# Patient Record
Sex: FEMALE | Race: WHITE | NOT HISPANIC OR LATINO | Employment: FULL TIME | URBAN - METROPOLITAN AREA
[De-identification: names, ages, dates, MRNs, and addresses within clinical notes are randomized per-mention and may not be internally consistent; named-entity substitution may affect disease eponyms.]

---

## 2017-03-16 ENCOUNTER — OFFICE VISIT - RIVER FALLS (OUTPATIENT)
Dept: FAMILY MEDICINE | Facility: CLINIC | Age: 42
End: 2017-03-16

## 2017-03-16 ASSESSMENT — MIFFLIN-ST. JEOR: SCORE: 1669.2

## 2017-05-23 ENCOUNTER — OFFICE VISIT - RIVER FALLS (OUTPATIENT)
Dept: FAMILY MEDICINE | Facility: CLINIC | Age: 42
End: 2017-05-23

## 2017-05-23 ASSESSMENT — MIFFLIN-ST. JEOR: SCORE: 1689.16

## 2017-11-20 ENCOUNTER — OFFICE VISIT - RIVER FALLS (OUTPATIENT)
Dept: FAMILY MEDICINE | Facility: CLINIC | Age: 42
End: 2017-11-20

## 2017-11-20 ASSESSMENT — MIFFLIN-ST. JEOR: SCORE: 1658.32

## 2017-12-05 ENCOUNTER — COMMUNICATION - RIVER FALLS (OUTPATIENT)
Dept: FAMILY MEDICINE | Facility: CLINIC | Age: 42
End: 2017-12-05

## 2017-12-05 ENCOUNTER — OFFICE VISIT - RIVER FALLS (OUTPATIENT)
Dept: FAMILY MEDICINE | Facility: CLINIC | Age: 42
End: 2017-12-05

## 2017-12-05 ASSESSMENT — MIFFLIN-ST. JEOR: SCORE: 1648.34

## 2017-12-11 ENCOUNTER — OFFICE VISIT - RIVER FALLS (OUTPATIENT)
Dept: FAMILY MEDICINE | Facility: CLINIC | Age: 42
End: 2017-12-11

## 2017-12-11 ASSESSMENT — MIFFLIN-ST. JEOR: SCORE: 1687.35

## 2017-12-18 ENCOUNTER — OFFICE VISIT - RIVER FALLS (OUTPATIENT)
Dept: FAMILY MEDICINE | Facility: CLINIC | Age: 42
End: 2017-12-18

## 2018-01-08 ENCOUNTER — OFFICE VISIT - RIVER FALLS (OUTPATIENT)
Dept: FAMILY MEDICINE | Facility: CLINIC | Age: 43
End: 2018-01-08

## 2018-01-08 ASSESSMENT — MIFFLIN-ST. JEOR: SCORE: 1661.95

## 2018-02-20 ENCOUNTER — OFFICE VISIT - RIVER FALLS (OUTPATIENT)
Dept: FAMILY MEDICINE | Facility: CLINIC | Age: 43
End: 2018-02-20

## 2018-03-06 ENCOUNTER — OFFICE VISIT - RIVER FALLS (OUTPATIENT)
Dept: FAMILY MEDICINE | Facility: CLINIC | Age: 43
End: 2018-03-06

## 2018-03-06 ASSESSMENT — MIFFLIN-ST. JEOR: SCORE: 1632.01

## 2018-05-24 ENCOUNTER — OFFICE VISIT - RIVER FALLS (OUTPATIENT)
Dept: FAMILY MEDICINE | Facility: CLINIC | Age: 43
End: 2018-05-24

## 2018-06-14 ENCOUNTER — OFFICE VISIT - RIVER FALLS (OUTPATIENT)
Dept: FAMILY MEDICINE | Facility: CLINIC | Age: 43
End: 2018-06-14

## 2018-06-14 ASSESSMENT — MIFFLIN-ST. JEOR: SCORE: 1574.85

## 2018-06-25 ENCOUNTER — OFFICE VISIT - RIVER FALLS (OUTPATIENT)
Dept: FAMILY MEDICINE | Facility: CLINIC | Age: 43
End: 2018-06-25

## 2018-06-25 ASSESSMENT — MIFFLIN-ST. JEOR: SCORE: 1598.44

## 2018-07-31 ENCOUNTER — OFFICE VISIT - RIVER FALLS (OUTPATIENT)
Dept: FAMILY MEDICINE | Facility: CLINIC | Age: 43
End: 2018-07-31

## 2018-09-01 ENCOUNTER — TRANSFERRED RECORDS (OUTPATIENT)
Dept: HEALTH INFORMATION MANAGEMENT | Facility: CLINIC | Age: 43
End: 2018-09-01

## 2018-09-25 ENCOUNTER — OFFICE VISIT - RIVER FALLS (OUTPATIENT)
Dept: FAMILY MEDICINE | Facility: CLINIC | Age: 43
End: 2018-09-25

## 2018-09-25 ASSESSMENT — MIFFLIN-ST. JEOR: SCORE: 1573.95

## 2018-10-10 ENCOUNTER — OFFICE VISIT - RIVER FALLS (OUTPATIENT)
Dept: FAMILY MEDICINE | Facility: CLINIC | Age: 43
End: 2018-10-10

## 2018-10-10 ASSESSMENT — MIFFLIN-ST. JEOR: SCORE: 1568.5

## 2019-03-28 ENCOUNTER — OFFICE VISIT - RIVER FALLS (OUTPATIENT)
Dept: FAMILY MEDICINE | Facility: CLINIC | Age: 44
End: 2019-03-28

## 2019-04-04 ENCOUNTER — OFFICE VISIT - RIVER FALLS (OUTPATIENT)
Dept: FAMILY MEDICINE | Facility: CLINIC | Age: 44
End: 2019-04-04

## 2019-04-04 ASSESSMENT — MIFFLIN-ST. JEOR: SCORE: 1601.16

## 2019-04-05 LAB
BUN SERPL-MCNC: 16 MG/DL (ref 7–25)
BUN/CREAT RATIO - HISTORICAL: NORMAL (ref 6–22)
CALCIUM SERPL-MCNC: 9.6 MG/DL (ref 8.6–10.2)
CHLORIDE BLD-SCNC: 104 MMOL/L (ref 98–110)
CO2 SERPL-SCNC: 29 MMOL/L (ref 20–32)
CREAT SERPL-MCNC: 0.92 MG/DL (ref 0.5–1.1)
EGFRCR SERPLBLD CKD-EPI 2021: 77 ML/MIN/1.73M2
GLUCOSE BLD-MCNC: 94 MG/DL (ref 65–99)
POTASSIUM BLD-SCNC: 4.7 MMOL/L (ref 3.5–5.3)
SODIUM SERPL-SCNC: 140 MMOL/L (ref 135–146)

## 2019-06-13 ENCOUNTER — COMMUNICATION - RIVER FALLS (OUTPATIENT)
Dept: FAMILY MEDICINE | Facility: CLINIC | Age: 44
End: 2019-06-13

## 2019-08-12 ENCOUNTER — OFFICE VISIT - RIVER FALLS (OUTPATIENT)
Dept: FAMILY MEDICINE | Facility: CLINIC | Age: 44
End: 2019-08-12

## 2019-08-29 ENCOUNTER — COMMUNICATION - RIVER FALLS (OUTPATIENT)
Dept: FAMILY MEDICINE | Facility: CLINIC | Age: 44
End: 2019-08-29

## 2019-09-24 ENCOUNTER — COMMUNICATION - RIVER FALLS (OUTPATIENT)
Dept: FAMILY MEDICINE | Facility: CLINIC | Age: 44
End: 2019-09-24

## 2019-11-05 ENCOUNTER — HOSPITAL ENCOUNTER (EMERGENCY)
Facility: CLINIC | Age: 44
Discharge: HOME | End: 2019-11-05
Attending: NURSE PRACTITIONER | Admitting: NURSE PRACTITIONER
Payer: COMMERCIAL

## 2019-11-05 ENCOUNTER — OFFICE VISIT - RIVER FALLS (OUTPATIENT)
Dept: FAMILY MEDICINE | Facility: CLINIC | Age: 44
End: 2019-11-05

## 2019-11-05 VITALS
TEMPERATURE: 97.6 F | HEIGHT: 62 IN | OXYGEN SATURATION: 99 % | RESPIRATION RATE: 18 BRPM | SYSTOLIC BLOOD PRESSURE: 175 MMHG | HEART RATE: 94 BPM | DIASTOLIC BLOOD PRESSURE: 89 MMHG | WEIGHT: 150 LBS | BODY MASS INDEX: 27.6 KG/M2

## 2019-11-05 DIAGNOSIS — Z76.0 MEDICATION REFILL: ICD-10-CM

## 2019-11-05 PROCEDURE — G0463 HOSPITAL OUTPT CLINIC VISIT: HCPCS | Performed by: NURSE PRACTITIONER

## 2019-11-05 PROCEDURE — 99214 OFFICE O/P EST MOD 30 MIN: CPT | Mod: Z6 | Performed by: NURSE PRACTITIONER

## 2019-11-05 RX ORDER — PREGABALIN 150 MG/1
150 CAPSULE ORAL 2 TIMES DAILY
Qty: 10 CAPSULE | Refills: 0 | Status: SHIPPED | OUTPATIENT
Start: 2019-11-05 | End: 2019-11-06

## 2019-11-05 RX ORDER — PREGABALIN 300 MG/1
300 CAPSULE ORAL 2 TIMES DAILY
COMMUNITY
End: 2019-11-05

## 2019-11-05 ASSESSMENT — ENCOUNTER SYMPTOMS
CHILLS: 0
COUGH: 0
HEADACHES: 1
FATIGUE: 0
VOMITING: 0

## 2019-11-05 ASSESSMENT — MIFFLIN-ST. JEOR: SCORE: 1298.65

## 2019-11-05 NOTE — ED PROVIDER NOTES
"  History     Chief Complaint   Patient presents with     Medication Refill     pt moved to [place] from [place] and has no PCP.  seeking refill of gabapentin and migraine meds     HPI  Apoorva Roberts is a 43 year old female who presents to urgent care requesting refill of medications. Patient states she recently moved to [place] from [place] and would like refill of Lyrica, Gabapentin, and Fioricet that she takes for chronic pain and migraine headaches.   Per [place] Prescription Monitoring Database ([place]):   Last refill of Pregabalin 150 mg capsule was #60 (30 day supply) on 9/24/2019.  No evidence of getting Fioricet        I have no access to her prior medical records or history other than what is noted in MNPMD above.         Allergies:  No Known Allergies    Problem List:    There are no active problems to display for this patient.       Past Medical History:    History reviewed. No pertinent past medical history.    Past Surgical History:    History reviewed. No pertinent surgical history.    Family History:    History reviewed. No pertinent family history.    Social History:  Marital Status:    Social History     Tobacco Use     Smoking status: Current Every Day Smoker     Packs/day: 1.00     Smokeless tobacco: Never Used   Substance Use Topics     Alcohol use: None     Drug use: None        Medications:    pregabalin (LYRICA) 150 MG capsule          Review of Systems   Constitutional: Negative for chills and fatigue.   HENT: Negative for congestion.    Respiratory: Negative for cough.    Cardiovascular: Negative for chest pain.   Gastrointestinal: Negative for vomiting.   Neurological: Positive for headaches.       Physical Exam   BP: (!) 175/89  Pulse: 94  Temp: 97.6  F (36.4  C)  Resp: 18  Height: 157.5 cm (5' 2\")  Weight: 68 kg (150 lb)  SpO2: 99 %      Physical Exam  Constitutional:       General: She is not in acute distress.     Appearance: Normal appearance. She is not ill-appearing.   Cardiovascular:      " Rate and Rhythm: Normal rate.   Pulmonary:      Effort: Pulmonary effort is normal.   Neurological:      Mental Status: She is alert and oriented to person, place, and time.         ED Course        Procedures                 No results found for this or any previous visit (from the past 24 hour(s)).    Medications - No data to display    Assessments & Plan (with Medical Decision Making)   The majority of this visit was counseling patient. Discussed the appropriate avenue for refill medications and establishing care. I am not comfortable refilling her Fioricet, and we do not provide opioid refills for chronic pain here in Urgent Care. Patient states she is on both Gabapentin and Pregabalin which seems unusual. I was able to verify Pregabalin was refilled on 9/24 for 150 mg tablets (60 tabs) for a 30 day supply.  Plan as follows:  Refill provided for a 10 day supply of Pregabalin 150 mg twice a day.  Establish care with a primary care provider for further medication refills, and an appointment was scheduled for you tomorrow at 2:20apm with Ginger Tamez NP here at the [facility].    I have reviewed the nursing notes.    I have reviewed the findings, diagnosis, plan and need for follow up with the patient.      New Prescriptions    PREGABALIN (LYRICA) 150 MG CAPSULE    Take 1 capsule (150 mg) by mouth 2 times daily       Final diagnoses:   Medication refill       11/5/2019   [facility] EMERGENCY DEPARTMENT     Vale Carter APRN CNP  11/05/19 1320

## 2019-11-06 ENCOUNTER — OFFICE VISIT (OUTPATIENT)
Dept: FAMILY MEDICINE | Facility: CLINIC | Age: 44
End: 2019-11-06
Payer: COMMERCIAL

## 2019-11-06 VITALS
SYSTOLIC BLOOD PRESSURE: 166 MMHG | TEMPERATURE: 98.9 F | DIASTOLIC BLOOD PRESSURE: 104 MMHG | HEIGHT: 62 IN | HEART RATE: 89 BPM | OXYGEN SATURATION: 100 % | WEIGHT: 182 LBS | BODY MASS INDEX: 33.49 KG/M2

## 2019-11-06 DIAGNOSIS — I10 BENIGN ESSENTIAL HYPERTENSION: ICD-10-CM

## 2019-11-06 DIAGNOSIS — G62.89 OTHER POLYNEUROPATHY: Primary | ICD-10-CM

## 2019-11-06 DIAGNOSIS — Z23 NEED FOR PROPHYLACTIC VACCINATION AND INOCULATION AGAINST INFLUENZA: ICD-10-CM

## 2019-11-06 DIAGNOSIS — Z23 ENCOUNTER FOR VACCINATION: ICD-10-CM

## 2019-11-06 DIAGNOSIS — G43.009 MIGRAINE WITHOUT AURA AND WITHOUT STATUS MIGRAINOSUS, NOT INTRACTABLE: ICD-10-CM

## 2019-11-06 PROBLEM — G43.109 CLASSIC MIGRAINE: Status: ACTIVE | Noted: 2019-11-06

## 2019-11-06 PROBLEM — L73.2 HIDRADENITIS SUPPURATIVA: Status: ACTIVE | Noted: 2017-09-11

## 2019-11-06 PROBLEM — K21.9 GASTROESOPHAGEAL REFLUX DISEASE: Status: ACTIVE | Noted: 2017-12-05

## 2019-11-06 PROBLEM — J18.9 COMMUNITY ACQUIRED PNEUMONIA: Status: ACTIVE | Noted: 2017-12-05

## 2019-11-06 PROCEDURE — 90471 IMMUNIZATION ADMIN: CPT | Performed by: NURSE PRACTITIONER

## 2019-11-06 PROCEDURE — 99214 OFFICE O/P EST MOD 30 MIN: CPT | Mod: 25 | Performed by: NURSE PRACTITIONER

## 2019-11-06 PROCEDURE — 90732 PPSV23 VACC 2 YRS+ SUBQ/IM: CPT | Performed by: NURSE PRACTITIONER

## 2019-11-06 PROCEDURE — 90686 IIV4 VACC NO PRSV 0.5 ML IM: CPT | Performed by: NURSE PRACTITIONER

## 2019-11-06 PROCEDURE — 90472 IMMUNIZATION ADMIN EACH ADD: CPT | Performed by: NURSE PRACTITIONER

## 2019-11-06 RX ORDER — GABAPENTIN 300 MG/1
CAPSULE ORAL
Refills: 0 | COMMUNITY
Start: 2019-09-24 | End: 2019-11-06

## 2019-11-06 RX ORDER — BUTALBITAL, ACETAMINOPHEN AND CAFFEINE 50; 325; 40 MG/1; MG/1; MG/1
TABLET ORAL
Refills: 0 | Status: CANCELLED | OUTPATIENT
Start: 2019-11-06

## 2019-11-06 RX ORDER — ZOLPIDEM TARTRATE 10 MG/1
TABLET ORAL
Refills: 1 | COMMUNITY
Start: 2019-10-23 | End: 2019-12-23

## 2019-11-06 RX ORDER — HYDROCHLOROTHIAZIDE 25 MG/1
25 TABLET ORAL DAILY
Qty: 90 TABLET | Refills: 3 | Status: SHIPPED | OUTPATIENT
Start: 2019-11-06 | End: 2021-02-01

## 2019-11-06 RX ORDER — RIZATRIPTAN BENZOATE 5 MG/1
5-10 TABLET ORAL
Qty: 18 TABLET | Refills: 11 | Status: SHIPPED | OUTPATIENT
Start: 2019-11-06 | End: 2020-11-16

## 2019-11-06 RX ORDER — PREGABALIN 150 MG/1
150 CAPSULE ORAL 2 TIMES DAILY
Qty: 10 CAPSULE | Refills: 0 | Status: CANCELLED | OUTPATIENT
Start: 2019-11-06

## 2019-11-06 RX ORDER — LIDOCAINE/PRILOCAINE 2.5 %-2.5%
1 CREAM (GRAM) TOPICAL DAILY PRN
COMMUNITY
Start: 2017-09-11 | End: 2022-03-21

## 2019-11-06 RX ORDER — HYDROCHLOROTHIAZIDE 25 MG/1
25 TABLET ORAL
COMMUNITY
End: 2019-11-06

## 2019-11-06 RX ORDER — HYDROCODONE BITARTRATE AND ACETAMINOPHEN 5; 325 MG/1; MG/1
TABLET ORAL
Refills: 0 | COMMUNITY
Start: 2019-08-22 | End: 2019-11-06

## 2019-11-06 RX ORDER — GABAPENTIN 300 MG/1
CAPSULE ORAL
Qty: 360 CAPSULE | Refills: 11 | Status: SHIPPED | OUTPATIENT
Start: 2019-11-06 | End: 2020-09-17

## 2019-11-06 RX ORDER — BUTALBITAL, ACETAMINOPHEN AND CAFFEINE 50; 325; 40 MG/1; MG/1; MG/1
TABLET ORAL
Refills: 0 | COMMUNITY
Start: 2019-09-20 | End: 2019-11-06

## 2019-11-06 ASSESSMENT — MIFFLIN-ST. JEOR: SCORE: 1443.8

## 2019-11-06 NOTE — PROGRESS NOTES
Subjective     Apoorva Roberts is a 43 year old female who presents to clinic today for the following health issues:    HPI   Chief Complaint   Patient presents with     ER F/U     Establish Care     Refill Request     Imm/Inj     Flu Shot         New Patient/Transfer of Care  Recently moved into this area from [place]      ED/ Followup:    Facility:  [facility]  Date of visit: 11/5/2019- yesterday  Reason for visit: refill request of gabapentin and migraine medications  Current Status: requesting refills       Migraine     Since your last clinic visit, how have your headaches changed?  Worsened    How often are you getting headaches or migraines? daily     Are you able to do normal daily activities when you have a migraine? Yes- sometimes    Are you taking rescue/relief medications? (Select all that apply) ibuprofen (Advil, Motrin) and Tylenol    How helpful is your rescue/relief medication?  I get only a small amount of relief;  Has been out of the Fioricet  That  Medication is very effective -  has taken Fioricet for headaches since she was a teenager.  She previously tried Imitrex which did not work.    Are you taking any medications to prevent migraines? (Select all that apply)  No    In the past 4 weeks, how often have you gone to urgent care or the emergency room because of your headaches?  1      Hypertension: Patient has long history of high blood pressure.  She has been on hydrochlorothiazide 25 mg daily.  She has been out of this medication for a while.        Chronic Pain Follow-Up       Type / Location of Pain: nerve damage in both legs - happened during a hernia surgery  Analgesia/pain control:       Recent changes:  Worse because she has been out of her medication      Overall control: Tolerable with discomfort  Patient currently is taking gabapentin 1200 mg 3 times daily and Lyrica 150 mg twice daily.  She states her previous doctor prescribed both of these medications starting 1 year  "ago.  Activity level/function:      Daily activities:  Able to do all daily activities    Work:  Pain does not limit any  Work as long as she can move around  Adverse effects:  No  Adherance    Taking medication as directed?  Yes- doesn't have any currently    Participating in other treatments: yes  Risk Factors:    Sleep:  Fair    Mood/anxiety:  controlled    Recent family or social stressors:  recent move and job change/loss; doesn't have place to live currently    Other aggravating factors: none  No flowsheet data found.  No flowsheet data found.  Encounter-Level CSA:    There are no encounter-level csa.     Patient-Level CSA:    There are no patient-level csa.       Reviewed and updated as needed this visit by Provider  Tobacco  Allergies  Meds  Problems  Med Hx  Surg Hx  Fam Hx         Review of Systems         Objective    BP (!) 166/104 (BP Location: Right arm, Cuff Size: Adult Large)   Pulse 89   Temp 98.9  F (37.2  C) (Tympanic)   Ht 1.575 m (5' 2\")   Wt 82.6 kg (182 lb)   SpO2 100%   Breastfeeding? No   BMI 33.29 kg/m    Body mass index is 33.29 kg/m .  Physical Exam   GENERAL: healthy, alert and no distress  EYES: Eyes grossly normal to inspection, PERRL and conjunctivae and sclerae normal  HENT: ear canals and TM's normal, nose and mouth without ulcers or lesions  NECK: no adenopathy, no asymmetry, masses, or scars and thyroid normal to palpation  RESP: lungs clear to auscultation - no rales, rhonchi or wheezes  CV: regular rate and rhythm, normal S1 S2, no S3 or S4, no murmur, click or rub, no peripheral edema and peripheral pulses strong  ABDOMEN: soft, nontender, no hepatosplenomegaly, no masses and bowel sounds normal  MS: no gross musculoskeletal defects noted, no edema            Assessment & Plan       ICD-10-CM    1. Other polyneuropathy G62.89 Peripheral neuropathy because after complication during hernia surgery.  Patient reports taking both gabapentin and Lyrica.  Discussed with " "patient that I questioned the safety of this.  Recommended treatment with just 1 of these medications.  She would like to stay on the gabapentin as she feels this provides better pain control.  Gabapentin was refilled at her current dose of 1200 mg 3 times daily.  Discontinue the Lyrica.    gabapentin (NEURONTIN) 300 MG capsule     2. Migraine without aura and without status migrainosus, not intractable G43.009 Patient has a long history of chronic daily headaches.  She has been using Fioricet since she was a teenager.  Previously tried Imitrex which was ineffective.  Discussed with patient that Fioricet is no longer recommended as therapy for chronic daily headaches.  Recommend trial of Maxalt.  If she continues to have chronic daily headaches recommend headache prevention medication such as topiramate.  Patient will follow-up in 1 month to reevaluate this.    rizatriptan (MAXALT) 5 MG tablet     3. Benign essential hypertension I10 Blood pressures are poorly controlled.  Restart hydrochlorothiazide 25 mg daily.  Recheck in one month    hydrochlorothiazide (HYDRODIURIL) 25 MG tablet     4. Need for prophylactic vaccination and inoculation against influenza Z23 INFLUENZA VACCINE IM > 6 MONTHS VALENT IIV4 [70234]     Vaccine Administration, Initial [61952]   5. Encounter for vaccination Z23 Pneumococcal vaccine 23 valent PPSV23  (Pneumovax) [05400]     Vaccine Administration, Each Additional [69260]        Tobacco Cessation:   reports that she has been smoking. She has been smoking about 1.00 pack per day. She has never used smokeless tobacco.  Tobacco Cessation Action Plan: Information offered: Patient not interested at this time      BMI:   Estimated body mass index is 33.29 kg/m  as calculated from the following:    Height as of this encounter: 1.575 m (5' 2\").    Weight as of this encounter: 82.6 kg (182 lb).   Weight management plan: Discussed healthy diet and exercise guidelines            Return in about 4 " weeks (around 12/4/2019).    The risks, benefits and treatment options of prescribed medications or other treatments have been discussed with the patient. The patient verbalized their understanding and should call or follow up if no improvement or if they develop further problems.    ISAAC Omer [facility]

## 2019-11-06 NOTE — NURSING NOTE
Prior to immunization administration, verified patients identity using patient s name and date of birth. Please see Immunization Activity for additional information.     Screening Questionnaire for Adult Immunization    Are you sick today?   No   Do you have allergies to medications, food, a vaccine component or latex?   Yes   Have you ever had a serious reaction after receiving a vaccination?   No   Do you have a long-term health problem with heart disease, lung disease, asthma, kidney disease, metabolic disease (e.g. diabetes), anemia, or other blood disorder?   No   Do you have cancer, leukemia, HIV/AIDS, or any other immune system problem?   No   In the past 3 months, have you taken medications that affect  your immune system, such as prednisone, other steroids, or anticancer drugs; drugs for the treatment of rheumatoid arthritis, Crohn s disease, or psoriasis; or have you had radiation treatments?   No   Have you had a seizure, or a brain or other nervous system problem?   No   During the past year, have you received a transfusion of blood or blood     products, or been given immune (gamma) globulin or antiviral drug?   No   For women: Are you pregnant or is there a chance you could become        pregnant during the next month?   No   Have you received any vaccinations in the past 4 weeks?   No     Immunization questionnaire was positive for at least one answer.  Notified provider.        Per orders of Dr. Tamez, injection of flu and pneumovax PPVS23 given by JACK FELDER. Patient instructed to remain in clinic for 15 minutes afterwards, and to report any adverse reaction to me immediately.       Screening performed by JACK FELDER on 11/6/2019 at 2:55 PM.

## 2019-11-06 NOTE — PATIENT INSTRUCTIONS
Your clinic record indicates that you are due for:   Pap and physical exam  If fasting labs are indicated, call before coming in to let the  know when you are coming in.  Need to be fasting for 10 hrs so just coming in before eating breakfast is the easiest.         Thank you for choosing [facility].  You may be receiving an email and/or telephone survey request from [facility] Customer Experience regarding your visit today.  Please take a few minutes to respond to the survey to let us know how we are doing.      If you have questions or concerns, please contact us via activ[employer] Intelligence or you can contact your care team at [phone].    Our Clinic hours are:  Monday 6:40 am  to 7:00 pm  Tuesday -Friday 6:40 am to 5:00 pm    The [place] outpatient lab hours are:  Monday - Friday 6:10 am to 4:45 pm  Saturdays 7:00 am to 11:00 am  Appointments are required, call [phone]    If you have clinical questions after hours or would like to schedule an appointment,  call the clinic at [phone].

## 2019-12-20 RX ORDER — ZOLPIDEM TARTRATE 10 MG/1
TABLET ORAL
Refills: 1 | Status: CANCELLED | OUTPATIENT
Start: 2019-12-20

## 2019-12-20 NOTE — TELEPHONE ENCOUNTER
Requested Prescriptions   Pending Prescriptions Disp Refills     zolpidem (AMBIEN) 10 MG tablet  Historical  Last office visit: 11/6/2019 with prescribing provider:  Ginger Tamez   Future Office Visit:      1       There is no refill protocol information for this order

## 2019-12-20 NOTE — TELEPHONE ENCOUNTER
Routing refill request to provider for review/approval because:  Drug not on the [facility] refill protocol   Medication is reported/historical  Rossana ASHER RN

## 2019-12-23 ENCOUNTER — OFFICE VISIT (OUTPATIENT)
Dept: FAMILY MEDICINE | Facility: CLINIC | Age: 44
End: 2019-12-23
Payer: COMMERCIAL

## 2019-12-23 VITALS
HEIGHT: 62 IN | HEART RATE: 89 BPM | OXYGEN SATURATION: 99 % | BODY MASS INDEX: 32.02 KG/M2 | WEIGHT: 174 LBS | RESPIRATION RATE: 12 BRPM | DIASTOLIC BLOOD PRESSURE: 102 MMHG | SYSTOLIC BLOOD PRESSURE: 182 MMHG | TEMPERATURE: 99.2 F

## 2019-12-23 DIAGNOSIS — F51.01 PRIMARY INSOMNIA: ICD-10-CM

## 2019-12-23 DIAGNOSIS — I10 BENIGN ESSENTIAL HYPERTENSION: ICD-10-CM

## 2019-12-23 DIAGNOSIS — J01.00 ACUTE NON-RECURRENT MAXILLARY SINUSITIS: Primary | ICD-10-CM

## 2019-12-23 PROCEDURE — 99214 OFFICE O/P EST MOD 30 MIN: CPT | Performed by: NURSE PRACTITIONER

## 2019-12-23 RX ORDER — LISINOPRIL 20 MG/1
20 TABLET ORAL DAILY
Qty: 30 TABLET | Refills: 1 | Status: SHIPPED | OUTPATIENT
Start: 2019-12-23 | End: 2020-02-12

## 2019-12-23 RX ORDER — ZOLPIDEM TARTRATE 10 MG/1
10 TABLET ORAL
Qty: 30 TABLET | Refills: 3 | Status: SHIPPED | OUTPATIENT
Start: 2019-12-23 | End: 2020-04-01

## 2019-12-23 ASSESSMENT — MIFFLIN-ST. JEOR: SCORE: 1402.51

## 2019-12-23 NOTE — PATIENT INSTRUCTIONS
Your clinic record indicates that you are due for:   Pap and physical exam      For blood pressure:  Continue hydrochlorothiazide 25 mg daily  Add lisinopril 20 mg daily.  Follow up in one month.      Thank you for choosing [facility].  You may be receiving an email and/or telephone survey request from [facility] Customer Experience regarding your visit today.  Please take a few minutes to respond to the survey to let us know how we are doing.      If you have questions or concerns, please contact us via In[employer] or you can contact your care team at [phone].    Our Clinic hours are:  Monday 6:40 am  to 7:00 pm  Tuesday -Friday 6:40 am to 5:00 pm    The [place] outpatient lab hours are:  Monday - Friday 6:10 am to 4:45 pm  Saturdays 7:00 am to 11:00 am  Appointments are required, call [phone]    If you have clinical questions after hours or would like to schedule an appointment,  call the clinic at [phone].

## 2019-12-23 NOTE — TELEPHONE ENCOUNTER
Left message on answering machine for patient to call back.  CSS, when she calls back, please tell her know that Ginger was asked about a prescription for zolpidem/ ambien and will need to see her before she could consider this prescription. Please help her schedule if she would like to come in to talk about it, thanks,   Roberta Jorge RNC

## 2019-12-23 NOTE — PROGRESS NOTES
"Subjective     Apoorva Roberts is a 44 year old female who presents to clinic today for the following health issues:    HPI   ENT Symptoms             Symptoms: cc Present Absent Comment   Fever/Chills   x    Fatigue       Muscle Aches   x    Eye Irritation       Sneezing  x     Nasal Pranav/Drg x x     Sinus Pressure/Pain x x     Loss of smell       Dental pain   x    Sore Throat   x    Swollen Glands       Ear Pain/Fullness   x    Cough   x Not really   Wheeze   x    Chest Pain   x    Shortness of breath       Rash       Other         Symptom duration:  has been sick with a cold for a couple weeks  Yesterday had severe sinus pain   Symptom severity:  moderate   Treatments tried:  nyquil and mucinex   Contacts:  none known       Hypertension Follow-up      Do you check your blood pressure regularly outside of the clinic? No     Are you following a low salt diet? No    Are your blood pressures ever more than 140 on the top number (systolic) OR more   than 90 on the bottom number (diastolic), for example 140/90? Yes   She is taking her hydrochlorothiazide every day.      Insomnia:  Takes Ambien 10 mg at bedtime if needed.  Tries not to use it every day.  No side effects.  No residual daytime grogginess              Reviewed and updated as needed this visit by Provider         Review of Systems   ROS COMP: Constitutional, HEENT, cardiovascular, pulmonary, gi and gu systems are negative, except as otherwise noted.      Objective    BP (!) 162/102 (BP Location: Right arm)   Pulse 89   Temp 99.2  F (37.3  C) (Tympanic)   Resp 12   Ht 1.575 m (5' 2\")   Wt 78.9 kg (174 lb)   SpO2 99%   BMI 31.83 kg/m    Body mass index is 31.83 kg/m .  Physical Exam   GENERAL: healthy, alert and no distress  HENT: ear canals and TM's normal, nose and mouth without ulcers or lesions  NECK: no adenopathy, no asymmetry, masses, or scars and thyroid normal to palpation  RESP: lungs clear to auscultation - no rales, rhonchi or wheezes  CV: " regular rate and rhythm, normal S1 S2, no S3 or S4, no murmur, click or rub, no peripheral edema and peripheral pulses strong            Assessment & Plan       ICD-10-CM    1. Acute non-recurrent maxillary sinusitis J01.00 amoxicillin-clavulanate (AUGMENTIN) 875-125 MG tablet BID for 10 days.     2. Primary insomnia F51.01 zolpidem (AMBIEN) 10 MG tablet - refilled.     3. Benign essential hypertension I10 Poorly controlled.  Add lisinopril (PRINIVIL/ZESTRIL) 20 MG tablet          Patient Instructions   Your clinic record indicates that you are due for:   Pap and physical exam      For blood pressure:  Continue hydrochlorothiazide 25 mg daily  Add lisinopril 20 mg daily.  Follow up in one month.      Thank you for choosing [facility].  You may be receiving an email and/or telephone survey request from [facility] Customer Experience regarding your visit today.  Please take a few minutes to respond to the survey to let us know how we are doing.      If you have questions or concerns, please contact us via Alek[employer] or you can contact your care team at [phone].    Our Clinic hours are:  Monday 6:40 am  to 7:00 pm  Tuesday -Friday 6:40 am to 5:00 pm    The [place] outpatient lab hours are:  Monday - Friday 6:10 am to 4:45 pm  Saturdays 7:00 am to 11:00 am  Appointments are required, call [phone]    If you have clinical questions after hours or would like to schedule an appointment,  call the clinic at [phone].        Return in about 4 weeks (around 1/20/2020) for BP Recheck.    The risks, benefits and treatment options of prescribed medications or other treatments have been discussed with the patient. The patient verbalized their understanding and should call or follow up if no improvement or if they develop further problems.    ISAAC Omer CNP  [facility]

## 2020-01-30 ENCOUNTER — TELEPHONE (OUTPATIENT)
Dept: FAMILY MEDICINE | Facility: CLINIC | Age: 45
End: 2020-01-30

## 2020-01-30 NOTE — TELEPHONE ENCOUNTER
Patient Quality Outreach Summary      Summary:    Patient is due/failing the following:   Hypertension follow-up visit    Type of outreach:    Call attempted, number listed is changed or no longer in service, unable to leave message.  Letter mailed with recommendations.    Questions for provider review:    None                                                                                                                    YVONNE Washington MA

## 2020-01-30 NOTE — LETTER
January 30, 2020      Apoorva Roberts  [address]  [address]  [place] [address]        Dear Apoorva,     In order to ensure we are providing the best quality care, we have reviewed your chart and see that you are due for:  A blood pressure recheck with your provider.  A new medication was added at your last office visit and your provider would like you to recheck with her.  You can call the clinic at [phone] to schedule an appointment.    Please call the clinic with any questions or concerns.    Thank you for trusting us with your health care.  Sincerely,    Your [facility] Team/lw

## 2020-01-30 NOTE — TELEPHONE ENCOUNTER
Lisinopril added last month.  Please call and remind patient to follow up with me for a recheck.  Ginger Tamez, CNP

## 2020-02-10 DIAGNOSIS — I10 BENIGN ESSENTIAL HYPERTENSION: ICD-10-CM

## 2020-02-10 NOTE — TELEPHONE ENCOUNTER
"Requested Prescriptions   Pending Prescriptions Disp Refills     lisinopril (PRINIVIL/ZESTRIL) 20 MG tablet [Pharmacy Med Name: LISINOPRIL 20 MG TABLET] 30 tablet 1     Sig: Take 1 tablet (20 mg) by mouth daily       ACE Inhibitors (Including Combos) Protocol Failed - 2/10/2020  8:00 AM        Failed - Blood pressure under 140/90 in past 12 months     BP Readings from Last 3 Encounters:   12/23/19 (!) 182/102   11/06/19 (!) 166/104   11/05/19 (!) 175/89                 Failed - Normal serum creatinine on file in past 12 months     No lab results found.          Failed - Normal serum potassium on file in past 12 months     No lab results found.          Passed - Recent (12 mo) or future (30 days) visit within the authorizing provider's specialty     Patient has had an office visit with the authorizing provider or a provider within the authorizing providers department within the previous 12 mos or has a future within next 30 days. See \"Patient Info\" tab in inbasket, or \"Choose Columns\" in Meds & Orders section of the refill encounter.              Passed - Medication is active on med list        Passed - Patient is age 18 or older        Passed - No active pregnancy on record        Passed - No positive pregnancy test within past 12 months        Last Written Prescription Date:  12/23/2019  Last Fill Quantity: 30,  # refills: 1   Last office visit: 12/23/2019 with prescribing provider:  Joi   Future Office Visit:      "

## 2020-02-10 NOTE — LETTER
[facility]  [address] [place] [address]  Phone: [phone]       February 12, 2020         Apoorva Roberts  [address]  [address]  [place] [address]            Dear Apoorva:    We are concerned about your health care.  We recently provided you with medication refills.  Many medications require routine follow-up with your doctor.    Your prescription(s) have been refilled for 30 days so you may have time for the above noted follow-up. Please call to schedule soon so we can assure you have an appointment before your next refills are needed.    Thank you,      ALEXANDER Omer / shun

## 2020-02-12 RX ORDER — LISINOPRIL 20 MG/1
20 TABLET ORAL DAILY
Qty: 30 TABLET | Refills: 0 | Status: SHIPPED | OUTPATIENT
Start: 2020-02-12 | End: 2020-09-17

## 2020-02-12 NOTE — TELEPHONE ENCOUNTER
Due for clinic visit and labs. 30 day joaquin fill given. Reminder letter sent.        BRIDGETTE MoreauN, RN

## 2020-04-28 ENCOUNTER — APPOINTMENT (OUTPATIENT)
Dept: FAMILY MEDICINE | Facility: CLINIC | Age: 45
End: 2020-04-28
Payer: COMMERCIAL

## 2020-04-29 ENCOUNTER — TELEPHONE (OUTPATIENT)
Dept: FAMILY MEDICINE | Facility: CLINIC | Age: 45
End: 2020-04-29

## 2020-04-29 NOTE — TELEPHONE ENCOUNTER
BP was very high last time I saw her and medications were adjusted.  She is due for follow up.  Please call patient and schedule her a face-to-face visit with me next week when I'm back in [place].  Ginger Tamez, CNP

## 2020-04-29 NOTE — LETTER
May 7, 2020      Apoorva Roberts  [address] [address]        Dear Apoorva,     In order to ensure we are providing the best quality care, we have reviewed your chart and see that you are due for:  A follow up appointment with your provider regarding your blood pressure.  Please call the clinic at [phone] to schedule an appointment.    Please call the clinic with any questions or concerns.    Thank you for trusting us with your health care.  Sincerely,    Your [facility] Team/lw

## 2020-04-29 NOTE — TELEPHONE ENCOUNTER
Panel Management Review      Patient has the following on her problem list:     Hypertension   Last three blood pressure readings:  BP Readings from Last 3 Encounters:   12/23/19 (!) 182/102   11/06/19 (!) 166/104   11/05/19 (!) 175/89     Blood pressure: FAILED    HTN Guidelines:  Less than 140/90      Composite cancer screening  Chart review shows that this patient is due/due soon for the following Pap Smear  Summary:    Patient is due/failing the following:   BP CHECK, PAP( will defer pappe until Fall 2020)  and PHQ9  BP was very high last time I saw her and medications were adjusted.  She is due for follow up.  Please call patient and schedule her a face-to-face visit with me next week when I'm back in [place].  Ginger Tamez CNP    Type of outreach:    Phone, left message for patient to call back.       Vimal GARCIA CMA

## 2020-05-04 NOTE — TELEPHONE ENCOUNTER
Attempt #2, Left message for patient to call back. Vimal SANTIAGO     Patient is due/failing the following:   BP CHECK, PAP( will defer pappe until Fall 2020)  and PHQ9  BP was very high last time I saw her and medications were adjusted.  She is due for follow up.  Please call patient and schedule her a face-to-face visit with me next week when I'm back in [place].  Ginger Tamez, CNP

## 2020-06-29 ENCOUNTER — TELEPHONE (OUTPATIENT)
Dept: FAMILY MEDICINE | Facility: CLINIC | Age: 45
End: 2020-06-29

## 2020-06-29 NOTE — TELEPHONE ENCOUNTER
"[facility] note regarding zolpidem (AMBIEN) 10 MG tablet:    \"To Ginger Tamez,  Please review Apoorva's Zolpidem refills dates. She claims she is out today. Looking at fill history she should hav an extra 18 or 19 tablets; Please advise. She claims she doesn't take an extra and doesn't know what has happened. She claims she should be able to get them because you know and she takes them. Please Advise.  RICHARD Harden\"     Fill dates from pharmacy:  9/24/201 30 tabs  11/18/2019 30 tabs  12/23/2019 30 tabs   1/20/2020 30 tabs   2/15/2020 30 tabs  3/12/2020 30 tabs   4/8/2020 30 tabs  5/5/2020 30 tabs  6/1/2020 30 tabs      "

## 2020-06-29 NOTE — TELEPHONE ENCOUNTER
Called [facility] [place] [facility] [address] [address] [phone] and spoke with [facility]. Pt transferred all her Rx's to [facility] on Saturday, 6/27/2020.    I left a message for the patient to return my call.    Rossana ASHER RN, BSN

## 2020-06-29 NOTE — TELEPHONE ENCOUNTER
She can refill today.    However please call and instruct her to be careful with her prescription - make sure no one else is using her pills.  I am approving the refill today, however I will not approve early refills in the future.  Ginger Tamez, CNP

## 2020-06-30 NOTE — TELEPHONE ENCOUNTER
Called pt again and this time she answered. Message below from JENNIFER GRAY relayed. She did switch her pharmacy, as she says that [facility] is closer to her home.  Due to her rides, she will  a couple days early and she still has some at home.    Rossana ASHER RN, BSN

## 2020-08-20 DIAGNOSIS — F51.01 PRIMARY INSOMNIA: ICD-10-CM

## 2020-08-20 NOTE — TELEPHONE ENCOUNTER
Requested Prescriptions   Pending Prescriptions Disp Refills     zolpidem (AMBIEN) 10 MG tablet [Pharmacy Med Name: zolpidem 10 mg tablet] 87 tablet 0     Sig: Take 1 Tablet BY MOUTH at bedtime AS NEEDED       There is no refill protocol information for this order

## 2020-08-20 NOTE — LETTER
[facility]  [address] [address]  Phone: [phone]       August 21, 2020         Apoorva Roberts  [address] [address] [address]            Dear Apoorva:    We are concerned about your health care.  We recently provided you with medication refills.  Many medications require routine follow-up with your doctor.    Your prescription(s) have been refilled for 3 months so you may have time for the above noted follow-up. Please call to schedule soon so we can assure you have an appointment before your next refills are needed.    Thank you,      ALEXANDER Omer / Odalys HARTMAN RN

## 2020-08-21 RX ORDER — ZOLPIDEM TARTRATE 10 MG/1
TABLET ORAL
Qty: 30 TABLET | Refills: 2 | Status: SHIPPED | OUTPATIENT
Start: 2020-08-21 | End: 2020-11-05

## 2020-08-22 DIAGNOSIS — G62.89 OTHER POLYNEUROPATHY: ICD-10-CM

## 2020-08-23 ENCOUNTER — APPOINTMENT (OUTPATIENT)
Dept: CT IMAGING | Facility: CLINIC | Age: 45
End: 2020-08-23
Attending: EMERGENCY MEDICINE
Payer: COMMERCIAL

## 2020-08-23 ENCOUNTER — HOSPITAL ENCOUNTER (EMERGENCY)
Facility: CLINIC | Age: 45
Discharge: HOME | End: 2020-08-23
Attending: EMERGENCY MEDICINE | Admitting: EMERGENCY MEDICINE
Payer: COMMERCIAL

## 2020-08-23 VITALS
TEMPERATURE: 97.7 F | OXYGEN SATURATION: 98 % | RESPIRATION RATE: 16 BRPM | HEART RATE: 119 BPM | BODY MASS INDEX: 29.26 KG/M2 | WEIGHT: 160 LBS | SYSTOLIC BLOOD PRESSURE: 158 MMHG | DIASTOLIC BLOOD PRESSURE: 116 MMHG

## 2020-08-23 DIAGNOSIS — S22.41XA CLOSED FRACTURE OF MULTIPLE RIBS OF RIGHT SIDE, INITIAL ENCOUNTER: ICD-10-CM
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
## 2020-08-23 LAB
ALBUMIN SERPL-MCNC: 3.9 G/DL (ref 3.4–5)
ALBUMIN UR-MCNC: 30 MG/DL
ALP SERPL-CCNC: 74 U/L (ref 40–150)
ALT SERPL W P-5'-P-CCNC: 34 U/L (ref 0–50)
ANION GAP SERPL CALCULATED.3IONS-SCNC: 5 MMOL/L (ref 3–14)
APPEARANCE UR: ABNORMAL
AST SERPL W P-5'-P-CCNC: 24 U/L (ref 0–45)
BACTERIA #/AREA URNS HPF: ABNORMAL /HPF
BASOPHILS # BLD AUTO: 0.1 10E9/L (ref 0–0.2)
BASOPHILS NFR BLD AUTO: 0.4 %
BILIRUB SERPL-MCNC: 0.6 MG/DL (ref 0.2–1.3)
BILIRUB UR QL STRIP: NEGATIVE
BUN SERPL-MCNC: 10 MG/DL (ref 7–30)
CALCIUM SERPL-MCNC: 8.8 MG/DL (ref 8.5–10.1)
CHLORIDE SERPL-SCNC: 108 MMOL/L (ref 94–109)
CO2 SERPL-SCNC: 24 MMOL/L (ref 20–32)
COLOR UR AUTO: YELLOW
CREAT SERPL-MCNC: 0.89 MG/DL (ref 0.52–1.04)
DIFFERENTIAL METHOD BLD: ABNORMAL
EOSINOPHIL # BLD AUTO: 0.1 10E9/L (ref 0–0.7)
EOSINOPHIL NFR BLD AUTO: 0.4 %
ERYTHROCYTE [DISTWIDTH] IN BLOOD BY AUTOMATED COUNT: 12.4 % (ref 10–15)
GFR SERPL CREATININE-BSD FRML MDRD: 80 ML/MIN/{1.73_M2}
GLUCOSE SERPL-MCNC: 109 MG/DL (ref 70–99)
GLUCOSE UR STRIP-MCNC: NEGATIVE MG/DL
HCT VFR BLD AUTO: 45.4 % (ref 35–47)
HGB BLD-MCNC: 15.4 G/DL (ref 11.7–15.7)
HGB UR QL STRIP: NEGATIVE
IMM GRANULOCYTES # BLD: 0.1 10E9/L (ref 0–0.4)
IMM GRANULOCYTES NFR BLD: 0.4 %
KETONES UR STRIP-MCNC: NEGATIVE MG/DL
LEUKOCYTE ESTERASE UR QL STRIP: NEGATIVE
LIPASE SERPL-CCNC: 64 U/L (ref 73–393)
LYMPHOCYTES # BLD AUTO: 1.8 10E9/L (ref 0.8–5.3)
LYMPHOCYTES NFR BLD AUTO: 16.5 %
MCH RBC QN AUTO: 34.2 PG (ref 26.5–33)
MCHC RBC AUTO-ENTMCNC: 33.9 G/DL (ref 31.5–36.5)
MCV RBC AUTO: 101 FL (ref 78–100)
MONOCYTES # BLD AUTO: 0.6 10E9/L (ref 0–1.3)
MONOCYTES NFR BLD AUTO: 5.3 %
MUCOUS THREADS #/AREA URNS LPF: PRESENT /LPF
NEUTROPHILS # BLD AUTO: 8.6 10E9/L (ref 1.6–8.3)
NEUTROPHILS NFR BLD AUTO: 77 %
NITRATE UR QL: POSITIVE
NRBC # BLD AUTO: 0 10*3/UL
NRBC BLD AUTO-RTO: 0 /100
PH UR STRIP: 5 PH (ref 5–7)
PLATELET # BLD AUTO: 376 10E9/L (ref 150–450)
POTASSIUM SERPL-SCNC: 3.4 MMOL/L (ref 3.4–5.3)
PROT SERPL-MCNC: 7.6 G/DL (ref 6.8–8.8)
RBC # BLD AUTO: 4.5 10E12/L (ref 3.8–5.2)
RBC #/AREA URNS AUTO: 1 /HPF (ref 0–2)
SODIUM SERPL-SCNC: 137 MMOL/L (ref 133–144)
SOURCE: ABNORMAL
SP GR UR STRIP: 1.02 (ref 1–1.03)
SQUAMOUS #/AREA URNS AUTO: 1 /HPF (ref 0–1)
UROBILINOGEN UR STRIP-MCNC: 0 MG/DL (ref 0–2)
WBC # BLD AUTO: 11.2 10E9/L (ref 4–11)
WBC #/AREA URNS AUTO: 1 /HPF (ref 0–5)

## 2020-08-23 PROCEDURE — 83690 ASSAY OF LIPASE: CPT | Performed by: EMERGENCY MEDICINE

## 2020-08-23 PROCEDURE — 76604 US EXAM CHEST: CPT | Mod: 26 | Performed by: EMERGENCY MEDICINE

## 2020-08-23 PROCEDURE — 25000125 ZZHC RX 250: Performed by: EMERGENCY MEDICINE

## 2020-08-23 PROCEDURE — 93308 TTE F-UP OR LMTD: CPT | Performed by: EMERGENCY MEDICINE

## 2020-08-23 PROCEDURE — 87088 URINE BACTERIA CULTURE: CPT | Performed by: EMERGENCY MEDICINE

## 2020-08-23 PROCEDURE — 25000128 H RX IP 250 OP 636: Performed by: EMERGENCY MEDICINE

## 2020-08-23 PROCEDURE — 76705 ECHO EXAM OF ABDOMEN: CPT | Performed by: EMERGENCY MEDICINE

## 2020-08-23 PROCEDURE — 87086 URINE CULTURE/COLONY COUNT: CPT | Performed by: EMERGENCY MEDICINE

## 2020-08-23 PROCEDURE — 87186 SC STD MICRODIL/AGAR DIL: CPT | Performed by: EMERGENCY MEDICINE

## 2020-08-23 PROCEDURE — 96376 TX/PRO/DX INJ SAME DRUG ADON: CPT | Performed by: EMERGENCY MEDICINE

## 2020-08-23 PROCEDURE — 80053 COMPREHEN METABOLIC PANEL: CPT | Performed by: EMERGENCY MEDICINE

## 2020-08-23 PROCEDURE — 96374 THER/PROPH/DIAG INJ IV PUSH: CPT | Mod: 59 | Performed by: EMERGENCY MEDICINE

## 2020-08-23 PROCEDURE — 99285 EMERGENCY DEPT VISIT HI MDM: CPT | Mod: 25 | Performed by: EMERGENCY MEDICINE

## 2020-08-23 PROCEDURE — 76705 ECHO EXAM OF ABDOMEN: CPT | Mod: 26 | Performed by: EMERGENCY MEDICINE

## 2020-08-23 PROCEDURE — 74177 CT ABD & PELVIS W/CONTRAST: CPT

## 2020-08-23 PROCEDURE — 81001 URINALYSIS AUTO W/SCOPE: CPT | Performed by: EMERGENCY MEDICINE

## 2020-08-23 PROCEDURE — 85025 COMPLETE CBC W/AUTO DIFF WBC: CPT | Performed by: EMERGENCY MEDICINE

## 2020-08-23 PROCEDURE — 76604 US EXAM CHEST: CPT | Performed by: EMERGENCY MEDICINE

## 2020-08-23 PROCEDURE — 2894A VOIDCORRECT: CPT | Mod: 26 | Performed by: EMERGENCY MEDICINE

## 2020-08-23 RX ORDER — DILTIAZEM HYDROCHLORIDE 5 MG/ML
15 INJECTION INTRAVENOUS ONCE
Status: DISCONTINUED | OUTPATIENT
Start: 2020-08-23 | End: 2020-08-23

## 2020-08-23 RX ORDER — HYDROMORPHONE HYDROCHLORIDE 1 MG/ML
0.5 INJECTION, SOLUTION INTRAMUSCULAR; INTRAVENOUS; SUBCUTANEOUS ONCE
Status: COMPLETED | OUTPATIENT
Start: 2020-08-23 | End: 2020-08-23

## 2020-08-23 RX ORDER — OXYCODONE AND ACETAMINOPHEN 5; 325 MG/1; MG/1
1-2 TABLET ORAL EVERY 4 HOURS PRN
Qty: 12 TABLET | Refills: 0 | Status: SHIPPED | OUTPATIENT
Start: 2020-08-23 | End: 2020-08-24

## 2020-08-23 RX ORDER — IOPAMIDOL 755 MG/ML
79 INJECTION, SOLUTION INTRAVASCULAR ONCE
Status: COMPLETED | OUTPATIENT
Start: 2020-08-23 | End: 2020-08-23

## 2020-08-23 RX ADMIN — IOPAMIDOL 79 ML: 755 INJECTION, SOLUTION INTRAVENOUS at 10:20

## 2020-08-23 RX ADMIN — SODIUM CHLORIDE 60 ML: 9 INJECTION, SOLUTION INTRAVENOUS at 10:20

## 2020-08-23 RX ADMIN — HYDROMORPHONE HYDROCHLORIDE 0.5 MG: 1 INJECTION, SOLUTION INTRAMUSCULAR; INTRAVENOUS; SUBCUTANEOUS at 10:07

## 2020-08-23 RX ADMIN — HYDROMORPHONE HYDROCHLORIDE 0.5 MG: 1 INJECTION, SOLUTION INTRAMUSCULAR; INTRAVENOUS; SUBCUTANEOUS at 10:59

## 2020-08-23 NOTE — DISCHARGE INSTRUCTIONS
Ibuprofen, Percocet, ice    Incentive spirometry    Stop smoking    Follow-up primary care for further evaluation, return here for worsening pain, shortness of air, passing out or any other concern
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
## 2020-08-24 ENCOUNTER — OFFICE VISIT (OUTPATIENT)
Dept: FAMILY MEDICINE | Facility: CLINIC | Age: 45
End: 2020-08-24
Payer: COMMERCIAL

## 2020-08-24 VITALS
HEIGHT: 62 IN | SYSTOLIC BLOOD PRESSURE: 168 MMHG | DIASTOLIC BLOOD PRESSURE: 106 MMHG | OXYGEN SATURATION: 98 % | RESPIRATION RATE: 16 BRPM | WEIGHT: 180.8 LBS | TEMPERATURE: 98 F | HEART RATE: 96 BPM | BODY MASS INDEX: 33.27 KG/M2

## 2020-08-24 DIAGNOSIS — S22.41XD CLOSED FRACTURE OF MULTIPLE RIBS OF RIGHT SIDE WITH ROUTINE HEALING, SUBSEQUENT ENCOUNTER: Primary | ICD-10-CM

## 2020-08-24 DIAGNOSIS — B96.89 URINARY TRACT INFECTION DUE TO KLEBSIELLA SPECIES: ICD-10-CM

## 2020-08-24 DIAGNOSIS — Z86.19 HX OF CANDIDIASIS: ICD-10-CM

## 2020-08-24 DIAGNOSIS — N39.0 URINARY TRACT INFECTION DUE TO KLEBSIELLA SPECIES: ICD-10-CM
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
## 2020-08-24 LAB
BACTERIA SPEC CULT: ABNORMAL
Lab: ABNORMAL
SPECIMEN SOURCE: ABNORMAL

## 2020-08-24 PROCEDURE — 99214 OFFICE O/P EST MOD 30 MIN: CPT | Performed by: FAMILY MEDICINE

## 2020-08-24 RX ORDER — OXYCODONE AND ACETAMINOPHEN 5; 325 MG/1; MG/1
1-2 TABLET ORAL EVERY 6 HOURS PRN
Qty: 30 TABLET | Refills: 0 | Status: SHIPPED | OUTPATIENT
Start: 2020-08-24 | End: 2020-09-01

## 2020-08-24 RX ORDER — GABAPENTIN 300 MG/1
CAPSULE ORAL
Qty: 360 CAPSULE | Refills: 2 | OUTPATIENT
Start: 2020-08-24

## 2020-08-24 RX ORDER — FLUCONAZOLE 150 MG/1
150 TABLET ORAL ONCE
Qty: 1 TABLET | Refills: 0 | Status: SHIPPED | OUTPATIENT
Start: 2020-08-24 | End: 2020-08-24

## 2020-08-24 RX ORDER — SULFAMETHOXAZOLE/TRIMETHOPRIM 800-160 MG
1 TABLET ORAL 2 TIMES DAILY
Qty: 6 TABLET | Refills: 0 | Status: SHIPPED | OUTPATIENT
Start: 2020-08-24 | End: 2020-08-27

## 2020-08-24 RX ORDER — DOCUSATE SODIUM 100 MG/1
100 CAPSULE, LIQUID FILLED ORAL 2 TIMES DAILY
Qty: 30 CAPSULE | Refills: 0 | Status: SHIPPED | OUTPATIENT
Start: 2020-08-24 | End: 2020-09-08

## 2020-08-24 ASSESSMENT — MIFFLIN-ST. JEOR: SCORE: 1433.35

## 2020-08-24 NOTE — TELEPHONE ENCOUNTER
Medication refilled for the year 11/2019  She should have refills until her follow up appointment is due.  Ginger Tamez, CNP

## 2020-08-24 NOTE — PATIENT INSTRUCTIONS
Take percocet 1-2 tablets 6 hrs apart for next 2 days as needed for severe pain, then 1 tablet 6-8 hours apart as needed for severre pain.  Reduce use as pain resolves.    Colace for stool softening.    Bactrim Ds for the urinary tract infection.  Take diflucan only if you develop signs of vaginal or oral yeast infection.    Narcan given as a mediation in case you have opiate overdose from percocet. Do not take ambien if you take percocet at night for pain.  Do not drink alcohol if you take either of those mediaiton.  Patient Education     Rib Fracture    You broke one or more ribs. This is called a rib fracture. Rib fractures don't need a cast like other bones. They will heal by themselves in about 4 to 6 weeks. The first 3 to 4 weeks will be the most painful. During this time deep breathing, coughing, or changing position from sitting to lying down, may cause the broken ends to move slightly.  Home care    Rest. You should not be doing any heavy lifting or strenuous exertion until the pain goes away.    It hurts to breathe when you have a broken rib. This puts you at risk of getting pneumonia from poor airflow through your lungs. To prevent this:  ? Take several very deep breaths once an hour while you're awake. Breathe out through pursed lips as if you are blowing up a balloon. If possible, actually blow up a balloon or a rubber glove. This exercise builds up pressure inside the lung and prevents collapse of the small air sacs of the lung. This exercise may cause some pain at the site of injury. This is normal.  ? You may have gotten a breathing exercise device called an incentive spirometer. Use it at least 4 times a day, or as directed.    Apply an ice pack over the injured area for 15 to 20 minutes every 1 to 2 hours. You should do this for the first 24 to 48 hours. To make an ice pack, put ice cubes in a plastic bag that seals at the top. Wrap the bag in a clean, thin towel or cloth. Never put ice or an ice  pack directly on the skin. Keep using ice packs as needed for the relief of pain and swelling.    You may use over-the-counter pain medicine to control pain, unless another pain medicine was prescribed. If you have chronic liver or kidney disease or ever had a stomach ulcer or GI (gastrointestinal) bleeding, talk with your healthcare provider before using these medicines.    If your pain is not controlled, contact your healthcare provider. Sometimes a stronger pain medicine may be needed. A nerve block can be done in case of severe pain. It will numb the nerve between the ribs.  Follow-up care  Follow up with your healthcare provider, or as advised. In rare cases, a broken rib will cause complications in the first few days that may not be clearly seen during your initial exam. This can include collapsed lung, bleeding around the lung or into the belly (abdomen), or pneumonia. So watch for the signs below.  If X-rays were taken, you will be told of any new findings that may affect your care.  Call 911  Call 911 if you have:    Dizziness, weakness or fainting    Shortness of breath with or without chest discomfort    New or worsening abdominal pain    Discomfort in other areas of your upper body such as your shoulders, jaw, neck, or arms  When to seek medical advice  Call your healthcare provider right away if any of these occur:    Increasing chest pain with breathing    Fever of 100.4 F (38 C) or above, or as directed by your healthcare provider    Congested cough, nausea, or vomiting  Date Last Reviewed: 4/1/2018 2000-2019 The "[employer]. [address] [place] [address]. All rights reserved. This information is not intended as a substitute for professional medical care. Always follow your healthcare professional's instructions.           Patient Education     Urinary Tract Infections in Women    Urinary tract infections (UTIs) are most often caused by bacteria. These bacteria enter the  urinary tract. The bacteria may come from outside the body. Or they may travel from the skin outside the rectum or vagina into the urethra. Female anatomy makes it easy for bacteria from the bowel to enter a woman s urinary tract, which is the most common source of UTI. This means women develop UTIs more often than men. Pain in or around the urinary tract is a common UTI symptom. But the only way to know for sure if you have a UTI for the healthcare provider to test your urine. The two tests that may be done are the urinalysis and urine culture.  Types of UTIs    Cystitis. A bladder infection (cystitis) is the most common UTI in women. You may have urgent or frequent urination. You may also have pain, burning when you urinate, and bloody urine.    Urethritis. This is an inflamed urethra, which is the tube that carries urine from the bladder to outside the body. You may have lower stomach or back pain. You may also have urgent or frequent urination.    Pyelonephritis. This is a kidney infection. If not treated, it can be serious and damage your kidneys. In severe cases, you may need to stay in the hospital. You may have a fever and lower back pain.  Medicines to treat a UTI  Most UTIs are treated with antibiotics. These kill the bacteria. The length of time you need to take them depends on the type of infection. It may be as short as 3 days. If you have repeated UTIs, you may need a low-dose antibiotic for several months. Take antibiotics exactly as directed. Don t stop taking them until all of the medicine is gone. If you stop taking the antibiotic too soon, the infection may not go away. You may also develop a resistance to the antibiotic. This can make it much harder to treat.  Lifestyle changes to treat and prevent UTIs  The lifestyle changes below will help get rid of your UTI. They may also help prevent future UTIs.    Drink plenty of fluids. This includes water, juice, or other caffeine-free drinks. Fluids  help flush bacteria out of your body.    Empty your bladder. Always empty your bladder when you feel the urge to urinate. And always urinate before going to sleep. Urine that stays in your bladder can lead to infection. Try to urinate before and after sex as well.    Practice good personal hygiene. Wipe yourself from front to back after using the toilet. This helps keep bacteria from getting into the urethra.    Use condoms during sex. These help prevent UTIs caused by sexually transmitted bacteria. Also don't use spermicides during sex. These can increase the risk for UTIs. Choose other forms of birth control instead. For women who tend to get UTIs after sex, a low-dose of a preventive antibiotic may be used. Be sure to discuss this option with your healthcare provider.    Follow up with your healthcare provider as directed. He or she may test to make sure the infection has cleared. If needed, more treatment may be started.  Date Last Reviewed: 1/1/2017 2000-2019 [employer]. [address] [place] [address]. All rights reserved. This information is not intended as a substitute for professional medical care. Always follow your healthcare professional's instructions.           Patient Education     Understanding the Risks and Side Effects of Opioid Medicines  When opioids are taken as prescribed, they are usually safe and can help manage pain effectively. But they do come with risks and side effects that are important to understand. Of the risks that can occur with opioid treatment, opioid overdose is the most serious. Overdose means taking a too high dose. For this reason, it is critical that you and your loved ones understand the signs and symptoms of an opioid overdose and what to do if it occurs.   Risks of opioid medicines  If you take opioids regularly for a long time, there is a risk of forming a tolerance or dependence to the medicines. There is also the risk of forming an  addiction. But this is much less common when opioids are taken as directed under the care of a healthcare provider. Understanding the differences between tolerance, dependence, and addiction is important. This helps you know what to expect when taking opioids and know what to do if you think you may be addicted.     Tolerance means that your body needs higher doses than before to get the same pain relief effects. Most people who take opioids for longer than a few weeks will form a tolerance. This is normal. Your healthcare provider will work with you to manage tolerance and ensure that your pain is still controlled.    Dependence means your body will have withdrawal symptoms if you reduce or stop taking the medicine. These symptoms can include sleeplessness, rapid heartbeat, rapid breathing, and diarrhea. Forming a dependence is common for people taking opioids regularly for a long time. When it is time to stop taking the medicine, your healthcare provider will work closely with you to taper the medicine to lessen withdrawal symptoms. You should never stop taking or reduce the amount of medicine you are used to taking without talking to your healthcare provider. Note: Dependence is not the same thing as addiction.    Addiction occurs when a person has the urge to seek out the medicine and can't stop using it despite the harm and negative effects it might cause. Some people, such as those who have a history of drug misuse, are at higher risk for addiction. Your healthcare provider will follow up with you regularly and also monitor you for signs of addiction. If you think you are forming an addiction to your medicine, call your healthcare provider right away.  What is opioid-use disorder?  Opioid-use disorder is a risk of taking opioid medicines. It may be diagnosed if a person shows a pattern of taking opioids despite negative consequences such as:    The opioid interferes with life, family or work obligations (this  includes avoiding situations because of opioid use)    The opioid causes physical or psychological problems    Continued and increased amount of time spent attempting to obtain, use and recover from opioid use    Unsuccessful attempts to cut down or stop opioid use    Using a higher amount of opioid than prescribed or using it in unsafe situations (such as driving)    Unmanaged signs and symptoms of tolerance or withdrawal  If you or your family suspect opioid-use disorder, contact your healthcare provider right away. They can help you assess the problem and provide treatment if needed.   Risk for overdose  Opioids affect the part of the brain that controls breathing. An overdose of opioids can slow breathing down too much and even stop a person s breathing. This can be fatal. Call 911 right away if an overdose is suspected in any person.   Three key signs and symptoms of opioid overdose are:    Narrowing of dark circles in the middle of eyes (pinpoint pupils)    Slowed or stopped breathing    Unconsciousness (this is when a person passes out and does not respond)  Other signs and symptoms to look for include:    Limp body    Pale face    Clammy skin    Purple or blue color of the lips and fingernails    Vomiting   Your healthcare provider may prescribe a medicine called naloxone in case of opioid overdose. When given within a certain period of time after an overdose, naloxone can help reverse the life-threatening effects of the opioid. Emergency care will still be needed.  Side effects of opioid medicines  Some side effects are common when taking opioids. These include constipation, nausea, sleepiness, impaired motor skills, and problems emptying the bladder (urinary retention). Opioid medicines can also cause problems with memory, thinking, and judgment, especially in older adults.   If you have any of these side effects, talk with your healthcare provider or pharmacist. They can provide advice for managing them.  This might include:    Reducing the dose of your opioid medicine (never do this without talking with your healthcare provider)    Trying a different type or brand of opioid medicine    Adding a drug to treat the side effect   In some cases, your healthcare provider may take measures to help prevent side effects that are likely to occur. For instance, to help prevent constipation, your healthcare provider may prescribe a laxative or stool softener at the same time you start opioid treatment.   More serious or longer-lasting side effects can occur when you don t take opioids exactly as directed. Misusing opioids can lead to liver and brain damage. To avoid these side effects:    Never take more opioids than prescribed by your healthcare provider.    Never combine opioids with non-prescribed medicines.    Never use street drugs or drink alcohol while taking opioids.    Don't take opioids in combination with benzodiazepines. Serious risks are associated with combining opioids with benzodiazepines. These risks include extreme sleepiness, slowed breathing, and death. Let your healthcare provider know if you are taking benzodiazepines.  When to call your healthcare provider  You will be carefully monitored during treatment with opioid medicines. But you should call your healthcare provider right away if you have any of these symptoms:    New pain, pain that gets worse, or pain that doesn t get better even after you take your medicine    Side effects, such as constipation or nausea, that keep you from daily activities    Extreme sleepiness    Breathing problems   Date Last Reviewed: 8/1/2017 2000-2019 [employer]. [address] [place] [address], [place] [address]. All rights reserved. This information is not intended as a substitute for professional medical care. Always follow your healthcare professional's instructions.           Patient Education     Naloxone Hydrochloride Solution for injection  What is this  medicine?  NALOXONE (nal OX one) is a narcotic blocker. It is used to treat narcotic drug overdose.  This medicine may be used for other purposes; ask your health care provider or pharmacist if you have questions.  What should I tell my health care provider before I take this medicine?  They need to know if you have any of these conditions:    drug abuse or addiction    heart disease    an unusual or allergic reaction to naloxone, other medicines, foods, dyes, or preservatives    pregnant or trying to get pregnant    breast-feeding  How should I use this medicine?  This medicine is for injection into the outer thigh. It can be injected through clothing if needed. Get emergency medical help right away after giving the first dose of this medicine, even if the person wakes up. You should be familiar with how to recognize the signs and symptoms of an opioid overdose. Administer according to the printed instructions on the device label or the electronic voice instructions. You should practice using the Trainer injector before this medicine is needed.  Talk to your pediatrician regarding the use of this medicine in children. While this drug may be prescribed for children as young as  for selected conditions, precautions do apply. For infants less than 1 year of age, pinch the thigh muscle while administering.  Overdosage: If you think you have taken too much of this medicine contact a poison control center or emergency room at once.  NOTE: This medicine is only for you. Do not share this medicine with others.  What if I miss a dose?  This does not apply.  What may interact with this medicine?  This medicine is only used during an emergency. No interactions are expected during emergency use.  This list may not describe all possible interactions. Give your health care provider a list of all the medicines, herbs, non-prescription drugs, or dietary supplements you use. Also tell them if you smoke, drink alcohol, or use  illegal drugs. Some items may interact with your medicine.  What should I watch for while using this medicine?  Keep this medicine ready for use in the case of an opioid overdose. Make sure that you have the phone number of your doctor or health care professional and local hospital ready. You may need to have additional doses of this medicine. Each injector contains a single dose. Some emergencies may require additional doses.  After use, bring the treated person to the nearest hospital or call 911. Make sure the treating health care professional knows that the person has received an injection of this medicine. You will receive additional instructions on what to do during and after use of this medicine before an emergency occurs.  What side effects may I notice from receiving this medicine?  Side effects that you should report to your doctor or health care professional as soon as possible:    allergic reactions like skin rash, itching or hives, swelling of the face, lips, or tongue    breathing problems    fast, irregular heartbeat    high blood pressure    pain that was controlled by narcotic pain medicine    seizures    stomach cramps  Side effects that usually do not require medical attention (report to your doctor or health care professional if they continue or are bothersome):    aches and pains    diarrhea    fever or chills    irritable, nervous, restless    nausea, vomiting    runny nose    sweating    trembling    weak  This list may not describe all possible side effects. Call your doctor for medical advice about side effects. You may report side effects to FDA at 2-555-FDA-0430.  Where should I keep my medicine?  Keep out of the reach of children.  Store at room temperature between 15 and 25 degrees C (59 and 77 degrees F). Keep this medicine in its outer case until ready to use. Occasionally check the solution through the viewing window of the injector. The solution should be clear. If it is discolored,  cloudy, or contains solid particles, replace it with a new injector. Remember to check the expiration date of this medicine regularly. Throw away any unused medicine after the expiration date.  NOTE: This sheet is a summary. It may not cover all possible information. If you have questions about this medicine, talk to your doctor, pharmacist, or health care provider.  NOTE:This sheet is a summary. It may not cover all possible information. If you have questions about this medicine, talk to your doctor, pharmacist, or health care provider. Copyright  2015 [employer]

## 2020-08-24 NOTE — PROGRESS NOTES
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 about this medicine, talk to your doctor, pharmacist, or health care provider. Copyright  2015 [employer]               Return in about 2 weeks (around 9/7/2020).    Parminder Reeves MD  [facility]

## 2020-08-24 NOTE — TELEPHONE ENCOUNTER
Requested Prescriptions   Pending Prescriptions Disp Refills     gabapentin (NEURONTIN) 300 MG capsule [Pharmacy Med Name: gabapentin 300 mg capsule] 360 capsule 2     Sig: Take 4 Capsules BY MOUTH THREE TIMES DAILY       There is no refill protocol information for this order

## 2020-09-01 ENCOUNTER — TELEPHONE (OUTPATIENT)
Dept: FAMILY MEDICINE | Facility: CLINIC | Age: 45
End: 2020-09-01
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
## 2020-09-01 DIAGNOSIS — S22.41XD CLOSED FRACTURE OF MULTIPLE RIBS OF RIGHT SIDE WITH ROUTINE HEALING, SUBSEQUENT ENCOUNTER: ICD-10-CM

## 2020-09-01 RX ORDER — OXYCODONE AND ACETAMINOPHEN 5; 325 MG/1; MG/1
1-2 TABLET ORAL EVERY 6 HOURS PRN
Qty: 12 TABLET | Refills: 0 | Status: SHIPPED | OUTPATIENT
Start: 2020-09-01 | End: 2020-09-03
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
## 2020-09-03 ENCOUNTER — OFFICE VISIT (OUTPATIENT)
Dept: FAMILY MEDICINE | Facility: CLINIC | Age: 45
End: 2020-09-03
Payer: COMMERCIAL

## 2020-09-03 VITALS
OXYGEN SATURATION: 99 % | HEART RATE: 95 BPM | HEIGHT: 63 IN | RESPIRATION RATE: 20 BRPM | SYSTOLIC BLOOD PRESSURE: 180 MMHG | TEMPERATURE: 98.3 F | WEIGHT: 185 LBS | DIASTOLIC BLOOD PRESSURE: 100 MMHG | BODY MASS INDEX: 32.78 KG/M2

## 2020-09-03 DIAGNOSIS — S22.41XD CLOSED FRACTURE OF MULTIPLE RIBS OF RIGHT SIDE WITH ROUTINE HEALING, SUBSEQUENT ENCOUNTER: Primary | ICD-10-CM
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
## 2020-09-03 DIAGNOSIS — Z23 NEED FOR PROPHYLACTIC VACCINATION AND INOCULATION AGAINST INFLUENZA: ICD-10-CM

## 2020-09-03 DIAGNOSIS — I10 UNCONTROLLED HYPERTENSION: ICD-10-CM

## 2020-09-03 PROBLEM — J96.01 ACUTE RESPIRATORY FAILURE WITH HYPOXIA (H): Status: RESOLVED | Noted: 2017-12-05 | Resolved: 2020-09-03

## 2020-09-03 PROBLEM — E66.01 MORBID OBESITY (H): Status: RESOLVED | Noted: 2017-12-05 | Resolved: 2020-09-03

## 2020-09-03 PROCEDURE — 90686 IIV4 VACC NO PRSV 0.5 ML IM: CPT | Performed by: FAMILY MEDICINE

## 2020-09-03 PROCEDURE — 90471 IMMUNIZATION ADMIN: CPT | Performed by: FAMILY MEDICINE

## 2020-09-03 PROCEDURE — 99214 OFFICE O/P EST MOD 30 MIN: CPT | Mod: 25 | Performed by: FAMILY MEDICINE

## 2020-09-03 RX ORDER — OXYCODONE AND ACETAMINOPHEN 5; 325 MG/1; MG/1
1 TABLET ORAL EVERY 6 HOURS PRN
Qty: 40 TABLET | Refills: 0 | Status: SHIPPED | OUTPATIENT
Start: 2020-09-03 | End: 2020-09-17

## 2020-09-03 RX ORDER — ACETAMINOPHEN 325 MG/1
650 TABLET ORAL EVERY 6 HOURS PRN
COMMUNITY

## 2020-09-03 RX ORDER — IBUPROFEN 200 MG
400 TABLET ORAL EVERY 4 HOURS PRN
Status: ON HOLD | COMMUNITY
End: 2021-10-08

## 2020-09-03 ASSESSMENT — ANXIETY QUESTIONNAIRES
2. NOT BEING ABLE TO STOP OR CONTROL WORRYING: SEVERAL DAYS
6. BECOMING EASILY ANNOYED OR IRRITABLE: SEVERAL DAYS
7. FEELING AFRAID AS IF SOMETHING AWFUL MIGHT HAPPEN: SEVERAL DAYS
1. FEELING NERVOUS, ANXIOUS, OR ON EDGE: SEVERAL DAYS
GAD7 TOTAL SCORE: 7
3. WORRYING TOO MUCH ABOUT DIFFERENT THINGS: MORE THAN HALF THE DAYS
IF YOU CHECKED OFF ANY PROBLEMS ON THIS QUESTIONNAIRE, HOW DIFFICULT HAVE THESE PROBLEMS MADE IT FOR YOU TO DO YOUR WORK, TAKE CARE OF THINGS AT HOME, OR GET ALONG WITH OTHER PEOPLE: SOMEWHAT DIFFICULT
5. BEING SO RESTLESS THAT IT IS HARD TO SIT STILL: NOT AT ALL

## 2020-09-03 ASSESSMENT — MIFFLIN-ST. JEOR: SCORE: 1460.34

## 2020-09-03 ASSESSMENT — PATIENT HEALTH QUESTIONNAIRE - PHQ9
5. POOR APPETITE OR OVEREATING: SEVERAL DAYS
SUM OF ALL RESPONSES TO PHQ QUESTIONS 1-9: 11
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
## 2020-09-03 NOTE — PATIENT INSTRUCTIONS
You may take percocet 5/325mg 1 tablet orally at least 6 hours apart as needed only for moderate to severe pain.  May take Naproxen 220 mg orally with food every 12 hrs as needed for pain.  Expect gradual improvement of rib pain in next few weeks.  If needing pain medication other than over the counter tylenol or naproxen after 2 weeks from now, follow up with a provider in clinic.    Restart lisinopril 20 mg daily - you mentioned you already have it at home.  Continue hydrochlorothiazide.  Low salt diet.  Schedule nurse visit in 1 week to recheck your blood pressure.  If you have persistent new chest pain, persistent or worsening breathing difficulty, palpitation, lightheadedness or other concerning symptoms, you should be brought to the ER.      Patient Education     Uncontrolled High Blood Pressure (Established)    Your blood pressure was unusually high today. This can occur if you ve missed doses of your blood pressure medicine. Or it can happen if you are taking other medicines. These include some asthma inhalers, decongestants, diet pills, and street drugs like cocaine and amphetamine.  Other causes include:    Weight gain    More salt in your diet    Smoking    Caffeine  Your blood pressure can also rise if you are emotionally upset or in intense pain. It may go back to normal after a period of rest.  Blood pressure measurements are given as 2 numbers. Systolic blood pressure is the upper number. This is the pressure when the heart contracts. Diastolic blood pressure is the lower number. This is the pressure when the heart relaxes between beats. You will see your blood pressure readings written together. For example, a person with a systolic pressure of 118 and a diastolic pressure of 78 will have 118/78 written in the medical record. To be high blood pressure, the numbers must be higher when tested over a period of time.  Blood pressure is categorized as normal, elevated, or stage 1 or stage 2 high blood  pressure:    Normal blood pressure is systolic of less than 120 and diastolic of less than 80 (120/80)    Elevated blood pressure is systolic of 120 to 129 and diastolic less than 80    Stage 1 high blood pressure is systolic is 130 to 139 or diastolic between 80 to 89    Stage 2 high blood pressure is when systolic is 140 or higher or the diastolic is 90 or higher  Uncontrolled high blood pressure can cause serious health problems. It raises your risk for heart attack, stroke, and heart failure. In general, if you have high blood pressure, keeping your blood pressure below 130/80 mmHg may help prevent these problems. Your healthcare provider may prescribe medicine to help control blood pressure if lifestyle changes are not enough.  Home care  It s important to take steps to lower your blood pressure. If you are taking blood pressure medicine, the guidelines below may help you need less or no medicines in the future.    Start a weight-loss program if you are overweight.    Cut back on the amount of salt in your diet:  ? Avoid high-salt foods like olives, pickles, smoked meats, and salted potato chips.  ? Don t add salt to your food at the table.  ? Use only small amounts of salt when cooking.    Start an exercise program. Talk with your healthcare provider about what exercise program is best for you. It doesn t have to be difficult. Even brisk walking for 20 minutes 3 times a week is a good form of exercise.    Avoid medicines that stimulates the heart. This includes many over-the-counter cold and sinus decongestant pills and sprays, as well as diet pills. Check the warnings about high blood pressure on the label. Before purchasing any over-the-counter medicines or supplements, always ask the pharmacist about the product's potential interaction with your high blood pressure and your medicines.    Stimulants such as amphetamine or cocaine could be lethal for someone with high blood pressure. Never take  these.    Limit how much caffeine you drink. Or switch to noncaffeinated beverages.    Stop smoking. If you are a long-time smoker, this can be hard. Enroll in a stop-smoking program to make it more likely that you will succeed. Talk with your provider about ways to quit.    Learn how to handle stress better. This is an important part of any program to lower blood pressure. Learn ways to relax. These include meditation, yoga, and biofeedback.    If medicines were prescribed, take them exactly as directed. Missing doses may cause your blood pressure to get out of control.    If you miss a dose or doses of your medicines, check with your healthcare provider or pharmacist about what to do.    Consider buying an automatic blood pressure machine. Your provider may recommend a certain type. You can get one of these at most pharmacies. Measure your blood pressure twice a day, in the morning, and in the late afternoon. Keep a written record of your home blood pressure readings and take the record to your medical appointments.  Here are some additional guidelines on home blood pressure monitoring from the American Heart Association.    Don't smoke or drink coffee for 30 minutes    Go to the bathroom before the test.    Relax for 5 minutes before taking the measurement.    Sit correctly. Be sure your back is supported. Don't sit on a couch or soft chair. Uncross your feet and place them flat on the floor. Place your arm on a solid, flat surface like a table with the upper arm at heart level. Make certain the middle of the cuff is directly above the bend of the elbow. Check the monitor's instruction manual for an illustration.    Take multiple readings. When you measure, take 2 or 3 readings one minute apart and record all of the results.    Take your blood pressure at the same time every day, or as your healthcare provider recommends.    Record the date, time, and blood pressure reading.    Take the record with you to your  next appointment. If your blood pressure monitor has a built-in memory, simply take the monitor with you to your next appointment.    Call your provider if you have several high readings. Don't be frightened by a single high reading, but if you get several high readings, check in with your healthcare provider.    Note: When blood pressure reaches a systolic (top number) of 180 or higher or a diastolic (bottom number) of 110 or higher, emergency medical treatment is required. Call your healthcare provider immediately.  Follow-up care  Regular visits to your own healthcare provider for blood pressure and medicine checks are an important part of your care. Make a follow-up appointment as directed. Bring the record of your home blood pressure readings to the appointment.  When to seek medical advice  Call your healthcare provider right away if any of these occur:    Blood pressure reaches a systolic (top number) of 180 or higher or diastolic (bottom number) of 110 or higher, emergency medical treatment is required.    Chest, arm, shoulder, neck, or upper back pain    Shortness of breath    Severe headache    Throbbing or rushing sound in the ears    Nosebleed    Extreme drowsiness, confusion, or fainting    Dizziness or dizziness with spinning sensation (vertigo)    Weakness in an arm or leg or on one side of the face    Trouble speaking or seeing   Date Last Reviewed: 1/1/2017 2000-2019 [employer]. [address] [place] [address], [place] [address]. All rights reserved. This information is not intended as a substitute for professional medical care. Always follow your healthcare professional's instructions.

## 2020-09-04 ASSESSMENT — ANXIETY QUESTIONNAIRES: GAD7 TOTAL SCORE: 7

## 2020-09-17 ENCOUNTER — OFFICE VISIT (OUTPATIENT)
Dept: FAMILY MEDICINE | Facility: CLINIC | Age: 45
End: 2020-09-17
Payer: COMMERCIAL

## 2020-09-17 VITALS
BODY MASS INDEX: 32.96 KG/M2 | HEART RATE: 96 BPM | SYSTOLIC BLOOD PRESSURE: 196 MMHG | TEMPERATURE: 98.2 F | OXYGEN SATURATION: 99 % | WEIGHT: 186 LBS | DIASTOLIC BLOOD PRESSURE: 114 MMHG | HEIGHT: 63 IN

## 2020-09-17 DIAGNOSIS — G43.009 MIGRAINE WITHOUT AURA AND WITHOUT STATUS MIGRAINOSUS, NOT INTRACTABLE: ICD-10-CM

## 2020-09-17 DIAGNOSIS — G62.89 OTHER POLYNEUROPATHY: ICD-10-CM

## 2020-09-17 DIAGNOSIS — F17.200 TOBACCO USE DISORDER: ICD-10-CM

## 2020-09-17 DIAGNOSIS — I10 UNCONTROLLED HYPERTENSION: Primary | ICD-10-CM

## 2020-09-17 DIAGNOSIS — F12.90 MARIJUANA USE, CONTINUOUS: ICD-10-CM

## 2020-09-17 PROCEDURE — 99214 OFFICE O/P EST MOD 30 MIN: CPT | Performed by: FAMILY MEDICINE

## 2020-09-17 RX ORDER — GABAPENTIN 300 MG/1
900 CAPSULE ORAL 3 TIMES DAILY
Qty: 270 CAPSULE | Refills: 0 | Status: SHIPPED | OUTPATIENT
Start: 2020-09-17 | End: 2020-10-14

## 2020-09-17 RX ORDER — LISINOPRIL 20 MG/1
30 TABLET ORAL DAILY
Start: 2020-09-17 | End: 2020-10-14

## 2020-09-17 ASSESSMENT — MIFFLIN-ST. JEOR: SCORE: 1464.88

## 2020-09-17 NOTE — PATIENT INSTRUCTIONS
Plan:    1) reduce gabapentin to 3 tablets 3 x a day.    2) increase lisinopril to 30 mg daily.    3) schedule neurology consult.    4) follow up next week for blood pressure recheck.    You are strongly advised to stop smoking cigarettes and marijuana soon!  Continuing to smoke will increase your risk for high blood pressure, heart disease, stroke, cancer, and  lung damage.  It also increases risk of complications from Covid-19 if you acquire it.    Low salt diet.    If you have persistent chest pain, persistent or worsening breathing difficulty, palpitation, lightheadedness or other concerning symptoms, you should be brought to the ER.    Patient Education     Uncontrolled High Blood Pressure (Established)    Your blood pressure was unusually high today. This can occur if you ve missed doses of your blood pressure medicine. Or it can happen if you are taking other medicines. These include some asthma inhalers, decongestants, diet pills, and street drugs like cocaine and amphetamine.  Other causes include:    Weight gain    More salt in your diet    Smoking    Caffeine  Your blood pressure can also rise if you are emotionally upset or in intense pain. It may go back to normal after a period of rest.  Blood pressure measurements are given as 2 numbers. Systolic blood pressure is the upper number. This is the pressure when the heart contracts. Diastolic blood pressure is the lower number. This is the pressure when the heart relaxes between beats. You will see your blood pressure readings written together. For example, a person with a systolic pressure of 118 and a diastolic pressure of 78 will have 118/78 written in the medical record. To be high blood pressure, the numbers must be higher when tested over a period of time.  Blood pressure is categorized as normal, elevated, or stage 1 or stage 2 high blood pressure:    Normal blood pressure is systolic of less than 120 and diastolic of less than 80  (120/80)    Elevated blood pressure is systolic of 120 to 129 and diastolic less than 80    Stage 1 high blood pressure is systolic is 130 to 139 or diastolic between 80 to 89    Stage 2 high blood pressure is when systolic is 140 or higher or the diastolic is 90 or higher  Uncontrolled high blood pressure can cause serious health problems. It raises your risk for heart attack, stroke, and heart failure. In general, if you have high blood pressure, keeping your blood pressure below 130/80 mmHg may help prevent these problems. Your healthcare provider may prescribe medicine to help control blood pressure if lifestyle changes are not enough.  Home care  It s important to take steps to lower your blood pressure. If you are taking blood pressure medicine, the guidelines below may help you need less or no medicines in the future.    Start a weight-loss program if you are overweight.    Cut back on the amount of salt in your diet:  ? Avoid high-salt foods like olives, pickles, smoked meats, and salted potato chips.  ? Don t add salt to your food at the table.  ? Use only small amounts of salt when cooking.    Start an exercise program. Talk with your healthcare provider about what exercise program is best for you. It doesn t have to be difficult. Even brisk walking for 20 minutes 3 times a week is a good form of exercise.    Avoid medicines that stimulates the heart. This includes many over-the-counter cold and sinus decongestant pills and sprays, as well as diet pills. Check the warnings about high blood pressure on the label. Before purchasing any over-the-counter medicines or supplements, always ask the pharmacist about the product's potential interaction with your high blood pressure and your medicines.    Stimulants such as amphetamine or cocaine could be lethal for someone with high blood pressure. Never take these.    Limit how much caffeine you drink. Or switch to noncaffeinated beverages.    Stop smoking. If you  are a long-time smoker, this can be hard. Enroll in a stop-smoking program to make it more likely that you will succeed. Talk with your provider about ways to quit.    Learn how to handle stress better. This is an important part of any program to lower blood pressure. Learn ways to relax. These include meditation, yoga, and biofeedback.    If medicines were prescribed, take them exactly as directed. Missing doses may cause your blood pressure to get out of control.    If you miss a dose or doses of your medicines, check with your healthcare provider or pharmacist about what to do.    Consider buying an automatic blood pressure machine. Your provider may recommend a certain type. You can get one of these at most pharmacies. Measure your blood pressure twice a day, in the morning, and in the late afternoon. Keep a written record of your home blood pressure readings and take the record to your medical appointments.  Here are some additional guidelines on home blood pressure monitoring from the American Heart Association.    Don't smoke or drink coffee for 30 minutes    Go to the bathroom before the test.    Relax for 5 minutes before taking the measurement.    Sit correctly. Be sure your back is supported. Don't sit on a couch or soft chair. Uncross your feet and place them flat on the floor. Place your arm on a solid, flat surface like a table with the upper arm at heart level. Make certain the middle of the cuff is directly above the bend of the elbow. Check the monitor's instruction manual for an illustration.    Take multiple readings. When you measure, take 2 or 3 readings one minute apart and record all of the results.    Take your blood pressure at the same time every day, or as your healthcare provider recommends.    Record the date, time, and blood pressure reading.    Take the record with you to your next appointment. If your blood pressure monitor has a built-in memory, simply take the monitor with you to  your next appointment.    Call your provider if you have several high readings. Don't be frightened by a single high reading, but if you get several high readings, check in with your healthcare provider.    Note: When blood pressure reaches a systolic (top number) of 180 or higher or a diastolic (bottom number) of 110 or higher, emergency medical treatment is required. Call your healthcare provider immediately.  Follow-up care  Regular visits to your own healthcare provider for blood pressure and medicine checks are an important part of your care. Make a follow-up appointment as directed. Bring the record of your home blood pressure readings to the appointment.  When to seek medical advice  Call your healthcare provider right away if any of these occur:    Blood pressure reaches a systolic (top number) of 180 or higher or diastolic (bottom number) of 110 or higher, emergency medical treatment is required.    Chest, arm, shoulder, neck, or upper back pain    Shortness of breath    Severe headache    Throbbing or rushing sound in the ears    Nosebleed    Extreme drowsiness, confusion, or fainting    Dizziness or dizziness with spinning sensation (vertigo)    Weakness in an arm or leg or on one side of the face    Trouble speaking or seeing   Date Last Reviewed: 1/1/2017 2000-2019 [employer]. [address] [place] [address], [place] [address]. All rights reserved. This information is not intended as a substitute for professional medical care. Always follow your healthcare professional's instructions.

## 2020-09-17 NOTE — PROGRESS NOTES
Subjective     Apoorva Roberts is a 44 year old female who presents to clinic today for the following health issues:  Chief Complaint   Patient presents with     Recheck Medication     Pt here to discuss going off her gabapentin.     Headache     Pt would also like to discuss headaches.       HPI       Medication Followup of gabapentin    Taking Medication as prescribed: yes    Side Effects:  None    Medication Helping Symptoms:  Pt would like to discuss going off medication.  Makes her feel sick when she misses a dose.     Headache  Onset/Duration: ongoing since age 17, worse the past 2-3 months   Description  Location: start inbase of neck , travel up head into the front   Character: sharp pain  Frequency:  3-4 times per week  Duration:  1-2 days  Wake with headaches: YES  Able to do daily activities when headache present: YES- depends how bad it is  Intensity:  3-9/10  Progression of Symptoms:  worsening  Accompanying signs and symptoms:  Stiff neck: no  Neck or upper back pain: no  Sinus or URI symptoms no   Fever: no  Nausea or vomiting: YES- sometimes  Dizziness: no  Numbness/tingling: no  Weakness: no  Visual changes: none  History  Head trauma: no  Family history of migraines: YES- mom and siblings  Daily pain medication use: YES- tylenol and ibuprofen  Previous tests for headaches: YES- ct and mri in the past  Neurologist evaluation: YES- in the past  Precipitating or Alleviating factors (light/sound/sleep/caffeine): light and sound make it worse  Therapies tried and outcome: Ibuprofen (Advil, Motrin), Naproxyn (Aleve), Tylenol, Excedrin, Imitrex, Maxalt and narcotics ( codeine, hydrocodone, morphine, meperidine, fioricet, Tylenol #3 and oxycodone )    Outcome - the only one she found helpful was fioricet  Frequent/daily pain medication use: tylenol and ibuprofen sometimes will help if she catches symptoms right away  Used to be on amitriptyline, topiramate, norgesic - less frequent migraines.  Patient said  "\"when I went on fioricet, I was doing fine with my migraines\".    Verified above history with patient.    Hypertension Follow-up      Do you check your blood pressure regularly outside of the clinic? No     Are you following a low salt diet? No    Are your blood pressures ever more than 140 on the top number (systolic) OR more   than 90 on the bottom number (diastolic), for example 140/90? No    Patient Active Problem List   Diagnosis     Classic migraine     Community acquired pneumonia     Gastroesophageal reflux disease     Generalized headaches     Hidradenitis suppurativa     Uncontrolled hypertension     Moderate episode of recurrent major depressive disorder (H)     Peripheral neuropathy     Marijuana use, continuous     Tobacco use disorder     Migraine without aura and without status migrainosus, not intractable     History reviewed. No pertinent surgical history.    Social History     Tobacco Use     Smoking status: Current Every Day Smoker     Packs/day: 1.00     Years: 30.00     Pack years: 30.00     Types: Cigarettes     Smokeless tobacco: Never Used   Substance Use Topics     Alcohol use: Yes     Comment: 2-3 drinks per week     Family History   Problem Relation Age of Onset     Diabetes Mother      Hypertension Mother      Hyperlipidemia Mother      Hypertension Father      Hyperlipidemia Father      Kidney Cancer Father      Breast Cancer No family hx of          Smokes marijuana.    Current Outpatient Medications   Medication Sig Dispense Refill     acetaminophen (TYLENOL) 325 MG tablet Take 650 mg by mouth every 6 hours as needed for mild pain       gabapentin (NEURONTIN) 300 MG capsule Take 3 capsules (900 mg) by mouth 3 times daily 270 capsule 0     hydrochlorothiazide (HYDRODIURIL) 25 MG tablet Take 1 tablet (25 mg) by mouth daily 90 tablet 3     ibuprofen (ADVIL/MOTRIN) 200 MG tablet Take 400 mg by mouth every 4 hours as needed for mild pain       lisinopril (ZESTRIL) 20 MG tablet Take 1.5 " "tablets (30 mg) by mouth daily       zolpidem (AMBIEN) 10 MG tablet Take 1 Tablet BY MOUTH at bedtime AS NEEDED 30 tablet 2     lidocaine-prilocaine (EMLA) 2.5-2.5 % external cream Apply 1 g topically daily as needed       naloxone (NARCAN) 4 MG/0.1ML nasal spray Spray 1 spray (4 mg) into one nostril alternating nostrils as needed for opioid reversal every 2-3 minutes until assistance arrives 0.2 mL 0     rizatriptan (MAXALT) 5 MG tablet Take 1-2 tablets (5-10 mg) by mouth at onset of headache for migraine May repeat in 2 hours. Max 6 tablets/24 hours. (Patient not taking: Reported on 9/17/2020) 18 tablet 11     Allergies   Allergen Reactions     Indomethacin      Other reaction(s): GI Upset     Fluoxetine      Other reaction(s): Thrush     Paroxetine      Other reaction(s): Thrush         Review of Systems   Constitutional, HEENT, cardiovascular, pulmonary, GI, , musculoskeletal, neuro, skin, endocrine and psych systems are negative, except as otherwise noted.      Objective    BP (!) 196/114   Pulse 96   Temp 98.2  F (36.8  C) (Tympanic)   Ht 1.588 m (5' 2.5\")   Wt 84.4 kg (186 lb)   SpO2 99%   BMI 33.48 kg/m    Body mass index is 33.48 kg/m .  Physical Exam   GENERAL:  alert and no distress, ambulatory w/o assist  EYES: pink conj, no icterus, EOMI, pupils 4-5 mm briskly reactive to light, no nystagmus  NECK: no tenderness, no adenopathy,  Thyroid not enlarged  RESP: lungs clear to auscultation - no rales, no rhonchi, no wheezes  CV: regular rates and rhythm, no murmur  MS: no edema  SKIN: no suspicious lesions, no rashes  NEURO: Patient is A and O X 3; Cranial nerves 2-12 intact;  Strength 5/5 all extremities; DTR: ++ x 4; Romberg negative, able to tandem walk; Sensory no deficit; no tremors No problems with motor coordination      No results found for this or any previous visit (from the past 24 hour(s)).        Assessment & Plan     Apoorva was seen today for recheck medication and headache.    Diagnoses " "and all orders for this visit:    Uncontrolled hypertension  -     lisinopril (ZESTRIL) 20 MG tablet; Take 1.5 tablets (30 mg) by mouth daily  Patient was advised BP uncontrolled today. Discussed risks of high  BP.   Discussed persistently very high BPs can cause frequent headaches.  Reviewed meds with patient.  Increased lisinopril dose to optimize management.  Reinforced sodium restriction.  Exercise recommendations reviewed with patient.  Reasons to go to ER discussed in detail with patient.    Other polyneuropathy  -     gabapentin (NEURONTIN) 300 MG capsule; Take 3 capsules (900 mg) by mouth 3 times daily  Patient reports she was not getting enough relief anyway with the med, plus she did not want \"to be tied down to taking this\" multiple times a day - she misses doses and it gives her side effects.  Discussed slow wean first to prevent withdrawals.  She concurred.    Migraine without aura and without status migrainosus, not intractable  -     NEUROLOGY ADULT REFERRAL  Discussed current guidelines in treating migraines. fioricet alone is not ideal due to long term adverse effects of its components.  Her frequent HAs can also be explained by her very high BPs, so optimization of htn control is improtant and will be pursued today.  Neurology consult for further evaluation and management of her migraines.  She concurred with the above.    Tobacco use disorder  Discussed risks of continuous smoking tobacco.  Patient is not interested in quitting completely at this time.  Will continue to  in the future.    Marijuana use, continuous  Discussed with patient possible adverse health effects of even episodic cannabis use.  Patient was advised to completely stop use.             Patient Instructions   Plan:    1) reduce gabapentin to 3 tablets 3 x a day.    2) increase lisinopril to 30 mg daily.    3) schedule neurology consult.    4) follow up next week for blood pressure recheck.    You are strongly advised to " stop smoking cigarettes and marijuana soon!  Continuing to smoke will increase your risk for high blood pressure, heart disease, stroke, cancer, and  lung damage.  It also increases risk of complications from Covid-19 if you acquire it.    Low salt diet.    If you have persistent chest pain, persistent or worsening breathing difficulty, palpitation, lightheadedness or other concerning symptoms, you should be brought to the ER.    Patient Education     Uncontrolled High Blood Pressure (Established)    Your blood pressure was unusually high today. This can occur if you ve missed doses of your blood pressure medicine. Or it can happen if you are taking other medicines. These include some asthma inhalers, decongestants, diet pills, and street drugs like cocaine and amphetamine.  Other causes include:    Weight gain    More salt in your diet    Smoking    Caffeine  Your blood pressure can also rise if you are emotionally upset or in intense pain. It may go back to normal after a period of rest.  Blood pressure measurements are given as 2 numbers. Systolic blood pressure is the upper number. This is the pressure when the heart contracts. Diastolic blood pressure is the lower number. This is the pressure when the heart relaxes between beats. You will see your blood pressure readings written together. For example, a person with a systolic pressure of 118 and a diastolic pressure of 78 will have 118/78 written in the medical record. To be high blood pressure, the numbers must be higher when tested over a period of time.  Blood pressure is categorized as normal, elevated, or stage 1 or stage 2 high blood pressure:    Normal blood pressure is systolic of less than 120 and diastolic of less than 80 (120/80)    Elevated blood pressure is systolic of 120 to 129 and diastolic less than 80    Stage 1 high blood pressure is systolic is 130 to 139 or diastolic between 80 to 89    Stage 2 high blood pressure is when systolic is 140 or  higher or the diastolic is 90 or higher  Uncontrolled high blood pressure can cause serious health problems. It raises your risk for heart attack, stroke, and heart failure. In general, if you have high blood pressure, keeping your blood pressure below 130/80 mmHg may help prevent these problems. Your healthcare provider may prescribe medicine to help control blood pressure if lifestyle changes are not enough.  Home care  It s important to take steps to lower your blood pressure. If you are taking blood pressure medicine, the guidelines below may help you need less or no medicines in the future.    Start a weight-loss program if you are overweight.    Cut back on the amount of salt in your diet:  ? Avoid high-salt foods like olives, pickles, smoked meats, and salted potato chips.  ? Don t add salt to your food at the table.  ? Use only small amounts of salt when cooking.    Start an exercise program. Talk with your healthcare provider about what exercise program is best for you. It doesn t have to be difficult. Even brisk walking for 20 minutes 3 times a week is a good form of exercise.    Avoid medicines that stimulates the heart. This includes many over-the-counter cold and sinus decongestant pills and sprays, as well as diet pills. Check the warnings about high blood pressure on the label. Before purchasing any over-the-counter medicines or supplements, always ask the pharmacist about the product's potential interaction with your high blood pressure and your medicines.    Stimulants such as amphetamine or cocaine could be lethal for someone with high blood pressure. Never take these.    Limit how much caffeine you drink. Or switch to noncaffeinated beverages.    Stop smoking. If you are a long-time smoker, this can be hard. Enroll in a stop-smoking program to make it more likely that you will succeed. Talk with your provider about ways to quit.    Learn how to handle stress better. This is an important part of  any program to lower blood pressure. Learn ways to relax. These include meditation, yoga, and biofeedback.    If medicines were prescribed, take them exactly as directed. Missing doses may cause your blood pressure to get out of control.    If you miss a dose or doses of your medicines, check with your healthcare provider or pharmacist about what to do.    Consider buying an automatic blood pressure machine. Your provider may recommend a certain type. You can get one of these at most pharmacies. Measure your blood pressure twice a day, in the morning, and in the late afternoon. Keep a written record of your home blood pressure readings and take the record to your medical appointments.  Here are some additional guidelines on home blood pressure monitoring from the American Heart Association.    Don't smoke or drink coffee for 30 minutes    Go to the bathroom before the test.    Relax for 5 minutes before taking the measurement.    Sit correctly. Be sure your back is supported. Don't sit on a couch or soft chair. Uncross your feet and place them flat on the floor. Place your arm on a solid, flat surface like a table with the upper arm at heart level. Make certain the middle of the cuff is directly above the bend of the elbow. Check the monitor's instruction manual for an illustration.    Take multiple readings. When you measure, take 2 or 3 readings one minute apart and record all of the results.    Take your blood pressure at the same time every day, or as your healthcare provider recommends.    Record the date, time, and blood pressure reading.    Take the record with you to your next appointment. If your blood pressure monitor has a built-in memory, simply take the monitor with you to your next appointment.    Call your provider if you have several high readings. Don't be frightened by a single high reading, but if you get several high readings, check in with your healthcare provider.    Note: When blood pressure  reaches a systolic (top number) of 180 or higher or a diastolic (bottom number) of 110 or higher, emergency medical treatment is required. Call your healthcare provider immediately.  Follow-up care  Regular visits to your own healthcare provider for blood pressure and medicine checks are an important part of your care. Make a follow-up appointment as directed. Bring the record of your home blood pressure readings to the appointment.  When to seek medical advice  Call your healthcare provider right away if any of these occur:    Blood pressure reaches a systolic (top number) of 180 or higher or diastolic (bottom number) of 110 or higher, emergency medical treatment is required.    Chest, arm, shoulder, neck, or upper back pain    Shortness of breath    Severe headache    Throbbing or rushing sound in the ears    Nosebleed    Extreme drowsiness, confusion, or fainting    Dizziness or dizziness with spinning sensation (vertigo)    Weakness in an arm or leg or on one side of the face    Trouble speaking or seeing   Date Last Reviewed: 1/1/2017 2000-2019 [employer]. [address] [place] [address]. All rights reserved. This information is not intended as a substitute for professional medical care. Always follow your healthcare professional's instructions.               Return in about 1 week (around 9/24/2020) for BP Recheck with Dr. Reeves.    Parminder Reeves MD  [facility]

## 2020-10-12 DIAGNOSIS — G62.89 OTHER POLYNEUROPATHY: ICD-10-CM

## 2020-10-12 DIAGNOSIS — I10 UNCONTROLLED HYPERTENSION: ICD-10-CM

## 2020-10-12 NOTE — TELEPHONE ENCOUNTER
Requested Prescriptions   Pending Prescriptions Disp Refills     gabapentin (NEURONTIN) 300 MG capsule [Pharmacy Med Name: gabapentin 300 mg capsule] 270 capsule 0     Sig: Take 3 capsules (900 mg) by mouth 3 times daily       There is no refill protocol information for this order

## 2020-10-14 RX ORDER — GABAPENTIN 300 MG/1
600 CAPSULE ORAL 3 TIMES DAILY
Qty: 180 CAPSULE | Refills: 0 | Status: SHIPPED | OUTPATIENT
Start: 2020-10-14 | End: 2020-11-05 | Stop reason: DRUGHIGH

## 2020-10-14 RX ORDER — LISINOPRIL 30 MG/1
30 TABLET ORAL DAILY
Qty: 90 TABLET | Refills: 3 | Status: SHIPPED | OUTPATIENT
Start: 2020-10-14 | End: 2020-11-16

## 2020-10-14 NOTE — TELEPHONE ENCOUNTER
Decrease gabapentin to 600 mg 3 x a day.  Continue lisinopril 30 mg daily - changed Rx to 30 mg tablets one tablet a day.

## 2020-10-14 NOTE — TELEPHONE ENCOUNTER
Patient says she yes, she is ready for the next step down.  Currently taking 900 mg TID.    Also her lisinopril was increased to 30 mg daily - from 20 mg daily.  Would you like her to stay on this dose?  If so, needs RX - currently listed as historical.    Routing to provider.  Keyla GARCIA RN

## 2020-10-14 NOTE — TELEPHONE ENCOUNTER
Please call patient.  Plan last visit was wean down gabapentin.   Need to know if she is okay with the next step down dose.  Virtual visit in a month is recommended afer this rx.

## 2020-11-04 DIAGNOSIS — F51.01 PRIMARY INSOMNIA: ICD-10-CM

## 2020-11-04 NOTE — TELEPHONE ENCOUNTER
Requested Prescriptions   Pending Prescriptions Disp Refills     zolpidem (AMBIEN) 10 MG tablet [Pharmacy Med Name: zolpidem 10 mg tablet] 30 tablet 2     Sig: Take 1 Tablet BY MOUTH at bedtime AS NEEDED       There is no refill protocol information for this order

## 2020-11-05 ENCOUNTER — VIRTUAL VISIT (OUTPATIENT)
Dept: FAMILY MEDICINE | Facility: CLINIC | Age: 45
End: 2020-11-05
Payer: COMMERCIAL

## 2020-11-05 DIAGNOSIS — F51.01 PRIMARY INSOMNIA: ICD-10-CM

## 2020-11-05 DIAGNOSIS — G62.89 OTHER POLYNEUROPATHY: Primary | ICD-10-CM

## 2020-11-05 PROCEDURE — 99213 OFFICE O/P EST LOW 20 MIN: CPT | Mod: 95 | Performed by: NURSE PRACTITIONER

## 2020-11-05 RX ORDER — ZOLPIDEM TARTRATE 10 MG/1
TABLET ORAL
Qty: 30 TABLET | Refills: 2 | OUTPATIENT
Start: 2020-11-05

## 2020-11-05 RX ORDER — GABAPENTIN 300 MG/1
900 CAPSULE ORAL 3 TIMES DAILY
Qty: 270 CAPSULE | Refills: 0 | Status: SHIPPED | OUTPATIENT
Start: 2020-11-05 | End: 2020-12-01

## 2020-11-05 RX ORDER — ZOLPIDEM TARTRATE 10 MG/1
TABLET ORAL
Qty: 30 TABLET | Refills: 2 | Status: SHIPPED | OUTPATIENT
Start: 2020-11-05 | End: 2021-02-01

## 2020-11-05 ASSESSMENT — ENCOUNTER SYMPTOMS
NAUSEA: 1
SLEEP DISTURBANCE: 1
MYALGIAS: 1

## 2020-11-05 NOTE — PROGRESS NOTES
"Apoorva Roberts is a 44 year old female who is being evaluated via a billable telephone visit.      The patient has been notified of following:     \"This telephone visit will be conducted via a call between you and your physician/provider. We have found that certain health care needs can be provided without the need for a physical exam.  This service lets us provide the care you need with a short phone conversation.  If a prescription is necessary we can send it directly to your pharmacy.  If lab work is needed we can place an order for that and you can then stop by our lab to have the test done at a later time.    Telephone visits are billed at different rates depending on your insurance coverage. During this emergency period, for some insurers they may be billed the same as an in-person visit.  Please reach out to your insurance provider with any questions.    If during the course of the call the physician/provider feels a telephone visit is not appropriate, you will not be charged for this service.\"    Patient has given verbal consent for Telephone visit?  Yes    What phone number would you like to be contacted at? [phone]    How would you like to obtain your AVS? Mail a copy    Subjective     Apoorva Roberts is a 44 year old female who presents via phone visit today for the following health issues:    HPI     On 10/12/20 - Refill approved by Dr. Reeves med changes - (last appointment was told to wean down meds below)  Gabapentin (Neurotin) 900 mg x 3 times a day - Decrease gabapentin 600 mg 3 x a day    Lisinopril from 20 mg to 30 mg daily - Continue with 30 mg for 30 days     Yesterday request refill on Zolpidem - Not refilled yet.    Medication Followup of zolpidem (AMBIEN) 10 MG tablet    Taking Medication as prescribed: yes    Side Effects:  None    Medication Helping Symptoms:  yes     Medication Followup of gabapentin (NEURONTIN) 300 MG capsule    Taking Medication as prescribed: yes    Side Effects:  " None    Medication Helping Symptoms:  NO-feels like having too much pain and need go back to the old prescription order.      Additional provider notes:       Gabapentin: on 9/17 patient was seen and had requested weaning off gabapentin due to feeling sick, insomnia, cold chills when she missed a dose. She started wean 10/12/20, decreasing from 900mg to 600mg TID. She now states that she is in too much pain and still having the above symptoms and would like to return to original dose.     Insomnia: requesting refill of Ambien. Since weaning off gabapentin has been having more insomnia. Has been using medication 4-5 times a week.     Review of Systems   Gastrointestinal: Positive for nausea.   Musculoskeletal: Positive for myalgias.   Psychiatric/Behavioral: Positive for sleep disturbance.             Objective          Vitals:  No vitals were obtained today due to virtual visit.    healthy, alert, no distress and cooperative  PSYCH: Alert and oriented times 3; coherent speech, normal   rate and volume, able to articulate logical thoughts, able   to abstract reason, no tangential thoughts, no hallucinations   or delusions  Her affect is normal and pleasant  RESP: No cough, no audible wheezing, able to talk in full sentences  Remainder of exam unable to be completed due to telephone visits            Assessment/Plan:    Assessment & Plan     Other polyneuropathy  Patient requesting increase back to original dose due to pain and s/e. Increased to 900mg TID, refill for 30 days provided. PCP to refill further.     - gabapentin (NEURONTIN) 300 MG capsule; Take 3 capsules (900 mg) by mouth 3 times daily    Primary insomnia  Worsened recently by gabapentin weaning. Requesting refill. Otherwise, no s/e of medication. 30 day refill provided.  verified.     - zolpidem (AMBIEN) 10 MG tablet; Take 1 Tablet BY MOUTH at bedtime AS NEEDED     Tobacco Cessation:   reports that she has been smoking cigarettes. She has a 30.00  "pack-year smoking history. She has never used smokeless tobacco.        BMI:   Estimated body mass index is 33.48 kg/m  as calculated from the following:    Height as of 9/17/20: 1.588 m (5' 2.5\").    Weight as of 9/17/20: 84.4 kg (186 lb).            Patient Instructions   Follow-up with PCP for further refills.       Return if symptoms worsen or fail to improve.    ISAAC Landeros [facility]    Phone call duration:  5 minutes                "

## 2020-11-13 ENCOUNTER — TELEPHONE (OUTPATIENT)
Dept: FAMILY MEDICINE | Facility: CLINIC | Age: 45
End: 2020-11-13

## 2020-11-13 NOTE — TELEPHONE ENCOUNTER
S-(situation): Patient requesting Urine drug screen,     B-(background): Last Office visit: 9/17/20 with Lala    A-(assessment): Patient has failed two Urine drug screens, county is reporting Active ingredient for Suboxone in her system, had urine screens today and 11/2/20.   Asking for an independent urine drug screen.   Patient is trying to get her daughter back and this is prolonging the process.     R-(recommendations): Provider please review and advise. Thank you.

## 2020-11-13 NOTE — TELEPHONE ENCOUNTER
She needs to make appt for this (virtual okay) or if her PCP is willing to place the order since they know her.     Thanks,    Carmen Curran DNP, NP-C 11/13/2020 4:35 PM

## 2020-11-13 NOTE — TELEPHONE ENCOUNTER
Reason for Call:  Other order    Detailed comments: pt failed two drug test through the county. Pt is asking for an independent drug test pt states there should not be suboxin in pt system to test positive for this. Pt is trying to get daughter back and failing these is pushing her behind. Please advise    Phone Number Patient can be reached at: Home number on file [phone]    Best Time: any    Can we leave a detailed message on this number? YES    Call taken on 11/13/2020 at 3:21 PM by Sarita Lopez

## 2020-11-16 ENCOUNTER — VIRTUAL VISIT (OUTPATIENT)
Dept: FAMILY MEDICINE | Facility: CLINIC | Age: 45
End: 2020-11-16
Payer: COMMERCIAL

## 2020-11-16 DIAGNOSIS — I10 ESSENTIAL HYPERTENSION: ICD-10-CM

## 2020-11-16 DIAGNOSIS — F12.91 HISTORY OF MARIJUANA USE: Primary | ICD-10-CM

## 2020-11-16 PROCEDURE — 99214 OFFICE O/P EST MOD 30 MIN: CPT | Mod: 95 | Performed by: NURSE PRACTITIONER

## 2020-11-16 NOTE — PROGRESS NOTES
"Apoorva Roberts is a 44 year old female who is being evaluated via a billable telephone visit.      The patient has been notified of following:     \"This telephone visit will be conducted via a call between you and your physician/provider. We have found that certain health care needs can be provided without the need for a physical exam.  This service lets us provide the care you need with a short phone conversation.  If a prescription is necessary we can send it directly to your pharmacy.  If lab work is needed we can place an order for that and you can then stop by our lab to have the test done at a later time.    Telephone visits are billed at different rates depending on your insurance coverage. During this emergency period, for some insurers they may be billed the same as an in-person visit.  Please reach out to your insurance provider with any questions.    If during the course of the call the physician/provider feels a telephone visit is not appropriate, you will not be charged for this service.\"    Patient has given verbal consent for Telephone visit?  Yes    What phone number would you like to be contacted at? [phone]    How would you like to obtain your AVS? MyChart    Subjective     Apoorva Roberts is a 44 year old female who presents via phone visit today for the following health issues:    HPI     Chief Complaint   Patient presents with     Lab Only     Pt needs to get a drug screen completed. She gets UA's through the county, but the last 2 tests have been positive for saboxone, but pt states that she is not taking that drug. She wants a second party test completed.     Hypertension     Pt reports that yesterday she felt dizzy and her BP was 88/70. She wants to discuss the causes and dose of her medications.     Hypertension Follow-up      Do you check your blood pressure regularly outside of the clinic? No     Are you following a low salt diet? Yes    Are your blood pressures ever more than 140 on the " top number (systolic) OR more   than 90 on the bottom number (diastolic), for example 140/90? Yes      How many servings of fruits and vegetables do you eat daily?  0-1    On average, how many sweetened beverages do you drink each day (Examples: soda, juice, sweet tea, etc.  Do NOT count diet or artificially sweetened beverages)?   6    How many days per week do you exercise enough to make your heart beat faster? 7    How many minutes a day do you exercise enough to make your heart beat faster? 10 - 19  How many days per week do you miss taking your medication? 2    What makes it hard for you to take your medications?  remembering to take          Review of Systems   Constitutional, HEENT, cardiovascular, pulmonary, GI, , musculoskeletal, neuro, skin, endocrine and psych systems are negative, except as otherwise noted.       Objective          Vitals:  No vitals were obtained today due to virtual visit.    healthy, alert and no distress  PSYCH: Alert and oriented times 3; coherent speech, normal   rate and volume, able to articulate logical thoughts, able   to abstract reason, no tangential thoughts, no hallucinations   or delusions  Her affect is normal  RESP: No cough, no audible wheezing, able to talk in full sentences  Remainder of exam unable to be completed due to telephone visits            Assessment/Plan:    Assessment & Plan     History of marijuana use  Patient states that she has had positive suboxone in her drug UA's - but denies using suboxone- therefore she is asking for an independent drug test  - Drug  Screen Comprehensive , Urine with Reported Meds ([facility]) (Pain Care Package)    Essential hypertension  Patient reports low blood pressure's at home- recommend stopping lisinopril- schedule RN visit in 1 week to recheck blood pressure               No follow-ups on file.    ISAAC Martini CNP  [facility]    Phone call duration:  8 minutes

## 2020-12-01 ENCOUNTER — VIRTUAL VISIT (OUTPATIENT)
Dept: FAMILY MEDICINE | Facility: CLINIC | Age: 45
End: 2020-12-01
Payer: COMMERCIAL

## 2020-12-01 DIAGNOSIS — G62.89 OTHER POLYNEUROPATHY: ICD-10-CM

## 2020-12-01 PROCEDURE — 99213 OFFICE O/P EST LOW 20 MIN: CPT | Mod: 95 | Performed by: NURSE PRACTITIONER

## 2020-12-01 RX ORDER — GABAPENTIN 300 MG/1
900 CAPSULE ORAL 3 TIMES DAILY
Qty: 270 CAPSULE | Refills: 0 | Status: SHIPPED | OUTPATIENT
Start: 2020-12-01 | End: 2020-12-28

## 2020-12-01 NOTE — PROGRESS NOTES
"Apoorva Roberts is a 45 year old female who is being evaluated via a billable telephone visit.      The patient has been notified of following:     \"This telephone visit will be conducted via a call between you and your physician/provider. We have found that certain health care needs can be provided without the need for a physical exam.  This service lets us provide the care you need with a short phone conversation.  If a prescription is necessary we can send it directly to your pharmacy.  If lab work is needed we can place an order for that and you can then stop by our lab to have the test done at a later time.    Telephone visits are billed at different rates depending on your insurance coverage. During this emergency period, for some insurers they may be billed the same as an in-person visit.  Please reach out to your insurance provider with any questions.    If during the course of the call the physician/provider feels a telephone visit is not appropriate, you will not be charged for this service.\"    Patient has given verbal consent for Telephone visit?  Yes    What phone number would you like to be contacted at? [phone]    How would you like to obtain your AVS? Mail a copy    Subjective     Apoorva Roberts is a 45 year old female who presents via phone visit today for the following health issues:    HPI     Chronic Pain Follow-Up    Where in your body do you have pain? Legs, nerve damage  How has your pain affected your ability to work? Pain does not limit ability to work  Which of these pain treatments have you tried since your last clinic visit? Other: none  How well are you sleeping? Fair  How has your mood been since your last visit? About the same  Have you had a significant life event? Relationship Concerns and Other: trying to get custody of her daughter  Other aggravating factors:   Taking medication as directed? Yes    PHQ-9 SCORE 9/3/2020   PHQ-9 Total Score 11     [place]OR[place]-7 SCORE 9/3/2020   Total Score " "7     No flowsheet data found.  Encounter-Level CSA:    There are no encounter-level csa.     Patient-Level CSA:    There are no patient-level csa.         How many servings of fruits and vegetables do you eat daily?  0-1    On average, how many sweetened beverages do you drink each day (Examples: soda, juice, sweet tea, etc.  Do NOT count diet or artificially sweetened beverages)?   Too much to count per Apoorva    How many days per week do you exercise enough to make your heart beat faster? 3 or less    How many minutes a day do you exercise enough to make your heart beat faster? 10 - 19    How many days per week do you miss taking your medication? 0             Review of Systems   See above       Objective          Vitals:  No vitals were obtained today due to virtual visit.    healthy, alert and no distress  PSYCH: Alert and oriented times 3; coherent speech, normal   rate and volume, able to articulate logical thoughts, able   to abstract reason, no tangential thoughts, no hallucinations   or delusions  Her affect is normal  RESP: No cough, no audible wheezing, able to talk in full sentences  Remainder of exam unable to be completed due to telephone visits            Assessment/Plan:    Assessment & Plan     Other polyneuropathy    - gabapentin (NEURONTIN) 300 MG capsule; Take 3 capsules (900 mg) by mouth 3 times daily     Tobacco Cessation:   reports that she has been smoking cigarettes. She has a 30.00 pack-year smoking history. She has never used smokeless tobacco.        BMI:   Estimated body mass index is 33.48 kg/m  as calculated from the following:    Height as of 9/17/20: 1.588 m (5' 2.5\").    Weight as of 9/17/20: 84.4 kg (186 lb).            See Patient Instructions  Patient Instructions   Refill sent to pharmacy, follow up with PCP as scheduled.      Our Clinic hours are:  Mondays    7:20 am - 7 pm  Tues -  Fri  7:20 am - 5 pm    Clinic Phone: [phone]    The clinic lab opens at 7:30 am Mon - Fri and " appointments are required.    [facility] [place]  Ph. [phone]  Monday  8 am - 7pm  Tues - Fri 8 am - 5:30 pm             Return in about 4 weeks (around 12/29/2020) for or sooner if symptoms persist or worsen, Med Check.    ISAAC [facility]    Phone call duration:  11 minutes

## 2020-12-01 NOTE — PATIENT INSTRUCTIONS
Refill sent to pharmacy, follow up with PCP as scheduled.      Our Clinic hours are:  Mondays    7:20 am - 7 pm  Tues -  Fri  7:20 am - 5 pm    Clinic Phone: [phone]    The clinic lab opens at 7:30 am Mon - Fri and appointments are required.    [facility] [place]  Ph. [phone]  Monday  8 am - 7pm  Tues - Fri 8 am - 5:30 pm

## 2020-12-28 ENCOUNTER — VIRTUAL VISIT (OUTPATIENT)
Dept: FAMILY MEDICINE | Facility: CLINIC | Age: 45
End: 2020-12-28
Payer: COMMERCIAL

## 2020-12-28 DIAGNOSIS — G62.89 OTHER POLYNEUROPATHY: ICD-10-CM

## 2020-12-28 PROCEDURE — 99213 OFFICE O/P EST LOW 20 MIN: CPT | Mod: 95 | Performed by: FAMILY MEDICINE

## 2020-12-28 RX ORDER — GABAPENTIN 300 MG/1
900 CAPSULE ORAL 3 TIMES DAILY
Qty: 270 CAPSULE | Refills: 0 | Status: SHIPPED | OUTPATIENT
Start: 2020-12-28 | End: 2021-02-01

## 2020-12-28 NOTE — PATIENT INSTRUCTIONS
Keep current dose of gabapentin at 900 mg 3 x a day.  Schedule pain clinic consult for further management of persistent pain in spite of current treatment.    Activity as tolerated.    Zolpidem (Ambien) can cause adverse health effects to people, particularly if taken at 10 mg everyday.  Try to start taking it only on some days.  Will try to reduce the dose to 5 mg as needed for insomnia in the next few months.    Use non-medical means to help with improving sleep.  Patient Education     Peripheral Neuropathy  Peripheral neuropathy is the result of damage to the peripheral nerves. It usually affects the arms or legs, and causes a change in physical feeling. Sometimes it causes weakness in the muscles. You may feel tingling, numbness or shooting pains. Symptoms may be more common at night. Skin may be extra sensitive to light touch or temperature changes.  Neuropathy may be caused by a complication of a chronic disease such as diabetes, virus or bacterial infections, or physical injury. A ruptured disk with pressure on the spinal nerve may also lead to the problem. Certain vitamin deficiencies may also lead to it. It may also be caused by exposure to certain drugs or chemicals.  Home care    Tell the healthcare provider about all medicines you take. This includes prescription and over-the-counter medicines, vitamins, and herbs. Ask if any of the medicines may be causing your problems. Don't make any changes to prescription medicines without talking to your healthcare provider first.    You may be prescribed medicines to help relieve the tingling feeling or for pain. Take all medicines as directed.    A numb hand or foot may be more prone to injury. To help protect it:  ? Always use oven mitts.  ? Test water with an unaffected hand or foot.  ? Use caution when trimming nails. File sharp areas.  ? Wear shoes that fit well to avoid pressure points, blisters, and ulcers.  ? Inspect your hands and feet carefully  "(including the soles of your feet and between your toes) at least once a week. If you see red areas, sores, or other problems, tell your healthcare provider.    Follow-up care  Follow up with your doctor or as advised by our staff. You may need further testing or evaluation.  When to seek medical advice  Call your healthcare provider right away if any of the following occur:    Redness, swelling, cracking, or ulcer on any numb area, especially the feet    New symptoms of numbness or muscle weakness numbness    Loss of bowel or bladder control    Slurred speech, confusion, or trouble speaking, walking, or seeing  [employer] last reviewed this educational content on 3/1/2018    2513-0879 [employer]. [address] [place] [address], [place] [address]. All rights reserved. This information is not intended as a substitute for professional medical care. Always follow your healthcare professional's instructions.           Patient Education   Tips for Sleep Hygiene  \"Sleep hygiene\" means having good sleep habits.Follow these tips to sleep better at night:     Get on a schedule. Go to bed and get up at about the same time every day.    Listen to your body. Only try to sleep when you actually feel tired or sleepy.    Be patient. If you haven't been able to get to sleep after about 30 minutes or more, get up and do something calming or boring until you feel sleepy. Then return to bed and try again.    Don't have caffeine (coffee, tea, cola drinks, chocolate and some medicines), alcohol or nicotine (cigarettes). These can make it harder for you to fall asleep and stay asleep.    Use your bed for sleeping only. That means no TV, computer or homework in bed, especially during the evening and before bedtime.    Don't nap during the day. If you must nap, make sure it is for less than 20 minutes.    Create sleep rituals that remind your body it is time to sleep. Examples include breathing exercises, stretching or reading a " "book.    Avoid all electronic media (smart phone, computer, tablet) within 2 hours of bed time. The \"blue light\" in these devices activates the part of the brain that keeps you awake.    Dim the lights at night.    Get early morning sources of light (walk in the sunshine) to help set sleep patterns at night.    Try a bath or shower before bed. Having a warm bath 1 to 2 hours before bedtime can help you feel sleepy. Hot baths can make you alert, so be mindful of the temperature.    Don't watch the clock. Checking the clock during the night can wake you up. It can also lead to negative thoughts such as, \"I will never fall asleep,\" which can increase anxiety and sleeplessness.    Use a sleep diary. Track your sleep schedule to know your sleep patterns and to see where you can improve.    Get regular exercise every day. Try not to do heavy exercise in the 4 hours before bedtime.    Eat a healthy, balanced diet.    Try eating a light, healthy snack before bed, but avoid eating a heavy meal.    Create the right sleeping area. A cool, dark, quiet room is best. If needed, try earplugs, fans and blackout curtains.    Keep your daytime routine the same even if you have a bad night sleep. Avoiding activities the next day can make it harder to sleep.  For informational purposes only. Not to replace the advice of your health care provider.   Copyright   2013 [employer]. All rights reserved. [employer] [id number] - 01/16.       "

## 2020-12-28 NOTE — PROGRESS NOTES
"Apoorva Roberts is a 45 year old female who is being evaluated via a billable telephone visit.      The patient has been notified of following:     \"This telephone visit will be conducted via a call between you and your physician/provider. We have found that certain health care needs can be provided without the need for a physical exam.  This service lets us provide the care you need with a short phone conversation.  If a prescription is necessary we can send it directly to your pharmacy.  If lab work is needed we can place an order for that and you can then stop by our lab to have the test done at a later time.    Telephone visits are billed at different rates depending on your insurance coverage. During this emergency period, for some insurers they may be billed the same as an in-person visit.  Please reach out to your insurance provider with any questions.    If during the course of the call the physician/provider feels a telephone visit is not appropriate, you will not be charged for this service.\"    Patient has given verbal consent for Telephone visit?  Yes    What phone number would you like to be contacted at? [phone]    How would you like to obtain your AVS? Mail a copy    Subjective     Apoorva Roberts is a 45 year old female who presents via phone visit today for the following health issues:    HPI     Medication Followup of Gabapentin, Ambien    Taking Medication as prescribed: yes    Side Effects:  None    Medication Helping Symptoms:  Yes    Patient would like to keep taking gabapentin as she continues to experience neuropathic pain bilateral lower extremities, affecting work/tasks if increased.     Patient states she used to be given gabapentin 1200 mg TID. Used to see pain clinic in [place].    Patient reports she has been on zolpidem 10 mg nightly for 8 years or so. She denies daytime side effects.        Patient Active Problem List   Diagnosis     Classic migraine     Community acquired pneumonia     " Gastroesophageal reflux disease     Generalized headaches     Hidradenitis suppurativa     Uncontrolled hypertension     Moderate episode of recurrent major depressive disorder (H)     Peripheral neuropathy     Marijuana use, continuous     Tobacco use disorder     Migraine without aura and without status migrainosus, not intractable     History reviewed. No pertinent surgical history.    Social History     Tobacco Use     Smoking status: Current Every Day Smoker     Packs/day: 1.00     Years: 30.00     Pack years: 30.00     Types: Cigarettes     Smokeless tobacco: Never Used   Substance Use Topics     Alcohol use: Yes     Comment: 2-3 drinks per week     Family History   Problem Relation Age of Onset     Diabetes Mother      Hypertension Mother      Hyperlipidemia Mother      Hypertension Father      Hyperlipidemia Father      Kidney Cancer Father      Diabetes Maternal Grandmother      Breast Cancer No family hx of          Current Outpatient Medications   Medication Sig Dispense Refill     acetaminophen (TYLENOL) 325 MG tablet Take 650 mg by mouth every 6 hours as needed for mild pain       gabapentin (NEURONTIN) 300 MG capsule Take 3 capsules (900 mg) by mouth 3 times daily 270 capsule 0     hydrochlorothiazide (HYDRODIURIL) 25 MG tablet Take 1 tablet (25 mg) by mouth daily 90 tablet 3     ibuprofen (ADVIL/MOTRIN) 200 MG tablet Take 400 mg by mouth every 4 hours as needed for mild pain       lidocaine-prilocaine (EMLA) 2.5-2.5 % external cream Apply 1 g topically daily as needed       zolpidem (AMBIEN) 10 MG tablet Take 1 Tablet BY MOUTH at bedtime AS NEEDED 30 tablet 2     naloxone (NARCAN) 4 MG/0.1ML nasal spray Spray 1 spray (4 mg) into one nostril alternating nostrils as needed for opioid reversal every 2-3 minutes until assistance arrives (Patient not taking: Reported on 12/28/2020) 0.2 mL 0     Allergies   Allergen Reactions     Indomethacin      Other reaction(s): GI Upset     Fluoxetine      Other  reaction(s): Thrush     Paroxetine      Other reaction(s): Thrush       Review of Systems   Constitutional, HEENT, cardiovascular, pulmonary, GI, , musculoskeletal, neuro, skin, endocrine and psych systems are negative, except as otherwise noted.       Objective          Vitals:  No vitals were obtained today due to virtual visit.    alert and no distress  PSYCH: Alert and oriented times 3; coherent speech, normal   rate and volume, able to articulate logical thoughts, able   to abstract reason, no tangential thoughts, no hallucinations   or delusions  Her affect is normal  RESP: No cough, no audible wheezing, able to talk in full sentences  Remainder of exam unable to be completed due to telephone visits    No results found for this or any previous visit (from the past 24 hour(s)).        Assessment/Plan:    Assessment & Plan     Apoorva was seen today for refill request and health maintenance.    Diagnoses and all orders for this visit:    Other polyneuropathy  -     gabapentin (NEURONTIN) 300 MG capsule; Take 3 capsules (900 mg) by mouth 3 times daily  -     PAIN MANAGEMENT REFERRAL; Future    patient is on the higher end of dosing for gabapentin.  Discussed risks of high doses of the med.  Since she still reports pain in spite of the dose, consult pain clinic for further management.  Activity as tolerated.    Advised possible risks of current zolpidem dose based on drug info/label and red flags raised re: it.  Opened discussion of possibly reducing use to only some days of the week/month, or reducing to 5 mg per dose.  Patient apprehensive about this but did acknowledge the plan.  Reinforced sleep hygiene.    Return precautions discussed and given to patient.       Tobacco Cessation:   reports that she has been smoking cigarettes. She has a 30.00 pack-year smoking history. She has never used smokeless tobacco.    Patient Instructions   Keep current dose of gabapentin at 900 mg 3 x a day.  Schedule pain clinic  consult for further management of persistent pain in spite of current treatment.    Activity as tolerated.    Zolpidem (Ambien) can cause adverse health effects to people, particularly if taken at 10 mg everyday.  Try to start taking it only on some days.  Will try to reduce the dose to 5 mg as needed for insomnia in the next few months.    Use non-medical means to help with improving sleep.  Patient Education     Peripheral Neuropathy  Peripheral neuropathy is the result of damage to the peripheral nerves. It usually affects the arms or legs, and causes a change in physical feeling. Sometimes it causes weakness in the muscles. You may feel tingling, numbness or shooting pains. Symptoms may be more common at night. Skin may be extra sensitive to light touch or temperature changes.  Neuropathy may be caused by a complication of a chronic disease such as diabetes, virus or bacterial infections, or physical injury. A ruptured disk with pressure on the spinal nerve may also lead to the problem. Certain vitamin deficiencies may also lead to it. It may also be caused by exposure to certain drugs or chemicals.  Home care    Tell the healthcare provider about all medicines you take. This includes prescription and over-the-counter medicines, vitamins, and herbs. Ask if any of the medicines may be causing your problems. Don't make any changes to prescription medicines without talking to your healthcare provider first.    You may be prescribed medicines to help relieve the tingling feeling or for pain. Take all medicines as directed.    A numb hand or foot may be more prone to injury. To help protect it:  ? Always use oven mitts.  ? Test water with an unaffected hand or foot.  ? Use caution when trimming nails. File sharp areas.  ? Wear shoes that fit well to avoid pressure points, blisters, and ulcers.  ? Inspect your hands and feet carefully (including the soles of your feet and between your toes) at least once a week. If  "you see red areas, sores, or other problems, tell your healthcare provider.    Follow-up care  Follow up with your doctor or as advised by our staff. You may need further testing or evaluation.  When to seek medical advice  Call your healthcare provider right away if any of the following occur:    Redness, swelling, cracking, or ulcer on any numb area, especially the feet    New symptoms of numbness or muscle weakness numbness    Loss of bowel or bladder control    Slurred speech, confusion, or trouble speaking, walking, or seeing  [employer] last reviewed this educational content on 3/1/2018    3784-8645 [employer]. [address] [place] [id number]. All rights reserved. This information is not intended as a substitute for professional medical care. Always follow your healthcare professional's instructions.           Patient Education   Tips for Sleep Hygiene  \"Sleep hygiene\" means having good sleep habits.Follow these tips to sleep better at night:     Get on a schedule. Go to bed and get up at about the same time every day.    Listen to your body. Only try to sleep when you actually feel tired or sleepy.    Be patient. If you haven't been able to get to sleep after about 30 minutes or more, get up and do something calming or boring until you feel sleepy. Then return to bed and try again.    Don't have caffeine (coffee, tea, cola drinks, chocolate and some medicines), alcohol or nicotine (cigarettes). These can make it harder for you to fall asleep and stay asleep.    Use your bed for sleeping only. That means no TV, computer or homework in bed, especially during the evening and before bedtime.    Don't nap during the day. If you must nap, make sure it is for less than 20 minutes.    Create sleep rituals that remind your body it is time to sleep. Examples include breathing exercises, stretching or reading a book.    Avoid all electronic media (smart phone, computer, tablet) within 2 hours of " "bed time. The \"blue light\" in these devices activates the part of the brain that keeps you awake.    Dim the lights at night.    Get early morning sources of light (walk in the sunshine) to help set sleep patterns at night.    Try a bath or shower before bed. Having a warm bath 1 to 2 hours before bedtime can help you feel sleepy. Hot baths can make you alert, so be mindful of the temperature.    Don't watch the clock. Checking the clock during the night can wake you up. It can also lead to negative thoughts such as, \"I will never fall asleep,\" which can increase anxiety and sleeplessness.    Use a sleep diary. Track your sleep schedule to know your sleep patterns and to see where you can improve.    Get regular exercise every day. Try not to do heavy exercise in the 4 hours before bedtime.    Eat a healthy, balanced diet.    Try eating a light, healthy snack before bed, but avoid eating a heavy meal.    Create the right sleeping area. A cool, dark, quiet room is best. If needed, try earplugs, fans and blackout curtains.    Keep your daytime routine the same even if you have a bad night sleep. Avoiding activities the next day can make it harder to sleep.  For informational purposes only. Not to replace the advice of your health care provider.   Copyright   2013 [facility] [employer]. All rights reserved. [employer] 069722 - 01/16.           Return in about 1 month (around 1/28/2021) for Follow up on Ambien use.    Parminder Reeves MD  [facility]    Phone call duration:  8 minutes              "

## 2021-01-04 ENCOUNTER — HEALTH MAINTENANCE LETTER (OUTPATIENT)
Age: 46
End: 2021-01-04

## 2021-01-25 DIAGNOSIS — F51.01 PRIMARY INSOMNIA: ICD-10-CM

## 2021-01-26 RX ORDER — ZOLPIDEM TARTRATE 10 MG/1
TABLET ORAL
Qty: 30 TABLET | Refills: 2 | OUTPATIENT
Start: 2021-01-26

## 2021-01-26 NOTE — TELEPHONE ENCOUNTER
Routing refill request to provider for review/approval because:  Drug not on the [facility] refill protocol     Geneva Funez RN

## 2021-02-01 ENCOUNTER — MYC MEDICAL ADVICE (OUTPATIENT)
Dept: ANESTHESIOLOGY | Facility: CLINIC | Age: 46
End: 2021-02-01

## 2021-02-01 ENCOUNTER — VIRTUAL VISIT (OUTPATIENT)
Dept: FAMILY MEDICINE | Facility: CLINIC | Age: 46
End: 2021-02-01
Payer: COMMERCIAL

## 2021-02-01 ENCOUNTER — VIRTUAL VISIT (OUTPATIENT)
Dept: ANESTHESIOLOGY | Facility: CLINIC | Age: 46
End: 2021-02-01
Attending: FAMILY MEDICINE
Payer: COMMERCIAL

## 2021-02-01 DIAGNOSIS — M54.16 LUMBAR RADICULOPATHY: Primary | ICD-10-CM

## 2021-02-01 DIAGNOSIS — G62.89 OTHER POLYNEUROPATHY: Primary | ICD-10-CM

## 2021-02-01 DIAGNOSIS — F51.01 PRIMARY INSOMNIA: ICD-10-CM

## 2021-02-01 DIAGNOSIS — I10 BENIGN ESSENTIAL HYPERTENSION: ICD-10-CM

## 2021-02-01 DIAGNOSIS — G62.89 OTHER POLYNEUROPATHY: ICD-10-CM

## 2021-02-01 PROCEDURE — 99204 OFFICE O/P NEW MOD 45 MIN: CPT | Mod: 95 | Performed by: ANESTHESIOLOGY

## 2021-02-01 PROCEDURE — 99214 OFFICE O/P EST MOD 30 MIN: CPT | Mod: 95 | Performed by: FAMILY MEDICINE

## 2021-02-01 RX ORDER — ZOLPIDEM TARTRATE 5 MG/1
5 TABLET ORAL
Qty: 30 TABLET | Refills: 2 | Status: SHIPPED | OUTPATIENT
Start: 2021-02-01 | End: 2021-04-20

## 2021-02-01 RX ORDER — HYDROCHLOROTHIAZIDE 25 MG/1
25 TABLET ORAL DAILY
Qty: 90 TABLET | Refills: 3 | Status: SHIPPED | OUTPATIENT
Start: 2021-02-01 | End: 2021-07-08

## 2021-02-01 RX ORDER — GABAPENTIN 400 MG/1
1200 CAPSULE ORAL 3 TIMES DAILY
Qty: 270 CAPSULE | Refills: 2 | Status: SHIPPED | OUTPATIENT
Start: 2021-02-01 | End: 2021-04-20

## 2021-02-01 ASSESSMENT — ENCOUNTER SYMPTOMS
NERVOUS/ANXIOUS: 1
PANIC: 1
INSOMNIA: 1
DECREASED CONCENTRATION: 0
DEPRESSION: 1

## 2021-02-01 ASSESSMENT — PAIN SCALES - GENERAL: PAINLEVEL: EXTREME PAIN (8)

## 2021-02-01 NOTE — PATIENT INSTRUCTIONS
Refilled hydrochlorothiazide   Low salt, low fat diet.   Exercise: moderate intensity sustained for at least 30 mins per episode, goal of 150 mins per week at least  Schedule nurse visit in 1 week for a blood pressure recheck.    Refilled gabapentin at the dose pain clinic recommended.  Take this as prescribed.  If needing more pain control, follow up with pain clinic.  Careful with mental fogginess, dizziness, other side effects of high dose gabapentin.    Zolpidem has been flagged to have adverse health effects at doses higher than 5 mg.  As discussed in the past, plan to gradually reduce the use of the med. Will start with decreasing the dose to the recommended safe dose.  Will attempt to reduce use to only on some days after next 2-3 months,.  Consider sleep clinic consult if with continued sleep difficulty symptoms.

## 2021-02-01 NOTE — PATIENT INSTRUCTIONS
Medications:    Recommend that your Primary care provider increase your Gabapentin back to 1200 mg, Three times daily.     Please provide the clinic with a minium of 1 week notice, on all prescription refills.     Imaging:    EMG ordered.     MRI ordered.     IMAGING SERVICES HOURS:    All imaging modalities are available from 7 a.m. - 9 p.m. Monday through Friday  X-ray, CT, MRI, and General Ultrasound appointments are available from 7 a.m. -3:30 p.m. on Saturdays  X-ray, CT and MRI appointments are available from 8 a.m. - 4:30 p.m. on Sundays  Please call [phone] to schedule imaging exams    Treatment planning:    Smoking cessation resources have been added to your After Visit Summary.       Recommended Follow up:      After EMG and MRI to review results.        Please call [phone], option #1 to schedule your clinic appointment if you don't already have an appointment scheduled.        To speak with a nurse, schedule/reschedule/cancel a clinic appointment, or request a medication refill call: [phone], option #1.    You can also reach us by [employer]: [email or web address]                                        Patient Education   Resources to Help You Quit Smoking  If you have quit smoking or are thinking about quitting, congratulations! It can be hard to quit smoking, but the benefits are well worth it. To help you quit and stay smoke-free, there are many resources that can help.  Your health plan  If you have health insurance, call them for more details about their phone coaching programs.    [facility] and [facility]:  3-360-797-BLUE ([phone])    [facility]:  4-148-399-QUIT ([phone])    [facility]:  7-230-731-BLUE ([phone])    [facility]:  [phone]    [facility]:  [phone]    [place] [employer] members:  [phone]    [facility] Plan:   [phone]    [facility]  Plan:  [phone]    [facility]:  [phone]  Other resources  American Cancer Society: 1-669.153.3955  The American Cancer Society can help you find local resources to quit smoking.  [employer]: 4-327-114-PLAN (1-576.410.5075)  Offers a telephone helpline, gum, patches and lozenges. These services are free for the uninsured and those without coverage. The online program is free to everyone at www.quitWetpaint.com.  American Lung Association: 1-800-LUNG-USA (1-520.326.8890)  Provides a lung helpline as well as an online program, self-help book and group clinic support for quitting smoking. www.lung.org/stop-smoking  [employer] [place]:  7-298-342-QUIT (1-798.869.9829)  Offers a telephone hotline, online text chat and a website with tools, information and support for smokers who want to quit. www.smokefree.gov  Medication Therapy Management (MTM):  630.571.8344 930.623.4824 (toll free)  mtm@[employer].org  This is a clinic program to help you quit smoking. It offers one-on-one sessions with a pharmacist. Call or email to find out if your insurance covers MTM and to schedule an appointment at all locations.  For informational purposes only. Not to replace the advice of your health care provider. Copyright   2013 [facility]. All rights reserved. Clinically reviewed by Rose Mary Padron MD, [facility] Lung Cancer Screening Program. [employer] 446109 - Rev 06/19.

## 2021-02-01 NOTE — PROGRESS NOTES
Apoorva is a 45 year old who is being evaluated via a billable video visit.      How would you like to obtain your AVS? MyChart  If the video visit is dropped, the invitation should be resent by: Text to cell phone: In chart  Will anyone else be joining your video visit? No      Video Start Time: 8:19 AM    Video-Visit Details    Type of service:  Video Visit    Video End Time:9:21 AM    Originating Location (pt. Location): Work    Distant Location (provider location):  [facility] [place]     Platform used for Video Visit: [facility]    Pain Clinic New Patient Consult Note:    Referring Provider: Lala   Primary care provider: Dina Ref-Primary, Physician.    Apoorva Roberts is a 45 year old y.o. old female with history of migraines, headaches, peripheral neuropathy, HTN, depression, marijuana and tobacco use who presents to the pain clinic with complaints of neuropathy.    HPI:   Patient Supplied Answers To the [facility] Pain Questionnaire  [facility] -  Patient Entered Questionnaire/Answers 2/1/2021   What number best describes your pain right now:  0 = No pain  to  10 = Worst pain imaginable 7   How would you describe the pain? throbbing   Which of the following worsen your pain? lying down, standing, sitting, walking, exercise   Which of the following improve or reduce your pain?  lying down, standing, sitting, walking, medication   What number best describes your average pain for the past week:  0 = No pain  to  10 = Worst pain imaginable 7   What number best describes your LOWEST pain in past 24 hours:  0 = No pain  to  10 = Worst pain imaginable 5   What number best describes your WORST pain in past 24 hours:  0 = No pain  to  10 = Worst pain imaginable 8   When is your pain worst? Constant   What non-medicine treatments have you already had for your pain? pain clinic, chiropractic care   Have you tried treating your pain with medication?  Yes   Are you currently taking  "medications for your pain? Yes       Apoorva is a 44 y/o F presenting for complaints of nerve pain in her bilateral lower extremities, L > R, which started after a left sided hernia surgery. She had a groin hernia repair in 2013 and reports \"two of the main nerves\" were cut during the surgery. She had significant pain afterward in her L thigh. After multiple images however no EMG, it was determined she had nerve damage from the surgery. Has been on gabapentin since 2014. About a year after the pain started she began experiencing pain in her L calf and on the outside of the lower leg/ankle, does not really go into the foot/toes. These pains are separate from the thigh pain in quality and frequency. The thigh pain is more painful and described as a throbbing. It improves with changing positions, worsens with activity in general. Will hurt more at the end of the day if she is active and can make it harder to fall asleep, otherwise she generally doesn't wake up at night from the pain. She also developed R thigh pain a year or so after the L thigh pain, which has never gone down the leg. It is not nearly as significant as the L leg pain. She denies developing weakness, numbness or tingling to those areas.  Her pain was managed by a pain clinic in [place] initially, was started on percocet prior to transitioning to Gabapentin. No interventions were performed. Her PCP took over gabapentin at 1200 TID and also started Lyrica, up to 150 BID. This was continued for about 2.5 years prior to moving to [place] and re-establishing care. Her PCP discontinued the Lyrica over a year ago and started decreasing the dose of gabapentin. She is currently at 900 TID. Attempted to wean to 600 TID however had significant increase in pain with the wean and went back to 900 TID. Has never had side effects from her medications. She will also use ibuprofen/Tylenol as needed, up to 4g tylenol a day, and less use of ibuprofen as it \"Tears up her " "stomach\". Of note, she has a more active job now since moving to [place], works as an  and is constantly moving. She is able to perform her job and do her activities though her pain can be more significant at the end of the day    Pain treatments:    Herbal medicines: *None  Physical therapy: No  Chiropractor: Yes - after first 1.5-2 years, continues to go to chiropractor for back pain  Pain physician: Yes - in [place] prior to move, not since 2014  Surgery: Resulted after hernia surgery  Biofeedback: No  Acupuncture: No  Injections: No    Tests/Imaging reviewed with the patient:  No imaging available    Significant Medical History:   No past medical history on file.  Jesus Alberto Servin, was in a pain Clinic in [facility]        Past Surgical History:  No past surgical history on file.       Family History:  Family History   Problem Relation Age of Onset     Diabetes Mother      Hypertension Mother      Hyperlipidemia Mother      Hypertension Father      Hyperlipidemia Father      Kidney Cancer Father      Diabetes Maternal Grandmother      Breast Cancer No family hx of           Social History:  Social History     Socioeconomic History     Marital status: Single     Spouse name: Not on file     Number of children: Not on file     Years of education: Not on file     Highest education level: Not on file   Occupational History     Not on file   Social Needs     Financial resource strain: Not on file     Food insecurity     Worry: Not on file     Inability: Not on file     Transportation needs     Medical: Not on file     Non-medical: Not on file   Tobacco Use     Smoking status: Current Every Day Smoker     Packs/day: 1.00     Years: 30.00     Pack years: 30.00     Types: Cigarettes     Smokeless tobacco: Never Used   Substance and Sexual Activity     Alcohol use: Yes     Comment: 2-3 drinks per week     Drug use: Not Currently     Comment: cbd     Sexual activity: Yes     Partners: Male "   Lifestyle     Physical activity     Days per week: Not on file     Minutes per session: Not on file     Stress: Not on file   Relationships     Social connections     Talks on phone: Not on file     Gets together: Not on file     Attends Christianity service: Not on file     Active member of club or organization: Not on file     Attends meetings of clubs or organizations: Not on file     Relationship status: Not on file     Intimate partner violence     Fear of current or ex partner: Not on file     Emotionally abused: Not on file     Physically abused: Not on file     Forced sexual activity: Not on file   Other Topics Concern     Not on file   Social History Narrative     Not on file     Social History     Social History Narrative     Not on file          Allergies:  Allergies   Allergen Reactions     Indomethacin      Other reaction(s): GI Upset     Fluoxetine      Other reaction(s): Thrush     Paroxetine      Other reaction(s): Thrush       Current Medications:   Current Outpatient Medications   Medication Sig Dispense Refill     acetaminophen (TYLENOL) 325 MG tablet Take 650 mg by mouth every 6 hours as needed for mild pain       gabapentin (NEURONTIN) 300 MG capsule Take 3 capsules (900 mg) by mouth 3 times daily 270 capsule 0     hydrochlorothiazide (HYDRODIURIL) 25 MG tablet Take 1 tablet (25 mg) by mouth daily 90 tablet 3     ibuprofen (ADVIL/MOTRIN) 200 MG tablet Take 400 mg by mouth every 4 hours as needed for mild pain       lidocaine-prilocaine (EMLA) 2.5-2.5 % external cream Apply 1 g topically daily as needed       naloxone (NARCAN) 4 MG/0.1ML nasal spray Spray 1 spray (4 mg) into one nostril alternating nostrils as needed for opioid reversal every 2-3 minutes until assistance arrives (Patient not taking: Reported on 12/28/2020) 0.2 mL 0     zolpidem (AMBIEN) 10 MG tablet Take 1 Tablet BY MOUTH at bedtime AS NEEDED 30 tablet 2          Current Pain Medications:  Gabapentin 900 TID  Ibuprofen  PRN  Tylenol PRN, up to 4g daily    Past Pain Medications:  Percocet  Lyrica    Blood thinner:  None    Work History:    Current work status: Full time - clinic manager    Social:  -Lives in: [place], with fiance  -Alcohol: 1-2 beers a week  -Smokin pack/day, 30 years  -Drug use: Smoke marijuana, daily, uses for pain    Psychosocial History:     History of treatment for behavioral disorder: Sees a therapist once per week, not on medication  History of suicidal ideation or suicidal attempt: No    Review of Systems:  Review of Systems   Psychiatric/Behavioral: Positive for depression. The patient is nervous/anxious and has insomnia.        Physical Exam:   Limited due to video visit    General Appearance: No distress, seated comfortably, seen at work via video  Mood: Euthymic  HE ENT: Non constricted pupils  Respiratory: Non labored breathing  Pain specific exam:  Unable to perform over video, no overt pain behaviors    Laboratory results:  Recent Labs   Lab Test 20  1001      POTASSIUM 3.4   CHLORIDE 108   CO2 24   ANIONGAP 5   *   BUN 10   CR 0.89   JULIET 8.8       CBC RESULTS:   Recent Labs   Lab Test 20  1001   WBC 11.2*   RBC 4.50   HGB 15.4   HCT 45.4   *   MCH 34.2*   MCHC 33.9   RDW 12.4            Imaging:  No images are attached to the encounter.     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 old female who presents to the pain clinic with chronic, worsening neuropathic pain in the LLE > RLE following a L groin hernia repair. Her pain has been managed at prior pain clinics and with her PCP in [place] with medications and had done reasonably well. Since transitioning her care to [place], her medication has been reduced/down-titrated however has been unsuccessful given an increase in pain with  the reduction. She does appear to have two pains going on, one related to likely nerve injury after her surgery, two being possible low-back mediated with radicular pain to the lower extremities resulting in the disjointed pain pattern she has been experiencing. Fortunately, it does not seem that she has significant neurologic changes over the many years she has been experiencing these symptoms, however it is worth investigating at this time, and the patient agrees in order to clarify her diagnosis and to pursue other possible interventions  Importantly, Apoorva was reminded of the implications smoking has on her health, in this case on contributing to increased pain and poor wound healing. Smoking cessation will be important for her long-term health as well as an important consideration for any future procedures that may be entertained for pain management    RECOMMENDATIONS:     1. Medications: We recommend that Apoorva's Gabapentin be increased to 1200 TID, this dose appeared to be helpful for her pain without causing significant side effects. This is the maximum dose of gabapentin and she has no known kidney disease.  - Could consider duloxetine as an adjunct medication in the future if gabapentin increase is not helpful  - Continue Tylenol/Ibuprofen PRN    2. Imaging: Will order MRI L Spine to further assess possible lumbar pathology for pain  - Will also request EMG/NCS be performed for bilateral lower extremities to assess for acute/chronic nerve damage that may be causing the patient's pain    3. Procedure: None at this time, will consider LESI pending MRI findings.     4. Physical therapy: None at this time    5. Education: discussed tobacco cessation importance, as indicated above. Patient willing to receive educational materials on cessation and ways to start treatment for cessation. Information given today    Follow up: TBD after testing is completed, Apoorva will call the clinic to review results and determine next  best steps    The patient was seen by and staffed with Dr. Mukherjee who agrees with the above assessment and plan    Allie Mon MD  Pain Fellow          ATTENDING ATTESTATION  I saw the patient along with the pain medicine fellow Dr. Allie Mon. Please see her note above for full details. I have edited her note and agree with it. I was involved in gathering history, physical examination and development of the plan of care.     ASSESSMENT AND PLAN:     Encounter Diagnosis:    # Neuropathic pain  # Lateral Femoral cutaneous nerve pain possible  # Bilateral L5 Radicular pain Left>> Right  # Chronic smoker    Apoorva Roberts is a 45 year old y.o. old female who presents to the pain clinic with worsening pain in the left leg and now experiencing similar discomfort in the right lower leg.     I have summarized the patient s past medical history, discussed their clinical findings and the potential differential diagnosis with the patient. Significant past medical history pertinent to the patient s current condition includes smoking, left thigh pain after hernia surgery.  She experienced progressive pain in the left lower leg and now spreading to the right leg in a similar fashion. The differential diagnosis discussed with the patient are listed above. I have discussed anatomy and possible sources of the pain using models and/or pictures (diagrams). I have discussed multi- disciplinary pain management options withthe patient as pertaining to their case as detailed above. The pain management options we discussed included, but were not limited to the recommendations below.  I also discussed with patient the risks, benefits and alternatives to each pain management option.  All of the patient s questions and concerns were answered to the best of my ability.    RECOMMENDATIONS:   1. Medications: The patient was stable on gabapentin 1200 mg po tid and dose was recently reduced by pcp with worsening pain control. I have reviewed  her recent lab work and recommend continuing gabapentin 1200 mg po tid as she was stable on this dose. Dosing, side effects, risks/benefits/alternatives were discussed with the patient in detail.    She was recently weaned from pregabalin for unclear reasons. In the future duloxetine can be considered.     2. Recommend EMG bilateral lower extremities for lat femoral cutaneous nerve involvement vs/and lumbar radicular pain    3. MRI lumbar spine.    4. She may be a candidate for LESI based on MRI lumbar spine findings.     5. The patient will reach out to the clinic for follow up after the EMG and MRI lumbar spine are completed. She lives in [place] and works as an . She wants to limit travel to [place] as far as possible.     6. I had an extensive discussion with the patient regarding the adverse effects of smoking and tobacco products. We discussed in detail how tobacco products will affect the microvasculature which may impact nutrient delivery and waste product removal. This in turn may affect the outcome of pain medications or pain procedures rendering them less efficacious. We encouraged the patient to consider decreasing and stopping tobacco consumption. We offered the patients few ideas on how to decrease tobacco such as using nicotine gum or patches to lessen the craving. We also encouraged the patient to seek other alternatives from the primary care physician.     We will provide her with smoking cessation resources.        Follow up: after MRI and EMG                                                                                              Answers for HPI/ROS submitted by the patient on 2/1/2021   General Symptoms: No  Skin Symptoms: No  HENT Symptoms: No  EYE SYMPTOMS: No  HEART SYMPTOMS: No  LUNG SYMPTOMS: No  INTESTINAL SYMPTOMS: No  URINARY SYMPTOMS: No  GYNECOLOGIC SYMPTOMS: No  BREAST SYMPTOMS: No  SKELETAL SYMPTOMS: No  BLOOD SYMPTOMS: No  NERVOUS SYSTEM SYMPTOMS: No  MENTAL  HEALTH SYMPTOMS: Yes  Trouble thinking or concentrating: No  Mood changes: No  Panic attacks: Yes

## 2021-02-01 NOTE — LETTER
2/1/2021       RE: Apoorva Roberts  [address] [facility] [address]     Dear Colleague,    Thank you for referring your patient, Apoorva Roberts, to the [facility] [place] [facility]. Please see a copy of my visit note below.    Apoorva is a 45 year old who is being evaluated via a billable video visit.      How would you like to obtain your AVS? MyChart  If the video visit is dropped, the invitation should be resent by: Text to cell phone: In chart  Will anyone else be joining your video visit? No      Video-Visit Details    Type of service:  Video Visit    Video Start Time: 8:19 AM    Video End Time:9:21 AM    Originating Location (pt. Location): Work    Distant Location (provider location):  [facility] [place]     Platform used for Video Visit: [facility]    Pain Clinic New Patient Consult Note:    Referring Provider: Lala   Primary care provider: Dina Ref-Primary, Physician.    Apoorva Roberts is a 45 year old y.o. old female with history of migraines, headaches, peripheral neuropathy, HTN, depression, marijuana and tobacco use who presents to the pain clinic with complaints of neuropathy.    HPI:   Patient Supplied Answers To the [facility] Pain Questionnaire  [facility] Pain -  Patient Entered Questionnaire/Answers 2/1/2021   What number best describes your pain right now:  0 = No pain  to  10 = Worst pain imaginable 7   How would you describe the pain? throbbing   Which of the following worsen your pain? lying down, standing, sitting, walking, exercise   Which of the following improve or reduce your pain?  lying down, standing, sitting, walking, medication   What number best describes your average pain for the past week:  0 = No pain  to  10 = Worst pain imaginable 7   What number best describes your LOWEST pain in past 24 hours:  0 = No pain  to  10 = Worst pain imaginable 5   What number best  "describes your WORST pain in past 24 hours:  0 = No pain  to  10 = Worst pain imaginable 8   When is your pain worst? Constant   What non-medicine treatments have you already had for your pain? pain clinic, chiropractic care   Have you tried treating your pain with medication?  Yes   Are you currently taking medications for your pain? Yes       Apoorva is a 46 y/o F presenting for complaints of nerve pain in her bilateral lower extremities, L > R, which started after a left sided hernia surgery. She had a groin hernia repair in 2013 and reports \"two of the main nerves\" were cut during the surgery. She had significant pain afterward in her L thigh. After multiple images however no EMG, it was determined she had nerve damage from the surgery. Has been on gabapentin since 2014. About a year after the pain started she began experiencing pain in her L calf and on the outside of the lower leg/ankle, does not really go into the foot/toes. These pains are separate from the thigh pain in quality and frequency. The thigh pain is more painful and described as a throbbing. It improves with changing positions, worsens with activity in general. Will hurt more at the end of the day if she is active and can make it harder to fall asleep, otherwise she generally doesn't wake up at night from the pain. She also developed R thigh pain a year or so after the L thigh pain, which has never gone down the leg. It is not nearly as significant as the L leg pain. She denies developing weakness, numbness or tingling to those areas.  Her pain was managed by a pain clinic in [place] initially, was started on percocet prior to transitioning to Gabapentin. No interventions were performed. Her PCP took over gabapentin at 1200 TID and also started Lyrica, up to 150 BID. This was continued for about 2.5 years prior to moving to [place] and re-establishing care. Her PCP discontinued the Lyrica over a year ago and started decreasing the dose of " "gabapentin. She is currently at 900 TID. Attempted to wean to 600 TID however had significant increase in pain with the wean and went back to 900 TID. Has never had side effects from her medications. She will also use ibuprofen/Tylenol as needed, up to 4g tylenol a day, and less use of ibuprofen as it \"Tears up her stomach\". Of note, she has a more active job now since moving to [place], works as an  and is constantly moving. She is able to perform her job and do her activities though her pain can be more significant at the end of the day    Pain treatments:    Herbal medicines: *None  Physical therapy: No  Chiropractor: Yes - after first 1.5-2 years, continues to go to chiropractor for back pain  Pain physician: Yes - in [place] prior to move, not since 2014  Surgery: Resulted after hernia surgery  Biofeedback: No  Acupuncture: No  Injections: No    Tests/Imaging reviewed with the patient:  No imaging available    Significant Medical History:   No past medical history on file.  Jesus Alberto Servin, was in a pain Clinic in [facility]        Past Surgical History:  No past surgical history on file.       Family History:  Family History   Problem Relation Age of Onset     Diabetes Mother      Hypertension Mother      Hyperlipidemia Mother      Hypertension Father      Hyperlipidemia Father      Kidney Cancer Father      Diabetes Maternal Grandmother      Breast Cancer No family hx of           Social History:  Social History     Socioeconomic History     Marital status: Single     Spouse name: Not on file     Number of children: Not on file     Years of education: Not on file     Highest education level: Not on file   Occupational History     Not on file   Social Needs     Financial resource strain: Not on file     Food insecurity     Worry: Not on file     Inability: Not on file     Transportation needs     Medical: Not on file     Non-medical: Not on file   Tobacco Use     Smoking status: " Current Every Day Smoker     Packs/day: 1.00     Years: 30.00     Pack years: 30.00     Types: Cigarettes     Smokeless tobacco: Never Used   Substance and Sexual Activity     Alcohol use: Yes     Comment: 2-3 drinks per week     Drug use: Not Currently     Comment: cbd     Sexual activity: Yes     Partners: Male   Lifestyle     Physical activity     Days per week: Not on file     Minutes per session: Not on file     Stress: Not on file   Relationships     Social connections     Talks on phone: Not on file     Gets together: Not on file     Attends Hindu service: Not on file     Active member of club or organization: Not on file     Attends meetings of clubs or organizations: Not on file     Relationship status: Not on file     Intimate partner violence     Fear of current or ex partner: Not on file     Emotionally abused: Not on file     Physically abused: Not on file     Forced sexual activity: Not on file   Other Topics Concern     Not on file   Social History Narrative     Not on file     Social History     Social History Narrative     Not on file          Allergies:  Allergies   Allergen Reactions     Indomethacin      Other reaction(s): GI Upset     Fluoxetine      Other reaction(s): Thrush     Paroxetine      Other reaction(s): Thrush       Current Medications:   Current Outpatient Medications   Medication Sig Dispense Refill     acetaminophen (TYLENOL) 325 MG tablet Take 650 mg by mouth every 6 hours as needed for mild pain       gabapentin (NEURONTIN) 300 MG capsule Take 3 capsules (900 mg) by mouth 3 times daily 270 capsule 0     hydrochlorothiazide (HYDRODIURIL) 25 MG tablet Take 1 tablet (25 mg) by mouth daily 90 tablet 3     ibuprofen (ADVIL/MOTRIN) 200 MG tablet Take 400 mg by mouth every 4 hours as needed for mild pain       lidocaine-prilocaine (EMLA) 2.5-2.5 % external cream Apply 1 g topically daily as needed       naloxone (NARCAN) 4 MG/0.1ML nasal spray Spray 1 spray (4 mg) into one nostril  alternating nostrils as needed for opioid reversal every 2-3 minutes until assistance arrives (Patient not taking: Reported on 2020) 0.2 mL 0     zolpidem (AMBIEN) 10 MG tablet Take 1 Tablet BY MOUTH at bedtime AS NEEDED 30 tablet 2          Current Pain Medications:  Gabapentin 900 TID  Ibuprofen PRN  Tylenol PRN, up to 4g daily    Past Pain Medications:  Percocet  Lyrica    Blood thinner:  None    Work History:    Current work status: Full time - clinic manager    Social:  -Lives in: [place], with fiance  -Alcohol: 1-2 beers a week  -Smokin pack/day, 30 years  -Drug use: Smoke marijuana, daily, uses for pain    Psychosocial History:     History of treatment for behavioral disorder: Sees a therapist once per week, not on medication  History of suicidal ideation or suicidal attempt: No    Review of Systems:  Review of Systems   Psychiatric/Behavioral: Positive for depression. The patient is nervous/anxious and has insomnia.        Physical Exam:   Limited due to video visit    General Appearance: No distress, seated comfortably, seen at work via video  Mood: Euthymic  HE ENT: Non constricted pupils  Respiratory: Non labored breathing  Pain specific exam:  Unable to perform over video, no overt pain behaviors    Laboratory results:  Recent Labs   Lab Test 20  1001      POTASSIUM 3.4   CHLORIDE 108   CO2 24   ANIONGAP 5   *   BUN 10   CR 0.89   JULIET 8.8       CBC RESULTS:   Recent Labs   Lab Test 20  1001   WBC 11.2*   RBC 4.50   HGB 15.4   HCT 45.4   *   MCH 34.2*   MCHC 33.9   RDW 12.4            Imaging:  No images are attached to the encounter.     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 old female who presents to the pain clinic with chronic, worsening neuropathic pain in the LLE > RLE  following a L groin hernia repair. Her pain has been managed at prior pain clinics and with her PCP in [place] with medications and had done reasonably well. Since transitioning her care to [place], her medication has been reduced/down-titrated however has been unsuccessful given an increase in pain with the reduction. She does appear to have two pains going on, one related to likely nerve injury after her surgery, two being possible low-back mediated with radicular pain to the lower extremities resulting in the disjointed pain pattern she has been experiencing. Fortunately, it does not seem that she has significant neurologic changes over the many years she has been experiencing these symptoms, however it is worth investigating at this time, and the patient agrees in order to clarify her diagnosis and to pursue other possible interventions  Importantly, Apoorva was reminded of the implications smoking has on her health, in this case on contributing to increased pain and poor wound healing. Smoking cessation will be important for her long-term health as well as an important consideration for any future procedures that may be entertained for pain management    RECOMMENDATIONS:     1. Medications: We recommend that Apoorva's Gabapentin be increased to 1200 TID, this dose appeared to be helpful for her pain without causing significant side effects. This is the maximum dose of gabapentin and she has no known kidney disease.  - Could consider duloxetine as an adjunct medication in the future if gabapentin increase is not helpful  - Continue Tylenol/Ibuprofen PRN    2. Imaging: Will order MRI L Spine to further assess possible lumbar pathology for pain  - Will also request EMG/NCS be performed for bilateral lower extremities to assess for acute/chronic nerve damage that may be causing the patient's pain    3. Procedure: None at this time, will consider LESI pending MRI findings.     4. Physical therapy: None at this  time    5. Education: discussed tobacco cessation importance, as indicated above. Patient willing to receive educational materials on cessation and ways to start treatment for cessation. Information given today    Follow up: TBD after testing is completed, Apoorva will call the clinic to review results and determine next best steps    The patient was seen by and staffed with Dr. Mukherjee who agrees with the above assessment and plan    Allie Mon MD  Pain Fellow      ATTENDING ATTESTATION  I saw the patient along with the pain medicine fellow Dr. Allie Mon. Please see her note above for full details. I have edited her note and agree with it. I was involved in gathering history, physical examination and development of the plan of care.     ASSESSMENT AND PLAN:     Encounter Diagnosis:    # Neuropathic pain  # Lateral Femoral cutaneous nerve pain possible  # Bilateral L5 Radicular pain Left>> Right  # Chronic smoker    Apoorva Roberts is a 45 year old y.o. old female who presents to the pain clinic with worsening pain in the left leg and now experiencing similar discomfort in the right lower leg.     I have summarized the patient s past medical history, discussed their clinical findings and the potential differential diagnosis with the patient. Significant past medical history pertinent to the patient s current condition includes smoking, left thigh pain after hernia surgery.  She experienced progressive pain in the left lower leg and now spreading to the right leg in a similar fashion. The differential diagnosis discussed with the patient are listed above. I have discussed anatomy and possible sources of the pain using models and/or pictures (diagrams). I have discussed multi- disciplinary pain management options withthe patient as pertaining to their case as detailed above. The pain management options we discussed included, but were not limited to the recommendations below.  I also discussed with patient the risks,  benefits and alternatives to each pain management option.  All of the patient s questions and concerns were answered to the best of my ability.    RECOMMENDATIONS:   1. Medications: The patient was stable on gabapentin 1200 mg po tid and dose was recently reduced by pcp with worsening pain control. I have reviewed her recent lab work and recommend continuing gabapentin 1200 mg po tid as she was stable on this dose. Dosing, side effects, risks/benefits/alternatives were discussed with the patient in detail.    She was recently weaned from pregabalin for unclear reasons. In the future duloxetine can be considered.     2. Recommend EMG bilateral lower extremities for lat femoral cutaneous nerve involvement vs/and lumbar radicular pain    3. MRI lumbar spine.    4. She may be a candidate for LESI based on MRI lumbar spine findings.     5. The patient will reach out to the clinic for follow up after the EMG and MRI lumbar spine are completed. She lives in [place] and works as an . She wants to limit travel to [place] as far as possible.     6. I had an extensive discussion with the patient regarding the adverse effects of smoking and tobacco products. We discussed in detail how tobacco products will affect the microvasculature which may impact nutrient delivery and waste product removal. This in turn may affect the outcome of pain medications or pain procedures rendering them less efficacious. We encouraged the patient to consider decreasing and stopping tobacco consumption. We offered the patients few ideas on how to decrease tobacco such as using nicotine gum or patches to lessen the craving. We also encouraged the patient to seek other alternatives from the primary care physician.     We will provide her with smoking cessation resources.     Follow up: after MRI and EMG      Apoorva is a 45 year old who is being evaluated via a billable video visit.      How would you like to obtain your AVS? MyChart  If  the video visit is dropped, the invitation should be resent by: Text to cell phone: [phone]  Will anyone else be joining your video visit? No      JACK RiberaS sent to the pt's My chart.   Blanca Pierson LPN      Again, thank you for allowing me to participate in the care of your patient.      Sincerely,    Fanny Mukherjee MD

## 2021-02-01 NOTE — PROGRESS NOTES
Apoorva is a 45 year old who is being evaluated via a billable video visit.      How would you like to obtain your AVS? MyChart  If the video visit is dropped, the invitation should be resent by: Text to cell phone: [phone]  Will anyone else be joining your video visit? No      Lisbet Jeffrey CMA        Answers for HPI/ROS submitted by the patient on 2/1/2021   General Symptoms: No  Skin Symptoms: No  HENT Symptoms: No  EYE SYMPTOMS: No  HEART SYMPTOMS: No  LUNG SYMPTOMS: No  INTESTINAL SYMPTOMS: No  URINARY SYMPTOMS: No  GYNECOLOGIC SYMPTOMS: No  BREAST SYMPTOMS: No  SKELETAL SYMPTOMS: No  BLOOD SYMPTOMS: No  NERVOUS SYSTEM SYMPTOMS: No  MENTAL HEALTH SYMPTOMS: Yes  Nervous or Anxious: Yes  Depression: Yes  Trouble sleeping: Yes  Trouble thinking or concentrating: No  Mood changes: No  Panic attacks: Yes

## 2021-02-01 NOTE — PROGRESS NOTES
Apoorva is a 45 year old who is being evaluated via a billable telephone visit.      What phone number would you like to be contacted at? [phone]  How would you like to obtain your AVS? MyChart  Assessment & Plan     Other polyneuropathy  - gabapentin (NEURONTIN) 400 MG capsule  Dispense: 270 capsule; Refill: 2    Benign essential hypertension  - hydrochlorothiazide (HYDRODIURIL) 25 MG tablet  Dispense: 90 tablet; Refill: 3    Primary insomnia  - zolpidem (AMBIEN) 5 MG tablet  Dispense: 30 tablet; Refill: 2      Review of prior external note(s) from - [facility]    Patient Instructions   Refilled hydrochlorothiazide   Low salt, low fat diet.   Exercise: moderate intensity sustained for at least 30 mins per episode, goal of 150 mins per week at least  Schedule nurse visit in 1 week for a blood pressure recheck.    Refilled gabapentin at the dose pain clinic recommended.  Take this as prescribed.  If needing more pain control, follow up with pain clinic.  Careful with mental fogginess, dizziness, other side effects of high dose gabapentin.    Zolpidem has been flagged to have adverse health effects at doses higher than 5 mg.  As discussed in the past, plan to gradually reduce the use of the med. Will start with decreasing the dose to the recommended safe dose.  Will attempt to reduce use to only on some days after next 2-3 months,.  Consider sleep clinic consult if with continued sleep difficulty symptoms.          Return in about 3 months (around 5/1/2021) for Follow up on zolpidem use.    Parminder Reeves MD  [facility]    Subjective     Apoorva is a 45 year old who presents to clinic today for the following health issues     HPI   Chief Complaint   Patient presents with     Pain     chronic pain follow up, renew med- patient saw pain clinic today , they increased dose on Gabapentin, but said her PCP has to fill this med.      Hypertension     follow up, renew med     Refill  Request     Ambien          Hypertension Follow-up      Do you check your blood pressure regularly outside of the clinic? Yes 2-3 x a week, 120-130/85-95    Are you following a low salt diet? Yes    Are your blood pressures ever more than 140 on the top number (systolic) OR more   than 90 on the bottom number (diastolic), for example 140/90? Yes.  Denies ADR from hydrochlorothiazide.      Chronic Pain Follow-Up    Where in your body do you have pain? Legs   How has your pain affected your ability to work? Pain does not limit ability to work  Which of these pain treatments have you tried since your last clinic visit? [facility]  How well are you sleeping? Poor  How has your mood been since your last visit? About the same  Have you had a significant life event? Other: loss of custody of her daughter  Other aggravating factors: repetitive activities - any   Taking medication as directed? Yes    PHQ-9 SCORE 9/3/2020   PHQ-9 Total Score 11     GEORGIANA-7 SCORE 9/3/2020   Total Score 7     No flowsheet data found.  Encounter-Level CSA:    There are no encounter-level csa.     Patient-Level CSA:    There are no patient-level csa.         How many servings of fruits and vegetables do you eat daily?  2-3    On average, how many sweetened beverages do you drink each day (Examples: soda, juice, sweet tea, etc.  Do NOT count diet or artificially sweetened beverages)?   4    How many days per week do you exercise enough to make your heart beat faster? 4    How many minutes a day do you exercise enough to make your heart beat faster? 10 - 19    How many days per week do you miss taking your medication? 0    Medication Followup of Ambien     Taking Medication as prescribed: yes    Side Effects:  None    Medication Helping Symptoms:  Yes    Patient has been on this for many years she said.    Denies daytime somnolence or cogniitive changes.     Review of Systems   Constitutional, HEENT, cardiovascular, pulmonary, GI, ,  musculoskeletal, neuro, skin, endocrine and psych systems are negative, except as otherwise noted.      Objective             Physical Exam   alert and no distress  PSYCH: Alert and oriented times 3; coherent speech, normal   rate and volume, able to articulate logical thoughts, able   to abstract reason, no tangential thoughts, no hallucinations   or delusions  Her affect is normal  RESP: No cough, no audible wheezing, able to talk in full sentences  Remainder of exam unable to be completed due to telephone visits    No results found for any visits on 02/01/21.          Phone call duration: 11 minutes

## 2021-04-12 ENCOUNTER — VIRTUAL VISIT (OUTPATIENT)
Dept: FAMILY MEDICINE | Facility: CLINIC | Age: 46
End: 2021-04-12
Payer: COMMERCIAL

## 2021-04-12 DIAGNOSIS — Z53.9 NO SHOW: Primary | ICD-10-CM

## 2021-04-12 NOTE — PROGRESS NOTES
Called the patient 3 different times, left message once, patient did not answer or call back. Lowell Allred, cma

## 2021-04-16 DIAGNOSIS — F51.01 PRIMARY INSOMNIA: ICD-10-CM

## 2021-04-19 RX ORDER — ZOLPIDEM TARTRATE 5 MG/1
TABLET ORAL
Qty: 30 TABLET | Refills: 2 | OUTPATIENT
Start: 2021-04-19

## 2021-04-20 ENCOUNTER — VIRTUAL VISIT (OUTPATIENT)
Dept: FAMILY MEDICINE | Facility: CLINIC | Age: 46
End: 2021-04-20
Payer: COMMERCIAL

## 2021-04-20 DIAGNOSIS — G62.89 OTHER POLYNEUROPATHY: ICD-10-CM

## 2021-04-20 DIAGNOSIS — F33.1 MODERATE EPISODE OF RECURRENT MAJOR DEPRESSIVE DISORDER (H): ICD-10-CM

## 2021-04-20 DIAGNOSIS — F51.01 PRIMARY INSOMNIA: Primary | ICD-10-CM

## 2021-04-20 PROCEDURE — 99214 OFFICE O/P EST MOD 30 MIN: CPT | Mod: 95 | Performed by: INTERNAL MEDICINE

## 2021-04-20 RX ORDER — GABAPENTIN 400 MG/1
1200 CAPSULE ORAL 3 TIMES DAILY
Qty: 270 CAPSULE | Refills: 1 | Status: SHIPPED | OUTPATIENT
Start: 2021-04-20 | End: 2021-06-09

## 2021-04-20 RX ORDER — ZOLPIDEM TARTRATE 5 MG/1
5 TABLET ORAL
Qty: 30 TABLET | Refills: 1 | Status: SHIPPED | OUTPATIENT
Start: 2021-04-20 | End: 2021-06-09

## 2021-04-20 NOTE — PATIENT INSTRUCTIONS
1. Short refill of gabapentin and ambien  2.  Recommend that you establish care with one primary care provider, it seems that you have seen Dr. Reeves most frequently.  3.  For anyone that is prescribed a controlled substance (which Ambien is) on a regular basis, that controlled substance should be prescribed by 1 consistent doctor.

## 2021-04-20 NOTE — PROGRESS NOTES
Apoorva is a 45 year old who is being evaluated via a billable telephone visit.      What phone number would you like to be contacted at? [phone]  How would you like to obtain your AVS? [facility]    Assessment & Plan   Problem List Items Addressed This Visit     Moderate episode of recurrent major depressive disorder (H)    Peripheral neuropathy    Relevant Medications    zolpidem (AMBIEN) 5 MG tablet    gabapentin (NEURONTIN) 400 MG capsule      Other Visit Diagnoses     Primary insomnia    -  Primary    Relevant Medications    zolpidem (AMBIEN) 5 MG tablet           Patient Instructions   1. Short refill of gabapentin and ambien  2.  Recommend that you establish care with one primary care provider, it seems that you have seen Dr. Reeves most frequently.  3.  For anyone that is prescribed a controlled substance (which Ambien is) on a regular basis, that controlled substance should be prescribed by 1 consistent doctor.      No follow-ups on file.    Nimisha Stoddard, [facility]    Eunice Guerin is a 45 year old who presents for the following health issues     HPI       Medication Followup ofgabapentin (NEURONTIN) 400 MG capsule    Taking Medication as prescribed: yes    Side Effects:  None    Medication Helping Symptoms:  Yes     she has follow-up with the pain clinic, last visit 2/21    She has pain in her left thigh after left-sided hernia surgery, thought to be due to neuropathy. Is been on gabapentin since 2014    She has primarily been following with Dr. Reeves, but has seen various other providers.  She is often not able to take time off work -  Needs MRI and EMG as ordered by pain clinic.       Medication Followup of zolpidem (AMBIEN) 5 MG tablet [Pharmacy Med Name: zolpidem 5 mg tablet]    Taking Medication as prescribed: yes    Side Effects:  None    Medication Helping Symptoms:  Yes    Dr. Reeves declined refill stating she needed an appointment.  She was not able to be seen by  Dr reeves so is seeing me today    Has tried using just a few days per week     Checked [place] .  Filled Ambien on 3/26, 2/27, 2/1, 1/4, 12/7    Dr. Reeves was decreasing from 10 to 5 mg         Review of Systems   Constitutional, HEENT, cardiovascular, pulmonary, gi and gu systems are negative, except as otherwise noted.      Objective           Vitals:  No vitals were obtained today due to virtual visit.    Physical Exam   healthy, alert and no distress  PSYCH: Alert and oriented times 3; coherent speech, normal   rate and volume, able to articulate logical thoughts, able   to abstract reason, no tangential thoughts, no hallucinations   or delusions  Her affect is normal  RESP: No cough, no audible wheezing, able to talk in full sentences  Remainder of exam unable to be completed due to telephone visits                Phone call duration: 9 minutes

## 2021-06-09 ENCOUNTER — E-VISIT (OUTPATIENT)
Dept: FAMILY MEDICINE | Facility: CLINIC | Age: 46
End: 2021-06-09
Payer: COMMERCIAL

## 2021-06-09 DIAGNOSIS — F51.01 PRIMARY INSOMNIA: ICD-10-CM

## 2021-06-09 DIAGNOSIS — G62.89 OTHER POLYNEUROPATHY: ICD-10-CM

## 2021-06-09 PROCEDURE — 99421 OL DIG E/M SVC 5-10 MIN: CPT | Performed by: INTERNAL MEDICINE

## 2021-06-09 RX ORDER — GABAPENTIN 400 MG/1
1200 CAPSULE ORAL 3 TIMES DAILY
Qty: 270 CAPSULE | Refills: 0 | Status: SHIPPED | OUTPATIENT
Start: 2021-06-09 | End: 2021-07-08

## 2021-06-09 RX ORDER — ZOLPIDEM TARTRATE 5 MG/1
5 TABLET ORAL
Qty: 30 TABLET | Refills: 0 | Status: SHIPPED | OUTPATIENT
Start: 2021-06-09 | End: 2021-07-08

## 2021-06-09 NOTE — TELEPHONE ENCOUNTER
Provider E-Visit time total (minutes): 8    1 joaquin refill given.  Patient needs to establish care w/one provider

## 2021-06-09 NOTE — PATIENT INSTRUCTIONS
Thank you for choosing us for your care. I have placed an order for a prescription so that you can start treatment. View your full visit summary for details by clicking on the link below. Your pharmacist will able to address any questions you may have about the medication.     If you're not feeling better within 5-7 days, please schedule an appointment.  You can schedule an appointment right here in [employer], or call [phone]  If the visit is for the same symptoms as your eVisit, we'll refund the cost of your eVisit if seen within seven days.      You can schedule an appointment right here in [employer], or call [phone]        You should make a clinic follow-up appointment for ongoing refills after today's refills.  No further refills will be given w/out a clinic appointment

## 2021-07-06 ENCOUNTER — TELEPHONE (OUTPATIENT)
Dept: FAMILY MEDICINE | Facility: CLINIC | Age: 46
End: 2021-07-06

## 2021-07-06 NOTE — PROGRESS NOTES
Apoorva is a 45 year old who is being evaluated via a billable telephone visit.      What phone number would you like to be contacted at? [phone]  How would you like to obtain your AVS? MyChart    Assessment & Plan     Other polyneuropathy    Stable on gabapentin, refills provided.      - gabapentin (NEURONTIN) 400 MG capsule; Take 3 capsules (1,200 mg) by mouth 3 times daily    Benign essential hypertension    Lisinopril stopped months ago due to low BPs.  Recommending coming in for BP check and labs- she states she will schedule and annual exam and we can check these then.  Continue hydrochlorothiazide.      - hydrochlorothiazide (HYDRODIURIL) 25 MG tablet; Take 1 tablet (25 mg) by mouth daily  - **Basic metabolic panel FUTURE anytime; Future    Primary insomnia    Not controlled on Ambien 5mg.  Discussed CBT-I as most effective therapy for long term insomnia and she is interested in trying that, referral to sleep psychologist placed.  Also suggested adding melatonin 1-3mg to the Ambien to see if that is helpful.  She also takes Tylenol PM on occasion.     - zolpidem (AMBIEN) 5 MG tablet; Take 1 tablet (5 mg) by mouth nightly as needed for sleep Take 1 Tablet BY MOUTH at bedtime AS NEEDED  - SLEEP PSYCHOLOGY REFERRAL      18 minutes spent on the date of the encounter doing chart review, history and exam, documentation and further activities per the note       Return for Routine preventive.    Mark Desir MD  [facility]    Subjective   Apoorva is a 45 year old who presents for the following health issues     HPI     Hypertension Follow-up    Has been taking hydrochlorothiazide.    Has not been taking lisinopril since Dec/Tyrone, states that her BP was around 80/60 while also taking lisinopril.    States that she can feel her pulse in her neck and heart beats very hard when BP elevates.    Lightheaded and feels that she may pass out when it gets too low.       Do you check your blood pressure  regularly outside of the clinic? No only when feeling off,160/98 3 weeks ago.    Got  two weeks ago so has been pretty stressed recently    Are you following a low salt diet? No    Are your blood pressures ever more than 140 on the top number (systolic) OR more   than 90 on the bottom number (diastolic), for example 140/90? Yes        Medication Refill- gabapentin for neuropathy that started after hernia surgery in 2013, started med in 2014    Pt returns to clinic requesting refill of medication Gabapentin.     States doing well on this medication without issue or side effect.      Medication Refill    Pt returns to clinic requesting refill of medication Ambien.  Last prescription was filled on 6/18 per [place] .      States that she was prescribed 5 MG back in Jan 2021 due to provider not wanting to continue her dose of 10 MG.    States that she still has problems falling/ staying asleep.  She takes Tylenol PM on occasion when pain is worse and that helps with sleep a little bit.  Doesn't take any melatonin, took this a long time ago.          Review of Systems   Constitutional, neuro, CV, psych systems are negative, except as otherwise noted.      Objective           Vitals:  No vitals were obtained today due to virtual visit.    Physical Exam   healthy, alert and no distress  PSYCH: Alert and oriented times 3; coherent speech, normal   rate and volume, able to articulate logical thoughts, able   to abstract reason, no tangential thoughts, no hallucinations   or delusions  Her affect is normal  RESP: No cough, no audible wheezing, able to talk in full sentences  Remainder of exam unable to be completed due to telephone visits          Phone call duration: 10 minutes

## 2021-07-08 ENCOUNTER — VIRTUAL VISIT (OUTPATIENT)
Dept: FAMILY MEDICINE | Facility: CLINIC | Age: 46
End: 2021-07-08
Payer: COMMERCIAL

## 2021-07-08 DIAGNOSIS — G62.89 OTHER POLYNEUROPATHY: ICD-10-CM

## 2021-07-08 DIAGNOSIS — I10 BENIGN ESSENTIAL HYPERTENSION: ICD-10-CM

## 2021-07-08 DIAGNOSIS — F51.01 PRIMARY INSOMNIA: ICD-10-CM

## 2021-07-08 PROCEDURE — 99214 OFFICE O/P EST MOD 30 MIN: CPT | Mod: 95 | Performed by: INTERNAL MEDICINE

## 2021-07-08 RX ORDER — HYDROCHLOROTHIAZIDE 25 MG/1
25 TABLET ORAL DAILY
Qty: 90 TABLET | Refills: 3 | Status: SHIPPED | OUTPATIENT
Start: 2021-07-08 | End: 2022-07-19

## 2021-07-08 RX ORDER — GABAPENTIN 400 MG/1
1200 CAPSULE ORAL 3 TIMES DAILY
Qty: 270 CAPSULE | Refills: 11 | Status: SHIPPED | OUTPATIENT
Start: 2021-07-08 | End: 2022-05-23

## 2021-07-08 RX ORDER — ZOLPIDEM TARTRATE 5 MG/1
5 TABLET ORAL
Qty: 30 TABLET | Refills: 5 | Status: SHIPPED | OUTPATIENT
Start: 2021-07-18 | End: 2021-12-20

## 2021-08-25 ASSESSMENT — ENCOUNTER SYMPTOMS
SHORTNESS OF BREATH: 0
MYALGIAS: 0
HEMATURIA: 0
JOINT SWELLING: 0
WEAKNESS: 0
FEVER: 0
PARESTHESIAS: 0
NERVOUS/ANXIOUS: 0
ARTHRALGIAS: 0
PALPITATIONS: 0
HEMATOCHEZIA: 0
EYE PAIN: 0
CHILLS: 0
NAUSEA: 0
CONSTIPATION: 0
SORE THROAT: 0
DIZZINESS: 0
DIARRHEA: 0
HEADACHES: 1
FREQUENCY: 0
BREAST MASS: 0
HEARTBURN: 1
ABDOMINAL PAIN: 0
DYSURIA: 0
COUGH: 0

## 2021-08-27 ENCOUNTER — OFFICE VISIT (OUTPATIENT)
Dept: FAMILY MEDICINE | Facility: CLINIC | Age: 46
End: 2021-08-27
Payer: COMMERCIAL

## 2021-08-27 ENCOUNTER — TRANSFERRED RECORDS (OUTPATIENT)
Dept: HEALTH INFORMATION MANAGEMENT | Facility: CLINIC | Age: 46
End: 2021-08-27

## 2021-08-27 VITALS
RESPIRATION RATE: 16 BRPM | HEART RATE: 96 BPM | TEMPERATURE: 98.4 F | DIASTOLIC BLOOD PRESSURE: 86 MMHG | WEIGHT: 209.6 LBS | HEIGHT: 62 IN | BODY MASS INDEX: 38.57 KG/M2 | SYSTOLIC BLOOD PRESSURE: 132 MMHG | OXYGEN SATURATION: 99 %

## 2021-08-27 DIAGNOSIS — Z11.4 SCREENING FOR HIV (HUMAN IMMUNODEFICIENCY VIRUS): ICD-10-CM

## 2021-08-27 DIAGNOSIS — R51.9 CHRONIC DAILY HEADACHE: ICD-10-CM

## 2021-08-27 DIAGNOSIS — E66.01 MORBID OBESITY (H): ICD-10-CM

## 2021-08-27 DIAGNOSIS — I10 ESSENTIAL HYPERTENSION: ICD-10-CM

## 2021-08-27 DIAGNOSIS — F41.1 GAD (GENERALIZED ANXIETY DISORDER): ICD-10-CM

## 2021-08-27 DIAGNOSIS — K21.9 GASTROESOPHAGEAL REFLUX DISEASE, UNSPECIFIED WHETHER ESOPHAGITIS PRESENT: ICD-10-CM

## 2021-08-27 DIAGNOSIS — G43.009 MIGRAINE WITHOUT AURA AND WITHOUT STATUS MIGRAINOSUS, NOT INTRACTABLE: ICD-10-CM

## 2021-08-27 DIAGNOSIS — F33.1 MODERATE EPISODE OF RECURRENT MAJOR DEPRESSIVE DISORDER (H): ICD-10-CM

## 2021-08-27 DIAGNOSIS — Z00.00 ROUTINE ADULT HEALTH MAINTENANCE: Primary | ICD-10-CM

## 2021-08-27 DIAGNOSIS — Z12.4 SCREENING FOR MALIGNANT NEOPLASM OF CERVIX: ICD-10-CM

## 2021-08-27 DIAGNOSIS — Z11.59 ENCOUNTER FOR HEPATITIS C SCREENING TEST FOR LOW RISK PATIENT: ICD-10-CM

## 2021-08-27 PROCEDURE — 36415 COLL VENOUS BLD VENIPUNCTURE: CPT | Performed by: INTERNAL MEDICINE

## 2021-08-27 PROCEDURE — G0145 SCR C/V CYTO,THINLAYER,RESCR: HCPCS | Performed by: INTERNAL MEDICINE

## 2021-08-27 PROCEDURE — 99213 OFFICE O/P EST LOW 20 MIN: CPT | Mod: 25 | Performed by: INTERNAL MEDICINE

## 2021-08-27 PROCEDURE — 99396 PREV VISIT EST AGE 40-64: CPT | Performed by: INTERNAL MEDICINE

## 2021-08-27 PROCEDURE — 87624 HPV HI-RISK TYP POOLED RSLT: CPT | Performed by: INTERNAL MEDICINE

## 2021-08-27 PROCEDURE — 87389 HIV-1 AG W/HIV-1&-2 AB AG IA: CPT | Performed by: INTERNAL MEDICINE

## 2021-08-27 PROCEDURE — 86803 HEPATITIS C AB TEST: CPT | Performed by: INTERNAL MEDICINE

## 2021-08-27 RX ORDER — LISINOPRIL 10 MG/1
10 TABLET ORAL DAILY
Qty: 90 TABLET | Refills: 3 | Status: SHIPPED | OUTPATIENT
Start: 2021-08-27 | End: 2022-08-04

## 2021-08-27 RX ORDER — SUCRALFATE 1 G/1
1 TABLET ORAL 4 TIMES DAILY
Qty: 120 TABLET | Refills: 3 | Status: SHIPPED | OUTPATIENT
Start: 2021-08-27 | End: 2022-03-21

## 2021-08-27 RX ORDER — BUTALBITAL, ACETAMINOPHEN AND CAFFEINE 50; 325; 40 MG/1; MG/1; MG/1
1 TABLET ORAL DAILY PRN
Qty: 20 TABLET | Refills: 1 | Status: SHIPPED | OUTPATIENT
Start: 2021-08-27 | End: 2021-10-05

## 2021-08-27 ASSESSMENT — ENCOUNTER SYMPTOMS
SHORTNESS OF BREATH: 0
BREAST MASS: 0
COUGH: 0
HEMATOCHEZIA: 0
NAUSEA: 0
DYSURIA: 0
FEVER: 0
SORE THROAT: 0
FREQUENCY: 0
PALPITATIONS: 0
ABDOMINAL PAIN: 0
HEADACHES: 1
HEARTBURN: 1
MYALGIAS: 0
EYE PAIN: 0
CONSTIPATION: 0
NERVOUS/ANXIOUS: 0
DIZZINESS: 0
JOINT SWELLING: 0
CHILLS: 0
DIARRHEA: 0
HEMATURIA: 0
PARESTHESIAS: 0
ARTHRALGIAS: 0
WEAKNESS: 0

## 2021-08-27 ASSESSMENT — ANXIETY QUESTIONNAIRES
3. WORRYING TOO MUCH ABOUT DIFFERENT THINGS: MORE THAN HALF THE DAYS
IF YOU CHECKED OFF ANY PROBLEMS ON THIS QUESTIONNAIRE, HOW DIFFICULT HAVE THESE PROBLEMS MADE IT FOR YOU TO DO YOUR WORK, TAKE CARE OF THINGS AT HOME, OR GET ALONG WITH OTHER PEOPLE: SOMEWHAT DIFFICULT
6. BECOMING EASILY ANNOYED OR IRRITABLE: MORE THAN HALF THE DAYS
5. BEING SO RESTLESS THAT IT IS HARD TO SIT STILL: SEVERAL DAYS
GAD7 TOTAL SCORE: 11
7. FEELING AFRAID AS IF SOMETHING AWFUL MIGHT HAPPEN: MORE THAN HALF THE DAYS
1. FEELING NERVOUS, ANXIOUS, OR ON EDGE: MORE THAN HALF THE DAYS
2. NOT BEING ABLE TO STOP OR CONTROL WORRYING: SEVERAL DAYS

## 2021-08-27 ASSESSMENT — PAIN SCALES - GENERAL: PAINLEVEL: EXTREME PAIN (8)

## 2021-08-27 ASSESSMENT — PATIENT HEALTH QUESTIONNAIRE - PHQ9
SUM OF ALL RESPONSES TO PHQ QUESTIONS 1-9: 12
5. POOR APPETITE OR OVEREATING: SEVERAL DAYS

## 2021-08-27 ASSESSMENT — MIFFLIN-ST. JEOR: SCORE: 1564.74

## 2021-08-27 NOTE — PATIENT INSTRUCTIONS
Schedule with the headache clinic to discuss other options for headache treatment.    Someone will call you to schedule the EGD scope of the stomach.  Try the sucralfate along with the over the counter med to see if that helps symptoms.      *    Restart lisinopril at 10mg daily and return in 2 weeks for recheck.    *    Watch the following website for upcoming information about getting a COVID vaccine through [place]: [email or web address]    Or through the [facility]:  https://[place].gov[place]vaccine/find-vaccine  or call [phone]    COVID Vaccine:  --I strongly encourage you to get a COVID vaccine when it is available to you.    --I have received my COVID vaccine  --The COVID vaccine is safe.  Most serious adverse reactions happen within the first few days or weeks.  Your chance of having a serious complication related to COVID is much higher than having a serious adverse reaction to the vaccine.  --Many people will have low grade fevers, general body aches and fatigue for 1-2 days after getting the vaccine.  This is a sign that your immune system is activated - this is a good thing!  --If you have an allergy to Miralax, you should not get the vaccine.    --Stopping a pandemic requires all of us to do our part. Getting vaccinated is another step you can take to help reduce your chance of being exposed to the virus and spreading it to others. Together, the COVID-19 vaccination and following CDC's recommendations to protect yourself and others will offer the best protection from COVID-19. Wear a mask, wash your hands, stay at least 6 feet from others, and remain home if you're sick.  --If you encounter information about the vaccine that you would like further clarification on, please reach out to my team!  Send me a My Chart message or make an appointment with me.
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
## 2021-08-27 NOTE — LETTER
My Depression Action Plan  Name: Apoorva Roberts   Date of Birth 1975  Date: 8/27/2021    My doctor: No Ref-Primary, Physician   My clinic: [facility]  [address] [place] [address]  [phone]          GREEN    ZONE   Good Control    What it looks like:     Things are going generally well. You have normal ups and downs. You may even feel depressed from time to time, but bad moods usually last less than a day.   What you need to do:  1. Continue to care for yourself (see self care plan)  2. Check your depression survival kit and update it as needed  3. Follow your physician s recommendations including any medication.  4. Do not stop taking medication unless you consult with your physician first.           YELLOW         ZONE Getting Worse    What it looks like:     Depression is starting to interfere with your life.     It may be hard to get out of bed; you may be starting to isolate yourself from others.    Symptoms of depression are starting to last most all day and this has happened for several days.     You may have suicidal thoughts but they are not constant.   What you need to do:     1. Call your care team. Your response to treatment will improve if you keep your care team informed of your progress. Yellow periods are signs an adjustment may need to be made.     2. Continue your self-care.  Just get dressed and ready for the day.  Don't give yourself time to talk yourself out of it.    3. Talk to someone in your support network.    4. Open up your Depression Self-Care Plan/Wellness Kit.           RED    ZONE Medical Alert - Get Help    What it looks like:     Depression is seriously interfering with your life.     You may experience these or other symptoms: You can t get out of bed most days, can t work or engage in other necessary activities, you have trouble taking care of basic hygiene, or basic responsibilities, thoughts of suicide or death that  will not go away, self-injurious behavior.     What you need to do:  1. Call your care team and request a same-day appointment. If they are not available (weekends or after hours) call your local crisis line, emergency room or 911.          Depression Self-Care Plan / Wellness Kit    Many people find that medication and therapy are helpful treatments for managing depression. In addition, making small changes to your everyday life can help to boost your mood and improve your wellbeing. Below are some tips for you to consider. Be sure to talk with your medical provider and/or behavioral health consultant if your symptoms are worsening or not improving.     Sleep   Sleep hygiene  means all of the habits that support good, restful sleep. It includes maintaining a consistent bedtime and wake time, using your bedroom only for sleeping or sex, and keeping the bedroom dark and free of distractions like a computer, smartphone, or television.     Develop a Healthy Routine  Maintain good hygiene. Get out of bed in the morning, make your bed, brush your teeth, take a shower, and get dressed. Don t spend too much time viewing media that makes you feel stressed. Find time to relax each day.    Exercise  Get some form of exercise every day. This will help reduce pain and release endorphins, the  feel good  chemicals in your brain. It can be as simple as just going for a walk or doing some gardening, anything that will get you moving.      Diet  Strive to eat healthy foods, including fruits and vegetables. Drink plenty of water. Avoid excessive sugar, caffeine, alcohol, and other mood-altering substances.     Stay Connected with Others  Stay in touch with friends and family members.    Manage Your Mood  Try deep breathing, massage therapy, biofeedback, or meditation. Take part in fun activities when you can. Try to find something to smile about each day.     Psychotherapy  Be open to working with a therapist if your provider  recommends it.     Medication  Be sure to take your medication as prescribed. Most anti-depressants need to be taken every day. It usually takes several weeks for medications to work. Not all medicines work for all people. It is important to follow-up with your provider to make sure you have a treatment plan that is working for you. Do not stop your medication abruptly without first discussing it with your provider.    Crisis Resources   These hotlines are for both adults and children. They and are open 24 hours a day, 7 days a week unless noted otherwise.      National Suicide Prevention Lifeline   3-211-389-TXOY (3086)      Crisis Text Line    www.crisistextline.org  Text HOME to 161609 from anywhere in the [place], anytime, about any type of crisis. A live, trained crisis counselor will receive the text and respond quickly.      [facility] Lifeline for LGBTQ Youth  A national crisis intervention and suicide lifeline for LGBTQ youth under 25. Provides a safe place to talk without judgement. Call 1-952.858.5601; text START to 499630 or visit www.thetrevorproject.org to talk to a trained counselor.      For [facility] crisis numbers, visit the [facility] website at:  https://[place].gov/dhs/people-we-serve/adults/health-care/mental-health/resources/crisis-contacts.jsp

## 2021-08-27 NOTE — LETTER
August 30, 2021      Apoorva Roberts  [address] [address]        Dear ,    We are writing to inform you of your test results.  HIV and hepatitis C tests are normal.    Resulted Orders   HIV Antigen Antibody Combo   Result Value Ref Range    HIV Antigen Antibody Combo Nonreactive Nonreactive      Comment:      HIV-1 p24 Ag & HIV-1/HIV-2 Ab Not Detected   Hepatitis C Screen Reflex to HCV RNA Quant and Genotype   Result Value Ref Range    Hepatitis C Antibody Nonreactive Nonreactive    Narrative    Assay performance characteristics have not been established for newborns, infants, and children.       If you have any questions or concerns, please call the clinic at the number listed above.       Sincerely,      Mark Desir MD

## 2021-08-28 LAB
HCV AB SERPL QL IA: NONREACTIVE
HIV 1+2 AB+HIV1 P24 AG SERPL QL IA: NONREACTIVE

## 2021-08-28 ASSESSMENT — ANXIETY QUESTIONNAIRES: GAD7 TOTAL SCORE: 11

## 2021-08-31 LAB
BKR LAB AP GYN ADEQUACY: NORMAL
BKR LAB AP GYN INTERPRETATION: NORMAL
BKR LAB AP HPV REFLEX: NORMAL
BKR LAB AP PREVIOUS ABNORMAL: NORMAL
PATH REPORT.COMMENTS IMP SPEC: NORMAL
PATH REPORT.RELEVANT HX SPEC: NORMAL

## 2021-09-02 ENCOUNTER — PATIENT OUTREACH (OUTPATIENT)
Dept: FAMILY MEDICINE | Facility: CLINIC | Age: 46
End: 2021-09-02

## 2021-09-02 LAB
HUMAN PAPILLOMA VIRUS 16 DNA: NEGATIVE
HUMAN PAPILLOMA VIRUS 18 DNA: NEGATIVE
HUMAN PAPILLOMA VIRUS FINAL DIAGNOSIS: ABNORMAL
HUMAN PAPILLOMA VIRUS OTHER HR: POSITIVE

## 2021-09-13 DIAGNOSIS — R51.9 CHRONIC DAILY HEADACHE: ICD-10-CM

## 2021-09-13 DIAGNOSIS — G43.009 MIGRAINE WITHOUT AURA AND WITHOUT STATUS MIGRAINOSUS, NOT INTRACTABLE: ICD-10-CM

## 2021-09-13 NOTE — TELEPHONE ENCOUNTER
Dr. Desir,    Routing refill request to provider for review/approval because:  Drug not on the [facility] refill protocol Odalys HARTMAN RN

## 2021-09-15 RX ORDER — BUTALBITAL, ACETAMINOPHEN AND CAFFEINE 50; 325; 40 MG/1; MG/1; MG/1
1 TABLET ORAL DAILY PRN
Qty: 20 TABLET | Refills: 0 | OUTPATIENT
Start: 2021-09-15

## 2021-09-15 NOTE — TELEPHONE ENCOUNTER
Did she already use both refills that were sent to pharmacy on 8/27?  She should not be out of the medication already.    Thanks,  Mark Desir MD

## 2021-09-21 DIAGNOSIS — Z11.59 ENCOUNTER FOR SCREENING FOR OTHER VIRAL DISEASES: ICD-10-CM

## 2021-10-04 ENCOUNTER — LAB (OUTPATIENT)
Dept: LAB | Facility: CLINIC | Age: 46
End: 2021-10-04
Payer: COMMERCIAL

## 2021-10-04 DIAGNOSIS — Z11.59 ENCOUNTER FOR SCREENING FOR OTHER VIRAL DISEASES: ICD-10-CM

## 2021-10-04 PROCEDURE — U0005 INFEC AGEN DETEC AMPLI PROBE: HCPCS

## 2021-10-04 PROCEDURE — U0003 INFECTIOUS AGENT DETECTION BY NUCLEIC ACID (DNA OR RNA); SEVERE ACUTE RESPIRATORY SYNDROME CORONAVIRUS 2 (SARS-COV-2) (CORONAVIRUS DISEASE [COVID-19]), AMPLIFIED PROBE TECHNIQUE, MAKING USE OF HIGH THROUGHPUT TECHNOLOGIES AS DESCRIBED BY CMS-2020-01-R: HCPCS

## 2021-10-05 ENCOUNTER — TELEPHONE (OUTPATIENT)
Dept: FAMILY MEDICINE | Facility: CLINIC | Age: 46
End: 2021-10-05

## 2021-10-05 ENCOUNTER — OFFICE VISIT (OUTPATIENT)
Dept: FAMILY MEDICINE | Facility: CLINIC | Age: 46
End: 2021-10-05
Payer: COMMERCIAL

## 2021-10-05 VITALS
WEIGHT: 204.6 LBS | HEIGHT: 62 IN | RESPIRATION RATE: 12 BRPM | HEART RATE: 105 BPM | SYSTOLIC BLOOD PRESSURE: 122 MMHG | DIASTOLIC BLOOD PRESSURE: 78 MMHG | TEMPERATURE: 98.5 F | BODY MASS INDEX: 37.65 KG/M2 | OXYGEN SATURATION: 100 %

## 2021-10-05 DIAGNOSIS — F33.1 MODERATE EPISODE OF RECURRENT MAJOR DEPRESSIVE DISORDER (H): Primary | ICD-10-CM

## 2021-10-05 DIAGNOSIS — F41.1 GAD (GENERALIZED ANXIETY DISORDER): ICD-10-CM

## 2021-10-05 DIAGNOSIS — G43.009 MIGRAINE WITHOUT AURA AND WITHOUT STATUS MIGRAINOSUS, NOT INTRACTABLE: ICD-10-CM

## 2021-10-05 DIAGNOSIS — R51.9 CHRONIC DAILY HEADACHE: ICD-10-CM

## 2021-10-05 DIAGNOSIS — B07.0 PLANTAR WARTS: ICD-10-CM

## 2021-10-05 DIAGNOSIS — R73.01 ELEVATED FASTING GLUCOSE: ICD-10-CM

## 2021-10-05 DIAGNOSIS — I10 BENIGN ESSENTIAL HYPERTENSION: ICD-10-CM

## 2021-10-05 LAB
ANION GAP SERPL CALCULATED.3IONS-SCNC: 6 MMOL/L (ref 3–14)
BUN SERPL-MCNC: 16 MG/DL (ref 7–30)
CALCIUM SERPL-MCNC: 9.2 MG/DL (ref 8.5–10.1)
CHLORIDE BLD-SCNC: 106 MMOL/L (ref 94–109)
CO2 SERPL-SCNC: 22 MMOL/L (ref 20–32)
CREAT SERPL-MCNC: 0.99 MG/DL (ref 0.52–1.04)
GFR SERPL CREATININE-BSD FRML MDRD: 70 ML/MIN/1.73M2
GLUCOSE BLD-MCNC: 122 MG/DL (ref 70–99)
POTASSIUM BLD-SCNC: 4 MMOL/L (ref 3.4–5.3)
SARS-COV-2 RNA RESP QL NAA+PROBE: NEGATIVE
SODIUM SERPL-SCNC: 134 MMOL/L (ref 133–144)

## 2021-10-05 PROCEDURE — 80048 BASIC METABOLIC PNL TOTAL CA: CPT | Performed by: INTERNAL MEDICINE

## 2021-10-05 PROCEDURE — 99214 OFFICE O/P EST MOD 30 MIN: CPT | Mod: 25 | Performed by: INTERNAL MEDICINE

## 2021-10-05 PROCEDURE — 36415 COLL VENOUS BLD VENIPUNCTURE: CPT | Performed by: INTERNAL MEDICINE

## 2021-10-05 PROCEDURE — 17110 DESTRUCTION B9 LES UP TO 14: CPT | Performed by: INTERNAL MEDICINE

## 2021-10-05 RX ORDER — DESVENLAFAXINE 50 MG/1
50 TABLET, FILM COATED, EXTENDED RELEASE ORAL DAILY
Qty: 30 TABLET | Refills: 2 | Status: SHIPPED | OUTPATIENT
Start: 2021-10-05 | End: 2022-05-23

## 2021-10-05 RX ORDER — BUTALBITAL, ACETAMINOPHEN AND CAFFEINE 50; 325; 40 MG/1; MG/1; MG/1
1 TABLET ORAL DAILY PRN
Qty: 20 TABLET | Refills: 0 | Status: SHIPPED | OUTPATIENT
Start: 2021-10-05 | End: 2022-03-21

## 2021-10-05 ASSESSMENT — ANXIETY QUESTIONNAIRES
7. FEELING AFRAID AS IF SOMETHING AWFUL MIGHT HAPPEN: MORE THAN HALF THE DAYS
2. NOT BEING ABLE TO STOP OR CONTROL WORRYING: MORE THAN HALF THE DAYS
1. FEELING NERVOUS, ANXIOUS, OR ON EDGE: SEVERAL DAYS
5. BEING SO RESTLESS THAT IT IS HARD TO SIT STILL: SEVERAL DAYS
GAD7 TOTAL SCORE: 10
3. WORRYING TOO MUCH ABOUT DIFFERENT THINGS: MORE THAN HALF THE DAYS
6. BECOMING EASILY ANNOYED OR IRRITABLE: SEVERAL DAYS

## 2021-10-05 ASSESSMENT — PATIENT HEALTH QUESTIONNAIRE - PHQ9
5. POOR APPETITE OR OVEREATING: SEVERAL DAYS
SUM OF ALL RESPONSES TO PHQ QUESTIONS 1-9: 9

## 2021-10-05 ASSESSMENT — MIFFLIN-ST. JEOR: SCORE: 1536.31

## 2021-10-05 NOTE — PROGRESS NOTES
Assessment & Plan     Moderate episode of recurrent major depressive disorder (H)  and  GEORGIANA (generalized anxiety disorder)    We reviewed Apoorva's GeneSmxHero results which showed no significant gene-drug interaction for most antidepressant medications.  Duloxetine was in the significant interaction category.  Despite this relatively favorable pharmacogenetic profile, Apoorva has tried and failed many meds in the past (amitriptyline, Wellbutrin, Paxil, Zoloft, Effexor, Celexa, Lexapro, Prozac, and trazodone).  She has never tried Pristiq before, so we'll give this med a try, insurance PA pending.  Follow-up in 6-8 weeks    - desvenlafaxine (PRISTIQ) 50 MG 24 hr tablet; Take 1 tablet (50 mg) by mouth daily    Chronic daily headache  and  Migraine without aura and without status migrainosus, not intractable    She is seeing neurology later this month and requests another med refill of the butalbital until then.     - butalbital-acetaminophen-caffeine (ESGIC) -40 MG tablet; Take 1 tablet by mouth daily as needed for headaches    Benign essential hypertension    BP good in clinic today, BMP normal except for glucose, but I do not believe she was fasting for this draw.     - **Basic metabolic panel FUTURE anytime    Plantar warts    Risks/benefits of liquid nitrogen treatment discussed, and she wished to proceed with treatment.  3 rounds of 7-10 seconds each applied to the lesion using plastic guard to protect the surrounding tissue.  Home care discussed.  Follow-up as needed in 3 weeks if not resolved.           Return in about 6 weeks (around 11/16/2021) for follow-up on new medication.    Mark Desir MD  [facility]    Subjective   Apoorva is a 45 year old who presents for the following health issues     HPI     Chief Complaint   Patient presents with     Results     Discuss lab results from 8/27/21.     Derm Problem     Treat warts on left foot.     Flu Shot     declined          Warts  Onset/Duration: 7-8 years  Description (location/number): left foot, 2  Accompanying signs and symptoms (pain, redness): YES- pain   History: prior warts: YES  Therapies tried and outcome: liquid nitrogen in the past about 7 years ago, no current treatments      Migraines- she's like a butalbital refill.  [place] showing up on [place] .       Depression and Anxiety Follow-Up    Apoorva present to discuss GeneSight testing results.  She has previously tried amitriptyline, wellbutrin, paxil, zoloft, effexor, celexa, lexapro, prozac, and trazodone in the past.      Social History     Tobacco Use     Smoking status: Current Every Day Smoker     Packs/day: 1.00     Years: 30.00     Pack years: 30.00     Types: Cigarettes     Smokeless tobacco: Never Used   Vaping Use     Vaping Use: Never used   Substance Use Topics     Alcohol use: Yes     Comment: 1-2 drinks a week     Drug use: Yes     Types: Marijuana     Comment: cbd     PHQ 9/3/2020 8/27/2021 10/5/2021   PHQ-9 Total Score 11 12 9   Q9: Thoughts of better off dead/self-harm past 2 weeks Not at all Not at all Not at all     GEORGIANA-7 SCORE 9/3/2020 8/27/2021 10/5/2021   Total Score 7 11 10     Last PHQ-9 10/5/2021   1.  Little interest or pleasure in doing things 1   2.  Feeling down, depressed, or hopeless 1   3.  Trouble falling or staying asleep, or sleeping too much 0   4.  Feeling tired or having little energy 1   5.  Poor appetite or overeating 2   6.  Feeling bad about yourself 3   7.  Trouble concentrating 1   8.  Moving slowly or restless 0   Q9: Thoughts of better off dead/self-harm past 2 weeks 0   PHQ-9 Total Score 9   Difficulty at work, home, or with people Somewhat difficult     GEORGIANA-7  10/5/2021   1. Feeling nervous, anxious, or on edge 1   2. Not being able to stop or control worrying 2   3. Worrying too much about different things 2   4. Trouble relaxing 1   5. Being so restless that it is hard to sit still 1   6. Becoming easily annoyed or  "irritable 1   7. Feeling afraid, as if something awful might happen 2   GEORGIANA-7 Total Score 10   If you checked any problems, how difficult have they made it for you to do your work, take care of things at home, or get along with other people? -       Review of Systems   Constitutional, psych, derm systems are negative, except as otherwise noted.      Objective    /78 (BP Location: Left arm, Patient Position: Chair, Cuff Size: Adult Large)   Pulse 105   Temp 98.5  F (36.9  C) (Tympanic)   Resp 12   Ht 1.575 m (5' 2\")   Wt 92.8 kg (204 lb 9.6 oz)   SpO2 100%   BMI 37.42 kg/m    Body mass index is 37.42 kg/m .  Physical Exam   GENERAL: healthy, alert and no distress  SKIN: two 4mm plantar warts on ball of left foot  PSYCH: mentation appears normal, affect normal/bright    Results for orders placed or performed in visit on 10/05/21 (from the past 24 hour(s))   **Basic metabolic panel FUTURE anytime   Result Value Ref Range    Sodium 134 133 - 144 mmol/L    Potassium 4.0 3.4 - 5.3 mmol/L    Chloride 106 94 - 109 mmol/L    Carbon Dioxide (CO2) 22 20 - 32 mmol/L    Anion Gap 6 3 - 14 mmol/L    Urea Nitrogen 16 7 - 30 mg/dL    Creatinine 0.99 0.52 - 1.04 mg/dL    Calcium 9.2 8.5 - 10.1 mg/dL    Glucose 122 (H) 70 - 99 mg/dL    GFR Estimate 70 >60 mL/min/1.73m2           "

## 2021-10-05 NOTE — PATIENT INSTRUCTIONS
Following treatment with liquid nitrogen your skin may get a blister. Try to keep this intact and keep the area clean and dry.  If no blister occurs, it is ok to use a pumice stone or nail file to gently file down the thickened warty tissue (do not use the same nail file that you use on your finger nails or it could spread the virus).  You may need to return for additional treatment if the wart does not completely resolve.  Plan to return after roughly 3 weeks once the area has healed, if still showing signs of persistent warty tissue.      May start using OTC treatments if treatment ineffective.

## 2021-10-06 ASSESSMENT — ANXIETY QUESTIONNAIRES: GAD7 TOTAL SCORE: 10

## 2021-10-06 NOTE — TELEPHONE ENCOUNTER
FUTURE VISIT INFORMATION      FUTURE VISIT INFORMATION:    Date: 10/27/2021    Time: 2pm    [place] [facility] [place]:    Referring provider:  Dr. Desir    Referring providers clinic:  [facility]     Reason for visit/diagnosis  Headaches/Migraines     RECORDS REQUESTED FROM:       Clinic name Comments Records Status Imaging Status   Internal Dr. Desir-10/5/2021, 8/27/2021 Epic N/A

## 2021-10-06 NOTE — TELEPHONE ENCOUNTER
Prior Authorization Retail Medication Request    Medication/Dose: desvenlafaxine  ICD code (if different than what is on RX):    Previously Tried and Failed:  amitriptyline, Wellbutrin, Paxil, Zoloft, Effexor, Celexa, Lexapro, Prozac, and trazodone  Rationale:  [place] results     Insurance Name:  BC/BS   Insurance ID:  Not provided  CMM Key: [id number]      Pharmacy Information (if different than what is on RX)  Name:    Phone:

## 2021-10-07 ENCOUNTER — ANESTHESIA EVENT (OUTPATIENT)
Dept: GASTROENTEROLOGY | Facility: CLINIC | Age: 46
End: 2021-10-07
Payer: COMMERCIAL

## 2021-10-07 RX ORDER — SODIUM CHLORIDE, SODIUM LACTATE, POTASSIUM CHLORIDE, CALCIUM CHLORIDE 600; 310; 30; 20 MG/100ML; MG/100ML; MG/100ML; MG/100ML
INJECTION, SOLUTION INTRAVENOUS CONTINUOUS
Status: CANCELLED | OUTPATIENT
Start: 2021-10-07

## 2021-10-07 RX ORDER — ONDANSETRON 2 MG/ML
4 INJECTION INTRAMUSCULAR; INTRAVENOUS
Status: CANCELLED | OUTPATIENT
Start: 2021-10-07

## 2021-10-07 RX ORDER — LIDOCAINE 40 MG/G
CREAM TOPICAL
Status: CANCELLED | OUTPATIENT
Start: 2021-10-07

## 2021-10-07 ASSESSMENT — LIFESTYLE VARIABLES: TOBACCO_USE: 1

## 2021-10-07 NOTE — ANESTHESIA PREPROCEDURE EVALUATION
Anesthesia Pre-Procedure Evaluation    Patient: Apoorva Roberts   MRN: [id number] : 1975        Preoperative Diagnosis: Gastroesophageal reflux disease, unspecified whether esophagitis present [K21.9]    Procedure : Procedure(s):  ESOPHAGOGASTRODUODENOSCOPY (EGD)          Past Medical History:   Diagnosis Date     Cervical high risk HPV (human papillomavirus) test positive 2021     Depressive disorder      Hypertension       Past Surgical History:   Procedure Laterality Date     ABDOMEN SURGERY       APPENDECTOMY       CHOLECYSTECTOMY       GYN SURGERY       HERNIA REPAIR       HYSTERECTOMY, PAP STILL INDICATED      Cervix still present      Allergies   Allergen Reactions     Indomethacin      Other reaction(s): GI Upset     Fluoxetine      Other reaction(s): Thrush     Paroxetine      Other reaction(s): Thrush      Social History     Tobacco Use     Smoking status: Current Every Day Smoker     Packs/day: 1.00     Years: 30.00     Pack years: 30.00     Types: Cigarettes     Smokeless tobacco: Never Used   Substance Use Topics     Alcohol use: Yes     Comment: 1-2 drinks a week      Wt Readings from Last 1 Encounters:   10/05/21 92.8 kg (204 lb 9.6 oz)        Anesthesia Evaluation            ROS/MED HX  ENT/Pulmonary:  - neg pulmonary ROS   (+) DIEGO risk factors, hypertension, obese, tobacco use, Current use,     Neurologic:     (+) peripheral neuropathy, migraines,     Cardiovascular:     (+) hypertension-----    METS/Exercise Tolerance:     Hematologic:  - neg hematologic  ROS     Musculoskeletal:  - neg musculoskeletal ROS     GI/Hepatic: Comment: Increased risk of aspiration due to GERD    (+) GERD,     Renal/Genitourinary:  - neg Renal ROS     Endo:     (+) Obesity,     Psychiatric/Substance Use:  - neg psychiatric ROS   (+) Recreational drug usage: Cannabis.    Infectious Disease:  - neg infectious disease ROS     Malignancy:  - neg malignancy ROS     Other: Comment: Hidradenitis  HPV - neg other  ROS          Physical Exam    Airway  airway exam normal      Mallampati: II   TM distance: > 3 FB   Neck ROM: full   Mouth opening: > 3 cm    Respiratory Devices and Support         Dental  no notable dental history         Cardiovascular   cardiovascular exam normal          Pulmonary   pulmonary exam normal                OUTSIDE LABS:  CBC:   Lab Results   Component Value Date    WBC 11.2 (H) 08/23/2020    HGB 15.4 08/23/2020    HCT 45.4 08/23/2020     08/23/2020     BMP:   Lab Results   Component Value Date     10/05/2021     08/23/2020    POTASSIUM 4.0 10/05/2021    POTASSIUM 3.4 08/23/2020    CHLORIDE 106 10/05/2021    CHLORIDE 108 08/23/2020    CO2 22 10/05/2021    CO2 24 08/23/2020    BUN 16 10/05/2021    BUN 10 08/23/2020    CR 0.99 10/05/2021    CR 0.89 08/23/2020     (H) 10/05/2021     (H) 08/23/2020     COAGS: No results found for: PTT, INR, FIBR  POC: No results found for: BGM, HCG, HCGS  HEPATIC:   Lab Results   Component Value Date    ALBUMIN 3.9 08/23/2020    PROTTOTAL 7.6 08/23/2020    ALT 34 08/23/2020    AST 24 08/23/2020    ALKPHOS 74 08/23/2020    BILITOTAL 0.6 08/23/2020     OTHER:   Lab Results   Component Value Date    JULIET 9.2 10/05/2021    LIPASE 64 (L) 08/23/2020       Anesthesia Plan    ASA Status:  3   NPO Status:  NPO Appropriate    Anesthesia Type: General.     - Airway: Native airway   Induction: Propofol, Intravenous.   Maintenance: TIVA.        Consents    Anesthesia Plan(s) and associated risks, benefits, and realistic alternatives discussed. Questions answered and patient/representative(s) expressed understanding.     - Discussed with:  Patient         Postoperative Care            Comments:                ISAAC Arana CRNA

## 2021-10-08 ENCOUNTER — HOSPITAL ENCOUNTER (OUTPATIENT)
Facility: CLINIC | Age: 46
Discharge: HOME | End: 2021-10-08
Attending: SURGERY | Admitting: SURGERY
Payer: COMMERCIAL

## 2021-10-08 ENCOUNTER — ANESTHESIA (OUTPATIENT)
Dept: GASTROENTEROLOGY | Facility: CLINIC | Age: 46
End: 2021-10-08
Payer: COMMERCIAL

## 2021-10-08 VITALS
HEART RATE: 92 BPM | OXYGEN SATURATION: 95 % | RESPIRATION RATE: 16 BRPM | BODY MASS INDEX: 37.54 KG/M2 | DIASTOLIC BLOOD PRESSURE: 99 MMHG | SYSTOLIC BLOOD PRESSURE: 115 MMHG | WEIGHT: 204 LBS | TEMPERATURE: 98.7 F | HEIGHT: 62 IN

## 2021-10-08 DIAGNOSIS — K29.71 GASTRITIS WITH HEMORRHAGE, UNSPECIFIED CHRONICITY, UNSPECIFIED GASTRITIS TYPE: Primary | ICD-10-CM

## 2021-10-08 LAB — UPPER GI ENDOSCOPY: NORMAL

## 2021-10-08 PROCEDURE — 43239 EGD BIOPSY SINGLE/MULTIPLE: CPT | Performed by: SURGERY

## 2021-10-08 PROCEDURE — 370N000017 HC ANESTHESIA TECHNICAL FEE, PER MIN: Performed by: SURGERY

## 2021-10-08 PROCEDURE — 258N000003 HC RX IP 258 OP 636: Performed by: NURSE ANESTHETIST, CERTIFIED REGISTERED

## 2021-10-08 PROCEDURE — 88305 TISSUE EXAM BY PATHOLOGIST: CPT | Mod: TC | Performed by: SURGERY

## 2021-10-08 PROCEDURE — 250N000011 HC RX IP 250 OP 636: Performed by: NURSE ANESTHETIST, CERTIFIED REGISTERED

## 2021-10-08 PROCEDURE — 250N000009 HC RX 250: Performed by: NURSE ANESTHETIST, CERTIFIED REGISTERED

## 2021-10-08 RX ORDER — LIDOCAINE HYDROCHLORIDE 10 MG/ML
INJECTION, SOLUTION EPIDURAL; INFILTRATION; INTRACAUDAL; PERINEURAL PRN
Status: DISCONTINUED | OUTPATIENT
Start: 2021-10-08 | End: 2021-10-08

## 2021-10-08 RX ORDER — SODIUM CHLORIDE, SODIUM LACTATE, POTASSIUM CHLORIDE, CALCIUM CHLORIDE 600; 310; 30; 20 MG/100ML; MG/100ML; MG/100ML; MG/100ML
INJECTION, SOLUTION INTRAVENOUS CONTINUOUS PRN
Status: DISCONTINUED | OUTPATIENT
Start: 2021-10-08 | End: 2021-10-08

## 2021-10-08 RX ORDER — PROPOFOL 10 MG/ML
INJECTION, EMULSION INTRAVENOUS PRN
Status: DISCONTINUED | OUTPATIENT
Start: 2021-10-08 | End: 2021-10-08

## 2021-10-08 RX ORDER — GLYCOPYRROLATE 0.2 MG/ML
INJECTION, SOLUTION INTRAMUSCULAR; INTRAVENOUS PRN
Status: DISCONTINUED | OUTPATIENT
Start: 2021-10-08 | End: 2021-10-08

## 2021-10-08 RX ORDER — OMEPRAZOLE 40 MG/1
40 CAPSULE, DELAYED RELEASE ORAL DAILY
Qty: 30 CAPSULE | Refills: 0 | Status: SHIPPED | OUTPATIENT
Start: 2021-10-08 | End: 2021-11-19

## 2021-10-08 RX ORDER — PROPOFOL 10 MG/ML
INJECTION, EMULSION INTRAVENOUS CONTINUOUS PRN
Status: DISCONTINUED | OUTPATIENT
Start: 2021-10-08 | End: 2021-10-08

## 2021-10-08 RX ORDER — KETAMINE HYDROCHLORIDE 10 MG/ML
INJECTION INTRAMUSCULAR; INTRAVENOUS PRN
Status: DISCONTINUED | OUTPATIENT
Start: 2021-10-08 | End: 2021-10-08

## 2021-10-08 RX ADMIN — KETAMINE HYDROCHLORIDE 10 MG: 10 INJECTION, SOLUTION INTRAMUSCULAR; INTRAVENOUS at 07:54

## 2021-10-08 RX ADMIN — PROPOFOL 200 MCG/KG/MIN: 10 INJECTION, EMULSION INTRAVENOUS at 07:54

## 2021-10-08 RX ADMIN — TOPICAL ANESTHETIC 1 SPRAY: 200 SPRAY DENTAL; PERIODONTAL at 07:54

## 2021-10-08 RX ADMIN — LIDOCAINE HYDROCHLORIDE 50 MG: 10 INJECTION, SOLUTION EPIDURAL; INFILTRATION; INTRACAUDAL; PERINEURAL at 07:54

## 2021-10-08 RX ADMIN — PROPOFOL 50 MG: 10 INJECTION, EMULSION INTRAVENOUS at 07:54

## 2021-10-08 RX ADMIN — GLYCOPYRROLATE 0.2 MG: 0.2 INJECTION, SOLUTION INTRAMUSCULAR; INTRAVENOUS at 07:54

## 2021-10-08 RX ADMIN — SODIUM CHLORIDE, POTASSIUM CHLORIDE, SODIUM LACTATE AND CALCIUM CHLORIDE: 600; 310; 30; 20 INJECTION, SOLUTION INTRAVENOUS at 07:54

## 2021-10-08 ASSESSMENT — MIFFLIN-ST. JEOR: SCORE: 1533.59

## 2021-10-08 NOTE — TELEPHONE ENCOUNTER
Central Prior Authorization Team   Phone: [phone]      PA Initiation    Medication: desvenlafaxine-Initiated  Insurance Company: [facility] - Phone [phone] Fax [phone]  Pharmacy Filling the Rx: [facility] [place] - [id number]  Filling Pharmacy Phone: [phone]  Filling Pharmacy Fax:    Start Date: 10/8/2021

## 2021-10-08 NOTE — ANESTHESIA POSTPROCEDURE EVALUATION
Patient: Apoorva Roberts    Procedure: Procedure(s):  ESOPHAGOGASTRODUODENOSCOPY, WITH BIOPSY       Diagnosis:Gastroesophageal reflux disease, unspecified whether esophagitis present [K21.9]  Diagnosis Additional Information: No value filed.    Anesthesia Type:  General    Note:  Disposition: Outpatient   Postop Pain Control: Uneventful            Sign Out: Well controlled pain   PONV: No   Neuro/Psych: Uneventful            Sign Out: Acceptable/Baseline neuro status   Airway/Respiratory: Uneventful            Sign Out: Acceptable/Baseline resp. status   CV/Hemodynamics: Uneventful            Sign Out: Acceptable CV status; No obvious hypovolemia; No obvious fluid overload   Other NRE: NONE   DID A NON-ROUTINE EVENT OCCUR? No           Last vitals:  Vitals Value Taken Time   BP     Temp     Pulse     Resp     SpO2         Electronically Signed By: ISAAC Brown CRNA  October 8, 2021  8:11 AM

## 2021-10-08 NOTE — TELEPHONE ENCOUNTER
Prior Authorization Approval    Authorization Effective Date: 7/8/2021  Authorization Expiration Date: 10/8/2022  Medication: desvenlafaxine-APPROVED  Approved Dose/Quantity:   Reference #:     Insurance Company: [employer] - Phone [phone] Fax [phone]  Expected CoPay:       CoPay Card Available:      Foundation Assistance Needed:    Which Pharmacy is filling the prescription (Not needed for infusion/clinic administered): [facility] - [place], [place] - [address] [address]  Pharmacy Notified: Yes  Patient Notified: No    Pharmacy will notify patient when medication is ready.

## 2021-10-08 NOTE — ANESTHESIA CARE TRANSFER NOTE
Patient: Apoorva Roberts    Procedure: Procedure(s):  ESOPHAGOGASTRODUODENOSCOPY, WITH BIOPSY       Diagnosis: Gastroesophageal reflux disease, unspecified whether esophagitis present [K21.9]  Diagnosis Additional Information: No value filed.    Anesthesia Type:   General     Note:    Oropharynx: spontaneously breathing  Level of Consciousness: drowsy  Oxygen Supplementation: room air    Independent Airway: airway patency satisfactory and stable  Dentition: dentition unchanged  Vital Signs Stable: post-procedure vital signs reviewed and stable  Report to RN Given: handoff report given  Patient transferred to: Phase II    Handoff Report: Identifed the Patient, Identified the Reponsible Provider, Reviewed the pertinent medical history, Discussed the surgical course, Reviewed Intra-OP anesthesia mangement and issues during anesthesia, Set expectations for post-procedure period and Allowed opportunity for questions and acknowledgement of understanding      Vitals:  Vitals Value Taken Time   BP     Temp     Pulse     Resp     SpO2         Electronically Signed By: ISAAC Brown CRNA  October 8, 2021  8:11 AM

## 2021-10-08 NOTE — H&P
45 year old year old female here for upper endoscopy for reflux.  Ongoing, poorly controlled.  Denies dysphagia.  She is currently on Carafate.  She has attempted numerous OTC medications with minimal relief.        Patient Active Problem List   Diagnosis     Classic migraine     Community acquired pneumonia     Gastroesophageal reflux disease     Generalized headaches     Hidradenitis suppurativa     Uncontrolled hypertension     Moderate episode of recurrent major depressive disorder (H)     Peripheral neuropathy     Morbid obesity (H)     Marijuana use, continuous     Tobacco use disorder     Migraine without aura and without status migrainosus, not intractable     Cervical high risk HPV (human papillomavirus) test positive       Past Medical History:   Diagnosis Date     Cervical high risk HPV (human papillomavirus) test positive 08/27/2021     Depressive disorder      Hypertension        Past Surgical History:   Procedure Laterality Date     ABDOMEN SURGERY       APPENDECTOMY       CHOLECYSTECTOMY       GYN SURGERY       HERNIA REPAIR       HYSTERECTOMY, PAP STILL INDICATED      Cervix still present       Family History   Problem Relation Age of Onset     Diabetes Mother      Hypertension Mother      Hyperlipidemia Mother      Depression Mother      Substance Abuse Mother      Hypertension Father      Hyperlipidemia Father      Kidney Cancer Father      Other Cancer Father      Diabetes Maternal Grandmother      Depression Sister      Breast Cancer No family hx of        No current outpatient medications on file.       Allergies   Allergen Reactions     Indomethacin      Other reaction(s): GI Upset     Fluoxetine      Other reaction(s): Thrush     Paroxetine      Other reaction(s): Thrush       Pt reports that she has been smoking cigarettes. She has a 30.00 pack-year smoking history. She has never used smokeless tobacco. She reports current alcohol use. She reports current drug use. Drug:  Marijuana.    Exam:    Awake, Alert OX3  Lungs - CTA bilaterally  CV - RRR, no murmurs, distal pulses intact  Abd - soft, non-distended, non-tender, +BS  Extr - No cyanosis or edema    A/P 45 year old year old female in need of upper endoscopy for reflux. Risks, benefits, alternatives, and complications were discussed including the possibility of perforation and the patient agreed to proceed.    Slim Randle, DO on 10/8/2021 at 7:31 AM

## 2021-10-10 ENCOUNTER — HEALTH MAINTENANCE LETTER (OUTPATIENT)
Age: 46
End: 2021-10-10

## 2021-10-11 LAB
PATH REPORT.COMMENTS IMP SPEC: NORMAL
PATH REPORT.COMMENTS IMP SPEC: NORMAL
PATH REPORT.FINAL DX SPEC: NORMAL
PATH REPORT.GROSS SPEC: NORMAL
PATH REPORT.MICROSCOPIC SPEC OTHER STN: NORMAL
PATH REPORT.RELEVANT HX SPEC: NORMAL
PHOTO IMAGE: NORMAL

## 2021-10-11 PROCEDURE — 88305 TISSUE EXAM BY PATHOLOGIST: CPT | Mod: 26 | Performed by: PATHOLOGY

## 2021-10-26 ENCOUNTER — E-VISIT (OUTPATIENT)
Dept: FAMILY MEDICINE | Facility: CLINIC | Age: 46
End: 2021-10-26
Payer: COMMERCIAL

## 2021-10-26 DIAGNOSIS — J01.90 ACUTE NON-RECURRENT SINUSITIS, UNSPECIFIED LOCATION: Primary | ICD-10-CM

## 2021-10-26 PROCEDURE — 99421 OL DIG E/M SVC 5-10 MIN: CPT | Performed by: INTERNAL MEDICINE

## 2021-10-26 RX ORDER — FLUTICASONE PROPIONATE 50 MCG
1 SPRAY, SUSPENSION (ML) NASAL DAILY
Qty: 16 G | Refills: 1 | Status: SHIPPED | OUTPATIENT
Start: 2021-10-26 | End: 2023-03-28

## 2021-10-26 NOTE — PATIENT INSTRUCTIONS
Dear Apoorva,    After reviewing your responses, your sinus symptoms are more likely due to a virus than a bacteria since they have only been going on for 3-4 days.  If symptoms continue for 10 days, then we get more concerned about a bacterial infection, so we typically hold off on using antibiotics until symptoms have lasted 10 days.  Please let us know if symptoms are not improved after another week.    In the meantime, I sent a prescription for Flonase nasal spray to see if that will help improve your symptoms by decreasing the inflammation and congestion in the sinuses.     It is also important to stay well hydrated, get lots of rest and take over-the-counter decongestants,?tylenol?or ibuprofen if you?are able to?take those medications per your primary care provider to help relieve discomfort.?     If your symptoms worsen, you develop severe headache, vomiting, high fever (>102), or are not improving in 7 days, please contact your primary care provider for an appointment or visit any of our convenient Walk-in Care or Urgent Care Centers to be seen which can be found on our website?here.?     Thanks again for choosing?us?as your health care partner,?   ?  Mark Desir MD?    The symptoms you describe suggest a viral cause, which is much more common than a bacterial cause. Antibiotics will treat bacterial infections, but have no effect on viral infections. If possible, especially if improving, start with symptom care for the first 7-10 days, then consider seeking further treatment or taking an antibiotic. Bacterial infections generally are more severe, including symptoms such as pus, fever over 101degrees F, or rapidly worsening.

## 2021-10-27 ENCOUNTER — PRE VISIT (OUTPATIENT)
Dept: NEUROLOGY | Facility: CLINIC | Age: 46
End: 2021-10-27

## 2021-10-27 ENCOUNTER — VIRTUAL VISIT (OUTPATIENT)
Dept: NEUROLOGY | Facility: CLINIC | Age: 46
End: 2021-10-27
Attending: INTERNAL MEDICINE
Payer: COMMERCIAL

## 2021-10-27 DIAGNOSIS — G43.709 CHRONIC MIGRAINE WITHOUT AURA WITHOUT STATUS MIGRAINOSUS, NOT INTRACTABLE: Primary | ICD-10-CM

## 2021-10-27 DIAGNOSIS — R06.83 SNORING: ICD-10-CM

## 2021-10-27 DIAGNOSIS — R51.9 CHRONIC DAILY HEADACHE: ICD-10-CM

## 2021-10-27 DIAGNOSIS — G43.009 MIGRAINE WITHOUT AURA AND WITHOUT STATUS MIGRAINOSUS, NOT INTRACTABLE: ICD-10-CM

## 2021-10-27 PROCEDURE — 99204 OFFICE O/P NEW MOD 45 MIN: CPT | Mod: 95 | Performed by: PSYCHIATRY & NEUROLOGY

## 2021-10-27 RX ORDER — NAPROXEN 500 MG/1
500 TABLET ORAL 2 TIMES DAILY WITH MEALS
Qty: 28 TABLET | Refills: 11 | Status: SHIPPED | OUTPATIENT
Start: 2021-10-27 | End: 2021-10-27

## 2021-10-27 RX ORDER — BUTALBITAL, ACETAMINOPHEN AND CAFFEINE 50; 325; 40 MG/1; MG/1; MG/1
1 TABLET ORAL EVERY 6 HOURS PRN
Qty: 8 TABLET | Refills: 5 | Status: SHIPPED | OUTPATIENT
Start: 2021-10-27 | End: 2021-10-27

## 2021-10-27 RX ORDER — BUTALBITAL, ACETAMINOPHEN AND CAFFEINE 50; 325; 40 MG/1; MG/1; MG/1
1 TABLET ORAL EVERY 6 HOURS PRN
Qty: 15 TABLET | Refills: 3 | Status: SHIPPED | OUTPATIENT
Start: 2021-10-27 | End: 2022-03-21

## 2021-10-27 RX ORDER — ELETRIPTAN HYDROBROMIDE 40 MG/1
40 TABLET, FILM COATED ORAL
Qty: 18 TABLET | Refills: 11 | Status: SHIPPED | OUTPATIENT
Start: 2021-10-27 | End: 2022-03-21

## 2021-10-27 ASSESSMENT — HEADACHE IMPACT TEST (HIT 6)
HOW OFTEN DID HEADACHS LIMIT CONCENTRATION ON WORK OR DAILY ACTIVITY: SOMETIMES
WHEN YOU HAVE A HEADACHE HOW OFTEN DO YOU WISH YOU COULD LIE DOWN: SOMETIMES
HOW OFTEN DO HEADACHES LIMIT YOUR DAILY ACTIVITIES: SOMETIMES
HOW OFTEN HAVE YOU FELT TOO TIRED TO WORK BECAUSE OF YOUR HEADACHES: SOMETIMES
WHEN YOU HAVE HEADACHES HOW OFTEN IS THE PAIN SEVERE: VERY OFTEN
HOW OFTEN HAVE YOU FELT FED UP OR IRRITATED BECAUSE OF YOUR HEADACHES: SOMETIMES
HIT6 TOTAL SCORE: 61

## 2021-10-27 NOTE — PROGRESS NOTES
Apoorva is a 45 year old who is being evaluated via a billable video visit.      How would you like to obtain your AVS? MyChart  If the video visit is dropped, the invitation should be resent by: Text to cell phone: [phone]  Will anyone else be joining your video visit? No      Video Start Time: 2:01 PM  Video-Visit Details    Type of service:  Video Visit    Video End Time:2:44pm    Originating Location (pt. Location): Home    Distant Location (provider location):  [facility] NEUROLOGY [facility]     Platform used for Video Visit: Komal     Last Patient-Answered HIT-6 Questionnaire  HIT-6 10/27/2021   When you have headaches, how often is the pain severe 11   How often do headaches limit your ability to do usual daily activities including household work, work, school, or social activities? 10   When you have a headache, how often do you wish you could lie down? 10   In the past 4 weeks, how often have you felt too tired to do work or daily activities because of your headaches 10   In the past 4 weeks, how often have you felt fed up or irritated because of your headaches 10   In the past 4 weeks, how often did headaches limit your ability to concentrate on work or daily activities 10   HIT-6 Total Score 61       MIGRAINE DISABILITY ASSESSMENT (MIDAS)    On how many days in the last 3 months did you miss work or school because of your headaches?  0    How many days in the last 3 months was your productivity at work or school reduced by half or more because of your headaches? (Do not include days you counted in question 1 where you missed work or school.)  10    On how many days in the last 3 months did you not do household work (such as housework, home repairs and maintenance, shopping, caring for children and relatives) because of your headaches?  0    How many days in the last 3 months was your productivity in household work reduced by half or more because of your headaches? (Do not include days you  counted in question 3 where you did not do household work).  14    On how many days in the last 3 months did you miss family, social, or lesiure activities because of your headaches?  4    MIDAS Total Score:     On how many days in the last 3 months did you have a headache? (If a headache lasted more than 1 day, count each day.)   60    On a scale of 0 - 10, on average how painful were these headaches (where 0 = no pain at all, and 10 = pain as bad as it can be.)  7    [facility]  Virtual Neurology Consult     Apoorva Roberts MRN# [id number]   YOB: 1975 Age: 45 year old     Requesting physician: Mark Desir MD         Assessment and Recommendations:     Apoorva Roberts is a 45 year old female  who presents for further evaluation of chronic headaches.     Her described headache presentation and history meets criteria for diagnosis of:  Chronic daily headache and chronic migraine without aura, with concern for medication over use headache.  I suspect she has this on a genetic basis.    The headache description meets criteria for diagnosis of chronic migraine without aura, considering features such as unilateral pain pattern, nausea sensation, worse with activity, photophobia, blurry vision and lasting multiple hours into days.     I did not appreciate any red flag symptoms or history that would warrant further radiographic evaluation for secondary causes of headache today, although if she does not improve with new treatment suggestions, considering it has been over a decade since she has had any imaging, may consider at follow up. Reassured as her virtual neurologic examination is intact today.     Going forward, we reviewed a symptomatic treatment strategy, focusing on optimizing a preventative treatment.  -For acute treatment of headache, recommended trial of Eletriptan 40 mg  to be used at onset of headache, with a repeat dose in 2 hours if  needed, not exceed more than 9 days/month to avoid medication overuse and hope to prevent further medication overuse with daily acetaminophen and fiorcet use.  -Taper Fiorcet, and patient educated to use up to 4x/ month   -Acetaminophen could be used in addition to the eleriptan, if needed, up to 14 days a month, patient to avoid naproxen or NSAIDs considering GI side effects and history of ulcer..         Her headache frequency and severity certainly warrants prevention.    -Discussed potential preventative options, and decided to proceed with Emgality trial, with hope to reduce medication overuse. If this is not tolerated or not effective after an adequate trial, alternative options could include  botulinum toxin injections using a chronic migraine protocol, another CGRP monoclonal antibody or CGRP antagonist oral medication, verapamil, or Nurtec.    -Considering the description of her daily morning headaches, and history of snoring, patient would benefit from formal sleep study to assess for DIEGO contribution to headache burden        Plan:  Sleep referral    Trial Eletriptan 40 mg   Fioricet taper, then no more than 4 days per month  CGRP inhibitor trial with Emgality  If no improvement in 3 months, consider repeat imaging  Follow up in 3 months         The patient was discussed  Dr. Harding, who agrees with my assessment and plan     Marisol Babcock DO, SENG  PGY-2 Neurology Resident    Physician Attestation   I, Aggie Harding MD, saw this patient and agree with the findings and plan of care as documented in the note.      Aggie Harding MD  Neurology            Chief Complaint:     Chief Complaint   Patient presents with     Neurology Video Visit NEW     Referred per Dr. Desir - [facility]           History is obtained from the patient and medical record.  Patient was seen via a virtual visit in their home due to the Covid 19 global pandemic.      Apoorva Roberts is a 45 year old female who has been  living with headache since age 15, chronic migraine since age 17, daily since she had her daughter in 2004. She reports 0 headache free days since 2004.    When she first had these severe headaches she would received Demerol or morphine in the ER at least once monthly. She tried amitriptyline/nortriptyline, which weren't effective. After the birth of her son, her headaches actually improved, only to worsen after the birth of her daughter in 2004 and persist up until now.     She reports she wakes up with a low grade mild headache each morning that is a dull pounding ache, improves with acetaminophen or ibuprofen for which she alternatives between daily. She snores at night.    Her headache pains are unilateral, either side, more so on the left. The pain starts posterior and is achy, then radiates up the side of her head to her eye. The pain is described as squeezing, sharp aching. She has 30/30 headache days per month, with 3/30 severe headache days per month. They rate 9-9.5/10 and last all day.   She has associated photophobia, phonophobia, nausea, vomiting.     She denies typical aura. Sleep is helpful. Worse with routine physical activity.    Fioricet is reportedly the only helpful medication she has ever had. She takes Tylenol or ibuprofen most mornings. She tried sumatriptan (not effective), rizatriptan (not effective), DHE, Reglan previously. She took topiramate without effect after a 6 month trial.     She has taken amitriptyline  (caused vomiting), Wellbutrin, Paxil, Zoloft, Effexor, Celexa, Lexapro, Prozac, and trazodone.     She currently takes Pristiq, which is new. She tried propranolol a few times, with blood pressure getting too low.     She currently takes lisinopril and hydrochlorothiazide and reports her blood pressure is under good conrol. She takes gabapentin for chronic nerve pain in her legs. She reports last imaging of her head was over 12 years ago. She continues to smoke 1ppd and drinks 2-3  large pops daily, but does not consume alcohol. She has notable stressors such as her daughter recently being taken from her.                Past Medical History:     Past Medical History:   Diagnosis Date     Cervical high risk HPV (human papillomavirus) test positive 08/27/2021     Depressive disorder      Hypertension               Past Surgical History:     Past Surgical History:   Procedure Laterality Date     ABDOMEN SURGERY       APPENDECTOMY       CHOLECYSTECTOMY       ESOPHAGOSCOPY, GASTROSCOPY, DUODENOSCOPY (EGD), COMBINED N/A 10/8/2021    Procedure: ESOPHAGOGASTRODUODENOSCOPY, WITH BIOPSY;  Surgeon: Slim Randle DO;  Location: [facility]     GYN SURGERY       HERNIA REPAIR       HYSTERECTOMY, PAP STILL INDICATED      Cervix still present             Social History:     Smokes 1 PPD, drinks 2-3 sodas daily.     She reports her 17-year-old daughter was taken away from her 2 years ago, which has been very stressful.     Social History     Socioeconomic History     Marital status:      Spouse name: Not on file     Number of children: Not on file     Years of education: Not on file     Highest education level: Not on file   Occupational History     Not on file   Tobacco Use     Smoking status: Current Every Day Smoker     Packs/day: 1.00     Years: 30.00     Pack years: 30.00     Types: Cigarettes     Smokeless tobacco: Never Used   Vaping Use     Vaping Use: Never used   Substance and Sexual Activity     Alcohol use: Yes     Comment: 1-2 drinks a week     Drug use: Yes     Types: Marijuana     Comment: cbd     Sexual activity: Yes     Partners: Male     Birth control/protection: None   Other Topics Concern     Parent/sibling w/ CABG, MI or angioplasty before 65F 55M? No   Social History Narrative     Not on file     Social Determinants of Health     Financial Resource Strain:      Difficulty of Paying Living Expenses:    Food Insecurity:      Worried About Running Out of Food in the Last Year:       Ran Out of Food in the Last Year:    Transportation Needs:      Lack of Transportation (Medical):      Lack of Transportation (Non-Medical):    Physical Activity:      Days of Exercise per Week:      Minutes of Exercise per Session:    Stress:      Feeling of Stress :    Social Connections:      Frequency of Communication with Friends and Family:      Frequency of Social Gatherings with Friends and Family:      Attends Taoism Services:      Active Member of Clubs or Organizations:      Attends Club or Organization Meetings:      Marital Status:    Intimate Partner Violence:      Fear of Current or Ex-Partner:      Emotionally Abused:      Physically Abused:      Sexually Abused:              Family History:   There is a history of migraine in her son, brother, sister, and mother.    Family History   Problem Relation Age of Onset     Diabetes Mother      Hypertension Mother      Hyperlipidemia Mother      Depression Mother      Substance Abuse Mother      Hypertension Father      Hyperlipidemia Father      Kidney Cancer Father      Other Cancer Father      Diabetes Maternal Grandmother      Depression Sister      Breast Cancer No family hx of              Allergies:      Allergies   Allergen Reactions     Indomethacin      Other reaction(s): GI Upset     Fluoxetine      Other reaction(s): Thrush     Paroxetine      Other reaction(s): Thrush             Medications:     Current Outpatient Medications:      acetaminophen (TYLENOL) 325 MG tablet, Take 650 mg by mouth every 6 hours as needed for mild pain, Disp: , Rfl:      butalbital-acetaminophen-caffeine (ESGIC) -40 MG tablet, Take 1 tablet by mouth daily as needed for headaches, Disp: 20 tablet, Rfl: 0     desvenlafaxine (PRISTIQ) 50 MG 24 hr tablet, Take 1 tablet (50 mg) by mouth daily, Disp: 30 tablet, Rfl: 2     fluticasone (FLONASE) 50 MCG/ACT nasal spray, Spray 1 spray into both nostrils daily, Disp: 16 g, Rfl: 1     gabapentin (NEURONTIN) 400 MG  capsule, Take 3 capsules (1,200 mg) by mouth 3 times daily, Disp: 270 capsule, Rfl: 11     hydrochlorothiazide (HYDRODIURIL) 25 MG tablet, Take 1 tablet (25 mg) by mouth daily, Disp: 90 tablet, Rfl: 3     lidocaine-prilocaine (EMLA) 2.5-2.5 % external cream, Apply 1 g topically daily as needed (Patient not taking: Reported on 8/27/2021), Disp: , Rfl:      lisinopril (ZESTRIL) 10 MG tablet, Take 1 tablet (10 mg) by mouth daily, Disp: 90 tablet, Rfl: 3     naloxone (NARCAN) 4 MG/0.1ML nasal spray, Spray 1 spray (4 mg) into one nostril alternating nostrils as needed for opioid reversal every 2-3 minutes until assistance arrives, Disp: 0.2 mL, Rfl: 0     omeprazole (PRILOSEC) 40 MG DR capsule, Take 1 capsule (40 mg) by mouth daily, Disp: 30 capsule, Rfl: 0     sucralfate (CARAFATE) 1 GM tablet, Take 1 tablet (1 g) by mouth 4 times daily, Disp: 120 tablet, Rfl: 3     zolpidem (AMBIEN) 5 MG tablet, Take 1 tablet (5 mg) by mouth nightly as needed for sleep Take 1 Tablet BY MOUTH at bedtime AS NEEDED, Disp: 30 tablet, Rfl: 5          Physical Exam:   There were no vitals taken for this visit.   Physical Exam:   General: NAD  Neurologic:   Mental Status Exam: Alert, awake and oriented to situation. No dysarthria. Speech of normal fluency.   Cranial Nerves: Pupils equal, EOMs intact, no nystagmus, facial movements symmetric, hearing intact to conversation, tongue midline and fully mobile. No tongue atrophy or fasciculations.    Motor: No drift in upper extremities. Able to stand from a seated position without use of arms. No tremors or abnormal movements noted.   Coordination: With arms outstretched, able to touch nose using index finger accurately bilaterally.    Gait: Normal stance and casual gait.    Eyes: No conjunctival injection, no scleral icterus.           Data:     No recent head imaging.

## 2021-10-27 NOTE — LETTER
10/27/2021       RE: Apoorva Roberts  [address] [facility] [address]     Dear Colleague,    Thank you for referring your patient, Aporova Roberts, to the [facility] [place] [facility]. Please see a copy of my visit note below.    Apoorva is a 45 year old who is being evaluated via a billable video visit.      How would you like to obtain your AVS? MyChart  If the video visit is dropped, the invitation should be resent by: Text to cell phone: [phone]  Will anyone else be joining your video visit? No      Video Start Time: 2:01 PM  Video-Visit Details    Type of service:  Video Visit    Video End Time:2:44pm    Originating Location (pt. Location): Home    Distant Location (provider location):  [facility] NEUROLOGY [facility]     Platform used for Video Visit: Komal     Last Patient-Answered HIT-6 Questionnaire  HIT-6 10/27/2021   When you have headaches, how often is the pain severe 11   How often do headaches limit your ability to do usual daily activities including household work, work, school, or social activities? 10   When you have a headache, how often do you wish you could lie down? 10   In the past 4 weeks, how often have you felt too tired to do work or daily activities because of your headaches 10   In the past 4 weeks, how often have you felt fed up or irritated because of your headaches 10   In the past 4 weeks, how often did headaches limit your ability to concentrate on work or daily activities 10   HIT-6 Total Score 61       MIGRAINE DISABILITY ASSESSMENT (MIDAS)    On how many days in the last 3 months did you miss work or school because of your headaches?  0    How many days in the last 3 months was your productivity at work or school reduced by half or more because of your headaches? (Do not include days you counted in question 1 where you missed work or school.)  10    On how many days in the last 3 months did  you not do household work (such as housework, home repairs and maintenance, shopping, caring for children and relatives) because of your headaches?  0    How many days in the last 3 months was your productivity in household work reduced by half or more because of your headaches? (Do not include days you counted in question 3 where you did not do household work).  14    On how many days in the last 3 months did you miss family, social, or lesiure activities because of your headaches?  4    MIDAS Total Score:     On how many days in the last 3 months did you have a headache? (If a headache lasted more than 1 day, count each day.)   60    On a scale of 0 - 10, on average how painful were these headaches (where 0 = no pain at all, and 10 = pain as bad as it can be.)  7    [facility]  Virtual Neurology Consult     Apoorva Roberts MRN# [id number]   YOB: 1975 Age: 45 year old     Requesting physician: Mark Desir MD         Assessment and Recommendations:     Apoorva Roberts is a 45 year old female  who presents for further evaluation of chronic headaches.     Her described headache presentation and history meets criteria for diagnosis of:  Chronic daily headache and chronic migraine without aura, with concern for medication over use headache.  I suspect she has this on a genetic basis.    The headache description meets criteria for diagnosis of chronic migraine without aura, considering features such as unilateral pain pattern, nausea sensation, worse with activity, photophobia, blurry vision and lasting multiple hours into days.     I did not appreciate any red flag symptoms or history that would warrant further radiographic evaluation for secondary causes of headache today, although if she does not improve with new treatment suggestions, considering it has been over a decade since she has had any imaging, may consider at follow up. Reassured as her  virtual neurologic examination is intact today.     Going forward, we reviewed a symptomatic treatment strategy, focusing on optimizing a preventative treatment.  -For acute treatment of headache, recommended trial of Eletriptan 40 mg  to be used at onset of headache, with a repeat dose in 2 hours if needed, not exceed more than 9 days/month to avoid medication overuse and hope to prevent further medication overuse with daily acetaminophen and fiorcet use.  -Taper Fiorcet, and patient educated to use up to 4x/ month   -Acetaminophen could be used in addition to the eleriptan, if needed, up to 14 days a month, patient to avoid naproxen or NSAIDs considering GI side effects and history of ulcer..         Her headache frequency and severity certainly warrants prevention.    -Discussed potential preventative options, and decided to proceed with Emgality trial, with hope to reduce medication overuse. If this is not tolerated or not effective after an adequate trial, alternative options could include  botulinum toxin injections using a chronic migraine protocol, another CGRP monoclonal antibody or CGRP antagonist oral medication, verapamil, or Nurtec.    -Considering the description of her daily morning headaches, and history of snoring, patient would benefit from formal sleep study to assess for DIEGO contribution to headache burden        Plan:  Sleep referral    Trial Eletriptan 40 mg   Fioricet taper, then no more than 4 days per month  CGRP inhibitor trial with Emgality  If no improvement in 3 months, consider repeat imaging  Follow up in 3 months         The patient was discussed  Dr. Harding, who agrees with my assessment and plan     Marisol Babcock DO, SENG  PGY-2 Neurology Resident    Physician Attestation   I, Aggie Harding MD, saw this patient and agree with the findings and plan of care as documented in the note.      Aggie Harding MD  Neurology            Chief Complaint:     Chief Complaint   Patient  presents with     Neurology Video Visit NEW     Referred per Dr. Desir - [facility]           History is obtained from the patient and medical record.  Patient was seen via a virtual visit in their home due to the Covid 19 global pandemic.      Apoorva Roberts is a 45 year old female who has been living with headache since age 15, chronic migraine since age 17, daily since she had her daughter in 2004. She reports 0 headache free days since 2004.    When she first had these severe headaches she would received Demerol or morphine in the ER at least once monthly. She tried amitriptyline/nortriptyline, which weren't effective. After the birth of her son, her headaches actually improved, only to worsen after the birth of her daughter in 2004 and persist up until now.     She reports she wakes up with a low grade mild headache each morning that is a dull pounding ache, improves with acetaminophen or ibuprofen for which she alternatives between daily. She snores at night.    Her headache pains are unilateral, either side, more so on the left. The pain starts posterior and is achy, then radiates up the side of her head to her eye. The pain is described as squeezing, sharp aching. She has 30/30 headache days per month, with 3/30 severe headache days per month. They rate 9-9.5/10 and last all day.   She has associated photophobia, phonophobia, nausea, vomiting.     She denies typical aura. Sleep is helpful. Worse with routine physical activity.    Fioricet is reportedly the only helpful medication she has ever had. She takes Tylenol or ibuprofen most mornings. She tried sumatriptan (not effective), rizatriptan (not effective), DHE, Reglan previously. She took topiramate without effect after a 6 month trial.     She has taken amitriptyline  (caused vomiting), Wellbutrin, Paxil, Zoloft, Effexor, Celexa, Lexapro, Prozac, and trazodone.     She currently takes Pristiq, which is new. She tried propranolol a few times, with blood  pressure getting too low.     She currently takes lisinopril and hydrochlorothiazide and reports her blood pressure is under good conrol. She takes gabapentin for chronic nerve pain in her legs. She reports last imaging of her head was over 12 years ago. She continues to smoke 1ppd and drinks 2-3 large pops daily, but does not consume alcohol. She has notable stressors such as her daughter recently being taken from her.                Past Medical History:     Past Medical History:   Diagnosis Date     Cervical high risk HPV (human papillomavirus) test positive 08/27/2021     Depressive disorder      Hypertension               Past Surgical History:     Past Surgical History:   Procedure Laterality Date     ABDOMEN SURGERY       APPENDECTOMY       CHOLECYSTECTOMY       ESOPHAGOSCOPY, GASTROSCOPY, DUODENOSCOPY (EGD), COMBINED N/A 10/8/2021    Procedure: ESOPHAGOGASTRODUODENOSCOPY, WITH BIOPSY;  Surgeon: Slim Randle DO;  Location: [facility]     GYN SURGERY       HERNIA REPAIR       HYSTERECTOMY, PAP STILL INDICATED      Cervix still present             Social History:     Smokes 1 PPD, drinks 2-3 sodas daily.     She reports her 17-year-old daughter was taken away from her 2 years ago, which has been very stressful.     Social History     Socioeconomic History     Marital status:      Spouse name: Not on file     Number of children: Not on file     Years of education: Not on file     Highest education level: Not on file   Occupational History     Not on file   Tobacco Use     Smoking status: Current Every Day Smoker     Packs/day: 1.00     Years: 30.00     Pack years: 30.00     Types: Cigarettes     Smokeless tobacco: Never Used   Vaping Use     Vaping Use: Never used   Substance and Sexual Activity     Alcohol use: Yes     Comment: 1-2 drinks a week     Drug use: Yes     Types: Marijuana     Comment: cbd     Sexual activity: Yes     Partners: Male     Birth control/protection: None   Other Topics  Concern     Parent/sibling w/ CABG, MI or angioplasty before 65F 55M? No   Social History Narrative     Not on file     Social Determinants of Health     Financial Resource Strain:      Difficulty of Paying Living Expenses:    Food Insecurity:      Worried About Running Out of Food in the Last Year:      Ran Out of Food in the Last Year:    Transportation Needs:      Lack of Transportation (Medical):      Lack of Transportation (Non-Medical):    Physical Activity:      Days of Exercise per Week:      Minutes of Exercise per Session:    Stress:      Feeling of Stress :    Social Connections:      Frequency of Communication with Friends and Family:      Frequency of Social Gatherings with Friends and Family:      Attends Zoroastrianism Services:      Active Member of Clubs or Organizations:      Attends Club or Organization Meetings:      Marital Status:    Intimate Partner Violence:      Fear of Current or Ex-Partner:      Emotionally Abused:      Physically Abused:      Sexually Abused:              Family History:   There is a history of migraine in her son, brother, sister, and mother.    Family History   Problem Relation Age of Onset     Diabetes Mother      Hypertension Mother      Hyperlipidemia Mother      Depression Mother      Substance Abuse Mother      Hypertension Father      Hyperlipidemia Father      Kidney Cancer Father      Other Cancer Father      Diabetes Maternal Grandmother      Depression Sister      Breast Cancer No family hx of              Allergies:      Allergies   Allergen Reactions     Indomethacin      Other reaction(s): GI Upset     Fluoxetine      Other reaction(s): Thrush     Paroxetine      Other reaction(s): Thrush             Medications:     Current Outpatient Medications:      acetaminophen (TYLENOL) 325 MG tablet, Take 650 mg by mouth every 6 hours as needed for mild pain, Disp: , Rfl:      butalbital-acetaminophen-caffeine (ESGIC) -40 MG tablet, Take 1 tablet by mouth daily as  needed for headaches, Disp: 20 tablet, Rfl: 0     desvenlafaxine (PRISTIQ) 50 MG 24 hr tablet, Take 1 tablet (50 mg) by mouth daily, Disp: 30 tablet, Rfl: 2     fluticasone (FLONASE) 50 MCG/ACT nasal spray, Spray 1 spray into both nostrils daily, Disp: 16 g, Rfl: 1     gabapentin (NEURONTIN) 400 MG capsule, Take 3 capsules (1,200 mg) by mouth 3 times daily, Disp: 270 capsule, Rfl: 11     hydrochlorothiazide (HYDRODIURIL) 25 MG tablet, Take 1 tablet (25 mg) by mouth daily, Disp: 90 tablet, Rfl: 3     lidocaine-prilocaine (EMLA) 2.5-2.5 % external cream, Apply 1 g topically daily as needed (Patient not taking: Reported on 8/27/2021), Disp: , Rfl:      lisinopril (ZESTRIL) 10 MG tablet, Take 1 tablet (10 mg) by mouth daily, Disp: 90 tablet, Rfl: 3     naloxone (NARCAN) 4 MG/0.1ML nasal spray, Spray 1 spray (4 mg) into one nostril alternating nostrils as needed for opioid reversal every 2-3 minutes until assistance arrives, Disp: 0.2 mL, Rfl: 0     omeprazole (PRILOSEC) 40 MG DR capsule, Take 1 capsule (40 mg) by mouth daily, Disp: 30 capsule, Rfl: 0     sucralfate (CARAFATE) 1 GM tablet, Take 1 tablet (1 g) by mouth 4 times daily, Disp: 120 tablet, Rfl: 3     zolpidem (AMBIEN) 5 MG tablet, Take 1 tablet (5 mg) by mouth nightly as needed for sleep Take 1 Tablet BY MOUTH at bedtime AS NEEDED, Disp: 30 tablet, Rfl: 5          Physical Exam:   There were no vitals taken for this visit.   Physical Exam:   General: NAD  Neurologic:   Mental Status Exam: Alert, awake and oriented to situation. No dysarthria. Speech of normal fluency.   Cranial Nerves: Pupils equal, EOMs intact, no nystagmus, facial movements symmetric, hearing intact to conversation, tongue midline and fully mobile. No tongue atrophy or fasciculations.    Motor: No drift in upper extremities. Able to stand from a seated position without use of arms. No tremors or abnormal movements noted.   Coordination: With arms outstretched, able to touch nose using index  finger accurately bilaterally.    Gait: Normal stance and casual gait.    Eyes: No conjunctival injection, no scleral icterus.           Data:     No recent head imaging.           Again, thank you for allowing me to participate in the care of your patient.      Sincerely,    Aggie Harding MD

## 2021-10-28 ENCOUNTER — TELEPHONE (OUTPATIENT)
Dept: NEUROLOGY | Facility: CLINIC | Age: 46
End: 2021-10-28

## 2021-10-28 NOTE — TELEPHONE ENCOUNTER
PA Initiation    Medication: galcanezumab-gnlm (EMGALITY) 120 MG/ML injection  Insurance Company: [employer] [place] - Phone [phone] Fax [phone]  Pharmacy Filling the Rx: [facility] [place] - [address] [address] [address] [facility]  Filling Pharmacy Phone: [phone]  Filling Pharmacy Fax:    Start Date: 10/28/2021    Central Prior Authorization Team   Phone: [phone]

## 2021-10-28 NOTE — TELEPHONE ENCOUNTER
Prior Authorization Retail Medication Request    Medication/Dose: galcanezumab-gnlm (EMGALITY) 120 MG/ML injection  ICD code (if different than what is on RX):  G43.709  Previously Tried and Failed:  See Chart  Rationale:  See Chart    Insurance Name:  [employer] [place]  Insurance ID:[id number]  [employer] [place]      Pharmacy Information (if different than what is on RX)  Name:    Phone:

## 2021-10-29 NOTE — TELEPHONE ENCOUNTER
Prior Authorization Approval    Authorization Effective Date: 7/29/2021  Authorization Expiration Date: 4/29/2022  Medication: galcanezumab-gnlm (EMGALITY) 120 MG/ML injection  Approved Dose/Quantity: 1  Reference #:     Insurance Company: [employer] - Phone [phone] Fax [phone]  Which Pharmacy is filling the prescription (Not needed for infusion/clinic administered): [facility] [place] [address] [address]  Pharmacy Notified: Yes  Patient Notified: Yes

## 2021-11-19 DIAGNOSIS — K29.71 GASTRITIS WITH HEMORRHAGE, UNSPECIFIED CHRONICITY, UNSPECIFIED GASTRITIS TYPE: ICD-10-CM

## 2021-11-19 RX ORDER — OMEPRAZOLE 40 MG/1
40 CAPSULE, DELAYED RELEASE ORAL DAILY
Qty: 30 CAPSULE | Refills: 0 | Status: SHIPPED | OUTPATIENT
Start: 2021-11-19 | End: 2021-12-20

## 2021-11-19 NOTE — TELEPHONE ENCOUNTER
Refill request for Omeprazole, date last issued on 10-08-21 for a qty of 30 with 0 refills.    To provider to authorize additional refills on this prescription.    Slime Salvador  [facility] RN

## 2021-12-20 ENCOUNTER — E-VISIT (OUTPATIENT)
Dept: FAMILY MEDICINE | Facility: CLINIC | Age: 46
End: 2021-12-20
Payer: COMMERCIAL

## 2021-12-20 DIAGNOSIS — F51.01 PRIMARY INSOMNIA: ICD-10-CM

## 2021-12-20 DIAGNOSIS — K29.71 GASTRITIS WITH HEMORRHAGE, UNSPECIFIED CHRONICITY, UNSPECIFIED GASTRITIS TYPE: ICD-10-CM

## 2021-12-20 PROCEDURE — 99422 OL DIG E/M SVC 11-20 MIN: CPT | Performed by: INTERNAL MEDICINE

## 2021-12-20 RX ORDER — ZOLPIDEM TARTRATE 5 MG/1
5 TABLET ORAL
Qty: 30 TABLET | Refills: 2 | Status: SHIPPED | OUTPATIENT
Start: 2022-01-12 | End: 2022-03-15

## 2021-12-20 RX ORDER — ZOLPIDEM TARTRATE 5 MG/1
5 TABLET ORAL
Qty: 30 TABLET | Refills: 5 | OUTPATIENT
Start: 2021-12-20

## 2022-02-11 VITALS
SYSTOLIC BLOOD PRESSURE: 138 MMHG | WEIGHT: 239.4 LBS | TEMPERATURE: 99.5 F | WEIGHT: 235 LBS | SYSTOLIC BLOOD PRESSURE: 132 MMHG | DIASTOLIC BLOOD PRESSURE: 82 MMHG | TEMPERATURE: 99.1 F | BODY MASS INDEX: 43.24 KG/M2 | HEART RATE: 105 BPM | HEIGHT: 62 IN | HEART RATE: 96 BPM | HEIGHT: 62 IN | BODY MASS INDEX: 44.05 KG/M2 | OXYGEN SATURATION: 95 % | DIASTOLIC BLOOD PRESSURE: 78 MMHG

## 2022-02-12 VITALS
WEIGHT: 226.8 LBS | TEMPERATURE: 99.1 F | BODY MASS INDEX: 41.73 KG/M2 | TEMPERATURE: 99 F | HEART RATE: 88 BPM | DIASTOLIC BLOOD PRESSURE: 80 MMHG | SYSTOLIC BLOOD PRESSURE: 136 MMHG | HEART RATE: 88 BPM | DIASTOLIC BLOOD PRESSURE: 82 MMHG | SYSTOLIC BLOOD PRESSURE: 130 MMHG | HEIGHT: 62 IN

## 2022-02-12 VITALS
WEIGHT: 214.2 LBS | DIASTOLIC BLOOD PRESSURE: 84 MMHG | OXYGEN SATURATION: 98 % | HEIGHT: 62 IN | WEIGHT: 211 LBS | TEMPERATURE: 98.8 F | BODY MASS INDEX: 38.59 KG/M2 | WEIGHT: 219.4 LBS | HEART RATE: 92 BPM | BODY MASS INDEX: 40.37 KG/M2 | DIASTOLIC BLOOD PRESSURE: 72 MMHG | DIASTOLIC BLOOD PRESSURE: 86 MMHG | HEART RATE: 94 BPM | HEIGHT: 62 IN | HEART RATE: 82 BPM | BODY MASS INDEX: 39.42 KG/M2 | SYSTOLIC BLOOD PRESSURE: 118 MMHG | SYSTOLIC BLOOD PRESSURE: 152 MMHG | SYSTOLIC BLOOD PRESSURE: 134 MMHG | OXYGEN SATURATION: 97 % | TEMPERATURE: 98.4 F | TEMPERATURE: 98.7 F

## 2022-02-12 VITALS
BODY MASS INDEX: 39.38 KG/M2 | DIASTOLIC BLOOD PRESSURE: 80 MMHG | OXYGEN SATURATION: 95 % | OXYGEN SATURATION: 97 % | SYSTOLIC BLOOD PRESSURE: 128 MMHG | HEIGHT: 62 IN | BODY MASS INDEX: 39.16 KG/M2 | WEIGHT: 212.8 LBS | HEIGHT: 62 IN | HEART RATE: 95 BPM | DIASTOLIC BLOOD PRESSURE: 74 MMHG | SYSTOLIC BLOOD PRESSURE: 142 MMHG | HEART RATE: 89 BPM | TEMPERATURE: 99.2 F | WEIGHT: 214 LBS | TEMPERATURE: 98.9 F

## 2022-02-12 VITALS
WEIGHT: 233.4 LBS | BODY MASS INDEX: 42.95 KG/M2 | BODY MASS INDEX: 42.4 KG/M2 | DIASTOLIC BLOOD PRESSURE: 69 MMHG | HEART RATE: 91 BPM | DIASTOLIC BLOOD PRESSURE: 92 MMHG | WEIGHT: 230.4 LBS | TEMPERATURE: 98.9 F | SYSTOLIC BLOOD PRESSURE: 126 MMHG | BODY MASS INDEX: 43.98 KG/M2 | OXYGEN SATURATION: 89 % | HEART RATE: 120 BPM | HEIGHT: 62 IN | OXYGEN SATURATION: 98 % | HEIGHT: 62 IN | HEIGHT: 62 IN | DIASTOLIC BLOOD PRESSURE: 80 MMHG | HEART RATE: 80 BPM | WEIGHT: 239 LBS | TEMPERATURE: 100.3 F | SYSTOLIC BLOOD PRESSURE: 150 MMHG | TEMPERATURE: 99.1 F | SYSTOLIC BLOOD PRESSURE: 101 MMHG

## 2022-02-12 VITALS
HEIGHT: 62 IN | HEART RATE: 84 BPM | DIASTOLIC BLOOD PRESSURE: 84 MMHG | TEMPERATURE: 99.2 F | SYSTOLIC BLOOD PRESSURE: 120 MMHG | BODY MASS INDEX: 42.8 KG/M2 | WEIGHT: 232.6 LBS

## 2022-02-12 VITALS
WEIGHT: 220 LBS | BODY MASS INDEX: 40.48 KG/M2 | DIASTOLIC BLOOD PRESSURE: 98 MMHG | TEMPERATURE: 98.8 F | HEART RATE: 84 BPM | HEIGHT: 62 IN | SYSTOLIC BLOOD PRESSURE: 146 MMHG

## 2022-02-16 NOTE — TELEPHONE ENCOUNTER
Entered by Obdulio Nava CMA on May 14, 2019 2:49:45 PM CDT  ---------------------  From: Obdulio Nava CMA   To: [facility]    Sent: 5/14/2019 2:49:45 PM CDT  Subject: Medication Management     ** Not Approved: Patient has requested refill too soon, Pt given #60 and 1 refill 4-4-19 **  acetaminophen/butalbital/caffeine (BUTALBITAL-APAP-CAFF -40 TABS)  TAKE ONE TABLET TWO TIMES A DAY AS NEEDED FOR HEADACHE  Qty:  60 tab(s)        Days Supply:  30        Refills:  1          Substitutions Allowed     Route To Pharmacy - [facility]   Signed by Obdulio Nava CMA            ------------------------------------------  From: [facility]  To: Hravey Nath MD  Sent: May 13, 2019 9:46:44 AM CDT  Subject: Medication Management  Due: May 14, 2019 9:46:44 AM CDT    ** On Hold Pending Signature **  Drug: acetaminophen/butalbital/caffeine (acetaminophen/butalbital/caffeine 325 mg-50 mg-40 mg oral tablet)  TAKE ONE TABLET TWO TIMES A DAY AS NEEDED FOR HEADACHE  Quantity: 60 tab(s)     Days Supply: 30        Refills: 1  Substitutions Allowed  Notes from Pharmacy:     Dispensed Drug: acetaminophen/butalbital/caffeine (acetaminophen/butalbital/caffeine 325 mg-50 mg-40 mg oral tablet)  TAKE ONE TABLET TWO TIMES A DAY AS NEEDED FOR HEADACHE  Quantity: 60 tab(s)     Days Supply: 30        Refills: 1  Substitutions Allowed  Notes from Pharmacy:   ------------------------------------------

## 2022-02-16 NOTE — PROGRESS NOTES
Chief Complaint  c/o left elbow injury Saturday while walking on grass--slipped on grass. hurts to bend/straighten elbow.  History of Present Illness  Here with a FOOSH injury to the left upper extremity. ?She complains of pain in the left elbow. ?She has limited range of motion due to pain. ?She also notes quite a bit of stiffness in the morning. ?Denies other injury.  Review of Systems  Negative except for above  Problem List/Past Medical History  Ongoing  Depression, major, recurrent, moderate  GERD (gastroesophageal reflux disease)  Hypertension, essential  Insomnia  Migraine  Neuropathic pain  Obesity  Tobacco user  Resolved  Pregnancy  Procedure/Surgical History   section (),   section (),  FS - Flexible sigmoidoscopy (1999),  Esophagogastroduodenoscopy (1999),  Cholecystectomy (1997),  Ovarian cystectomy (1996),  Appendectomy (),  Hernia repair,  Hysterectomy.  Home Medications  acetaminophen/butalbital/caffeine 325 mg-50 mg-40 mg oral tablet, 3 refills  amLODIPine 10 mg oral tablet, 10 mg, 1 tab(s), po, daily, 3 refills  gabapentin 300 mg oral capsule, 1200 mg, 4 cap(s), po, tid, 3 refills  hydrochlorothiazide-metoprolol 25 mg-50 mg oral tablet, 1 tab(s), po, daily, 3 refills  Multiple Vitamins oral tablet, 1 tab(s), po, daily  pregabalin 50 mg oral capsule, 50 mg, 1 cap(s), po, bid, 2 refills  zolpidem 10 mg oral tablet, 10 mg, 1 tab(s), po, hs, 4 refills  Allergies  No Known Medication Allergies  Social History  Home and Environment  Spouse/Partner name: Peewee Roberts.  Nutrition and Health  Type of diet: Regular.  Other  Last Eye Exam: 2012 and Last Dental Exam: 2012. Children: bharath and Marianne  Tobacco  Current, Cigarettes, 20 per day.  Family History  Diabetes mellitus: Grandmother (M).  MI - Myocardial infarction: Grandfather (P).  WPW (Mary-Parkinson-White syndrome)...: Brother.  Immunizations  Physical Exam  Vitals &  Measurements  T:?99.1?(Tympanic)?  HR:?96?(Peripheral)?  BP:?132/78?  HT:?62?in?  WT:?239.4?lb?  BMI:?43.78?  Alert, oriented, no acute distress  No significant deformity of the left elbow. ?She has limited range of motion due to pain. ?Tenderness over the radial head.  Diagnostic Results  Possible joint effusion on left elbow x-ray  No obvious fracture  Assessment/Plan  Pain of left elbow joint  Sling for comfort  Ibuprofen as needed for pain  Recheck in 2 weeks  Discussed range of motion exercises

## 2022-02-16 NOTE — TELEPHONE ENCOUNTER
---------------------  From: Ronit Perez CMA (eRx Pool ([id number]))   To: [employer] Message Pool ([id number] [facility] [place]);     Sent: 5/16/2019 10:28:01 AM CDT  Subject: FW: Medication Management   Due Date/Time: 5/17/2019 8:38:00 AM CDT     LR on 4/4/19 for # 60 w/ 1 refill. Last visit for med check on 4/4/19 and not due for f/u until Oct.     Please advise      ------------------------------------------  From: [facility]  To: Harvey Nath MD  Sent: May 16, 2019 8:38:15 AM CDT  Subject: Medication Management  Due: May 17, 2019 8:38:15 AM CDT    ** On Hold Pending Signature **  Drug: acetaminophen/butalbital/caffeine (acetaminophen/butalbital/caffeine 325 mg-50 mg-40 mg oral tablet)  TAKE ONE TABLET TWO TIMES A DAY AS NEEDED FOR HEADACHE  Quantity: 60 tab(s)     Days Supply: 30        Refills: 1  Substitutions Allowed  Notes from Pharmacy:     Dispensed Drug: acetaminophen/butalbital/caffeine (acetaminophen/butalbital/caffeine 325 mg-50 mg-40 mg oral tablet)  TAKE ONE TABLET TWO TIMES A DAY AS NEEDED FOR HEADACHE  Quantity: 60 tab(s)     Days Supply: 30        Refills: 1  Substitutions Allowed  Notes from Pharmacy:   ---------------------------------------------------------------  From: Eugenia Roman LPN ([employer] Message Pool ([id number]))   To: Harvey Nath MD;     Sent: 5/16/2019 2:21:50 PM CDT  Subject: FW: Medication Management   Due Date/Time: 5/17/2019 8:38:00 AM CDT---------------------  From: Harvey Nath MD   To: [facility]    Sent: 5/17/2019 7:46:03 AM CDT  Subject: FW: Medication Management     ** Submitted: **  Complete:acetaminophen/butalbital/caffeine (acetaminophen/butalbital/caffeine 325 mg-50 mg-40 mg oral tablet)   Signed by Harvey Nath MD  5/17/2019 7:46:00 AM    ** Approved with modifications: **  acetaminophen/butalbital/caffeine (BUTALBITAL-APAP-CAFF -40 TABS)  TAKE ONE TABLET TWO TIMES A DAY AS NEEDED FOR HEADACHE  Qty:  60 tab(s)         Days Supply:  30        Refills:  0          Substitutions Allowed     Route To Pharmacy - [facility]

## 2022-02-16 NOTE — PROGRESS NOTES
"Chief Complaint    Medication check. Warts on bottom of left foot.  History of Present Illness      Patient here for medication check and refills. Stable on medications with no side effects.      Neuropathy, HTN, depression are stable.  she has been compliant with her medications and care plan      Wakes up 4-5 morning with a headache. Does take Fioricet to help with migraines.      Wart on left under fourth toe has been there for 5 or 6 years. one about a week ago noticed a new on on inside of left foot. Feels like walking on rocks. Has not done any treatments at home to remove warts.      Some days has to take two Pantoprazole to help with reflux.  Review of Systems      \"See HPI.  All other review of systems negative.\"  Physical Exam   Vitals & Measurements    T: 98.8   F (Tympanic)  HR: 84(Peripheral)  BP: 146/98     HT: 62 in  WT: 220 lb  BMI: 40.23          General:  Alert and oriented, No acute distress.             Eye:  Pupils are equal, round and reactive to light, Normal conjunctiva.             HENT:  Oral mucosa is moist.             Neck:  Supple.             Respiratory: Lungs are clear to auscultation, Respirations are non-labored, Breath sounds are equal, No chest wall tenderness.           Cardiovascular: Normal rate, Regular rhythm, No murmur, No gallop, Normal peripheral perfusion, No edema.           Gastrointestinal:  Non-distended.             Musculoskeletal:  Normal gait.             Integumentary:  Warm, No rash.  Two small warts on the plantar surface of left foot           Psychiatric:  Cooperative, Appropriate mood & affect, Normal judgment.         Assessment/Plan       1. GERD (gastroesophageal reflux disease) (K21.9)         Continue to take Pantoprazole 40mg and can take OTC Zantac as needed.       2. Warts (B07.0)         2 warts on bottom of foot cleansed with alcohol, trimmed and treated with 3 freeze/thaw cycles of liquid nitrogen. Follow up in 2 weeks if wart not gone.       " Depression, major, recurrent, moderate (F33.1)         Stable, continue on current medication.       Hypertension, essential (I10)         Stable, continue on current medication. Monitor Blood pressures. Follow up in 6 months.       Migraine (G43.109)         Stable, continue on current medication. Start Migraine log, how often having headaches and how bad.       Neuropathic pain (M79.2)        Decrease Lyrica to once daily. Follow up if Pain increases. Continue on current dose of Gabapentin.      IEugenia LPN, acted solely as a scribe for, and in the presence of Dr. Harvey Nath who performed the services.  Patient Information     Name:ADRIÁN VILLEGAS      Address:      [address] [place] [id number]     Sex:Female     YOB: 1975     [phone]     Emergency Contact:ROCK SMITH     MRN:[id number]     FIN:[id number]     Location:[facility]     Date of Service:2019      Primary Care Physician:       Harvey Nath MD, [phone]      Attending Physician:       Harvey Nath MD, [phone]  Problem List/Past Medical History    Ongoing     Depression, major, recurrent, moderate     GERD (gastroesophageal reflux disease)     History of sepsis     Hypertension, essential     Insomnia     Migraine     Neuropathic pain     Obesity     Tobacco user    Historical     Inpatient stay       Comments: @[place], [place] - Community acquired pneumonia     Pregnancy  Procedure/Surgical History      section ()            section ()           FS - Flexible sigmoidoscopy (1999)           Esophagogastroduodenoscopy (1999)           Cholecystectomy (1997)           Ovarian cystectomy (1996)            Comments: Diagnostic laparoscopy.     Appendectomy ()           Hernia repair           Hysterectomy        Medications     Multiple Vitamins oral tablet: 1 tab(s), po,  daily, 0 Refill(s).     nicotine 21 mg-14 mg-7 mg transdermal film, extended release: 1 EA, td, daily, 1 kit(s), 0 Refill(s).     albuterol 90 mcg/inh inhalation aerosol: 2 puff(s), INH, QID, use with spacer chamber, PRN: for wheezing, 1 EA, 2 Refill(s).     zolpidem 10 mg oral tablet: 1 tab(s), Oral, qhs, 30 tab(s), 4 Refill(s).     Lyrica 150 mg oral capsule: 1 cap(s), Oral, bid, 60 cap(s), 3 Refill(s).     venlafaxine 75 mg oral tablet, extended release: 150 mg, 2 tab(s), po, daily, 60 tab(s), 5 Refill(s).     hydroCHLOROthiazide 25 mg oral tablet: 25 mg, 1 tab(s), po, daily, 30 tab(s), 5 Refill(s).     amLODIPine 10 mg oral tablet: 10 mg, 1 tab(s), PO, Daily, 30 tab(s), 5 Refill(s).     gabapentin 300 mg oral capsule: 4 cap(s), Oral, tid, 360 cap(s), 5 Refill(s).     pantoprazole 40 mg oral delayed release tablet: 1 tab(s), Oral, daily, 30 tab(s), 5 Refill(s).     acetaminophen/butalbital/caffeine 325 mg-50 mg-40 mg oral tablet: 1 tab(s), Oral, bid, PRN: AS NEEDED FOR HEADACHE, 60 tab(s), 1 Refill(s).     metoprolol succinate 50 mg oral tablet, extended release: 50 mg, 1 tab(s), po, daily, 30 tab(s), 5 Refill(s).          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 Children: Justa, 06/24/2015     Sexual      Sexually active: Yes. Sexual orientation: Heterosexual. Contraceptive Use Details: None., 05/22/2015     Substance Abuse      Never, 05/22/2015     Tobacco      Current, Cigarettes, 20 per day., 05/22/2015  Family History    Diabetes mellitus: Grandmother  (M).    MI - Myocardial infarction: Grandfather (P).    WPW (Mary-Parkinson-White syndrome)...: Brother.    Father: History is negative    Mother: History is negative    Son: History is negative    Daughter: History is negative  Immunizations      Vaccine Date Status Comments      influenza virus vaccine, inactivated 02/09/2017 Recorded      influenza virus vaccine, inactivated 10/15/2014 Recorded W[facility]      tetanus/diphth/pertuss (Tdap) adult/adol 11/12/2012 Recorded WIR      IPV 05/23/2003 Recorded W[facility]      Hep B 09/05/1996 Recorded

## 2022-02-16 NOTE — PROGRESS NOTES
Chief Complaint    c/o panic attacks, not eating/sleeping, emesis w/ blood since Friday.  History of Present Illness      Here with increased anxiety, panic attacks, nausea, vomiting, hematemesis      Symptoms began on Friday and hematemesis started on Sunday       left her on Friday  Review of Systems          ROS reviewed an negative except for symptoms noted in HPI.            Physical Exam   Vitals & Measurements    T: 99.2   F (Tympanic)  HR: 95(Peripheral)  BP: 142/80  SpO2: 97%     HT: 62 in  WT: 214 lb  BMI: 39.14           General:  Alert and oriented, mild distress           HENT:  Normocephalic, Oral mucosa is moist.                 Throat: Tonsils ( Within normal limits ), Pharynx ( Not edematous, Erythematous, No exudate ).            Neck:  anterior cervical adenopathy.            Respiratory:  Lungs are clear to auscultation, Respirations are non-labored.            Cardiovascular:  Normal rate, Regular rhythm.              Gastrointestinal:  epigastric tenderness          Integumentary:  Warm, Dry, No rash.  Assessment/Plan       1. Depression, major, recurrent, moderate (F33.1)         advised counseling        increase venlafaxine, recheck in 10 days       2. Acute anxiety (F41.9)         treat with limited course of lorazepam       3. Hematemesis (K92.0)         suspect trauma to esophagus from retching       Orders:         LORazepam, = 1 tab(s) ( 1 mg ), Oral, tid, PRN: as needed for anxiety, # 30 tab(s), 1 Refill(s), Type: Maintenance, Pharmacy: [facility], 1 tab(s) Oral tid,PRN:as needed for anxiety, (Ordered)         venlafaxine, = 2 tab(s) ( 150 mg ), po, daily, # 60 tab(s), 5 Refill(s), Type: Maintenance, Pharmacy: [facility], 2 tab(s) Oral daily,x30 day(s), (Ordered)  Patient Information     Name:ADRIÁN VILLEGAS      Address:      [address] [place] [id number]     Sex:Female     YOB: 1975     [phone]     Emergency Contact:LUIS  ROCK RODRIGUEZ     MRN:[id number]     FIN:[id number]     Location:[facility]     Date of Service:2018      Primary Care Physician:       Harvey Nath MD, [phone]      Attending Physician:       Harvey Nath MD, [phone]  Problem List/Past Medical History    Ongoing     Depression, major, recurrent, moderate     GERD (gastroesophageal reflux disease)     History of sepsis     Hypertension, essential     Insomnia     Migraine     Neuropathic pain     Obesity     Tobacco user    Historical     Inpatient stay       Comments: @[facility], [place] - Community acquired pneumonia     Pregnancy  Procedure/Surgical History      section ()            section ()           FS - Flexible sigmoidoscopy (1999)           Esophagogastroduodenoscopy (1999)           Cholecystectomy (1997)           Ovarian cystectomy (1996)            Comments:      Diagnostic laparoscopy     Appendectomy ()           Hernia repair           Hysterectomy        Medications     Multiple Vitamins oral tablet: 1 tab(s), po, daily, 0 Refill(s).     nicotine 21 mg-14 mg-7 mg transdermal film, extended release: 1 EA, td, daily, 1 kit(s), 0 Refill(s).     amLODIPine 10 mg oral tablet: 10 mg, 1 tab(s), PO, Daily, 90 tab(s), 3 Refill(s).     pantoprazole 40 mg oral delayed release tablet: See Instructions, TAKE ONE TABLET BY MOUTH EVERY DAY, 30 tab(s), 5 Refill(s).     hydroCHLOROthiazide 25 mg oral tablet: 25 mg, 1 tab(s), po, daily, 30 tab(s), 5 Refill(s).     metoprolol succinate 50 mg oral tablet, extended release: 50 mg, 1 tab(s), po, daily, 30 tab(s), 5 Refill(s).     Lyrica 150 mg oral capsule: See Instructions, TAKE ONE CAPSULE BY MOUTH TWICE A DAY, 60 cap(s), 3 Refill(s).     acetaminophen/butalbital/caffeine 325 mg-50 mg-40 mg oral tablet: See Instructions, TAKE ONE TABLET BY MOUTH TWICE A DAY AS NEEDED FOR HEADACHE, 30 tab(s), 4 Refill(s).     zolpidem  10 mg oral tablet: See Instructions, TAKE ONE TABLET BY MOUTH AT BEDTIME, 30 tab(s), 4 Refill(s).     venlafaxine 75 mg oral tablet, extended release: 150 mg, 2 tab(s), po, daily, for 30 day(s), 60 tab(s), 5 Refill(s).     LORazepam 1 mg oral tablet: 1 mg, 1 tab(s), Oral, tid, PRN: as needed for anxiety, 30 tab(s), 1 Refill(s).          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 Children: Justa, 06/24/2015     Sexual      Sexually active: Yes. Sexual orientation: Heterosexual. Contraceptive Use Details: None., 05/22/2015     Substance Abuse      Never, 05/22/2015     Tobacco      Current, Cigarettes, 20 per day., 05/22/2015  Family History    Diabetes mellitus: Grandmother (M).    MI - Myocardial infarction: Grandfather (P).    WPW (Mayr-Parkinson-White syndrome)...: Brother.    Father: History is negative    Mother: History is negative    Son: History is negative    Daughter: History is negative  Immunizations      Vaccine Date Status Comments      influenza virus vaccine, inactivated 02/09/2017 Recorded      influenza virus vaccine, inactivated 10/15/2014 Recorded [facility]      tetanus/diphth/pertuss (Tdap) adult/adol 11/12/2012 Recorded WIR      IPV 05/23/2003 Recorded [facility]      Hep B 09/05/1996 Recorded

## 2022-02-16 NOTE — TELEPHONE ENCOUNTER
Entered by Brittany Melendrez CMA on February 13, 2019 6:20:10 PM CST  ---------------------  From: Brittany Melendrez CMA   To: [facility]    Sent: 2/13/2019 6:20:10 PM CST  Subject: Medication Management     ** Not Approved: Patient has requested refill too soon **  acetaminophen/butalbital/caffeine (BUTALBITAL-APAP-CAFF -40 TABS)  TAKE ONE TABLET BY MOUTH TWICE A DAY AS NEEDED FOR HEADACHE  Qty:  30 tab(s)        Days Supply:  15        Refills:  1          RENARD     Route To Pharmacy - [facility]   Signed by Brittany Melendrez CMA            ------------------------------------------  From: [facility]  To: Harvey Nath MD  Sent: February 13, 2019 1:48:14 PM CST  Subject: Medication Management  Due: February 14, 2019 1:48:14 PM CST    ** On Hold Pending Signature **  Drug: acetaminophen/butalbital/caffeine (acetaminophen/butalbital/caffeine 325 mg-50 mg-40 mg oral tablet)  TAKE ONE TABLET BY MOUTH TWICE A DAY AS NEEDED FOR HEADACHE  Quantity: 30 tab(s)     Days Supply: 15        Refills: 1  Substitutions Allowed  Notes from Pharmacy:     Dispensed Drug: acetaminophen/butalbital/caffeine (acetaminophen/butalbital/caffeine 325 mg-50 mg-40 mg oral tablet)  TAKE ONE TABLET BY MOUTH TWICE A DAY AS NEEDED FOR HEADACHE  Quantity: 30 tab(s)     Days Supply: 15        Refills: 1  Substitutions Allowed  Notes from Pharmacy:   ------------------------------------------

## 2022-02-16 NOTE — TELEPHONE ENCOUNTER
---------------------  From: Madison Jimenez   To: Portillo PANG, Harvey;     Sent: 8/12/2019 3:26:13 PM CDT  Subject: Scheduling Management     Patient no showed appointment. Appointment was for a follow up on medications.

## 2022-02-16 NOTE — PROGRESS NOTES
Chief Complaint    f/u low back pain--was originally seen in [place] 2 days ago, dx w/ torn muscle. was seen in [place] ER last evening and dx w/ kidney infection. was put on abx and prescription strenght Naproxen. is requesting stronger pain med.  History of Present Illness      Apoorva is here in follow up from ED visit in [place].  She was diagnosed with acute pyelonephritis and treated with ciprofloxacin and acetaminophen-hydrocodone for pain.  WBC and UA were reportedly abnormal.  Has N&V, headache and left flank pain.  Review of Systems          ROS reviewed an negative except for symptoms noted in HPI.            Physical Exam   Vitals & Measurements    T: 99.1   F (Tympanic)  HR: 88(Peripheral)  BP: 136/82                General:  Alert and oriented, No acute distress, appears uncomfortable          Eye:  Pupils are equal, round and reactive to light, Extraocular movements are intact, Normal conjunctiva.            HENT:  Normocephalic          Respiratory:  Lungs are clear to auscultation          Cardiovascular:  Normal rate, Regular rhythm, No murmur, No gallop            Genitourinary:  left costovertebral angle tenderness.            Integumentary:  Warm, Dry, No rash.            Psychiatric:  Cooperative, Appropriate mood & affect.    Assessment/Plan       Acute pyelonephritis (N10)         continue with ciprofloxacin, pain management, ondansetron for nausea, follow up if not improving in the next 48 hours or if condition worsens        ketorolac today       Orders:         acetaminophen-oxycodone, 1 - 2 tabs, PO, q4-6hr, # 30 tab(s), 0 Refill(s), Type: Maintenance, Pharmacy: [facility], 1 - 2 tabs po q4-6 hrs, (Ordered)         ondansetron, 1 tab(s) ( 4 mg ), PO, q8 hrs, # 10 tab(s), 0 Refill(s), Type: Maintenance, Pharmacy: [facility], 1 tab(s) po q8 hrs, (Ordered)         pregabalin, 1 cap(s) ( 150 mg ), po, bid, # 60 cap(s), 3 Refill(s), Type: Maintenance, Pharmacy: [facility], 1 cap(s) po bid, (Ordered)  Patient Information     Name:ADRIÁN VILLEGAS      Address:      [address] [place] [id number]     Sex:Female     YOB: 1975     [phone]     Emergency Contact:WILTON VILLEGAS     MRN:[id number]     FIN:[id number]     Location:[facility]     Date of Service:2018      Primary Care Physician:       Harvey Nath MD, [phone]      Attending Physician:       Harvey Nath MD, [phone]  Problem List/Past Medical History    Ongoing     Depression, major, recurrent, moderate     GERD (gastroesophageal reflux disease)     History of sepsis     Hypertension, essential     Insomnia     Migraine     Neuropathic pain     Obesity     Tobacco user    Historical     Inpatient stay       Comments: @[place], [place] - Community acquired pneumonia     Pregnancy  Procedure/Surgical History      section ()            section ()           FS - Flexible sigmoidoscopy (1999)           Esophagogastroduodenoscopy (1999)           Cholecystectomy (1997)           Ovarian cystectomy (1996)             Comments: Diagnostic laparoscopy     Appendectomy ()           Hernia repair           Hysterectomy        Medications     Multiple Vitamins oral tablet: 1 tab(s), po, daily, 0 Refill(s).     venlafaxine 37.5 mg oral capsule, extended release: 75 mg, 2 cap(s), po, daily, for 30 day(s), 60 cap(s), 1 Refill(s).     nicotine 21 mg-14 mg-7 mg transdermal film, extended release: 1 EA, td, daily, 1 kit(s), 0 Refill(s).     hydrochlorothiazide-metoprolol 25 mg-50 mg oral tablet: 1 tab(s), po, daily, 90 tab(s), 3 Refill(s).     amLODIPine 10 mg oral tablet: 10 mg, 1 tab(s), PO, Daily, 90 tab(s), 3 Refill(s).     zolpidem 10 mg oral tablet: See Instructions, TAKE ONE TABLET BY MOUTH AT BEDTIME, 30 tab(s), 4 Refill(s).     acetaminophen/butalbital/caffeine 325 mg-50 mg-40 mg oral  tablet: See Instructions, TAKE ONE TABLET BY MOUTH EVERY 4 HOURS, 60 tab(s), 2 Refill(s).     pantoprazole 40 mg oral delayed release tablet: See Instructions, TAKE ONE TABLET BY MOUTH EVERY DAY, 30 tab(s), 5 Refill(s).     Lyrica 150 mg oral capsule: 150 mg, 1 cap(s), po, bid, 60 cap(s), 3 Refill(s).     ondansetron 4 mg oral tablet, disintegratin mg, 1 tab(s), PO, q8 hrs, 10 tab(s), 0 Refill(s).     Percocet 5/325 oral tablet: 1 - 2 tabs, PO, q4-6hr, 30 tab(s), 0 Refill(s).          Allergies    FLUoxetine    PARoxetine (thrush)  Social History    Smoking Status - 2018     Current every day smoker     Alcohol      Never, 2015     Employment and Education      Employed, Work/School description: CNA., 2015     Exercise and Physical Activity     Home and Environment      Spouse/Partner name: Peewee Roberts., 2015     Nutrition and Health      Type of diet: Regular., 2015     Other      Last Eye Exam: 2012 and Last Dental Exam: 2012. Children: Justa, 2015     Sexual      Sexually active: Yes. Sexual orientation: Heterosexual. Contraceptive Use Details: None., 2015     Substance Abuse      Never, 2015     Tobacco      Current, Cigarettes, 20 per day., 2015  Family History    Diabetes mellitus: Grandmother (M).    MI - Myocardial infarction: Grandfather (P).    WPW (Mary-Parkinson-White syndrome)...: Brother.    Father: History is negative    Mother: History is negative    Son: History is negative    Daughter: History is negative  Immunizations      Vaccine Date Status Comments      influenza virus vaccine, inactivated 2017 Recorded      influenza virus vaccine, inactivated 10/15/2014 Recorded [facility]      tetanus/diphth/pertuss (Tdap) adult/adol 2012 Recorded [facility]IR      IPV 2003 Recorded WIR      Hep B 1996 Recorded

## 2022-02-16 NOTE — TELEPHONE ENCOUNTER
---------------------  From: Madison Jimenez   To: Portillo PANG, Harvey;     Sent: 3/28/2019 5:41:34 PM CDT  Subject: Scheduling Management     Patient no showed 415pm appointment. Appointment was for a med check.

## 2022-02-16 NOTE — TELEPHONE ENCOUNTER
---------------------  From: Berenice Phillips LPN (eRx Pool ([id number]))   To: Harvey Nath MD;     Sent: 3/13/2019 1:09:36 PM CDT  Subject: FW: Medication Management   Due Date/Time: 3/13/2019 12:45:00 PM CDT     Medication Refill needing approval    PCP:   NOLAN    Medication:   acetaminophen/butalbital/caffeine 1 tab PO BID PRN headache  Last Filled:  2/25/19    Quantity:  30  Refills:  0    Medication:   lyrica 150mg 1 cap PO BID  Last Filled:  12/4/18    Quantity:  60  Refills:  4  CSA on file?   n/a     Date of last office visit and reason:   10/10/18 bronchitis   Date of last labs pertaining to condition:  n/a    Note:  Please advise on refills    Return to Clinic order placed?  Pt was due in October for HTN and labs    Resource:   pharmacy          ------------------------------------------  From: [facility]  To: Harvey Nath MD  Sent: March 12, 2019 12:45:26 PM CDT  Subject: Medication Management  Due: March 13, 2019 12:45:26 PM CDT    ** On Hold Pending Signature **  Drug: pregabalin (Lyrica 150 mg oral capsule)  TAKE ONE CAPSULE BY MOUTH TWICE A DAY  Quantity: 60 cap(s)     Days Supply: 30        Refills: 3  Substitutions Allowed  Notes from Pharmacy:     Dispensed Drug: pregabalin (Lyrica 150 mg oral capsule)  TAKE ONE CAPSULE BY MOUTH TWICE A DAY  Quantity: 60 cap(s)     Days Supply: 30        Refills: 3  Substitutions Allowed  Notes from Pharmacy:     ** On Hold Pending Signature **  Drug: acetaminophen/butalbital/caffeine (acetaminophen/butalbital/caffeine 325 mg-50 mg-40 mg oral tablet)  TAKE ONE TABLET BY MOUTH TWICE A DAY AS NEEDED FOR HEADACHE  Quantity: 30 tab(s)     Days Supply: 15        Refills: 0  Substitutions Allowed  Notes from Pharmacy:     Dispensed Drug: acetaminophen/butalbital/caffeine (acetaminophen/butalbital/caffeine 325 mg-50 mg-40 mg oral tablet)  TAKE ONE TABLET BY MOUTH TWICE A DAY AS NEEDED FOR HEADACHE  Quantity: 30 tab(s)     Days Supply: 15         Refills: 0  Substitutions Allowed  Notes from Pharmacy:   ------------------------------------------  ** Submitted: **  Complete:acetaminophen/butalbital/caffeine (acetaminophen/butalbital/caffeine 325 mg-50 mg-40 mg oral tablet)   Signed by Harvey Nath MD  3/13/2019 5:13:00 PM    ** Approved **  acetaminophen/butalbital/caffeine (BUTALBITAL-APAP-CAFF -40 TABS)  TAKE ONE TABLET BY MOUTH TWICE A DAY AS NEEDED FOR HEADACHE  Qty:  30 tab(s)        Days Supply:  15        Refills:  0          Substitutions Allowed     Route To [facility]---------------------  From: Harvey Nath MD   To: [facility]    Sent: 3/13/2019 5:13:49 PM CDT  Subject: FW: Medication Management     ** Submitted: **  Complete:pregabalin (Lyrica 150 mg oral capsule)   Signed by Harvey Nath MD  3/13/2019 5:13:00 PM    ** Approved **  pregabalin (LYRICA 150MG CAPS)  TAKE ONE CAPSULE BY MOUTH TWICE A DAY  Qty:  60 cap(s)        Days Supply:  30        Refills:  3          Substitutions Allowed     Route To [facility]

## 2022-02-16 NOTE — PROGRESS NOTES
Chief Complaint    here for med check and BP f/u. doing better after having pneumonia.  History of Present Illness      Patient is here for follow-up on her medication.  She has chronic neuropathic pain, hypertension, tobacco abuse, GERD, and recently was hospitalized for pneumonia.  She feels she is about back to normal with regards to the pneumonia.  Still has occasional shortness of breath.  She has no problems with her current medications.  She is compliant with her medications.  Review of Systems      See HPI.  All other review of systems negative.  Physical Exam   Vitals & Measurements    T: 99.1(Tympanic)  HR: 80(Peripheral)  BP: 126/80     HT: 62 in  WT: 233.4 lb  BMI: 42.68       Alert, oriented, no acute distress      Ear canals patent, TMs clear      Throat is clear      Neck is supple without adenopathy      Lungs are clear with decreased breath sounds throughout      Heart has regular rate and rhythm      Neurologic exam is nonfocal  Assessment/Plan       Depression, major, recurrent, moderate        She had been on paroxetine which was not working well for her.  We will switch to fluoxetine and follow-up in a month       GERD (gastroesophageal reflux disease)         Continue with current medication       Hypertension, essential         Blood pressures under good control continue with her current medication       Neuropathic pain        Continue with current medication       Orders:         acetaminophen/butalbital/caffeine, 1 tab(s), po, q4 hrs, # 60 tab(s), 3 Refill(s), Type: Soft Stop, Pharmacy: [facility], 1 tab(s) po q4 hrs         FLUoxetine, 1 tab(s) ( 20 mg ), PO, Daily, # 30 tab(s), 5 Refill(s), Type: Maintenance, Pharmacy: [facility], 1 tab(s) po daily         gabapentin, 4 cap(s) ( 1,200 mg ), po, tid, # 240 cap(s), 5 Refill(s), Type: Maintenance, Pharmacy: [facility], 4 cap(s) po tid  Patient Information     Name:ADRIÁN VILLEGAS      Address:      [address] [place] [address]     Sex:Female     YOB: 1975     [phone]     Emergency Contact:WILTON VILLEGAS     MRN:[id number]     FIN:[id number]     Location:[facility]     Date of Service:2018      Primary Care Physician:       Harvey Nath MD, [phone]  Problem List/Past Medical History    Ongoing     Depression, major, recurrent, moderate     GERD (gastroesophageal reflux disease)     History of sepsis     Hypertension, essential     Insomnia     Migraine     Neuropathic pain     Obesity     Tobacco user    Historical     Inpatient stay      Comments: @[facility], [place] - Community acquired pneumonia     Pregnancy  Procedure/Surgical History      section ()      section ()     FS - Flexible sigmoidoscopy (1999)     Esophagogastroduodenoscopy (1999)     Cholecystectomy (1997)     Ovarian cystectomy (1996)     Appendectomy ()     Hernia repair     Hysterectomy  Medications        FLUoxetine 20 mg oral tablet: 20 mg, 1 tab(s), PO, Daily, 30 tab(s), 5 Refill(s).        acetaminophen/butalbital/caffeine 325 mg-50 mg-40 mg oral tablet: 1 tab(s), po, q4 hrs, 60 tab(s), 3 Refill(s).        amLODIPine 10 mg oral tablet: 10 mg, 1 tab(s), PO, Daily, 90 tab(s), 3 Refill(s).        gabapentin 300 mg oral capsule: 1,200 mg, 4 cap(s), po, tid, 240 cap(s), 5 Refill(s).        hydrochlorothiazide-metoprolol 25 mg-50 mg oral tablet: 1 tab(s), po, daily, 90 tab(s), 3 Refill(s).        Multiple Vitamins oral tablet: 1 tab(s), po, daily, 0 Refill(s).        pantoprazole 40 mg oral delayed release tablet: 40 mg, 1 tab(s), PO, Daily, for 30 day(s), 30 tab(s), 5 Refill(s).        Lyrica 50 mg oral capsule: See Instructions, TAKE ONE CAPSULE BY MOUTH TWICE A DAY, 60 unknown unit, 5 Refill(s).        zolpidem 10 mg oral tablet: See Instructions, TAKE ONE TABLET BY MOUTH AT BEDTIME, 30 tab(s), 4 Refill(s).                 Allergies    No Known Medication Allergies  Social History    Smoking Status - 01/08/2018     Current every day smoker     Alcohol - 06/24/2015      Never     Employment and Education - 06/24/2015      Employed, Work/School description: CNA.     Exercise and Physical Activity - 06/24/2015     Home and Environment - 06/24/2015      Spouse/Partner name: Peewee Roberts.     Nutrition and Health - 06/24/2015      Type of diet: Regular.     Other - 06/24/2015      Last Eye Exam: 07/2012 and Last Dental Exam: 05/2012. Children: bharath and Marianne     Sexual - 06/24/2015      Sexually active: Yes. Sexual orientation: Heterosexual. Contraceptive Use Details: None.     Substance Abuse - 06/24/2015      Never     Tobacco - 06/24/2015      Current, Cigarettes, 20 per day.  Family History    Diabetes mellitus: Grandmother (M).    MI - Myocardial infarction: Grandfather (P).    WPW (Mary-Parkinson-White syndrome)...: Brother.  Immunizations      Vaccine Date Status Comments      influenza virus vaccine, inactivated 02/09/2017 Recorded      influenza virus vaccine, inactivated 10/15/2014 Recorded [facility]      tetanus/diphth/pertuss (Tdap) adult/adol 11/12/2012 Recorded [facility]      IPV 05/23/2003 Recorded [facility]      Hep B 09/05/1996 Recorded  Lab Results      Results (Last 90 days)                Laboratory                     Hematology                          CBC                               Hct:      36.7 %  (12/05/17 01:05 PM CST)                                                                                                                                          Hgb:      12.6 g/dL  (12/05/17 01:05 PM CST)                                                                                                                                          MCH:      32.6 pg  (12/05/17 01:05 PM CST)                                                                                                                                          MCHC:       34.3 g/dL  (12/05/17 01:05 PM CST)                                                                                                                                          MCV:      95 fL  (12/05/17 01:05 PM CST)                                                                                                                                          MPV:      H 11.6 fL (Range 6.5 - 11.0)  (12/05/17 01:05 PM CST)                                                                                                                                          Platelet:      435   (12/05/17 01:05 PM CST)                                                                                                                                          RBC:      L 3.86  (Range 4.00 - 5.20)  (12/05/17 01:05 PM CST)                                                                                                                                          RDW:      13.7 %  (12/05/17 01:05 PM CST)                                                                                                                                          WBC:      H 21.8  (Range 4.5 - 11.0)  (12/05/17 01:05 PM CST)                                                                                                                                     Differential                               Abs Basophils:      0.0   (12/05/17 01:05 PM CST)                                                                                                                                          Abs Eosinophils:      0.0   (12/05/17 01:05 PM CST)                                                                                                                                          Abs Lymphocytes:      2.4   (12/05/17 01:05 PM CST)                                                                                                                                          Abs Monocytes:      0.7   (12/05/17  01:05 PM CST)                                                                                                                                          Abs Neutrophils:      H 18.7  (Range 1.7 - 7.0)  (12/05/17 01:05 PM CST)                                                                                                                                          Basophils:      0.0 %  (12/05/17 01:05 PM CST)                                                                                                                                          Blasts Man:      0.0 %  (12/05/17 01:05 PM CST)                                                                                                                                          Eosinophils:      0.0 %  (12/05/17 01:05 PM CST)                                                                                                                                          Lymphocytes:      L 11.0 % (Range 20.0 - 44.0)  (12/05/17 01:05 PM CST)                                                                                                                                          Metamyelocytes Man:      0.0 %  (12/05/17 01:05 PM CST)                                                                                                                                          Monocytes:      3.0 %  (12/05/17 01:05 PM CST)                                                                                                                                          Myelocytes Man:      0.0 %  (12/05/17 01:05 PM CST)                                                                                                                                          Neutrophils:      H 86.0 % (Range 42.0 - 72.0)  (12/05/17 01:05 PM CST)                                                                                                                                          Other Cells Man:      0.0 %  (12/05/17 01:05 PM  CST)                                                                                                                                          Plasma Cells:      0.0 %  (12/05/17 01:05 PM CST)                                                                                                                                          Platelet Estimate                                        (12/05/17 01:05 PM CST)                                                                                                                                                Adequate   (12/05/17 01:05 PM CST)                                                                                                                                          Promyelocytes Man:      0.0 %  (12/05/17 01:05 PM CST)                                                                                                                                     Morphology                               RBC Comments:      RBC morphology appears normal   (12/05/17 01:05 PM CST)                                                                                                                                          RBC Morphology:         (12/05/17 01:05 PM CST)

## 2022-02-16 NOTE — PROGRESS NOTES
Chief Complaint    Med check--needs refills on all meds. discuss antidepressants and issues w/ thrush  History of Present Illness      Patient is here for follow-up on her depression and anxiety.  She did not do well on SSRIs.  She is having increased stressors and anxiety due to some family issues.       She is tolerating her antihypertensives and her blood pressure is been under control.  She requests refills on the medications.       Neuropathic pain is been fairly well controlled.  On medication review she is on gabapentin and pregabalin.  This is a duplication of medications.  Review of Systems      See HPI.  All other review of systems negative.  Physical Exam   Vitals & Measurements    T: 99(Tympanic)  HR: 88(Peripheral)  BP: 130/80     HT: 62 in  WT: 226.8 lb  BMI: 41.48       Alert, oriented, no acute distress       Neck supple       Lungs are clear       Heart has regular rate and rhythm        Cooperative, appropriate affect  Assessment/Plan       Depression, major, recurrent, moderate         Venlafaxine 30 7/2 mg daily for the next increasing to 75 mg prescribed.  Lorazepam 0.5 mg as needed for anxiety.  Limited number prescribed       Hypertension, essential        Controlled, continue current medication       Migraine        Continue with acetaminophen/butalbital/caffeine as needed for headache       Neuropathic pain         She will discontinue gabapentin.  Pregabalin will be increased to 100 mg twice daily       Orders:         acetaminophen/butalbital/caffeine, See Instructions, Instructions: TAKE ONE TABLET BY MOUTH EVERY 4 HOURS AS DIRECTED, # 60 tab(s), 3 Refill(s), Type: Soft Stop, Pharmacy: [facility], TAKE ONE TABLET BY MOUTH EVERY 4 HOURS AS DIRECTED         acetaminophen/butalbital/caffeine, 1 tab(s), po, q4 hrs, # 60 tab(s), 3 Refill(s), Type: Soft Stop, Pharmacy: [facility], 1 tab(s) po q4 hrs         acetaminophen/butalbital/caffeine, 1 tab(s), po, q4 hrs, # 60 tab(s), 3  Refill(s), Type: Hard Stop, Pharmacy: [facility]         amLODIPine, 1 tab(s) ( 10 mg ), PO, Daily, # 90 tab(s), 3 Refill(s), Type: Hard Stop, Pharmacy: [facility]         amLODIPine, 1 tab(s) ( 10 mg ), PO, Daily, # 90 tab(s), 3 Refill(s), Type: Maintenance, Pharmacy: [facility], 1 tab(s) po daily         FLUoxetine, 1 tab(s) ( 20 mg ), PO, Daily, # 30 tab(s), 5 Refill(s), Type: Maintenance, Pharmacy: [facility], 1 tab(s) po daily         hydrochlorothiazide-metoprolol, 1 tab(s), po, daily, # 90 tab(s), 3 Refill(s), Type: Maintenance, Pharmacy: [facility], 1 tab(s) po daily         hydrochlorothiazide-metoprolol, 1 tab(s), po, daily, # 90 tab(s), 3 Refill(s), Type: Hard Stop, Pharmacy: [facility]         LORazepam, 1 tab(s) ( 0.5 mg ), PO, TID, PRN: for anxiety, # 21 tab(s), 0 Refill(s), Type: Maintenance, Pharmacy: [facility], 1 tab(s) po tid,x7 day(s),PRN:for anxiety         nicotine, 1 EA, td, daily, # 1 kit(s), 0 Refill(s), Type: Maintenance, Pharmacy: [facility], 1 EA td daily         venlafaxine, 2 cap(s) ( 75 mg ), po, daily, # 60 cap(s), 1 Refill(s), Type: Maintenance, Pharmacy: [facility], 2 cap(s) po daily,x30 day(s)  Patient Information     Name:ADRIÁN VILLEGAS      Address:      [address] [place] [id number]     Sex:Female     YOB: 1975     [phone]     Emergency Contact:WILTON VILLEGAS     MRN:[id number]     FIN:[id number]     Location:[facility]     Date of Service:03/06/2018      Primary Care Physician:       Harvey Nath MD, [phone]  Problem List/Past Medical History    Ongoing     Depression, major, recurrent, moderate     GERD (gastroesophageal reflux disease)     History of sepsis     Hypertension, essential     Insomnia     Migraine     Neuropathic pain     Obesity     Tobacco user    Historical     Inpatient stay      Comments: @[facility], [place] - Community acquired  pneumonia     Pregnancy  Procedure/Surgical History      section ()      section ()     FS - Flexible sigmoidoscopy (1999)     Esophagogastroduodenoscopy (1999)     Cholecystectomy (1997)     Ovarian cystectomy (1996)     Appendectomy ()     Hernia repair     Hysterectomy  Medications        LORazepam 0.5 mg oral tablet: 0.5 mg, 1 tab(s), PO, TID, for 7 day(s), PRN: for anxiety, 21 tab(s), 0 Refill(s).        acetaminophen/butalbital/caffeine 325 mg-50 mg-40 mg oral tablet: See Instructions, TAKE ONE TABLET BY MOUTH EVERY 4 HOURS AS DIRECTED, 60 tab(s), 3 Refill(s).        acetaminophen/butalbital/caffeine 325 mg-50 mg-40 mg oral tablet: 1 tab(s), po, q4 hrs, 60 tab(s), 3 Refill(s).        amLODIPine 10 mg oral tablet: 10 mg, 1 tab(s), PO, Daily, 90 tab(s), 3 Refill(s).        gabapentin 300 mg oral capsule: 1,200 mg, 4 cap(s), po, tid, 360 cap(s), 5 Refill(s).        hydrochlorothiazide-metoprolol 25 mg-50 mg oral tablet: 1 tab(s), po, daily, 90 tab(s), 3 Refill(s).        Multiple Vitamins oral tablet: 1 tab(s), po, daily, 0 Refill(s).        nicotine 21 mg-14 mg-7 mg transdermal film, extended release: 1 EA, td, daily, 1 kit(s), 0 Refill(s).        nystatin 100,000 units/mL oral suspension: 500,000 unit(s), 5 mL, PO, QID, for 7 day(s), retain in mouth as long as possible before swallowing, 140 mL, 0 Refill(s).        pantoprazole 40 mg oral delayed release tablet: 40 mg, 1 tab(s), PO, Daily, for 30 day(s), 30 tab(s), 5 Refill(s).        Lyrica 50 mg oral capsule: See Instructions, TAKE ONE CAPSULE BY MOUTH TWICE A DAY, 60 unknown unit, 5 Refill(s).        venlafaxine 37.5 mg oral capsule, extended release: 75 mg, 2 cap(s), po, daily, for 30 day(s), 60 cap(s), 1 Refill(s).        zolpidem 10 mg oral tablet: See Instructions, TAKE ONE TABLET BY MOUTH AT BEDTIME, 30 tab(s), 4 Refill(s).                Allergies    FLUoxetine    PARoxetine (thrush)  Social History     Smoking Status - 03/06/2018     Current every day smoker     Alcohol - 06/24/2015      Never     Employment and Education - 06/24/2015      Employed, Work/School description: CNA.     Exercise and Physical Activity - 06/24/2015     Home and Environment - 06/24/2015      Spouse/Partner name: Peewee Roberts.     Nutrition and Health - 06/24/2015      Type of diet: Regular.     Other - 06/24/2015      Last Eye Exam: 07/2012 and Last Dental Exam: 05/2012. Children: bharath and Marianne     Sexual - 06/24/2015      Sexually active: Yes. Sexual orientation: Heterosexual. Contraceptive Use Details: None.     Substance Abuse - 06/24/2015      Never     Tobacco - 06/24/2015      Current, Cigarettes, 20 per day.  Family History    Diabetes mellitus: Grandmother (M).    MI - Myocardial infarction: Grandfather (P).    WPW (Mary-Parkinson-White syndrome)...: Brother.  Immunizations      Vaccine Date Status Comments      influenza virus vaccine, inactivated 02/09/2017 Recorded      influenza virus vaccine, inactivated 10/15/2014 Recorded [facility]      tetanus/diphth/pertuss (Tdap) adult/adol 11/12/2012 Recorded [facility]      IPV 05/23/2003 Recorded [facility]      Hep B 09/05/1996 Recorded  Lab Results      Results (Last 90 days)      No results located.

## 2022-02-16 NOTE — TELEPHONE ENCOUNTER
---------------------  From: Jenniffer Campbell CMA (Phone Messages Pool ([id number]_[facility]))   To: [facility] Message Pool ([id number]_[place]);     Sent: 6/12/2019 11:52:56 AM CDT  Subject: General Message-Lyrica     Phone Message    PCP:   NOLAN      Time of Call:  1138       Person Calling:  self  Phone number:  [phone], ok LM     Returned call at: _    Note:   pt asking for a refill of her Lyrical.  States NOLAN wanted her to go off of it, but she had too much pain without it.  She states the pharm has sent a req yesterday and hasn't heard back.  Please advise.      Last office visit and reason:  chronic disease 4/2019  RTC 10/2019---------------------  From: Abhishek SY, Francisca ([facility] Message [employer] ([id number]_[facility]))   To: Harvey Nath MD;     Sent: 6/12/2019 1:06:46 PM CDT  Subject: FW: General Message-Lyrica  ** Approved **  pregabalin (LYRICA 150MG CAPS)  TAKE ONE CAPSULE BY MOUTH TWICE A DAY  Qty:  60 cap(s)        Days Supply:  30        Refills:  3          Substitutions Allowed     Route To Pharmacy [facility]---------------------  From: Harvey Nath MD   To: [facility] Message Pool ([id number]_[place]);     Sent: 6/12/2019 4:26:28 PM CDT  Subject: RE: General Message-LyricaPt informed.

## 2022-02-16 NOTE — TELEPHONE ENCOUNTER
---------------------  From: Erin Barton MA ([facility] Pool ([id number]_[facility]))   To: NOLAN Vicenta Pool ([id number]_[place] [facility] [place]);     Sent: 2/25/2019 11:20:44 AM CST  Subject: FW: Medication Management   Due Date/Time: 2/26/2019 8:30:00 AM CST     Gabapentin last filled 9/27/18  #360 with 5 refills  Acetaminophen/butalbital/caffeine last filled 1/9/19  #30 with 1 refill  Last visit:  10/10/18 bronchitis       ------------------------------------------  From: [facility]  To: Harvey Nath MD  Sent: February 25, 2019 8:30:36 AM CST  Subject: Medication Management  Due: February 26, 2019 8:30:36 AM CST    ** On Hold Pending Signature **  Drug: gabapentin (gabapentin 300 mg oral capsule)  TAKE FOUR CAPSULES BY MOUTH THREE TIMES A DAY  Quantity: 360 cap(s)    Days Supply: 30        Refills: 5  Substitutions Allowed  Notes from Pharmacy:     Dispensed Drug: gabapentin (gabapentin 300 mg oral capsule)  TAKE FOUR CAPSULES BY MOUTH THREE TIMES A DAY  Quantity: 360 cap(s)    Days Supply: 30        Refills: 5  Substitutions Allowed  Notes from Pharmacy:     ** On Hold Pending Signature **  Drug: acetaminophen/butalbital/caffeine (acetaminophen/butalbital/caffeine 325 mg-50 mg-40 mg oral tablet)  TAKE ONE TABLET BY MOUTH TWICE A DAY AS NEEDED FOR HEADACHE  Quantity: 30 tab(s)     Days Supply: 15        Refills: 1  Substitutions Allowed  Notes from Pharmacy:     Dispensed Drug: acetaminophen/butalbital/caffeine (acetaminophen/butalbital/caffeine 325 mg-50 mg-40 mg oral tablet)  TAKE ONE TABLET BY MOUTH TWICE A DAY AS NEEDED FOR HEADACHE  Quantity: 30 tab(s)     Days Supply: 15        Refills: 1  Substitutions Allowed  Notes from Pharmacy:   ---------------------------------------------------------------  From: Abhishek SY, Francisca ([facility] Message Pool ([id number]_[facility]))   To: Harvey Nath MD;     Sent: 2/25/2019 11:41:37 AM CST  Subject: FW: Medication Management   Due Date/Time: 2/26/2019 8:30:00 AM  CST---------------------  From: Harvey Nath MD   To: [facility]    Sent: 2/25/2019 12:58:50 PM CST  Subject: FW: Medication Management     ** Submitted: **  Complete:acetaminophen/butalbital/caffeine (acetaminophen/butalbital/caffeine 325 mg-50 mg-40 mg oral tablet)   Signed by Harvey Nath MD  2/25/2019 12:58:00 PM    ** Submitted: **  Complete:gabapentin (gabapentin 300 mg oral capsule)   Signed by Harvey Nath MD  2/25/2019 12:58:00 PM    ** Approved with modifications: **  gabapentin (GABAPENTIN 300MG CAPS)  TAKE FOUR CAPSULES BY MOUTH THREE TIMES A DAY  Qty:  360 cap(s)        Days Supply:  30        Refills:  0          Substitutions Allowed     Route To [facility]       ** Approved with modifications: **  acetaminophen/butalbital/caffeine (BUTALBITAL-APAP-CAFF -40 TABS)  TAKE ONE TABLET BY MOUTH TWICE A DAY AS NEEDED FOR HEADACHE  Qty:  30 tab(s)        Days Supply:  15        Refills:  0          Substitutions Allowed     Route To [facility]

## 2022-02-16 NOTE — TELEPHONE ENCOUNTER
Entered by Leda Humphries CMA on March 22, 2019 10:27:57 AM CDT  ---------------------  From: Leda Humphries CMA   To: [facility]    Sent: 3/22/2019 10:27:56 AM CDT  Subject: Medication Management     ** Submitted: **  Order:gabapentin (gabapentin 300 mg oral capsule)  4 cap(s)  Oral  tid  Qty:  168 cap(s)        Refills:  0          Substitutions Allowed     Route To Pharmacy - [facility]    Signed by Leda Humphries CMA  3/22/2019 10:27:00 AM    ** Submitted: **  Complete:gabapentin (gabapentin 300 mg oral capsule)   Signed by Leda Humphries CMA  3/22/2019 10:27:00 AM    ** Not Approved:  **  gabapentin (GABAPENTIN 300MG CAPS)  TAKE FOUR CAPSULES BY MOUTH THREE TIMES A DAY  Qty:  360 cap(s)        Days Supply:  30        Refills:  0          Substitutions Allowed     Route To Pharmacy - [facility]   Signed by Leda Humphries CMA            ------------------------------------------  From: [facility]  To: Harvey Nath MD  Sent: March 22, 2019 10:01:40 AM CDT  Subject: Medication Management  Due: March 23, 2019 10:01:40 AM CDT    ** On Hold Pending Signature **  Drug: gabapentin (gabapentin 300 mg oral capsule)  TAKE FOUR CAPSULES BY MOUTH THREE TIMES A DAY  Quantity: 360 cap(s)    Days Supply: 30        Refills: 0  Substitutions Allowed  Notes from Pharmacy:     Dispensed Drug: gabapentin (gabapentin 300 mg oral capsule)  TAKE FOUR CAPSULES BY MOUTH THREE TIMES A DAY  Quantity: 360 cap(s)    Days Supply: 30        Refills: 0  Substitutions Allowed  Notes from Pharmacy:   ------------------------------------------Med Refill      Date of last office visit and reason:  10/10/18; bronchitis      Date of last Med Check / Px:   9/25/18  Date of last labs pertaining to med:  12/5/17    RTC order in chart:  yes    For Protocol refill, has patient been contacted:  msg sent to pharmacy

## 2022-02-16 NOTE — PROGRESS NOTES
Chief Complaint    c/o non-productive cough, SOB, fever, chest/back discomfort.  History of Present Illness      Here with cough and fever for the last 4 days.       Has BEJARANO, chest discomfort from coughing      concerned about pneumonia      Had pneumonia in December of last year  Review of Systems          ROS reviewed an negative except for symptoms noted in HPI.            Physical Exam   Vitals & Measurements    T: 98.8   F (Tympanic)  HR: 92(Peripheral)  BP: 134/86  SpO2: 97%     HT: 62 in  WT: 214.2 lb  BMI: 39.17           General:  Alert and oriented, No acute distress.            Eye:  Normal conjunctiva, Vision unchanged.            HENT:  Normocephalic, Tympanic membranes are clear, Oral mucosa is moist, No pharyngeal erythema.            Neck:  Supple, Non-tender, No carotid bruit, No lymphadenopathy, No thyromegaly.            Respiratory:  Lungs have crackles in right base          Cardiovascular:  Normal rate, Regular rhythm, Normal peripheral perfusion.            Gastrointestinal:  Soft, Non-tender.            Genitourinary:  No costovertebral angle tenderness.            Musculoskeletal:  Normal range of motion, Normal strength.            Integumentary:  Warm, Dry, No rash.            Neurologic:  Alert, Oriented.            Psychiatric:  Cooperative, Appropriate mood & affect.    Assessment/Plan       Pneumonia (J18.9)         treat with levofloxacin, antitussive and close follow up         Orders:          09929 office outpatient visit 15 minutes (Charge), Quantity: 1, Pneumonia                Orders:         codeine-promethazine, 10 mL, po, q4 hrs, # 240 mL, 0 Refill(s), Type: Maintenance, Pharmacy: [facility], 10 mL po q4 hrs, (Ordered)         levoFLOXacin, 1 tab(s) ( 750 mg ), PO, q24hr, # 7 tab(s), 0 Refill(s), Type: Maintenance, Pharmacy: [facility], 1 tab(s) po q 24 hrs,x7 day(s), (Ordered)  Patient Information     Name:ADRIÁN VILLEGAS      Address:      [address] [place] [address]     Sex:Female     YOB: 1975     [phone]     Emergency Contact:WILTON VILLEGAS     MRN:[id number]     FIN:[id number]     Location:[facility]     Date of Service:2018      Primary Care Physician:       Harvey Nath MD, [phone]      Attending Physician:       Harvey Nath MD, [phone]  Problem List/Past Medical History    Ongoing     Depression, major, recurrent, moderate     GERD (gastroesophageal reflux disease)     History of sepsis     Hypertension, essential     Insomnia     Migraine     Neuropathic pain     Obesity     Tobacco user    Historical     Inpatient stay       Comments: @[facility], [place] - Community acquired pneumonia     Pregnancy  Procedure/Surgical History      section ()            section ()           FS - Flexible sigmoidoscopy (1999)           Esophagogastroduodenoscopy (1999)           Cholecystectomy (1997)           Ovarian cystectomy (1996)             Comments: Diagnostic laparoscopy     Appendectomy ()           Hernia repair           Hysterectomy        Medications     Multiple Vitamins oral tablet: 1 tab(s), po, daily, 0 Refill(s).     venlafaxine 37.5 mg oral capsule, extended release: 75 mg, 2 cap(s), po, daily, for 30 day(s), 60 cap(s), 1 Refill(s).     nicotine 21 mg-14 mg-7 mg transdermal film, extended release: 1 EA, td, daily, 1 kit(s), 0 Refill(s).     amLODIPine 10 mg oral tablet: 10 mg, 1 tab(s), PO, Daily, 90 tab(s), 3 Refill(s).     zolpidem 10 mg oral tablet: See Instructions, TAKE ONE TABLET BY MOUTH AT BEDTIME, 30 tab(s), 4 Refill(s).     pantoprazole 40 mg oral delayed release tablet: See Instructions, TAKE ONE TABLET BY MOUTH EVERY DAY, 30 tab(s), 5 Refill(s).     Lyrica 150 mg oral capsule: 150 mg, 1 cap(s), po, bid, 60 cap(s), 3 Refill(s).     hydroCHLOROthiazide 25 mg oral tablet: 25 mg, 1 tab(s), po, daily, 30  tab(s), 5 Refill(s).     metoprolol succinate 50 mg oral tablet, extended release: 50 mg, 1 tab(s), po, daily, 30 tab(s), 5 Refill(s).     acetaminophen/butalbital/caffeine 325 mg-50 mg-40 mg oral tablet: See Instructions, TAKE ONE TABLET BY MOUTH EVERY 4 HOURS, 60 tab(s), 1 Refill(s).     Promethazine with Codeine 6.25 mg-10 mg/5 mL oral syrup: 10 mL, po, q4 hrs, 240 mL, 0 Refill(s).     levoFLOXacin 750 mg oral tablet: 750 mg, 1 tab(s), PO, q24hr, for 7 day(s), 7 tab(s), 0 Refill(s).          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 Children: Justa, 06/24/2015     Sexual      Sexually active: Yes. Sexual orientation: Heterosexual. Contraceptive Use Details: None., 05/22/2015     Substance Abuse      Never, 05/22/2015     Tobacco      Current, Cigarettes, 20 per day., 05/22/2015  Family History    Diabetes mellitus: Grandmother (M).    MI - Myocardial infarction: Grandfather (P).    WPW (Mary-Parkinson-White syndrome)...: Brother.    Father: History is negative    Mother: History is negative    Son: History is negative    Daughter: History is negative  Immunizations      Vaccine Date Status Comments      influenza virus vaccine, inactivated 02/09/2017 Recorded      influenza virus vaccine, inactivated 10/15/2014 Recorded [facility]      tetanus/diphth/pertuss (Tdap) adult/adol 11/12/2012 Recorded WIR      IPV 05/23/2003 Recorded [facility]      Hep B 09/05/1996 Recorded

## 2022-02-16 NOTE — PROGRESS NOTES
Chief Complaint    hospital f/u--pneumonia. was at [facility].  History of Present Illness      Patient is here for follow-up on recent hospitalization for pneumonia.  She was in [place] and transferred to [place].  She was on BiPAP for the majority of her stay.  She was also on IV levofloxacin.  She was discharged on prednisone and levofloxacin.  She has concerns about lower extremity edema and intermittent anxiety attacks.  She has had issues with this in the past.  She is currently using nicotine patches.  Review of Systems      See HPI.  All other review of systems negative.  Physical Exam   Vitals & Measurements    T: 98.7   F (Tympanic)  HR: 82(Peripheral)  BP: 158/82  SpO2: 98%       Alert, oriented, no acute distress       Neck supple       Lungs have decreased breath sounds throughout with crackles in right base       Heart has regular rate and rhythm       Cooperative, appropriate affect  Assessment/Plan       Hypertension, essential(I10)        Continue current medication       Pneumonia(J18.9)        This is been recurrent in nature, finish prednisone and levofloxacin          consider Pneumovax       Tobacco user(Z72.0)        Continue with nicotine replacement       Orders:         clonazePAM, 1 tab(s) ( 0.5 mg ), PO, TID, x 14 day(s), # 42 tab(s), 0 Refill(s), Type: Acute, Pharmacy: [facility], 1 tab(s) po tid,x14 day(s), (Ordered)         codeine-promethazine, 10 mL, po, q4 hrs, # 240 mL, 0 Refill(s), Type: Maintenance, Pharmacy: [facility], 10 mL po q4 hrs, (Completed)         venlafaxine, 1 tab(s) ( 75 mg ), po, daily, # 30 tab(s), 5 Refill(s), Type: Maintenance, Pharmacy: [facility], 1 tab(s) po daily, (Ordered)         venlafaxine, 2 cap(s) ( 75 mg ), po, daily, x 30 day(s), # 60 cap(s), 1 Refill(s), Type: Hard Stop, Pharmacy: [facility], (Completed)  Patient Information     Name:ADRIÁN VILLEGAS      Address:      [address]      Sex:Female      Date of  Birth:1975      [phone]      Emergency Contact:WILTON VILLEGAS     MRN:[id number]     FIN:[id number]     Location:[facility]     Date of Service:2018      Primary Care Physician:       Harvey Nath MD, [phone]      Attending Physician:       Harvey Nath MD, [phone]  Problem List/Past Medical History    Ongoing     Depression, major, recurrent, moderate     GERD (gastroesophageal reflux disease)     History of sepsis     Hypertension, essential     Insomnia     Migraine     Neuropathic pain     Obesity     Tobacco user    Historical     Inpatient stay       Comments: @[facility], [place] - Community acquired pneumonia     Pregnancy  Procedure/Surgical History      section ()      section ()     FS - Flexible sigmoidoscopy (1999)     Esophagogastroduodenoscopy (1999)     Cholecystectomy (1997)     Ovarian cystectomy (1996)     Appendectomy ()     Hernia repair     Hysterectomy  Medications        Multiple Vitamins oral tablet: 1 tab(s), po, daily, 0 Refill(s).        nicotine 21 mg-14 mg-7 mg transdermal film, extended release: 1 EA, td, daily, 1 kit(s), 0 Refill(s).        amLODIPine 10 mg oral tablet: 10 mg, 1 tab(s), PO, Daily, 90 tab(s), 3 Refill(s).        zolpidem 10 mg oral tablet: See Instructions, TAKE ONE TABLET BY MOUTH AT BEDTIME, 30 tab(s), 4 Refill(s).        pantoprazole 40 mg oral delayed release tablet: See Instructions, TAKE ONE TABLET BY MOUTH EVERY DAY, 30 tab(s), 5 Refill(s).        Lyrica 150 mg oral capsule: 150 mg, 1 cap(s), po, bid, 60 cap(s), 3 Refill(s).        hydroCHLOROthiazide 25 mg oral tablet: 25 mg, 1 tab(s), po, daily, 30 tab(s), 5 Refill(s).        metoprolol succinate 50 mg oral tablet, extended release: 50 mg, 1 tab(s), po, daily, 30 tab(s), 5 Refill(s).        acetaminophen/butalbital/caffeine 325 mg-50 mg-40 mg oral tablet: See Instructions, TAKE ONE TABLET  BY MOUTH EVERY 4 HOURS, 60 tab(s), 1 Refill(s).        levoFLOXacin 750 mg oral tablet: 750 mg, 1 tab(s), PO, q24hr, for 7 day(s), 7 tab(s), 0 Refill(s).        predniSONE: po, daily, on taper dose for 10days., 0 Refill(s).        clonazePAM 0.5 mg oral tablet: 0.5 mg, 1 tab(s), PO, TID, for 14 day(s), 42 tab(s), 0 Refill(s).        venlafaxine 75 mg oral tablet, extended release: 75 mg, 1 tab(s), po, daily, 30 tab(s), 5 Refill(s).                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 Children: Justa, 06/24/2015     Sexual      Sexually active: Yes. Sexual orientation: Heterosexual. Contraceptive Use Details: None., 05/22/2015     Substance Abuse      Never, 05/22/2015     Tobacco      Current, Cigarettes, 20 per day., 05/22/2015  Family History    Diabetes mellitus: Grandmother (M).    MI - Myocardial infarction: Grandfather (P).    WPW (Mary-Parkinson-White syndrome)...: Brother.    Father: History is negative    Mother: History is negative    Son: History is negative    Daughter: History is negative  Immunizations      Vaccine Date Status Comments      influenza virus vaccine, inactivated 02/09/2017 Recorded      influenza virus vaccine, inactivated 10/15/2014 Recorded [facility]IR      tetanus/diphth/pertuss (Tdap) adult/adol 11/12/2012 Recorded WIR      IPV 05/23/2003 Recorded [facility]IR      Hep B 09/05/1996 Recorded

## 2022-02-16 NOTE — CARE COORDINATION
Patient:   APOORVA VILLEGAS            MRN: [id number]            FIN: [id number]               Age:   42 years     Sex:  Female     :  1975   Associated Diagnoses:   None   Author:   Diana Poe CMA      Care Coordination Hospital Discharge Note    Sources of Information:  [  X ] Patient, family member, or caregiver (Please list):  Spoke with spouce.  Apoorva is currently in [facility].  She is very upset she recieved at [facility] in .  She stated that he nurses and Doctor treating her laughed at her when she asked for dilauted and ativan to help her with her pain control and anxiety.  They were only treating her with IV fluids.  I let him know the brief note I was looking at showed steroids and antibiotics were being given.  He then continued on to say how  unhappy they were so she decided to leave.  She went down hill fast he stated so he took her to [place] on Saturday but they didn't have the capacity to care for her as they do not have a full staff of Doctors, so they took her to [place] by ambulance and she is admitted there.  The [facility] phone number was given to him.  He verbalized understanding nad had no further questions or concerns.  He will make sure her information gets back to [facility].  [ X  ] Hospital discharge summary  [   ] Hospital fax  [   ] List of recent hospitalizations or ED visits  [   ] Other:     Discharged From: [facility]   Discharge Date: 18    Diagnosis/Problem: Pneumonia, reciving antibioics, steroids, and IV fluids, pt verbalized undertanding of discharge against medical advice.  She said she plans to f/u with [facility].    Medication Changes: [   ] Yes [ X  ] No   Medication List Updated: [   ] Yes [ X  ] No    Needs Referral or Lab: [   ] Yes [  C ] No  Outpatient Provider Recommendations: N/A  Needs Follow-up Appointment:  [   ] Within 7 days of discharge (highly complex visit)  [   ] Within 14 days of discharge (moderately complex visit)    Appointment  Made With:  Date:    Additional Information Needed and Requested:  [   ] Yes:  [   ] No

## 2022-02-16 NOTE — LETTER
(Inserted Image. Unable to display)     October 14, 2019      ADRIÁN VILLEGAS  [address]  [place], [place] [id number]          Dear ADRIÁN,      Thank you for selecting [facility] (previously [facility] for your healthcare needs.    Our records indicate you are due for the following services:    Annual Physical.  Hypertension check ~ please remember to bring your at-home blood pressure readings with you to your appointment.   Fasting Lab Tests ~ Please do not eat or drink anything 10 hours prior to your scheduled appointment time.  (Water and any medications that you may need are allowed unless directed otherwise.)    If you had your labs done at another facility or with Direct Access Lab Testing at [facility], please bring in a copy of the results to your next visit, mail a copy, or drop off a copy of your results to your Healthcare Provider.    You are due for lab work and an office visit; please schedule the lab appointment 1 week before the office visit.  This will assure all results are available to discuss with your provider during your visit.    **It is very helpful if you bring your medication bottles to your appointment.  This assures we have all of your current medications, including strength and dosing information, documented accurately in your medical record.    To schedule an appointment or if you have further questions, please contact your primary clinic:   [facility]       [phone]   [facility]       [phone]              [facility]     [phone]      Powered by [employer] and [employer]    Sincerely,    Harvey Nath MD

## 2022-02-16 NOTE — TELEPHONE ENCOUNTER
---------------------  From: Meena Woodruff CMA (eRx Pool ([id number]))   To: [facility] [employer] ([id number]_[place] [facility] [place]);     Sent: 7/23/2019 2:51:01 PM CDT  Subject: FW: Medication Management   Due Date/Time: 7/24/2019 12:27:00 PM CDT           Entered by Meena Woodruff CMA on July 23, 2019 2:50:48 PM CDT  PCP:   NOLAN    Medication:   acetaminophen/butalbital  Last Filled:  6-12-19   Quantity:  60  Refills:  0    Date of last office visit and reason:   4/4/19 CD visit  Date of last labs pertaining to condition:  _    Note: Please advise on refill. 1 mo supply was sent 6/12/19    Return to Clinic order placed?  10/2019 annual    Resource:  eRImpulseFlyer pool  Phone:   [phone]      ------------------------------------------  From: [facility]  To: Harvey Nath MD  Sent: July 23, 2019 12:27:03 PM CDT  Subject: Medication Management  Due: July 24, 2019 12:27:03 PM CDT    ** On Hold Pending Signature **  Drug: acetaminophen/butalbital/caffeine (acetaminophen/butalbital/caffeine 325 mg-50 mg-40 mg oral tablet)  TAKE ONE TABLET TWO TIMES A DAY AS NEEDED FOR HEADACHE  Quantity: 60 tab(s)     Days Supply: 30        Refills: 0  Substitutions Allowed  Notes from Pharmacy:     Dispensed Drug: acetaminophen/butalbital/caffeine (acetaminophen/butalbital/caffeine 325 mg-50 mg-40 mg oral tablet)  TAKE ONE TABLET TWO TIMES A DAY AS NEEDED FOR HEADACHE  Quantity: 60 tab(s)     Days Supply: 30        Refills: 0  Substitutions Allowed  Notes from Pharmacy:   ---------------------------------------------------------------  From: Eugenia Roman LPN ([facility] Message Pool ([id number]_[facility]))   To: Harvey Nath MD;     Sent: 7/23/2019 2:51:43 PM CDT  Subject: FW: Medication Management   Due Date/Time: 7/24/2019 12:27:00 PM CDT---------------------  From: Harvey Nath MD   To: [facility]    Sent: 7/23/2019 4:27:53 PM CDT  Subject: FW: Medication Management     ** Submitted:  **  Complete:acetaminophen/butalbital/caffeine (acetaminophen/butalbital/caffeine 325 mg-50 mg-40 mg oral tablet)   Signed by Harvey Nath MD  7/23/2019 4:27:00 PM    ** Approved **  acetaminophen/butalbital/caffeine (BUTALBITAL-APAP-CAFF -40 TABS)  TAKE ONE TABLET TWO TIMES A DAY AS NEEDED FOR HEADACHE  Qty:  60 tab(s)        Days Supply:  30        Refills:  0          Substitutions Allowed     Route To Pharmacy - [facility]

## 2022-02-16 NOTE — TELEPHONE ENCOUNTER
Entered by Leda Humphries CMA on January 30, 2019 1:31:38 PM CST  ---------------------  From: Leda Humphries CMA   To: [facility]    Sent: 1/30/2019 1:31:38 PM CST  Subject: Medication Management     ** Not Approved: Patient has requested refill too soon, #30, 1 refill sent 1/9/19 **  acetaminophen/butalbital/caffeine (BUTALBITAL-APAP-CAFF -40 TABS)  TAKE ONE TABLET BY MOUTH TWICE A DAY AS NEEDED FOR HEADACHE  Qty:  30 tab(s)        Days Supply:  15        Refills:  1          RENARD     Route To Pharmacy - [facility]   Signed by Leda Humphries CMA            ------------------------------------------  From: [facility]  To: Harvey Nath MD  Sent: January 30, 2019 1:26:03 PM CST  Subject: Medication Management  Due: January 31, 2019 1:26:03 PM CST    ** On Hold Pending Signature **  Drug: acetaminophen/butalbital/caffeine (acetaminophen/butalbital/caffeine 325 mg-50 mg-40 mg oral tablet)  TAKE ONE TABLET BY MOUTH TWICE A DAY AS NEEDED FOR HEADACHE  Quantity: 30 tab(s)     Days Supply: 15        Refills: 1  Substitutions Allowed  Notes from Pharmacy:     Dispensed Drug: acetaminophen/butalbital/caffeine (acetaminophen/butalbital/caffeine 325 mg-50 mg-40 mg oral tablet)  TAKE ONE TABLET BY MOUTH TWICE A DAY AS NEEDED FOR HEADACHE  Quantity: 30 tab(s)     Days Supply: 15        Refills: 1  Substitutions Allowed  Notes from Pharmacy:   ------------------------------------------

## 2022-02-16 NOTE — TELEPHONE ENCOUNTER
---------------------  From: Leda Humphries CMA (eRx Pool ([id number]_[facility]))   To: [facility] Message Pool ([id number]_[place] [facility] [place]);     Sent: 1/2/2019 10:44:25 AM CST  Subject: FW: Medication Management   Due Date/Time: 1/3/2019 8:39:00 AM CST     Medication Refill needing approval    PCP:   [facility]AN    Medication:   zolpidem  Last Filled:  9/4/18    Quantity:  30  Refills:  4  CSA on file?   no     Date of last office visit and reason:   10/10/18  Date of last labs pertaining to condition:  12/5/17    Return to Clinic order placed?  yes; December        ------------------------------------------  From: [facility]  To: Harvey Nath MD  Sent: January 2, 2019 8:39:09 AM CST  Subject: Medication Management  Due: January 3, 2019 8:39:09 AM CST    ** On Hold Pending Signature **  Drug: zolpidem (zolpidem 10 mg oral tablet)  TAKE ONE TABLET BY MOUTH AT BEDTIME  Quantity: 30 tab(s)     Days Supply: 30        Refills: 4  Substitutions Allowed  Notes from Pharmacy:     Dispensed Drug: zolpidem (zolpidem 10 mg oral tablet)  TAKE ONE TABLET BY MOUTH AT BEDTIME  Quantity: 30 tab(s)     Days Supply: 30        Refills: 4  Substitutions Allowed  Notes from Pharmacy:   ---------------------------------------------------------------  From: Francisca Weiss MA ([facility] Message Pool ([id number]))   To: Harvey Nath MD;     Sent: 1/2/2019 11:05:35 AM CST  Subject: FW: Medication Management   Due Date/Time: 1/3/2019 8:39:00 AM CST---------------------  From: Harvey Nath MD   To: [facility]    Sent: 1/2/2019 12:49:26 PM CST  Subject: FW: Medication Management     ** Submitted: **  Order:zolpidem (zolpidem 10 mg oral tablet)  1 tab(s)  Oral  qhs  Qty:  30 tab(s)        Days Supply:  30        Refills:  4          Substitutions Allowed     Route To Pharmacy - [facility]    Signed by Harvey Nath MD  1/2/2019 12:47:00 PM    ** Submitted: **  Complete:zolpidem (zolpidem 10 mg oral tablet)   Signed  by Harvey Nath MD  1/2/2019 12:49:00 PM    ** Not Approved:  **  zolpidem (ZOLPIDEM TARTRATE 10MG TABS)  TAKE ONE TABLET BY MOUTH AT BEDTIME  Qty:  30 tab(s)        Days Supply:  30        Refills:  4          RENARD     Route To Pharmacy - [facility]

## 2022-02-16 NOTE — PROGRESS NOTES
Chief Complaint    Pt. here for F/U pneumonia.  History of Present Illness      Here for follow up from hospitalization for CAP.  Currently taking cefuroxime and doxycycline      Uses oxycodone and clonazepam for rib pain and anxiety      still has some dyspnea      reviewed discharge summary  Review of Systems      Neg except for items noted in HPI  Physical Exam   Vitals & Measurements    T: 98.9(Tympanic)  HR: 91(Peripheral)  SpO2: 98%     HT: 62 in  WT: 239.0 lb  BMI: 43.71           General:  Alert and oriented, No acute distress.            Eye:  Pupils are equal, round and reactive to light, Extraocular movements are intact, Normal conjunctiva.            HENT:  Normocephalic, Tympanic membranes are clear, Oral mucosa is moist, No pharyngeal erythema, No sinus tenderness.            Neck:  Supple, Non-tender, No carotid bruit, No lymphadenopathy, No thyromegaly.            Respiratory:  Lungs are clear to auscultation, Respirations are non-labored, Decreased BS RLL, posterior chest wall tenderness.            Cardiovascular:  Normal rate, Regular rhythm          Neurologic:  Alert, Oriented          Psychiatric:  Cooperative, Appropriate mood & affect.    Assessment/Plan       Pneumonia, community acquired         Finish abx and follow up if symptoms worsen        Monitor BP       Orders:         clonazePAM, 1 tab(s) ( 0.5 mg ), po, tid, PRN: for anxiety, # 15 tab(s), 1 Refill(s), Type: Maintenance, Pharmacy: [facility], 1 tab(s) po tid,PRN:for anxiety         oxyCODONE, 1 tab(s) ( 5 mg ), po, q4-6 hrs, # 24 tab(s), 0 Refill(s), Type: Maintenance, Pharmacy: [facility], 1 tab(s) po q4-6 hrs  Patient Information     Name:ADRIÁN VILLEGAS      Address:      [address] [place] [id number]     Sex:Female     YOB: 1975     [phone]     Emergency Contact:WILTON VILLEGAS     MRN:[id number]     FIN:[id number]     Location:[facility]     Date of  Service:2017      Primary Care Physician:       Harvey Nath MD, [phone]  Problem List/Past Medical History    Ongoing     Depression, major, recurrent, moderate     GERD (gastroesophageal reflux disease)     Hypertension, essential     Insomnia     Migraine     Neuropathic pain     Obesity     Tobacco user    Historical     Pregnancy  Procedure/Surgical History      section ()      section ()     FS - Flexible sigmoidoscopy (1999)     Esophagogastroduodenoscopy (1999)     Cholecystectomy (1997)     Ovarian cystectomy (1996)     Appendectomy ()     Hernia repair     Hysterectomy  Medications    acetaminophen/butalbital/caffeine 325 mg-50 mg-40 mg oral tablet, See Instructions, 3 refills    amLODIPine 10 mg oral tablet, 10 mg= 1 tab(s), po, daily, 3 refills    gabapentin 300 mg oral capsule, 1200 mg= 4 cap(s), po, tid, 3 refills    hydrochlorothiazide-metoprolol 25 mg-50 mg oral tablet, 1 tab(s), po, daily, 3 refills    Lyrica 50 mg oral capsule, See Instructions, 5 refills    Multiple Vitamins oral tablet, 1 tab(s), po, daily    pantoprazole 40 mg oral delayed release tablet, 40 mg= 1 tab(s), po, daily, 5 refills    PARoxetine 20 mg oral tablet, 20 mg= 1 tab(s), po, daily, 5 refills    zolpidem 10 mg oral tablet, 10 mg= 1 tab(s), po, hs, 4 refills  Allergies    No Known Medication Allergies  Social History    Smoking Status - 2017     Current every day smoker     Alcohol - 2015      Never     Employment and Education - 2015      Employed, Work/School description: CNA.     Exercise and Physical Activity - 2015     Home and Environment - 2015      Spouse/Partner name: Peewee Roberts.     Nutrition and Health - 2015      Type of diet: Regular.     Other - 2015      Last Eye Exam: 2012 and Last Dental Exam: 2012. Children: bharath and Marianne     Sexual - 2015      Sexually active: Yes. Sexual  orientation: Heterosexual. Contraceptive Use Details: None.     Substance Abuse - 06/24/2015      Never     Tobacco - 06/24/2015      Current, Cigarettes, 20 per day.  Family History    Diabetes mellitus: Grandmother (M).    MI - Myocardial infarction: Grandfather (P).    WPW (Mary-Parkinson-White syndrome)...: Brother.  Immunizations      Vaccine Date Status Comments      influenza virus vaccine, inactivated 02/09/2017 Recorded      influenza virus vaccine, inactivated 10/15/2014 Recorded [facility]      tetanus/diphth/pertuss (Tdap) adult/adol 11/12/2012 Recorded [facility]      IPV 05/23/2003 Recorded [facility]      Hep B 09/05/1996 Recorded  Lab Results   Results (Last 90 days)             Laboratory                  Hematology                       CBC                            Hct:      36.7 %  (12/05/17 01:05 PM CST)                                                                                                                                       Hgb:      12.6 g/dL  (12/05/17 01:05 PM CST)                                                                                                                                       MCH:      32.6 pg  (12/05/17 01:05 PM CST)                                                                                                                                       MCHC:      34.3 g/dL  (12/05/17 01:05 PM CST)                                                                                                                                       MCV:      95 fL  (12/05/17 01:05 PM CST)                                                                                                                                       MPV:      H 11.6 fL (Range 6.5 - 11.0)  (12/05/17 01:05 PM CST)                                                                                                                                       Platelet:      435   (12/05/17 01:05 PM CST)                                                                                                                                        RBC:      L 3.86  (Range 4.00 - 5.20)  (12/05/17 01:05 PM CST)                                                                                                                                       RDW:      13.7 %  (12/05/17 01:05 PM CST)                                                                                                                                       WBC:      H 21.8  (Range 4.5 - 11.0)  (12/05/17 01:05 PM CST)                                                                                                                                  Differential                            Abs Basophils:      0.0   (12/05/17 01:05 PM CST)                                                                                                                                       Abs Eosinophils:      0.0   (12/05/17 01:05 PM CST)                                                                                                                                       Abs Lymphocytes:      2.4   (12/05/17 01:05 PM CST)                                                                                                                                       Abs Monocytes:      0.7   (12/05/17 01:05 PM CST)                                                                                                                                       Abs Neutrophils:      H 18.7  (Range 1.7 - 7.0)  (12/05/17 01:05 PM CST)                                                                                                                                       Basophils:      0.0 %  (12/05/17 01:05 PM CST)                                                                                                                                       Blasts Man:      0.0 %  (12/05/17 01:05 PM CST)                                                                                                                                        Eosinophils:      0.0 %  (12/05/17 01:05 PM CST)                                                                                                                                       Lymphocytes:      L 11.0 % (Range 20.0 - 44.0)  (12/05/17 01:05 PM CST)                                                                                                                                       Metamyelocytes Man:      0.0 %  (12/05/17 01:05 PM CST)                                                                                                                                       Monocytes:      3.0 %  (12/05/17 01:05 PM CST)                                                                                                                                       Myelocytes Man:      0.0 %  (12/05/17 01:05 PM CST)                                                                                                                                       Neutrophils:      H 86.0 % (Range 42.0 - 72.0)  (12/05/17 01:05 PM CST)                                                                                                                                       Other Cells Man:      0.0 %  (12/05/17 01:05 PM CST)                                                                                                                                       Plasma Cells:      0.0 %  (12/05/17 01:05 PM CST)                                                                                                                                       Platelet Estimate                                     (12/05/17 01:05 PM CST)                                                                                                                                             Adequate   (12/05/17 01:05 PM CST)                                                                                                                                        Promyelocytes Man:      0.0 %  (12/05/17 01:05 PM CST)                                                                                                                                  Morphology                            RBC Comments:      RBC morphology appears normal   (12/05/17 01:05 PM CST)                                                                                                                                       RBC Morphology:         (12/05/17 01:05 PM CST)

## 2022-02-16 NOTE — PROGRESS NOTES
Chief Complaint    right knee pain increasing over the past month.  History of Present Illness      Apoorva injured her knee about a month ago while moving to a new house. She twisted her knee and heard and felt a pop in the right knee. It has been painful and she has a feeling of instability especially on stairs.  Review of Systems      ROS reviewed an negative except for symptoms noted in HPI.  Physical Exam   Vitals & Measurements    T: 98.4   F (Tympanic)  HR: 94(Peripheral)  BP: 118/72       General: Alert and oriented, No acute distress.      Eye: Normal conjunctiva.      HENT: Normocephalic.      Neck: Supple, Non-tender.      Respiratory: Respirations are non-labored.      Cardiovascular: Normal rate.      Musculoskeletal: Lower extremity exam:right      Hip:no      Thigh:no      Knee:medial joint line tenderness, decreased flexion, positive Suzanne medially, ligaments stable      Lower leg:nl      Ankle:nl      Foot:nl      Toe:nl      Psychiatric: Cooperative, Appropriate mood affect.  Assessment/Plan       Right knee pain(M25.561)        Mechanism of injury and symptoms consistent with possible medial meniscus injury.  MRI of the knee ordered and will follow-up when results are available.         Orders:          MRI Knee Right (Request), Instructions: W/O CONTRAST, Right knee pain  Patient Information     Name:APOORVA VILLEGAS      Address:      [address] [place] [address]      Sex:Female      YOB: 1975      [phone]      Emergency Contact:WILTON VILLEGAS     MRN:[id number]     FIN:[id number]     Location:[facility]     Date of Service:07/31/2018      Primary Care Physician:       Harvey Nath MD, [phone]      Attending Physician:       Harvey Nath MD, [phone]  Problem List/Past Medical History    Ongoing     Depression, major, recurrent, moderate     GERD (gastroesophageal reflux disease)     History of sepsis     Hypertension, essential     Insomnia      Migraine     Neuropathic pain     Obesity     Tobacco user    Historical     Inpatient stay       Comments: @[facility] [place], [place] - Community acquired pneumonia     Pregnancy  Procedure/Surgical History      section ()      section ()     FS - Flexible sigmoidoscopy (1999)     Esophagogastroduodenoscopy (1999)     Cholecystectomy (1997)     Ovarian cystectomy (1996)     Appendectomy ()     Hernia repair     Hysterectomy  Medications        Multiple Vitamins oral tablet: 1 tab(s), po, daily, 0 Refill(s).        nicotine 21 mg-14 mg-7 mg transdermal film, extended release: 1 EA, td, daily, 1 kit(s), 0 Refill(s).        amLODIPine 10 mg oral tablet: 10 mg, 1 tab(s), PO, Daily, 90 tab(s), 3 Refill(s).        zolpidem 10 mg oral tablet: See Instructions, TAKE ONE TABLET BY MOUTH AT BEDTIME, 30 tab(s), 4 Refill(s).        pantoprazole 40 mg oral delayed release tablet: See Instructions, TAKE ONE TABLET BY MOUTH EVERY DAY, 30 tab(s), 5 Refill(s).        Lyrica 150 mg oral capsule: 150 mg, 1 cap(s), po, bid, 60 cap(s), 3 Refill(s).        hydroCHLOROthiazide 25 mg oral tablet: 25 mg, 1 tab(s), po, daily, 30 tab(s), 5 Refill(s).        metoprolol succinate 50 mg oral tablet, extended release: 50 mg, 1 tab(s), po, daily, 30 tab(s), 5 Refill(s).        levoFLOXacin 750 mg oral tablet: 750 mg, 1 tab(s), PO, q24hr, for 7 day(s), 7 tab(s), 0 Refill(s).        venlafaxine 75 mg oral tablet, extended release: 75 mg, 1 tab(s), po, daily, 30 tab(s), 5 Refill(s).        nystatin 100,000 units/mL oral suspension: 15 cc, Oral, tid, for 7 day(s), 315 mL, 0 Refill(s).        acetaminophen/butalbital/caffeine 325 mg-50 mg-40 mg oral tablet: See Instructions, TAKE ONE TABLET BY MOUTH EVERY 4 HOURS AS DIRECTED, 60 tab(s).                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 Children: Justa, 06/24/2015     Sexual      Sexually active: Yes. Sexual orientation: Heterosexual. Contraceptive Use Details: None., 05/22/2015     Substance Abuse      Never, 05/22/2015     Tobacco      Current, Cigarettes, 20 per day., 05/22/2015  Family History    Diabetes mellitus: Grandmother (M).    MI - Myocardial infarction: Grandfather (P).    WPW (Mary-Parkinson-White syndrome)...: Brother.    Father: History is negative    Mother: History is negative    Son: History is negative    Daughter: History is negative  Immunizations      Vaccine Date Status Comments      influenza virus vaccine, inactivated 02/09/2017 Recorded      influenza virus vaccine, inactivated 10/15/2014 Recorded [facility]      tetanus/diphth/pertuss (Tdap) adult/adol 11/12/2012 Recorded [facility]      IPV 05/23/2003 Recorded [facility]      Hep B 09/05/1996 Recorded

## 2022-02-16 NOTE — PROGRESS NOTES
Patient:   ADRIÁN VILLEGAS            MRN: [id number]            FIN: [id number]               Age:   41 years     Sex:  Female     :  1975   Associated Diagnoses:   Hypertension; Depression, major, recurrent, moderate; Encounter for screening for lipid disorder; Migraine; Neuropathic pain   Author:   Harvey Nath MD      Visit Information      Date of Service: 2017 06:51 pm  Performing Location: [facility]  Encounter#: [id number]      Primary Care Provider (PCP):  Harvey Nath MD    NPI# [id number]      Referring Provider:  No referring provider recorded for selected visit.   Visit type:  On-going care, Scheduled follow-up.         Medication: Follow-up, Refill.    Source of history:  Self, Medical record.    History limitation:  None.       Chief Complaint   3/16/2017 6:54 PM CDT    Pt presents today for a medication check and refill of her amlodipine 10 mg daily, HCTZ 25 mg daily and metoprolol tartrate 50 mg daily she takes for her HTN. Denies any issues with the medicaitons. She would like to discuss combining some of her HTN meds  (Modified)       History of Present Illness             The patient presents for follow-up evaluation of hypertension, Pt presents today for a medication check and refill of her amlodipine 10 mg daily, HCTZ 25 mg daily and metoprolol tartrate 50 mg daily she takes for her HTN. Denies any issues with the medications. She would like to discuss combining some of her HTN medications by taking HCTZ with lisinopril and d/c her amlodipine. She has been on her current three medications for years. When she forgets to take her HTN medications at night she wakes the next morning noticing the difference.  The quality of hypertension symptom(s) since the patient's last visit is described as being unchanged.  The severity of the hypertension symptom(s) since the last visit is mild.  Since the patient's last visit, the timing/course of hypertension symptom(s)  remains unchanged.  The context of the hypertension: blood pressure was maintained within the target range.  Exacerbating factors consist of missed medication.  Relieving factors consist of medication.  Associated symptoms consist of none.  Compliance problems: with weight management.  Additional pertinent history: previous labs reviewed.        Review of Systems   Constitutional:  No fever, No chills, No sweats, No weakness, No fatigue.    Eye:  No recent visual problem.    Ear/Nose/Mouth/Throat:  No decreased hearing, No nasal congestion, No sore throat.    Respiratory:  No shortness of breath, No cough.    Cardiovascular:  Negative, No chest pain, No palpitations, No peripheral edema.    Gastrointestinal:  No nausea, No vomiting, No diarrhea, No constipation, No heartburn.    Genitourinary:  No dysuria, No change in urine stream.    Hematology/Lymphatics:  No bruising tendency, No bleeding tendency.    Endocrine:  No cold intolerance, No heat intolerance.    Immunologic:  Negative.    Musculoskeletal:  No back pain, No neck pain, No joint pain, No muscle pain.    Integumentary:  No rash, No dryness, No skin lesion.    Neurologic:  Alert and oriented X4, No headache.    Psychiatric:  No anxiety, No depression.    All other systems were reviewed and are negative.      Health Status   Allergies:    Allergic Reactions (Selected)  No Known Medication Allergies   Medications:  (Selected)   Prescriptions  Prescribed  Metoprolol Tartrate 50 mg oral tablet: 1 tab(s) ( 50 mg ), po, daily, # 30 tab(s), 5 Refill(s), Type: Maintenance, Pharmacy: [facility], 1 tab(s) po daily  acetaminophen/butalbital/caffeine 325 mg-50 mg-40 mg oral tablet: See Instructions, Instructions: TAKE ONE TABLET EVERY 6 HOURS AS NEEDED FOR HEADACHE, # 60 tab(s), 0 Refill(s), Type: Soft Stop, Pharmacy: [facility], TAKE ONE TABLET EVERY 6 HOURS AS NEEDED FOR HEADACHE  amLODIPine 10 mg oral tablet: 1 tab(s) ( 10 mg ), PO, Daily, # 30 tab(s),  5 Refill(s), Type: Maintenance, Pharmacy: [facility], 1 tab(s) po daily  gabapentin 300 mg oral capsule: 4 cap(s) ( 1,200 mg ), po, tid, # 360 cap(s), 0 Refill(s), Type: Soft Stop, Pharmacy: [facility], 4 cap(s) po tid,x30 day(s)  hydroCHLOROthiazide 25 mg oral tablet: 1 tab(s) ( 25 mg ), po, daily, # 30 tab(s), 0 Refill(s), Type: Maintenance, Pharmacy: [facility], Pt needs to be seen  pregabalin 50 mg oral capsule: 1 cap(s) ( 50 mg ), po, bid, # 60 cap(s), 2 Refill(s), Type: Maintenance, called to pharmacy (Rx)  zolpidem 10 mg oral tablet: See Instructions, Instructions: TAKE ONE TABLET AT BEDTIME AS NEEDED FOR SLEEP, # 30 unknown unit, 4 Refill(s), Type: Soft Stop, Pharmacy: [facility]  Documented Medications  Documented  Multiple Vitamins oral tablet: 1 tab(s), po, daily, 0 Refill(s), Type: Maintenance   Problem list:    All Problems  Hypertension, essential / SNOMED CT 57690795 / Confirmed  GERD (gastroesophageal reflux disease) / SNOMED CT 815024862 / Confirmed  Insomnia / SNOMED CT 925258407 / Confirmed  Migraine / SNOMED CT 8530370 / Confirmed  Neuropathic pain / SNOMED CT 871689906 / Confirmed  Obesity / SNOMED CT 5514904238 / Probable  Depression, major, recurrent, moderate / SNOMED CT 095595291 / Confirmed  Tobacco user / SNOMED CT 766969693 / Probable  Resolved: Pregnancy / SNOMED CT 451693551      Histories   Past Medical History:    Resolved  Pregnancy (193096968):  Resolved in the month of 3/2000 at 24 years.   Family History:    Diabetes mellitus  Grandmother (M)  MI - Myocardial infarction  Grandfather (P) ()  Comments:  2015 12:16 PM - Diana Kumar  Late 50s.  WPW (Mray-Parkinson-White syndrome)...  Brother  Comments:  2015 12:19 PM - Diana Kumar  This is a half-brother, not sure if maternal or paternal.     Procedure history:     section (16762896) in  at 29 Years.   section (16059349) in  at 25 Years.  FS - Flexible  sigmoidoscopy (5551247707) on 4/26/1999 at 23 Years.  Esophagogastroduodenoscopy (587898717) on 2/23/1999 at 23 Years.  Cholecystectomy (56797092) on 4/3/1997 at 21 Years.  Ovarian cystectomy (5065766581) on 6/18/1996 at 20 Years.  Comments:  5/27/2015 12:06 PM - Diana Kumar  Diagnostic laparoscopy  Appendectomy (958745356) in 1992 at 17 Years.  Hysterectomy (238573240).  Hernia repair (27155863).   Social History:        Alcohol Assessment            Never      Tobacco Assessment            Current, Cigarettes, 20 per day.      Substance Abuse Assessment            Never      Employment and Education Assessment            Employed, Work/School description: CNA.      Home and Environment Assessment            Spouse/Partner name: Peewee Roberts.      Nutrition and Health Assessment            Type of diet: Regular.      Exercise and Physical Activity Assessment                     Comments:                      06/24/2015 - [employer]                     Does not exercise      Sexual Assessment            Sexually active: Yes.  Sexual orientation: Heterosexual.  Contraceptive Use Details: None.      Other Assessment            Last Eye Exam: 07/2012 and Last Dental Exam: 05/2012.  Children: Justa        Physical Examination   Vital Signs   3/16/2017 6:54 PM CDT Temperature Tympanic 99.5 DegF    Peripheral Pulse Rate 105 bpm  HI    HR Method Electronic    Systolic Blood Pressure 138 mmHg    Diastolic Blood Pressure 82 mmHg    Mean Arterial Pressure 101 mmHg    BP Site Left arm    BP Method Manual    Oxygen Saturation 95 %      Measurements from flowsheet : Measurements   3/16/2017 6:54 PM CDT Height Measured - Standard 62 in    Weight Measured - Standard 235 lb    BSA 2.16 m2    Body Mass Index 42.98 kg/m2      General:  Alert and oriented, No acute distress.    Eye:  Normal conjunctiva.    HENT:  Normocephalic, Tympanic membranes are clear, Normal hearing, Oral mucosa is moist, No pharyngeal  erythema.    Neck:  Supple, Non-tender, No carotid bruit, No lymphadenopathy, No thyromegaly.    Respiratory:  Lungs are clear to auscultation, Respirations are non-labored.    Cardiovascular:  Normal rate, Regular rhythm, Normal peripheral perfusion, No edema.    Gastrointestinal:  Soft, Non-tender.    Musculoskeletal:  Normal range of motion, Normal gait.    Integumentary:  Warm, Dry, No rash.    Neurologic:  Alert, Oriented.    Psychiatric:  Cooperative, Appropriate mood & affect.       Impression and Plan   Diagnosis     Hypertension (DLI52-HV I10).     Depression, major, recurrent, moderate (GYZ40-MT F33.1).     Encounter for screening for lipid disorder (PNK31-QN Z13.220).     Hypertension (GZL83-AV I10).     Migraine (XQO87-JH G43.109).     Neuropathic pain (QBD17-LJ M79.2).     Course:  Progressing as expected, Well controlled.    Plan:  Enroll in exercise program, Monitor blood pressure, Weight monitoring.         Diet: Low cholesterol, Sodium restricted, Blood pressure and current medication management for HTN discussed.    HCTZ 25 - metoprolol 50 mg combined medication discussed and prescribed. Rx sent to pharmacy.    Advise to stay on and continue with amlodipine as directed. Rx sent in.    Gabapentin and migraine medications refilled today.    Sent to lab for BMP and lipid panel. Patient will be contacted with results.    Follow up in 1 year for HTN medication managment.    Patient Instructions:       Counseled: Patient, Family, Spouse, Regarding diagnosis, Regarding treatment ( Hypertension ( Diet management, Medication management, Physical activity, Weight management ) ), Regarding medications, Diet, Activity, Smoking cessation, Verbalized understanding.    Orders     Orders (Selected)   Outpatient Orders  Ordered  Basic Metabolic Panel (Request): Hypertension  Lipid Panel and Chol/HDL Ratio w/ Reflex to Direct LDL (Request): Encounter for screening for lipid  disorder  Prescriptions  Prescribed  acetaminophen/butalbital/caffeine 325 mg-50 mg-40 mg oral tablet: See Instructions, Instructions: TAKE ONE TABLET EVERY 6 HOURS AS NEEDED FOR HEADACHE, # 45 tab(s), 3 Refill(s), Type: Soft Stop, Pharmacy: [facility], TAKE ONE TABLET EVERY 6 HOURS AS NEEDED FOR HEADACHE  amLODIPine 10 mg oral tablet: 1 tab(s) ( 10 mg ), PO, Daily, # 90 tab(s), 3 Refill(s), Type: Maintenance, Pharmacy: [facility], 1 tab(s) po daily  gabapentin 300 mg oral capsule: 4 cap(s) ( 1,200 mg ), po, tid, # 1,080 cap(s), 3 Refill(s), Type: Soft Stop, Pharmacy: [facility], 4 cap(s) po tid  hydrochlorothiazide-metoprolol 25 mg-50 mg oral tablet: 1 tab(s), po, daily, # 90 tab(s), 3 Refill(s), Type: Maintenance, Pharmacy: [facility], 1 tab(s) po daily.

## 2022-02-16 NOTE — LETTER
(Inserted Image. Unable to display)         February 13, 2020        ADRIÁN VILLEGAS  [address]  [place], [place] [id number]        Dear ADRIÁN,    Thank you for selecting [facility] for your healthcare needs.    Our records indicate you are due for the following services:     Hypertension check ~ Please remember to bring your at-home blood pressure readings with you to your appointment.   Non-Fasting Labs    If you had your labs done at another facility or with Direct Access Lab Testing at [facility], please bring in a copy of the results to your next visit, mail a copy, or drop off a copy of your results to your Healthcare Provider.     To schedule an appointment or if you have further questions, please contact your primary clinic:   [facility]       [phone]   [facility]       [phone]              [facility]     [phone]      Powered by [employer]    Sincerely,    Harvey Nath MD

## 2022-02-16 NOTE — TELEPHONE ENCOUNTER
---------------------From: Ginger Stevens CMA (eRx Pool ([id number]_[facility])) To: NOLAN Message Pool (32224_[facility]);   Sent: 10/11/2019 9:57:26 AM CDTSubject: FW: Medication Management Due Date/Time: 10/12/2019 7:59:00 AM CDT Provider approval needed1) Gabapentin 300mg 4 caps po tid #360, last prescribed 9/24/192) Lyrica 150mg 1 cap po bid #60, last prescribed 9/24/193) Zolpidem 10mg 1 tab po hs #30 +1, last prescribed 8/29/19Controlled Substance Agreement on file and date/HCP: noPatient due for follow up: yes due this month ( Oct 2019)Note: No apt made yet, pt due this month for med check and fasting labs, please advise on refillPer refill request on 9/24/19 NOLAN stated pt needed office visit for any further refills(Return to clinic order placed?) yes, due Oct for chronic disease visit and fasting labs per GTGPlease advise if refill appropriate and any additional refills** Patient matched by Ginger Stevens CMA on 10/11/2019 9:45:43 AM CDT **------------------------------------------From: [facility] -To: Teagan Nath MDt: October 11, 2019 7:59:52 AM CDTSubject: Medication ManagementDue: October 12, 2019 7:59:52 AM CDT** On Hold Pending Signature **Drug: zolpidem (zolpidem 10 mg oral tablet)  TAKE 1 TABLET BY MOUTH EVERY DAYQuantity: 30 EADays Supply: 0Refills: 0Substitutions AllowedNotes from Pharmacy: Dispensed Drug: zolpidem (zolpidem 10 mg oral tablet)  TAKE 1 TABLET BY MOUTH EVERY DAYQuantity: 30 tab(s)Days Supply: 30Refills: 0Substitutions AllowedNotes from Pharmacy: ** On Hold Pending Signature **Drug: PREGABALIN 150 MG CAPSULE  TAKE 1 CAPSULE BY MOUTH TWICE DAILYQuantity: 60 EADays Supply: 0Refills: 0Substitutions AllowedNotes from Pharmacy: Dispensed Drug: PREGABALIN 150 MG CAPSULE  TAKE 1 CAPSULE BY MOUTH TWICE DAILYQuantity: 60 cap(s)Days Supply: 30Refills: 0Substitutions AllowedNotes from Pharmacy: ** On Hold Pending Signature **Drug: gabapentin (gabapentin 300 mg  oral capsule)  Take 4 Capsules BY MOUTH THREE TIMES DAILYQuantity: 360 EADays Supply: 0Refills: 0Substitutions AllowedNotes from Pharmacy: Dispensed Drug: gabapentin (gabapentin 300 mg oral capsule)  Take 4 Capsules BY MOUTH THREE TIMES DAILYQuantity: 360 cap(s)Days Supply: 30Refills: 0Substitutions AllowedNotes from Pharmacy: ---------------------------------------------------------------From: Francisca Weiss MA (GTG Message Pool ([id number]_[facility])) To: Harvey Nath MD;   Sent: 10/14/2019 7:54:00 AM CDTSubject: FW: Medication Management Due Date/Time: 10/12/2019 7:59:00 AM CDT** Submitted: **Complete:gabapentin (gabapentin 300 mg oral capsule) Signed by Harvey Nath MD  10/14/2019 10:45:00 AM** Submitted: **Complete:zolpidem (zolpidem 10 mg oral tablet) Signed by Harvey Nath MD  10/14/2019 10:45:00 AM** Approved **zolpidem (ZOLPIDEM TARTRATE 10 MG TABLET)  TAKE 1 TABLET BY MOUTH EVERY DAYQty:  30 tab(s)        Days Supply:  30        Refills:  0        Substitutions Allowed     Route To [facility] - ** Approved **gabapentin (GABAPENTIN 300 MG CAPSULE)  Take 4 Capsules BY MOUTH THREE TIMES DAILYQty:  360 cap(s)        Days Supply:  30        Refills:  0        Substitutions Allowed     Route To Pharmacy - [facility] -

## 2022-02-16 NOTE — TELEPHONE ENCOUNTER
---------------------  From: Harvey Nath MD   To: ADRIÁN VILLEGAS    Sent: 4/8/2019 9:43:07 PM CDT  Subject: Patient Message - Results Notification      The tests were normal.      Results:  Date Result Name Value Ref Range   4/4/2019 4:49 PM Sodium Level 140 mmol/L (135 - 146)   4/4/2019 4:49 PM Potassium Level 4.7 mmol/L (3.5 - 5.3)   4/4/2019 4:49 PM Chloride Level 104 mmol/L (98 - 110)   4/4/2019 4:49 PM CO2 Level 29 mmol/L (20 - 32)   4/4/2019 4:49 PM Glucose Level 94 mg/dL (65 - 99)   4/4/2019 4:49 PM BUN 16 mg/dL (7 - 25)   4/4/2019 4:49 PM Creatinine Level 0.92 mg/dL (0.50 - 1.10)   4/4/2019 4:49 PM eGFR 77 mL/min/1.73m2 (> OR = 60 - )   4/4/2019 4:49 PM Calcium Level 9.6 mg/dL (8.6 - 10.2)

## 2022-02-16 NOTE — TELEPHONE ENCOUNTER
---------------------  From: Yuki Baltazar   Sent: 11/5/2019 11:07:25 AM CST  Subject: Scheduling Management     PT NO SHOED PHYSICAL W/ NOLAN 11/05/19

## 2022-02-16 NOTE — NURSING NOTE
Comprehensive Intake Entered On:  4/4/2019 4:13 PM CDT    Performed On:  4/4/2019 4:06 PM CDT by Eugenia Roman LPN   Chief Complaint :   Medication check. Warts on bottom of left foot.    Weight Measured :   220 lb(Converted to: 220 lb 0 oz, 99.79 kg)    Height Measured :   62 in(Converted to: 5 ft 2 in, 157.48 cm)    Body Mass Index :   40.23 kg/m2 (HI)    Body Surface Area :   2.09 m2   Systolic Blood Pressure :   146 mmHg (HI)    Diastolic Blood Pressure :   98 mmHg (HI)    Mean Arterial Pressure :   114 mmHg   Peripheral Pulse Rate :   84 bpm   BP Site :   Right arm   Pulse Site :   Radial artery   BP Method :   Manual   HR Method :   Manual   Temperature Tympanic :   98.8 DegF(Converted to: 37.1 DegC)    Eugenia Roman LPN - 4/4/2019 4:06 PM CDT   Health Status   Allergies Verified? :   Yes   Medication History Verified? :   Yes   Pre-Visit Planning Status :   Completed   Eugenia Roman LPN - 4/4/2019 4:06 PM CDT   Consents   Consent for Immunization Exchange :   Consent Granted   Consent for Immunizations to Providers :   Consent Granted   Eugenia Roman LPN - 4/4/2019 4:06 PM CDT   Meds / Allergies   (As Of: 4/4/2019 4:13:32 PM CDT)   Allergies (Active)   FLUoxetine  Estimated Onset Date:   Unspecified ; Created By:   Francisca Weiss MA; Reaction Status:   Active ; Category:   Drug ; Substance:   FLUoxetine ; Type:   Allergy ; Updated By:   Francisca Weiss MA; Source:   Patient ; Reviewed Date:   7/31/2018 2:14 PM CDT      PARoxetine  Estimated Onset Date:   Unspecified ; Reactions:   thrush ; Created By:   Francisca Weiss MA; Reaction Status:   Active ; Category:   Drug ; Substance:   PARoxetine ; Type:   Allergy ; Updated By:   Francisca Weiss MA; Source:   Patient ; Reviewed Date:   7/31/2018 2:14 PM CDT        Medication List   (As Of: 4/4/2019 4:13:32 PM CDT)   Prescription/Discharge Order    codeine-promethazine  :   codeine-promethazine ; Status:   Completed ;  Ordered As Mnemonic:   Promethazine with Codeine 6.25 mg-10 mg/5 mL oral syrup ; Simple Display Line:   10 mL, Oral, q4 hrs, 120 mL, 1 Refill(s) ; Ordering Provider:   Harvey Nath MD; Catalog Code:   codeine-promethazine ; Order Dt/Tm:   10/10/2018 10:24:22 AM          predniSONE  :   predniSONE ; Status:   Completed ; Ordered As Mnemonic:   predniSONE 20 mg oral tablet ; Simple Display Line:   20 mg, 1 tab(s), PO, Daily, for 7 day(s), 7 tab(s), 0 Refill(s) ; Ordering Provider:   Harvey Nath MD; Catalog Code:   predniSONE ; Order Dt/Tm:   10/10/2018 10:23:48 AM          doxycycline  :   doxycycline ; Status:   Completed ; Ordered As Mnemonic:   doxycycline monohydrate 100 mg oral capsule ; Simple Display Line:   100 mg, 1 cap(s), PO, bid, for 10 day(s), 20 cap(s), 0 Refill(s) ; Ordering Provider:   Harvey Nath MD; Catalog Code:   doxycycline ; Order Dt/Tm:   10/10/2018 10:24:01 AM          LORazepam  :   LORazepam ; Status:   Completed ; Ordered As Mnemonic:   LORazepam 1 mg oral tablet ; Simple Display Line:   1 mg, 1 tab(s), Oral, tid, PRN: as needed for anxiety, 30 tab(s), 1 Refill(s) ; Ordering Provider:   Harvey Nath MD; Catalog Code:   LORazepam ; Order Dt/Tm:   9/25/2018 2:11:44 PM          gabapentin  :   gabapentin ; Status:   Prescribed ; Ordered As Mnemonic:   gabapentin 300 mg oral capsule ; Simple Display Line:   4 cap(s), Oral, tid, 168 cap(s), 0 Refill(s) ; Ordering Provider:   Harvey Nath MD; Catalog Code:   gabapentin ; Order Dt/Tm:   3/22/2019 10:27:16 AM          Lyrica 150 mg oral capsule  :   Lyrica 150 mg oral capsule ; Status:   Prescribed ; Ordered As Mnemonic:   Lyrica 150 mg oral capsule ; Simple Display Line:   1 cap(s), Oral, bid, 60 cap(s), 3 Refill(s) ; Ordering Provider:   Harvey Nath MD; Catalog Code:   pregabalin ; Order Dt/Tm:   3/13/2019 5:13:48 PM          acetaminophen/butalbital/caffeine 325 mg-50 mg-40 mg oral tablet  :    acetaminophen/butalbital/caffeine 325 mg-50 mg-40 mg oral tablet ; Status:   Prescribed ; Ordered As Mnemonic:   acetaminophen/butalbital/caffeine 325 mg-50 mg-40 mg oral tablet ; Simple Display Line:   1 tab(s), Oral, bid, PRN: AS NEEDED FOR HEADACHE, 30 tab(s) ; Ordering Provider:   Harvey Nath MD; Catalog Code:   acetaminophen/butalbital/caffeine ; Order Dt/Tm:   3/13/2019 5:13:08 PM          zolpidem  :   zolpidem ; Status:   Prescribed ; Ordered As Mnemonic:   zolpidem 10 mg oral tablet ; Simple Display Line:   1 tab(s), Oral, qhs, 30 tab(s), 4 Refill(s) ; Ordering Provider:   Harvey Nath MD; Catalog Code:   zolpidem ; Order Dt/Tm:   1/2/2019 12:47:39 PM          pantoprazole  :   pantoprazole ; Status:   Prescribed ; Ordered As Mnemonic:   pantoprazole 40 mg oral delayed release tablet ; Simple Display Line:   1 tab(s), Oral, daily, 30 tab(s), 0 Refill(s) ; Ordering Provider:   Harvey Nath MD; Catalog Code:   pantoprazole ; Order Dt/Tm:   12/19/2018 8:47:46 AM          albuterol  :   albuterol ; Status:   Prescribed ; Ordered As Mnemonic:   albuterol 90 mcg/inh inhalation aerosol ; Simple Display Line:   2 puff(s), INH, QID, use with spacer chamber, PRN: for wheezing, 1 EA, 2 Refill(s) ; Ordering Provider:   Harvey Nath MD; Catalog Code:   albuterol ; Order Dt/Tm:   10/10/2018 10:24:17 AM          venlafaxine  :   venlafaxine ; Status:   Prescribed ; Ordered As Mnemonic:   venlafaxine 75 mg oral tablet, extended release ; Simple Display Line:   150 mg, 2 tab(s), po, daily, for 30 day(s), 60 tab(s), 5 Refill(s) ; Ordering Provider:   Harvey Nath MD; Catalog Code:   venlafaxine ; Order Dt/Tm:   9/25/2018 2:14:09 PM          hydroCHLOROthiazide  :   hydroCHLOROthiazide ; Status:   Prescribed ; Ordered As Mnemonic:   hydroCHLOROthiazide 25 mg oral tablet ; Simple Display Line:   25 mg, 1 tab(s), po, daily, 30 tab(s), 5 Refill(s) ; Ordering Provider:   Harvey Nath MD;  Catalog Code:   hydroCHLOROthiazide ; Order Dt/Tm:   5/24/2018 1:24:49 PM          metoprolol  :   metoprolol ; Status:   Prescribed ; Ordered As Mnemonic:   metoprolol succinate 50 mg oral tablet, extended release ; Simple Display Line:   50 mg, 1 tab(s), po, daily, 30 tab(s), 5 Refill(s) ; Ordering Provider:   Harvey Nath MD; Catalog Code:   metoprolol ; Order Dt/Tm:   5/24/2018 1:24:49 PM          amLODIPine  :   amLODIPine ; Status:   Prescribed ; Ordered As Mnemonic:   amLODIPine 10 mg oral tablet ; Simple Display Line:   10 mg, 1 tab(s), PO, Daily, 90 tab(s), 3 Refill(s) ; Ordering Provider:   Harvey Nath MD; Catalog Code:   amLODIPine ; Order Dt/Tm:   3/6/2018 11:45:30 AM          nicotine  :   nicotine ; Status:   Prescribed ; Ordered As Mnemonic:   nicotine 21 mg-14 mg-7 mg transdermal film, extended release ; Simple Display Line:   1 EA, td, daily, 1 kit(s), 0 Refill(s) ; Ordering Provider:   Harvey Nath MD; Catalog Code:   nicotine ; Order Dt/Tm:   3/6/2018 10:06:29 AM            Home Meds    multivitamin  :   multivitamin ; Status:   Documented ; Ordered As Mnemonic:   Multiple Vitamins oral tablet ; Simple Display Line:   1 tab(s), po, daily, 0 Refill(s) ; Catalog Code:   multivitamin ; Order Dt/Tm:   3/16/2017 7:06:05 PM

## 2022-02-16 NOTE — TELEPHONE ENCOUNTER
Entered by Obdulio Nava CMA on June 11, 2019 10:30:19 AM CDT  PCP:   NOLAN    Medication:   fioricet  Last Filled:  5-17-19    Quantity:  60  Refills:  0    Date of last office visit and reason:   4-4-19 chronic disease visit.    Date of last Med Check / Px:     Date of last labs pertaining to condition:      Note:  Please advise on refills.  Obdulio Nava CMA    Return to Clinic order placed?  yes due for HTN/Px and labwork 10-12-19    Resource:   _  Phone:   _  Fax:  _          ------------------------------------------  From: [facility]  To: Harvey Nath MD  Sent: June 11, 2019 10:15:20 AM CDT  Subject: Medication Management  Due: June 12, 2019 10:15:20 AM CDT    ** On Hold Pending Signature **  Drug: pregabalin (Lyrica 150 mg oral capsule)  TAKE ONE CAPSULE BY MOUTH TWICE A DAY  Quantity: 60 cap(s)     Days Supply: 30        Refills: 3  Substitutions Allowed  Notes from Pharmacy:     Dispensed Drug: pregabalin (Lyrica 150 mg oral capsule)  TAKE ONE CAPSULE BY MOUTH TWICE A DAY  Quantity: 60 cap(s)     Days Supply: 30        Refills: 3  Substitutions Allowed  Notes from Pharmacy:     ** On Hold Pending Signature **  Drug: acetaminophen/butalbital/caffeine (acetaminophen/butalbital/caffeine 325 mg-50 mg-40 mg oral tablet)  TAKE ONE TABLET TWO TIMES A DAY AS NEEDED FOR HEADACHE  Quantity: 60 tab(s)     Days Supply: 30        Refills: 0  Substitutions Allowed  Notes from Pharmacy:     Dispensed Drug: acetaminophen/butalbital/caffeine (acetaminophen/butalbital/caffeine 325 mg-50 mg-40 mg oral tablet)  TAKE ONE TABLET TWO TIMES A DAY AS NEEDED FOR HEADACHE  Quantity: 60 tab(s)     Days Supply: 30        Refills: 0  Substitutions Allowed  Notes from Pharmacy:   ---------------------------------------------------------------  From: Obdulio Nava CMA (eRx Pool ([id number]_[facility]))   To: Harvey Nath MD;     Sent: 6/11/2019 10:30:25 AM CDT  Subject: FW: Medication Management   Due Date/Time:  6/12/2019 10:15:00 AM CDT  ** Submitted: **  Complete:acetaminophen/butalbital/caffeine (acetaminophen/butalbital/caffeine 325 mg-50 mg-40 mg oral tablet)   Signed by Harvey Nath MD  6/12/2019 12:32:00 PM    ** Approved **  acetaminophen/butalbital/caffeine (BUTALBITAL-APAP-CAFF -40 TABS)  TAKE ONE TABLET TWO TIMES A DAY AS NEEDED FOR HEADACHE  Qty:  60 tab(s)        Days Supply:  30        Refills:  0          Substitutions Allowed     Route To Pharmacy - [facility]

## 2022-02-16 NOTE — TELEPHONE ENCOUNTER
---------------------  From: Joseph MITTAL, Shell GROSSMAN (Phone Messages Pool ([id number]_[facility]))   To: [facility] Message Pool ([id number]_[place] [facility] [place]);     Sent: 6/13/2019 3:35:02 PM CDT  Subject: PA for Butalbital     Phone message    PCP:   NOLAN      Time of Call:  1459       Person Calling:  Pt  Phone number:  [phone], OK to LVM    Returned call at: [phone]    Note:   Patient LVM stating she needs the PA signed today that the pharmacy faxed over.    I called pt and she states the pharmacy told her they faxed a PA for Butalbital because the one they faxed in March was never received back.    Please advise.---------------------  From: Francisca Weiss MA ([facility] Message Pool ([address]224_[facility]))   To: Harvey Nath MD;     Sent: 6/13/2019 5:08:38 PM CDT  Subject: FW: PA for Butalbital     PA form is next to your computer for completion/signature.

## 2022-02-16 NOTE — TELEPHONE ENCOUNTER
---------------------  From: Berenice Phillips LPN (eRx Pool (32224_[facility]))   To: [facility] Message Pool ([id number]_[place] [facility] [place]);     Sent: 4/1/2019 7:37:35 PM CDT  Subject: FW: Medication Management   Due Date/Time: 4/2/2019 1:08:00 PM CDT     Medication Refill needing approval    PCP:   NOLAN    Medication:   acetaminophen/butalbital/caffeine 1 tab PO BID PRN for headache  Last Filled:  3/13/19    Quantity:  30  Refills:  0  Medication:   pantoprazole 40mg 1 tab daily  Last Filled:  12/19/18    Quantity:  30  Refills:  0  CSA on file?   n/a     Date of last office visit and reason:   10/10/18 bronchitis  Date of last labs pertaining to condition:  n/a    Note:  Please advise- pt was due for med check/HTN check in October. Pt had appt 3/28/19 and no showed.    Return to Clinic order placed?  _    Resource:   pharmacy          ------------------------------------------  From: [facility]  To: Harvey Nath MD  Sent: April 1, 2019 1:08:38 PM CDT  Subject: Medication Management  Due: April 2, 2019 1:08:38 PM CDT    ** On Hold Pending Signature **  Drug: acetaminophen/butalbital/caffeine (acetaminophen/butalbital/caffeine 325 mg-50 mg-40 mg oral tablet)  TAKE ONE TABLET BY MOUTH TWICE A DAY AS NEEDED FOR HEADACHE  Quantity: 30 tab(s)     Days Supply: 15        Refills: 0  Substitutions Allowed  Notes from Pharmacy:     Dispensed Drug: acetaminophen/butalbital/caffeine (acetaminophen/butalbital/caffeine 325 mg-50 mg-40 mg oral tablet)  TAKE ONE TABLET BY MOUTH TWICE A DAY AS NEEDED FOR HEADACHE  Quantity: 30 tab(s)     Days Supply: 15        Refills: 0  Substitutions Allowed  Notes from Pharmacy:     ** On Hold Pending Signature **  Drug: pantoprazole (pantoprazole 40 mg oral delayed release tablet)  TAKE ONE TABLET BY MOUTH EVERY DAY NEEDS TO BE SEEN FOR MORE REFILLS  Quantity: 30 tab(s)     Days Supply: 30        Refills: 0  Substitutions Allowed  Notes from Pharmacy:     Dispensed Drug: pantoprazole  (pantoprazole 40 mg oral delayed release tablet)  TAKE ONE TABLET BY MOUTH EVERY DAY NEEDS TO BE SEEN FOR MORE REFILLS  Quantity: 30 tab(s)     Days Supply: 30        Refills: 0  Substitutions Allowed  Notes from Pharmacy:   ---------------------------------------------------------------  From: Francisca Weiss MA ([id number]   To: Harvey Nath MD;     Sent: 4/2/2019 8:44:45 AM CDT  Subject: FW: Medication Management   Due Date/Time: 4/2/2019 1:08:00 PM CDT  ** Not Approved: Patient needs appointment **  pantoprazole (PANTOPRAZOLE SODIUM 40MG TBEC)  TAKE ONE TABLET BY MOUTH EVERY DAY NEEDS TO BE SEEN FOR MORE REFILLS  Qty:  30 tab(s)        Days Supply:  30        Refills:  0          Substitutions Allowed     Route To Pharmacy - [facility]---------------------  From: Harvey Nath MD   To: [facility]    Sent: 4/3/2019 12:42:32 PM CDT  Subject: FW: Medication Management     ** Not Approved: Patient needs appointment **  acetaminophen/butalbital/caffeine (BUTALBITAL-APAP-CAFF -40 TABS)  TAKE ONE TABLET BY MOUTH TWICE A DAY AS NEEDED FOR HEADACHE  Qty:  30 tab(s)        Days Supply:  15        Refills:  0          Substitutions Allowed     Route To Pharmacy - [facility]

## 2022-02-16 NOTE — CARE COORDINATION
Pt appears on  [facility] chronic disease panel as out of parameters for elevated BP.  RTC placed for CSS only BP check.    Diana Poe CMA.

## 2022-02-16 NOTE — PROGRESS NOTES
Chief Complaint    c/o not feeling well since last Monday, was seen in ER this past Sunday. still having cough, SOB, back/rib/chest discomfort, chills,headaches, decreased appetite.  History of Present Illness      Patient is here for follow-up on pneumonia.  She was diagnosed with pneumonia on Sunday morning and is currently been on azithromycin, albuterol, and prednisone.  Her illness started middle of last week.  She feels she is worsening over the last 24 hours.  Cough and shortness of breath are prominent as well as decreased energy level.  Review of Systems      See HPI.  All other review of systems negative.  Physical Exam   Vitals & Measurements    T: 100.3(Tympanic)  HR: 120(Peripheral)  BP: 101/69  SpO2: 89%     HT: 62 in  WT: 230.4 lb  BMI: 42.14       Appears ill, mild distress       Using nasal cannula oxygen       Ear canals are patent, TMs clear       Throat is clear       Decreased breath sounds throughout with crackles in the right middle lobe       Heart has regular rate and rhythm       Chest x-ray shows probable infiltrate right middle lobe       White blood cell count elevated at 21,800  Assessment/Plan       Pneumonia, community acquired         She is not responding to outpatient treatment.  Arrangements have been made for admission to the hospital for increased supportive care and IV antibiotics.           Patient Information     Name:ADRIÁN VILLEGAS      Address:      [address] [place] [id number]     Sex:Female     YOB: 1975     [phone]     Emergency Contact:WILTON VILLEGAS     MRN:[id number]     FIN:[id number]     Location:[facility]     Date of Service:12/05/2017      Primary Care Physician:       Harvey Nath MD, [phone]  Problem List/Past Medical History    Ongoing     Depression, major, recurrent, moderate     GERD (gastroesophageal reflux disease)     Hypertension, essential     Insomnia     Migraine     Neuropathic pain      Obesity     Tobacco user    Historical     Pregnancy  Procedure/Surgical History      section ()      section ()     FS - Flexible sigmoidoscopy (1999)     Esophagogastroduodenoscopy (1999)     Cholecystectomy (1997)     Ovarian cystectomy (1996)     Appendectomy ()     Hernia repair     Hysterectomy  Medications    acetaminophen/butalbital/caffeine 325 mg-50 mg-40 mg oral tablet, See Instructions, 3 refills    amLODIPine 10 mg oral tablet, 10 mg= 1 tab(s), po, daily, 3 refills    gabapentin 300 mg oral capsule, 1200 mg= 4 cap(s), po, tid, 3 refills    hydrochlorothiazide-metoprolol 25 mg-50 mg oral tablet, 1 tab(s), po, daily, 3 refills    Lyrica 50 mg oral capsule, See Instructions, 5 refills    Multiple Vitamins oral tablet, 1 tab(s), po, daily    pantoprazole 40 mg oral delayed release tablet, 40 mg= 1 tab(s), po, daily, 5 refills    PARoxetine 20 mg oral tablet, 20 mg= 1 tab(s), po, daily, 5 refills    zolpidem 10 mg oral tablet, 10 mg= 1 tab(s), po, hs, 4 refills  Allergies    No Known Medication Allergies  Social History    Smoking Status - 2017     Current every day smoker     Alcohol - 2015      Never     Employment and Education - 2015      Employed, Work/School description: CNA.     Exercise and Physical Activity - 2015     Home and Environment - 2015      Spouse/Partner name: Peewee Roberts.     Nutrition and Health - 2015      Type of diet: Regular.     Other - 2015      Last Eye Exam: 2012 and Last Dental Exam: 2012. Children: bharath and Marianne     Sexual - 2015      Sexually active: Yes. Sexual orientation: Heterosexual. Contraceptive Use Details: None.     Substance Abuse - 2015      Never     Tobacco - 2015      Current, Cigarettes, 20 per day.  Family History    Diabetes mellitus: Grandmother (M).    MI - Myocardial infarction: Grandfather (P).    WPW (Mary-Parkinson-White  syndrome)...: Brother.  Immunizations      Vaccine Date Status Comments      influenza virus vaccine, inactivated 02/09/2017 Recorded      influenza virus vaccine, inactivated 10/15/2014 Recorded [facility]      tetanus/diphth/pertuss (Tdap) adult/adol 11/12/2012 Recorded [facility]      IPV 05/23/2003 Recorded [facility]      Hep B 09/05/1996 Recorded  Lab Results      Results (Last 90 days)                Laboratory                     Hematology                          CBC                               Hct:      36.7 %  (12/05/17 01:05 PM CST)                                                                                                                                          Hgb:      12.6 g/dL  (12/05/17 01:05 PM CST)                                                                                                                                          MCH:      32.6 pg  (12/05/17 01:05 PM CST)                                                                                                                                          MCHC:      34.3 g/dL  (12/05/17 01:05 PM CST)                                                                                                                                          MCV:      95 fL  (12/05/17 01:05 PM CST)                                                                                                                                          MPV:      H 11.6 fL (Range 6.5 - 11.0)  (12/05/17 01:05 PM CST)                                                                                                                                          Platelet:      435   (12/05/17 01:05 PM CST)                                                                                                                                          RBC:      L 3.86  (Range 4.00 - 5.20)  (12/05/17 01:05 PM CST)                                                                                                                                           RDW:      13.7 %  (12/05/17 01:05 PM CST)                                                                                                                                          WBC:      H 21.8  (Range 4.5 - 11.0)  (12/05/17 01:05 PM CST)                                                                                                                                     Differential                               Abs Basophils:      0.0   (12/05/17 01:05 PM CST)                                                                                                                                          Abs Eosinophils:      0.0   (12/05/17 01:05 PM CST)                                                                                                                                          Abs Lymphocytes:      2.4   (12/05/17 01:05 PM CST)                                                                                                                                          Abs Monocytes:      0.7   (12/05/17 01:05 PM CST)                                                                                                                                          Abs Neutrophils:      H 18.7  (Range 1.7 - 7.0)  (12/05/17 01:05 PM CST)                                                                                                                                          Basophils:      0.0 %  (12/05/17 01:05 PM CST)                                                                                                                                          Blasts Man:      0.0 %  (12/05/17 01:05 PM CST)                                                                                                                                          Eosinophils:      0.0 %  (12/05/17 01:05 PM CST)                                                                                                                                           Lymphocytes:      L 11.0 % (Range 20.0 - 44.0)  (12/05/17 01:05 PM CST)                                                                                                                                          Metamyelocytes Man:      0.0 %  (12/05/17 01:05 PM CST)                                                                                                                                          Monocytes:      3.0 %  (12/05/17 01:05 PM CST)                                                                                                                                          Myelocytes Man:      0.0 %  (12/05/17 01:05 PM CST)                                                                                                                                          Neutrophils:      H 86.0 % (Range 42.0 - 72.0)  (12/05/17 01:05 PM CST)                                                                                                                                          Other Cells Man:      0.0 %  (12/05/17 01:05 PM CST)                                                                                                                                          Plasma Cells:      0.0 %  (12/05/17 01:05 PM CST)                                                                                                                                          Platelet Estimate                                        (12/05/17 01:05 PM CST)                                                                                                                                                Adequate   (12/05/17 01:05 PM CST)                                                                                                                                          Promyelocytes Man:      0.0 %  (12/05/17 01:05 PM CST)                                                                                                                                      Morphology                               RBC Comments:      RBC morphology appears normal   (12/05/17 01:05 PM CST)                                                                                                                                          RBC Morphology:         (12/05/17 01:05 PM CST)

## 2022-02-16 NOTE — TELEPHONE ENCOUNTER
---------------------  From: Blanca Benitez LPN (Phone Messages Pool ([id number]_[facility]))   To: [facility] Message Pool (32224_[place] [facility] [place]);     Sent: 9/24/2019 9:14:47 AM CDT  Subject: Medication Refills       Phone Message    PCP: NOLAN    Time of call: 9:00am message left    Person calling: Apoorva  Contact # : [phone]    MESSAGE: Pt calls stating she has been waiting since Friday for refills on some medications.  1) Gabapentin 300mg 4 caps po tid #360+5, last prescribed 4/4/19  2) Lyrica 150mg 1 cap po bid #60+3, last prescribed 6/12/19  3) Zolpidem 10mg 1 tab po hs #30+1, last prescribed 8/29/19.  Pt says that she will get sick if she does not get the Gabapentin.    Last visit/reason: 4/4/19 - chronic disease visit    Per RTC order pt is due for a visit 10/2019. The Zolpidem are was taken care of last month, so she should have 1 refill remaining on that. Please advise on the other refills.---------------------  From: Francisca Weiss MA ([facility] Message Pool ([facility]))   To: Harvey Nath MD;     Sent: 9/24/2019 10:03:00 AM CDT  Subject: FW: Medication Refills---------------------  From: Harvey Nath MD   To: [facility] Message Pool ([id number]_[place] [facility] [place]);     Sent: 9/24/2019 1:19:00 PM CDT  Subject: RE: Medication Refills     one month for pregabalin and gabapentin sent in, will need follow up visitLM for pt that rx refills were sent in and to check w/ pharmacy re: Zolpidem refill.

## 2022-02-16 NOTE — PROGRESS NOTES
Chief Complaint    c/o chest discomfort, cough, congestion, fever yesterday. started Monday. has hx pneumonia.  History of Present Illness      Patient is here with cough and chest tightness/discomfort since Monday, fever of 100 yesterday.  Symptoms were getting worse yesterday and now she is having back pain, hurts to breathe.  Her O2 sats are at 95% today, temp normal.  She does have an Albuterol inhaler, which she hasn't been using lately as it's at her sister's house. She is concerned about developing pneumonia.  Hospitalized twice in past year.  Review of Systems      Constitutional:  No fever, No chills, No sweats, No weakness, No fatigue.            Eye:  No recent visual problem.            Ear/Nose/Mouth/Throat:  No decreased hearing, No nasal congestion, No sore throat.            Respiratory:  Shortness of breath, Cough, Sputum production.            Cardiovascular:  Negative, No chest pain, No palpitations, No peripheral edema.            Gastrointestinal:  No nausea, No vomiting, No diarrhea, No constipation, No heartburn.            Genitourinary:  No dysuria, No change in urine stream.            Hematology/Lymphatics:  No bruising tendency, No bleeding tendency.            Endocrine:  No cold intolerance, No heat intolerance.            Immunologic:  Negative.            Musculoskeletal:  No back pain, No neck pain, No joint pain, No muscle pain.            Integumentary:  No rash, No dryness, No skin lesion.            Neurologic:  Alert and oriented X4, No headache.            Psychiatric:  No anxiety, No depression.         Physical Exam   Vitals & Measurements    T: 98.9   F (Tympanic)  HR: 89(Peripheral)  BP: 128/74  SpO2: 95%     HT: 62 in  WT: 212.8 lb  BMI: 38.92       General:  Alert and oriented, No acute distress.            Eye:  Pupils are equal, round and reactive to light, Normal conjunctiva.            HENT:  Tympanic membranes are clear, Oral mucosa is moist, No pharyngeal erythema.             Neck:  Supple.            Respiratory:                  Respirations: Are within normal limits.                 Breath sounds: No wheezes present.                 Cough: Barking, Productive.            Cardiovascular:  Normal rate, Regular rhythm, No edema.            Gastrointestinal:  Non-distended.            Musculoskeletal:  Normal gait.            Integumentary:  Warm, No rash.            Psychiatric:  Cooperative, Appropriate mood & affect, Normal judgment.         Assessment/Plan       1. Chest discomfort (R07.89)        EKG will be done to rule out cardiac issues.  Patient will also be given Albuterol neb treatment to help loosen up her airways.        NSR, non-specific T wave changes       2. Bronchitis (J40)        Will treat patient with an antibiotic, Doxycycline 100mg bid x10 days, before her symptoms get any worse.  Will also start her on some Prednisone 20mg daily x7 days.  A prescription for Promethazine-codeine 10mL q4hrs will also be sent in to help with the cough.  She will also start using her inhaler.  If her symptoms get worse, she should come back for a follow up.  She will call us tomorrow afternoon for an update.      IFrancisca MA, acted solely as a scribe for, and in the presence of Dr. Harvey Nath who performed the services.             I, Harvey Nath MD, personally performed the services described in this documentation.  The documentation was scribed in my presence and is both accurate and complete.                Patient Information     Name:ADRIÁN VILLEGAS      Address:      [address] [place] [id number]     Sex:Female     YOB: 1975     [phone]     Emergency Contact:ROCK SMITH     MRN:[id number]     FIN:[id number]     Location:[facility]     Date of Service:10/10/2018      Primary Care Physician:       Harvey Nath MD, [phone]      Attending Physician:       Harvey Nath MD,  [phone]  Problem List/Past Medical History    Ongoing     Depression, major, recurrent, moderate     GERD (gastroesophageal reflux disease)     History of sepsis     Hypertension, essential     Insomnia     Migraine     Neuropathic pain     Obesity     Tobacco user    Historical     Inpatient stay       Comments: @[facility], [place] - Community acquired pneumonia     Pregnancy  Procedure/Surgical History      section ()            section ()           FS - Flexible sigmoidoscopy (1999)           Esophagogastroduodenoscopy (1999)           Cholecystectomy (1997)           Ovarian cystectomy (1996)            Comments:      Diagnostic laparoscopy     Appendectomy ()           Hernia repair           Hysterectomy        Medications     Multiple Vitamins oral tablet: 1 tab(s), po, daily, 0 Refill(s).     nicotine 21 mg-14 mg-7 mg transdermal film, extended release: 1 EA, td, daily, 1 kit(s), 0 Refill(s).     amLODIPine 10 mg oral tablet: 10 mg, 1 tab(s), PO, Daily, 90 tab(s), 3 Refill(s).     pantoprazole 40 mg oral delayed release tablet: See Instructions, TAKE ONE TABLET BY MOUTH EVERY DAY, 30 tab(s), 5 Refill(s).     hydroCHLOROthiazide 25 mg oral tablet: 25 mg, 1 tab(s), po, daily, 30 tab(s), 5 Refill(s).     metoprolol succinate 50 mg oral tablet, extended release: 50 mg, 1 tab(s), po, daily, 30 tab(s), 5 Refill(s).     Lyrica 150 mg oral capsule: See Instructions, TAKE ONE CAPSULE BY MOUTH TWICE A DAY, 60 cap(s), 3 Refill(s).     acetaminophen/butalbital/caffeine 325 mg-50 mg-40 mg oral tablet: See Instructions, TAKE ONE TABLET BY MOUTH TWICE A DAY AS NEEDED FOR HEADACHE, 30 tab(s), 4 Refill(s).     zolpidem 10 mg oral tablet: See Instructions, TAKE ONE TABLET BY MOUTH AT BEDTIME, 30 tab(s), 4 Refill(s).     venlafaxine 75 mg oral tablet, extended release: 150 mg, 2 tab(s), po, daily, for 30 day(s), 60 tab(s), 5 Refill(s).     LORazepam 1 mg  oral tablet: 1 mg, 1 tab(s), Oral, tid, PRN: as needed for anxiety, 30 tab(s), 1 Refill(s).     gabapentin 300 mg oral capsule: See Instructions, TAKE FOUR CAPSULES BY MOUTH THREE TIMES A DAY, 360 cap(s), 5 Refill(s).     predniSONE 20 mg oral tablet: 20 mg, 1 tab(s), PO, Daily, for 7 day(s), 7 tab(s), 0 Refill(s).     doxycycline monohydrate 100 mg oral capsule: 100 mg, 1 cap(s), PO, bid, for 10 day(s), 20 cap(s), 0 Refill(s).     albuterol 90 mcg/inh inhalation aerosol: 2 puff(s), INH, QID, use with spacer chamber, PRN: for wheezing, 1 EA, 2 Refill(s).     Promethazine with Codeine 6.25 mg-10 mg/5 mL oral syrup: 10 mL, Oral, q4 hrs, 120 mL, 1 Refill(s).          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 Children: Justa, 06/24/2015     Sexual      Sexually active: Yes. Sexual orientation: Heterosexual. Contraceptive Use Details: None., 05/22/2015     Substance Abuse      Never, 05/22/2015     Tobacco      Current, Cigarettes, 20 per day., 05/22/2015  Family History    Diabetes mellitus: Grandmother (M).    MI - Myocardial infarction: Grandfather (P).    WPW (Mary-Parkinson-White syndrome)...: Brother.    Father: History is negative    Mother: History is negative    Son: History is negative    Daughter: History is negative  Immunizations      Vaccine Date Status Comments      influenza virus vaccine, inactivated 02/09/2017 Recorded      influenza virus vaccine, inactivated 10/15/2014 Recorded W[facility]      tetanus/diphth/pertuss (Tdap) adult/adol 11/12/2012 Recorded  [facility]      IPV 05/23/2003 Recorded [facility]      Hep B 09/05/1996 Recorded

## 2022-02-16 NOTE — TELEPHONE ENCOUNTER
---------------------  From: Bobbi Mac (eRx Pool (32224_[facility]))   To: [facility] Message Pool ([address]_[place] [facility] [place]);     Sent: 8/29/2019 2:21:59 PM CDT  Subject: FW: Medication Management   Due Date/Time: 8/29/2019 11:30:00 AM CDT     PCP:   NOLAN    Medication:   Zolpidem  Last Filled:  4/5/19   Quantity:  30 tabs Refills:  4    Date of last office visit and reason:   4/4/19 chronic disease mngmnt  Date of last labs pertaining to condition:  _    Note:  Please advise on refill.    Return to Clinic order placed?  yes, due 10/4/19 for 6 month chronic disease visit.              ** Submitted: **  Order:acetaminophen/butalbital/caffeine (acetaminophen/butalbital/caffeine 325 mg-50 mg-40 mg oral tablet)  1 tab(s)  Oral  bid  Qty:  60 tab(s)        Days Supply:  30        Refills:  1          Substitutions Allowed     PRN  AS NEEDED FOR HEADACHE      Route To Pharmacy - [facility]    Signed by Bobbi Mac  8/29/2019 2:20:00 PM    ** Submitted: **  Complete:acetaminophen/butalbital/caffeine (acetaminophen/butalbital/caffeine 325 mg-50 mg-40 mg oral tablet)   Signed by Bobbi Mac  8/29/2019 2:20:00 PM    ** Not Approved:  **  acetaminophen/butalbital/caffeine (BUTAL/ACETAMN/CF -40 TAB)  TAKE 1 TABLET BY MOUTH TWICE DAILY AS NEEDED FOR HEADACHE  Qty:  60 tab(s)        Days Supply:  30        Refills:  0          Substitutions Allowed     Route To Pharmacy - [facility]   Note from Pharmacy:  Please consider 90 day supplies to promote better adherence  Signed by Bobbi Mac            ** Patient matched by Bobbi Mac on 8/29/2019 2:16:37 PM CDT **      ------------------------------------------  From: [facility]  To: Harvey Nath MD  Sent: August 28, 2019 11:30:00 AM CDT  Subject: Medication Management  Due: August 29, 2019 11:30:00 AM CDT    ** On Hold Pending Signature **  Drug: acetaminophen/butalbital/caffeine  (acetaminophen/butalbital/caffeine 325 mg-50 mg-40 mg oral tablet)  TAKE 1 TABLET BY MOUTH TWICE DAILY AS NEEDED FOR HEADACHE  Quantity: 60 tab(s)     Days Supply: 0         Refills: 0  Substitutions Allowed  Notes from Pharmacy:     Dispensed Drug: acetaminophen/butalbital/caffeine (acetaminophen/butalbital/caffeine 325 mg-50 mg-40 mg oral tablet)  TAKE 1 TABLET BY MOUTH TWICE DAILY AS NEEDED FOR HEADACHE  Quantity: 60 tab(s)     Days Supply: 30        Refills: 0  Substitutions Allowed  Notes from Pharmacy: Please consider 90 day supplies to promote better adherence    ** On Hold Pending Signature **  Drug: zolpidem (zolpidem 10 mg oral tablet)  TAKE 1 TABLET BY MOUTH ONCE DAILY AT BEDTIME  Quantity: 30 tab(s)     Days Supply: 0         Refills: 0  Substitutions Allowed  Notes from Pharmacy:     Dispensed Drug: zolpidem (zolpidem 10 mg oral tablet)  TAKE 1 TABLET BY MOUTH ONCE DAILY AT BEDTIME  Quantity: 30 tab(s)     Days Supply: 30        Refills: 1  Substitutions Allowed  Notes from Pharmacy:   ---------------------------------------------------------------  From: Francisca Weiss MA ([employer] Pool (32224_[facility]))   To: Harvey Nath MD;     Sent: 8/29/2019 2:25:50 PM CDT  Subject: FW: Medication Management   Due Date/Time: 8/29/2019 11:30:00 AM CDT---------------------  From: Harvey Nath MD   To: [facility]    Sent: 8/29/2019 3:46:27 PM CDT  Subject: FW: Medication Management     ** Submitted: **  Complete:zolpidem (zolpidem 10 mg oral tablet)   Signed by Harvey Nath MD  8/29/2019 3:46:00 PM    ** Approved **  zolpidem (ZOLPIDEM 10MG       TAB)  TAKE 1 TABLET BY MOUTH ONCE DAILY AT BEDTIME  Qty:  30 tab(s)        Days Supply:  30        Refills:  1          Substitutions Allowed     Route To Pharmacy - [id number]

## 2022-02-16 NOTE — TELEPHONE ENCOUNTER
---------------------  From: Meena Woodruff CMA (eRx Pool (32224_[facility]))   To: [facility] Message Pool ([id number]_[place] [facility] [place]);     Sent: 1/9/2019 9:04:49 AM CST  Subject: FW: Medication Management   Due Date/Time: 1/10/2019 8:11:00 AM CST           Entered by Meena Woodruff CMA on January 09, 2019 9:04:43 AM CST  PCP:   NOLAN    Medication:   Butalbital  Last Filled:  12/4/18  Quantity:  30 tab Refills:  2    Date of last office visit and reason:   10/10/18 bronchitis  Date of last labs pertaining to condition:  None    Note:  Looks like patient is past due for labs and HTN check. Please advise refill.     Return to Clinic order placed?  Yes, patient was due 10/2018 for HTN check and labs.     Resource:   eRx pool  Phone:   _       ------------------------------------------  From: [facility]  To: Harvey Nath MD  Sent: January 9, 2019 8:11:04 AM CST  Subject: Medication Management  Due: January 10, 2019 8:11:04 AM CST    ** On Hold Pending Signature **  Drug: acetaminophen/butalbital/caffeine (acetaminophen/butalbital/caffeine 325 mg-50 mg-40 mg oral tablet)  TAKE ONE TABLET BY MOUTH TWICE A DAY AS NEEDED FOR HEADACHE  Quantity: 30 tab(s)     Days Supply: 15        Refills: 2  Substitutions Allowed  Notes from Pharmacy:     Dispensed Drug: acetaminophen/butalbital/caffeine (acetaminophen/butalbital/caffeine 325 mg-50 mg-40 mg oral tablet)  TAKE ONE TABLET BY MOUTH TWICE A DAY AS NEEDED FOR HEADACHE  Quantity: 30 tab(s)     Days Supply: 15        Refills: 2  Substitutions Allowed  Notes from Pharmacy:   ---------------------------------------------------------------  From: Francisca Weiss MA ([facility] Message Pool ([id number]_[facility]))   To: Harvey Nath MD;     Sent: 1/9/2019 9:48:57 AM CST  Subject: FW: Medication Management   Due Date/Time: 1/10/2019 8:11:00 AM CST---------------------  From: Harvey Nath MD   To: [facility]    Sent: 1/9/2019 10:40:41 AM CST  Subject: FW: Medication  Management     ** Submitted: **  Complete:acetaminophen/butalbital/caffeine (acetaminophen/butalbital/caffeine 325 mg-50 mg-40 mg oral tablet)   Signed by Harvey Nath MD  1/9/2019 10:40:00 AM    ** Approved with modifications: **  acetaminophen/butalbital/caffeine (BUTALBITAL-APAP-CAFF -40 TABS)  TAKE ONE TABLET BY MOUTH TWICE A DAY AS NEEDED FOR HEADACHE  Qty:  30 tab(s)        Days Supply:  15        Refills:  1          RENARD     Route To Pharmacy - [facility]   Note to Pharmacy:  due for follow up appt

## 2022-02-16 NOTE — PROGRESS NOTES
Patient:   ADRIÁN VILLEGAS            MRN: [id number]            FIN: [id number]               Age:   41 years     Sex:  Female     :  1975   Associated Diagnoses:   Depression, major, recurrent, moderate; GERD (gastroesophageal reflux disease)   Author:   Harvey Nath MD      Visit Information      Date of Service: 2017 01:13 pm  Performing Location: [facility]  Encounter#: [id number]      Chief Complaint   2017 1:23 PM CST   med check.   discuss rx for antacids---OTC form not helping w/ acid reflux.        History of Present Illness             The patient presents with depression.  The depressive symptom(s) are described as feeling of hopelessness, irritability and no suicidal ideation.  The severity of the depressive symptom(s) is moderate.  The symptom(s) associated with depression is worsening.  The depressive symptom(s) has lasted for an unknown duration of time.  The context of the symptom(s) associated with depression: occurred in association with illness.  Exacerbating factors consist of emotional stress.  Relieving factors consist of medication.  Associated symptoms consist of fatigue and loss of appetite.  Complaint: worsening heartburn symptoms not responding to ranitidine.        Review of Systems   All other systems were reviewed and are negative.      Health Status   Allergies:    Allergic Reactions (Selected)  No Known Medication Allergies   Medications:  (Selected)   Prescriptions  Prescribed  Lyrica 50 mg oral capsule: See Instructions, Instructions: TAKE ONE CAPSULE BY MOUTH TWICE A DAY, # 60 unknown unit, 5 Refill(s), Type: Soft Stop, Pharmacy: [facility]  acetaminophen/butalbital/caffeine 325 mg-50 mg-40 mg oral tablet: See Instructions, Instructions: TAKE ONE TABLET BY MOUTH EVERY 6 HOURS AS NEEDED FOR HEADACHE, # 45 unknown unit, 3 Refill(s), Type: Soft Stop, Pharmacy: [facility]  amLODIPine 10 mg oral tablet: 1 tab(s) ( 10 mg ), PO, Daily, # 90  tab(s), 3 Refill(s), Type: Maintenance, Pharmacy: [facility], 1 tab(s) po daily  gabapentin 300 mg oral capsule: 4 cap(s) ( 1,200 mg ), po, tid, # 1,080 cap(s), 3 Refill(s), Type: Soft Stop, Pharmacy: [facility], 4 cap(s) po tid  hydrochlorothiazide-metoprolol 25 mg-50 mg oral tablet: 1 tab(s), po, daily, # 90 tab(s), 3 Refill(s), Type: Maintenance, Pharmacy: [facility], 1 tab(s) po daily  zolpidem 10 mg oral tablet: 1 tab(s) ( 10 mg ), po, hs, # 30 tab(s), 4 Refill(s), Type: Soft Stop, Pharmacy: [facility], 1 tab(s) po hs  Documented Medications  Documented  Multiple Vitamins oral tablet: 1 tab(s), po, daily, 0 Refill(s), Type: Maintenance      Histories   Past Medical History:    Resolved  Pregnancy ([id number]):  Resolved in the month of 3/2000 at 24 years.      Physical Examination   Vital Signs   11/20/2017 1:23 PM CST Temperature Tympanic 99.2 DegF    Peripheral Pulse Rate 84 bpm    Pulse Site Radial artery    HR Method Manual    Systolic Blood Pressure 120 mmHg    Diastolic Blood Pressure 84 mmHg    Mean Arterial Pressure 96 mmHg    BP Site Right arm    BP Method Manual      Measurements from flowsheet : Measurements   11/20/2017 1:23 PM CST Height Measured - Standard 62 in    Weight Measured - Standard 232.6 lb    BSA 2.15 m2    Body Mass Index 42.54 kg/m2      General:  Alert and oriented, No acute distress.    Eye:  Normal conjunctiva.    HENT:  Normocephalic.    Neck:  Supple, Non-tender.    Respiratory:  Respirations are non-labored.    Cardiovascular:  Normal rate.    Gastrointestinal:       Abdomen: Epigastric, Tenderness.    Psychiatric:  Cooperative, Appropriate mood & affect.       Impression and Plan   Diagnosis     Depression, major, recurrent, moderate (KFT34-VX F33.1).     GERD (gastroesophageal reflux disease) (JUU12-UP K21.9).     Course:  Worsening.    Plan:  add pantoprazole and paroxetine, follow up if not improving.    Orders     Orders (Selected)    Prescriptions  Prescribed  PARoxetine 20 mg oral tablet: 1 tab(s) ( 20 mg ), PO, Daily, # 30 tab(s), 5 Refill(s), Type: Maintenance, Pharmacy: [facility], 1 tab(s) po daily  pantoprazole 40 mg oral delayed release tablet: 1 tab(s) ( 40 mg ), PO, Daily, # 30 tab(s), 5 Refill(s), Type: Maintenance, Pharmacy: [facility], 1 tab(s) po daily,x30 day(s).

## 2022-02-16 NOTE — LETTER
(Inserted Image. Unable to display)     July 26, 2019      ADRIÁN VILLEGAS  [address] [facility] [address]  [place], [place] [id number]          Dear ADRIÁN,      Thank you for selecting [facility]) for your healthcare needs.      Our records indicate you are due for the following services:     Clinical Support Staff (CSS)-Only Blood Pressure Check ~ Please stop in anytime to have your blood pressure rechecked. This is a free service and no appointment necessary.     So we can best determine if your medications are effective in lowering your blood pressure, please make sure your blood pressure medicine has been in your system for at least 1-2 hours prior to coming in.  We encourage you to avoid caffeine or other stimulants prior to having your blood pressure checked and come at a time when you are not feeling rushed.     If you check your blood pressure at home, please bring in your blood pressure monitor and home blood pressure readings.  We will check your machine for accuracy and also share your home readings with your Healthcare Provider.       To schedule an appointment or if you have further questions, please contact your primary clinic:   [facility]       [phone]   [facility]       [phone]              [facility]     [phone]      Powered by [employer]    Sincerely,    Harvey Nath MD

## 2022-02-16 NOTE — TELEPHONE ENCOUNTER
---------------------  From: Diana Poe CMA   To: Diana Poe CMA;     Sent: 4/11/2019 10:15:21 AM CDT  Subject: BP Managment     RTC placed for 2 week CSS only BP f/u  RTC also placed for 6mo Chronic Disease f/;u and Annual exam/fasting labs

## 2022-02-16 NOTE — TELEPHONE ENCOUNTER
Entered by Berenice Phillips LPN on October 16, 2019 9:03:58 AM CDT  ---------------------  From: Berenice Phillips LPN   To: [facility]    Sent: 10/16/2019 9:03:58 AM CDT  Subject: Medication Management     ** Not Approved: Refill not appropriate, Rx sent 10/14- due for appt **  pregabalin (PREGABALIN 150MG CAP)  TAKE 1 CAPSULE BY MOUTH TWICE DAILY  Qty:  60 cap(s)        Days Supply:  30        Refills:  0          Substitutions Allowed     Route To Pharmacy - [facility]   Signed by Berenice hPillips LPN            ** Not Approved: Refill not appropriate, Rx sent 10/14- due for appt **  gabapentin (GABAPENTIN 300MG    CAP)  TAKE 4 CAPSULES BY MOUTH THREE TIMES DAILY  Qty:  360 cap(s)        Days Supply:  30        Refills:  0          Substitutions Allowed     Route To Pharmacy - [facility]   Note from Pharmacy:  Please consider 90 day supplies to promote better adherence  Signed by Berenice Phillips LPN            ------------------------------------------  From: [facility]  To: Harvey Nath MD  Sent: October 15, 2019 12:34:33 PM CDT  Subject: Medication Management  Due: October 16, 2019 12:34:33 PM CDT    ** On Hold Pending Signature **  Drug: pregabalin (pregabalin 150 mg oral capsule)  TAKE 1 CAPSULE BY MOUTH TWICE DAILY  Quantity: 60 cap(s)  Days Supply: 0  Refills: 0  Substitutions Allowed  Notes from Pharmacy:     Dispensed Drug: pregabalin (pregabalin 150 mg oral capsule)  TAKE 1 CAPSULE BY MOUTH TWICE DAILY  Quantity: 60 cap(s)  Days Supply: 30  Refills: 0  Substitutions Allowed  Notes from Pharmacy:     ** On Hold Pending Signature **  Drug: gabapentin (gabapentin 300 mg oral capsule)  TAKE 4 CAPSULES BY MOUTH THREE TIMES DAILY  Quantity: 360 cap(s)  Days Supply: 0  Refills: 0  Substitutions Allowed  Notes from Pharmacy:     Dispensed Drug: gabapentin (gabapentin 300 mg oral capsule)  TAKE 4 CAPSULES BY MOUTH THREE TIMES DAILY  Quantity: 360 cap(s)  Days Supply: 30  Refills:  0  Substitutions Allowed  Notes from Pharmacy: Please consider 90 day supplies to promote better adherence  ------------------------------------------

## 2022-02-16 NOTE — TELEPHONE ENCOUNTER
Entered by Leda Humphries CMA on February 07, 2019 1:34:38 PM CST  ---------------------  From: Leda Humphries CMA   To: [facility]    Sent: 2/7/2019 1:34:37 PM CST  Subject: Medication Management     ** Not Approved: Patient has requested refill too soon, #30, 1 refill sent 1/9/19 **  acetaminophen/butalbital/caffeine (BUTALBITAL-APAP-CAFF -40 TABS)  TAKE ONE TABLET BY MOUTH TWICE A DAY AS NEEDED FOR HEADACHE  Qty:  30 tab(s)        Days Supply:  15        Refills:  1          RENARD     Route To Pharmacy - [facility]   Signed by Leda Humphries CMA            ------------------------------------------  From: [facility]  To: Harvey Nath MD  Sent: February 7, 2019 1:16:02 PM CST  Subject: Medication Management  Due: February 8, 2019 1:16:02 PM CST    ** On Hold Pending Signature **  Drug: acetaminophen/butalbital/caffeine (acetaminophen/butalbital/caffeine 325 mg-50 mg-40 mg oral tablet)  TAKE ONE TABLET BY MOUTH TWICE A DAY AS NEEDED FOR HEADACHE  Quantity: 30 tab(s)     Days Supply: 15        Refills: 1  Substitutions Allowed  Notes from Pharmacy:     Dispensed Drug: acetaminophen/butalbital/caffeine (acetaminophen/butalbital/caffeine 325 mg-50 mg-40 mg oral tablet)  TAKE ONE TABLET BY MOUTH TWICE A DAY AS NEEDED FOR HEADACHE  Quantity: 30 tab(s)     Days Supply: 15        Refills: 1  Substitutions Allowed  Notes from Pharmacy:   ---------------------------------------------------------------  From: Francisca Weiss MA ([facility] ([id number]))   To: Harvey Nath MD;     Sent: 2/7/2019 5:38:17 PM CST  Subject: FW: Medication Management   Due Date/Time: 2/8/2019 1:16:00 PM CST     Received fax from [facility].  Pt picked up refill from 1/9/19 for #30 w/ 1 refill on 1/11/19 for #30 and again on 1/23/19.  Please advise re: refill.---------------------  From: Harvey Nath MD   To: [facility] ([id number] [facility] [place]);     Sent: 2/7/2019 5:48:03 PM CST  Subject: RE:  Medication Management     Appt advised to look into other treatment for headache

## 2022-03-14 DIAGNOSIS — F51.01 PRIMARY INSOMNIA: ICD-10-CM

## 2022-03-14 NOTE — TELEPHONE ENCOUNTER
Routing refill request to provider for review/approval because:  Drug not on the [facility] refill protocol     Pending Prescriptions:                       Disp   Refills    zolpidem (AMBIEN) 5 MG tablet [Pharmacy Me*30 tab*2        Sig: Take 1 tablet (5 mg) by mouth nightly as needed for           sleep Take 1 Tablet BY MOUTH at bedtime AS NEEDED    Kavya Santos RN on 3/14/2022 at 12:39 PM

## 2022-03-15 RX ORDER — ZOLPIDEM TARTRATE 5 MG/1
5 TABLET ORAL
Qty: 30 TABLET | Refills: 0 | Status: SHIPPED | OUTPATIENT
Start: 2022-03-23 | End: 2022-04-22

## 2022-03-15 NOTE — TELEPHONE ENCOUNTER
Refilled once while pcp is out.  Start date is 30 days after last fill date.  Laureano Pena MD  Family Medicine

## 2022-03-21 ENCOUNTER — E-VISIT (OUTPATIENT)
Dept: FAMILY MEDICINE | Facility: CLINIC | Age: 47
End: 2022-03-21
Payer: COMMERCIAL

## 2022-03-21 DIAGNOSIS — G43.009 MIGRAINE WITHOUT AURA AND WITHOUT STATUS MIGRAINOSUS, NOT INTRACTABLE: ICD-10-CM

## 2022-03-21 DIAGNOSIS — R51.9 CHRONIC DAILY HEADACHE: ICD-10-CM

## 2022-03-21 PROCEDURE — 99421 OL DIG E/M SVC 5-10 MIN: CPT | Performed by: INTERNAL MEDICINE

## 2022-03-21 RX ORDER — BUTALBITAL, ACETAMINOPHEN AND CAFFEINE 50; 325; 40 MG/1; MG/1; MG/1
1 TABLET ORAL DAILY PRN
Qty: 20 TABLET | Refills: 0 | Status: SHIPPED | OUTPATIENT
Start: 2022-03-21 | End: 2022-05-23

## 2022-03-21 NOTE — TELEPHONE ENCOUNTER
Reviewed neurologist note from 10/27- they had recommended tapering Fiorcet and to use this max 4x/month.    Sleep psychologist referral is already in from December.    Provider E-Visit time total (minutes): 10

## 2022-04-11 DIAGNOSIS — F51.01 PRIMARY INSOMNIA: ICD-10-CM

## 2022-04-12 ENCOUNTER — MYC REFILL (OUTPATIENT)
Dept: FAMILY MEDICINE | Facility: CLINIC | Age: 47
End: 2022-04-12
Payer: COMMERCIAL

## 2022-04-12 DIAGNOSIS — F51.01 PRIMARY INSOMNIA: ICD-10-CM

## 2022-04-12 DIAGNOSIS — G43.009 MIGRAINE WITHOUT AURA AND WITHOUT STATUS MIGRAINOSUS, NOT INTRACTABLE: ICD-10-CM

## 2022-04-12 DIAGNOSIS — R51.9 CHRONIC DAILY HEADACHE: ICD-10-CM

## 2022-04-12 RX ORDER — BUTALBITAL, ACETAMINOPHEN AND CAFFEINE 50; 325; 40 MG/1; MG/1; MG/1
1 TABLET ORAL DAILY PRN
Qty: 20 TABLET | Refills: 0 | OUTPATIENT
Start: 2022-04-12

## 2022-04-12 RX ORDER — ZOLPIDEM TARTRATE 5 MG/1
5 TABLET ORAL
Qty: 30 TABLET | Refills: 0 | OUTPATIENT
Start: 2022-04-12

## 2022-04-12 RX ORDER — ZOLPIDEM TARTRATE 5 MG/1
TABLET ORAL
Qty: 30 TABLET | Refills: 0 | OUTPATIENT
Start: 2022-04-12

## 2022-04-12 NOTE — TELEPHONE ENCOUNTER
Pending Prescriptions:                       Disp   Refills    zolpidem (AMBIEN) 5 MG tablet [Pharmacy M*30 tab*0            Sig: TAKE 1 TABLET BY MOUTH NIGHTLY AS NEEDED FOR           SLEEP    Routing refill request to provider for review/approval because:  Drug not on the [facility] refill protocol     Marin Marmolejo RN

## 2022-04-12 NOTE — TELEPHONE ENCOUNTER
Pending Prescriptions:                       Disp   Refills    zolpidem (AMBIEN) 5 MG tablet             30 tab*0            Sig: Take 1 tablet (5 mg) by mouth nightly as needed           for sleep Take 1 Tablet BY MOUTH at bedtime AS           NEEDED    butalbital-acetaminophen-caffeine (ESGIC)*20 tab*0            Sig: Take 1 tablet by mouth daily as needed for           headaches    Routing refill request to provider for review/approval because:  Drug not on the [facility] refill protocol       Marin Marmolejo RN

## 2022-04-12 NOTE — TELEPHONE ENCOUNTER
Fiorcet was prescribed on 3/21/22 for 20 tablets, and neurology had recommended taking no more than 4 per month, so prescription should last 5 months.  She was advised to follow-up with neurology during E-visit on 3/21 if symptoms were not controlled.  This is not yet scheduled; please advise her to do so.     Insomnia was discussed at office visit in July 2021 where I suggested seeing a sleep psychologist for CBT-I therapy as most effective treatment for chronic insomnia.  She had an E-visit on 12/20 for insomnia in which I noted that she had been routinely filling Ambien a few days early so that she was few weeks ahead of schedule.  Next refill should have been due 1/12, but she filled it on 12/27, then 1/24 (2 days early from December fill), 2/21 (2 days early), 3/21 (2 days early).  Due to ongoing early refills, further refill declined.  She needs to either follow-up with the sleep psychologist as I referred previously or the sleep clinic as referred by neurology.    Thanks,  Mark Desir MD

## 2022-05-07 DIAGNOSIS — G62.89 OTHER POLYNEUROPATHY: ICD-10-CM

## 2022-05-09 NOTE — TELEPHONE ENCOUNTER
Routing refill request to provider for review/approval because:  Drug not on the [facility] refill protocol     Pending Prescriptions:                       Disp   Refills    gabapentin (NEURONTIN) 400 MG capsule [Pha*270 ca*11       Sig: TAKE 3 CAPSULES BY MOUTH THREE TIMES DAILY    Kavya Santos RN on 5/9/2022 at 2:17 PM

## 2022-05-10 RX ORDER — GABAPENTIN 400 MG/1
CAPSULE ORAL
Qty: 270 CAPSULE | Refills: 11 | OUTPATIENT
Start: 2022-05-10

## 2022-05-10 NOTE — TELEPHONE ENCOUNTER
[place]  reviewed- med last refilled on 5/7 for 30 days.  She has an appointment on 5/23 with Angelic More to establish care, so should have enough medication to last until then.    Thanks,  Mark Desir MD

## 2022-05-11 NOTE — TELEPHONE ENCOUNTER
LM on patient VM that prescription denied, patient should have enough to get to establish care appt with ELIDIA More on 5/23/22. Rere Devries on 5/11/2022 at 8:39 AM

## 2022-05-20 ASSESSMENT — PATIENT HEALTH QUESTIONNAIRE - PHQ9
SUM OF ALL RESPONSES TO PHQ QUESTIONS 1-9: 10
10. IF YOU CHECKED OFF ANY PROBLEMS, HOW DIFFICULT HAVE THESE PROBLEMS MADE IT FOR YOU TO DO YOUR WORK, TAKE CARE OF THINGS AT HOME, OR GET ALONG WITH OTHER PEOPLE: SOMEWHAT DIFFICULT
SUM OF ALL RESPONSES TO PHQ QUESTIONS 1-9: 10

## 2022-05-23 ENCOUNTER — OFFICE VISIT (OUTPATIENT)
Dept: FAMILY MEDICINE | Facility: CLINIC | Age: 47
End: 2022-05-23
Payer: COMMERCIAL

## 2022-05-23 VITALS
BODY MASS INDEX: 36.71 KG/M2 | TEMPERATURE: 97.8 F | WEIGHT: 207.2 LBS | DIASTOLIC BLOOD PRESSURE: 80 MMHG | HEIGHT: 63 IN | RESPIRATION RATE: 18 BRPM | OXYGEN SATURATION: 98 % | SYSTOLIC BLOOD PRESSURE: 138 MMHG | HEART RATE: 96 BPM

## 2022-05-23 DIAGNOSIS — F33.1 MODERATE EPISODE OF RECURRENT MAJOR DEPRESSIVE DISORDER (H): ICD-10-CM

## 2022-05-23 DIAGNOSIS — G62.89 OTHER POLYNEUROPATHY: ICD-10-CM

## 2022-05-23 DIAGNOSIS — E66.01 MORBID OBESITY (H): ICD-10-CM

## 2022-05-23 DIAGNOSIS — G43.009 MIGRAINE WITHOUT AURA AND WITHOUT STATUS MIGRAINOSUS, NOT INTRACTABLE: ICD-10-CM

## 2022-05-23 DIAGNOSIS — R51.9 CHRONIC DAILY HEADACHE: Primary | ICD-10-CM

## 2022-05-23 PROCEDURE — 99214 OFFICE O/P EST MOD 30 MIN: CPT | Performed by: NURSE PRACTITIONER

## 2022-05-23 RX ORDER — ZOLPIDEM TARTRATE 10 MG/1
10 TABLET ORAL
COMMUNITY
End: 2022-05-23

## 2022-05-23 RX ORDER — GABAPENTIN 400 MG/1
1200 CAPSULE ORAL 3 TIMES DAILY
Qty: 270 CAPSULE | Refills: 3 | Status: SHIPPED | OUTPATIENT
Start: 2022-06-06 | End: 2022-09-15

## 2022-05-23 RX ORDER — BUTALBITAL, ACETAMINOPHEN AND CAFFEINE 50; 325; 40 MG/1; MG/1; MG/1
1 TABLET ORAL DAILY PRN
Qty: 20 TABLET | Refills: 3 | Status: SHIPPED | OUTPATIENT
Start: 2022-05-23 | End: 2023-01-02

## 2022-05-23 RX ORDER — ZOLPIDEM TARTRATE 5 MG/1
5 TABLET ORAL
Qty: 30 TABLET | Refills: 5 | Status: SHIPPED | OUTPATIENT
Start: 2022-05-23 | End: 2022-10-31

## 2022-05-23 RX ORDER — TIZANIDINE 2 MG/1
2 TABLET ORAL 3 TIMES DAILY
Qty: 30 TABLET | Refills: 1 | Status: SHIPPED | OUTPATIENT
Start: 2022-05-23 | End: 2022-06-17

## 2022-05-23 ASSESSMENT — PATIENT HEALTH QUESTIONNAIRE - PHQ9
10. IF YOU CHECKED OFF ANY PROBLEMS, HOW DIFFICULT HAVE THESE PROBLEMS MADE IT FOR YOU TO DO YOUR WORK, TAKE CARE OF THINGS AT HOME, OR GET ALONG WITH OTHER PEOPLE: SOMEWHAT DIFFICULT
SUM OF ALL RESPONSES TO PHQ QUESTIONS 1-9: 10

## 2022-05-23 NOTE — PROGRESS NOTES
"  Assessment & Plan     Chronic daily headache  Trial addition of muscle relaxer before bed  - tiZANidine (ZANAFLEX) 2 MG tablet; Take 1 tablet (2 mg) by mouth 3 times daily  - butalbital-acetaminophen-caffeine (ESGIC) -40 MG tablet; Take 1 tablet by mouth daily as needed for headaches Do not take more than 2 days per week    Other polyneuropathy  Continue:  - gabapentin (NEURONTIN) 400 MG capsule; Take 3 capsules (1,200 mg) by mouth 3 times daily  - zolpidem (AMBIEN) 5 MG tablet; Take 1 tablet (5 mg) by mouth nightly as needed for sleep    Migraine without aura and without status migrainosus, not intractable  Continue:  - butalbital-acetaminophen-caffeine (ESGIC) -40 MG tablet; Take 1 tablet by mouth daily as needed for headaches Do not take more than 2 days per week    Moderate episode of recurrent major depressive disorder (H)  Stable without medication, continue to monitor,     Morbid obesity (H)  Comorbid HTN               BMI:   Estimated body mass index is 37 kg/m  as calculated from the following:    Height as of this encounter: 1.594 m (5' 2.75\").    Weight as of this encounter: 94 kg (207 lb 3.2 oz).   Weight management plan: Discussed healthy diet and exercise guidelines    FUTURE APPOINTMENTS:       - Follow-up visit in 3-4 months for annual exam/labs, sooner as needed    Time spent in chart review in preparation to see patient, time with patient for interview/exam, ordering medications/tests/and/or procedures, and time spent in charting and coordinating care: 30 minutes.       No follow-ups on file.    ISAAC Dodson CNP  [facility]    PDMP Review       Value Time User    State PDMP site checked  Yes 5/10/2022  6:33 PM Mark Desir MD        PDMP Review       Value Time User    State PDMP site checked  Yes 5/23/2022  2:29 PM Angelic More APRN CNP            Eunice Guerin is a 46 year old who presents for the following health issues "     History of Present Illness       Reason for visit:  Establish care with new provider    She eats 2-3 servings of fruits and vegetables daily.She consumes 3 sweetened beverage(s) daily.She exercises with enough effort to increase her heart rate 9 or less minutes per day.  She exercises with enough effort to increase her heart rate 3 or less days per week. She is missing 2 dose(s) of medications per week.  She is not taking prescribed medications regularly due to remembering to take.    Today's PHQ-9         PHQ-9 Total Score: 10    PHQ-9 Q9 Thoughts of better off dead/self-harm past 2 weeks :   Not at all    How difficult have these problems made it for you to do your work, take care of things at home, or get along with other people: Somewhat difficult     Migraine     Since your last clinic visit, how have your headaches changed?  No change    How often are you getting headaches or migraines? Most days tension HA, Progresses to Migraine only once a year on average     Are you able to do normal daily activities when you have a migraine? No    Are you taking rescue/relief medications? (Select all that apply) ibuprofen (Advil, Motrin), Tylenol and Other: Fiorinal     How helpful is your rescue/relief medication?  I get some relief    Are you taking any medications to prevent migraines? (Select all that apply)  No    In the past 4 weeks, how often have you gone to urgent care or the emergency room because of your headaches?  0    Medication Followup of Gabapentin    Taking Medication as prescribed: yes    Side Effects:  None    Medication Helping Symptoms:  yes     Medication Followup of Ambien    Taking Medication as prescribed: yes    Side Effects:  None    Medication Helping Symptoms:  yes        Review of Systems   Constitutional, HEENT, cardiovascular, pulmonary, gi and gu systems are negative, except as otherwise noted.      Objective    /80   Pulse 96   Temp 97.8  F (36.6  C) (Tympanic)   Resp 18    "Ht 1.594 m (5' 2.75\")   Wt 94 kg (207 lb 3.2 oz)   SpO2 98%   BMI 37.00 kg/m    Body mass index is 37 kg/m .  Physical Exam   GENERAL: alert and no distress  NECK: no adenopathy, no asymmetry, masses, or scars and thyroid normal to palpation  RESP: lungs clear to auscultation - no rales, rhonchi or wheezes  CV: regular rates and rhythm, normal S1 S2, no S3 or S4 and no murmur, click or rub  MS: no gross musculoskeletal defects noted, no edema  NEURO: Normal strength and tone, mentation intact and speech normal  PSYCH: mentation appears normal, affect normal/bright                "

## 2022-06-02 ENCOUNTER — TELEPHONE (OUTPATIENT)
Dept: INTERNAL MEDICINE | Facility: CLINIC | Age: 47
End: 2022-06-02
Payer: COMMERCIAL

## 2022-06-03 NOTE — TELEPHONE ENCOUNTER
Prior Authorization Retail Medication Request    Medication/Dose: omeprazole  ICD code (if different than what is on RX):    Previously Tried and Failed:  Robinul inj; carafate; omeprazole 40 mg  Rationale:  Patient has used previously    Insurance Name:  Not provided  Insurance ID:  Not provided  [facility]: [id number]      Pharmacy Information (if different than what is on RX)  Name:    Phone:

## 2022-06-08 NOTE — TELEPHONE ENCOUNTER
Central Prior Authorization Team   Phone: [phone]        PA Initiation    Medication: omeprazole  Insurance Company: [employer] - Phone [phone] Fax [phone]  Pharmacy Filling the Rx: [facility] [place] [id number] [facility]  Filling Pharmacy Phone: [phone]  Filling Pharmacy Fax:    Start Date: 6/8/2022

## 2022-06-08 NOTE — TELEPHONE ENCOUNTER
Prior Authorization Approval    Authorization Effective Date: 3/10/2022  Authorization Expiration Date: 6/8/2023  Medication: Omeprazole-APPROVED  Approved Dose/Quantity:   Reference #:     Insurance Company: [employer] - Phone [phone] Fax [phone]  Expected CoPay:       CoPay Card Available:      Foundation Assistance Needed:    Which Pharmacy is filling the prescription (Not needed for infusion/clinic administered): [facility] - [place], [place] - [address] [facility]  Pharmacy Notified: Yes  Patient Notified: No    **Instructed pharmacy to notify patient when script is ready to /ship.**

## 2022-06-15 NOTE — TELEPHONE ENCOUNTER
Routing refill request to provider for review/approval because:  Drug not on the [facility] refill protocol     Yvonne Tian RN           Erythromycin Counseling:  I discussed with the patient the risks of erythromycin including but not limited to GI upset, allergic reaction, drug rash, diarrhea, increase in liver enzymes, and yeast infections.

## 2022-06-16 DIAGNOSIS — R51.9 CHRONIC DAILY HEADACHE: ICD-10-CM

## 2022-06-17 RX ORDER — TIZANIDINE 2 MG/1
2 TABLET ORAL 3 TIMES DAILY
Qty: 30 TABLET | Refills: 1 | Status: SHIPPED | OUTPATIENT
Start: 2022-06-17 | End: 2022-07-06

## 2022-07-06 DIAGNOSIS — R51.9 CHRONIC DAILY HEADACHE: ICD-10-CM

## 2022-07-06 RX ORDER — TIZANIDINE 2 MG/1
2 TABLET ORAL 3 TIMES DAILY
Qty: 30 TABLET | Refills: 1 | Status: SHIPPED | OUTPATIENT
Start: 2022-07-06 | End: 2022-08-04

## 2022-07-06 NOTE — TELEPHONE ENCOUNTER
Pending Prescriptions:                       Disp   Refills    tiZANidine (ZANAFLEX) 2 MG tablet [Pharma*30 tab*1            Sig: Take 1 tablet (2 mg) by mouth 3 times daily    Routing refill request to provider for review/approval because:  Drug not on the [facility] refill protocol       Marin Marmolejo RN

## 2022-08-03 ASSESSMENT — ENCOUNTER SYMPTOMS
DYSURIA: 0
HEARTBURN: 1
EYE PAIN: 0
FEVER: 0
HEADACHES: 1
WEAKNESS: 0
BREAST MASS: 0
ARTHRALGIAS: 1
SORE THROAT: 0
FREQUENCY: 0
DIZZINESS: 0
PARESTHESIAS: 1
SHORTNESS OF BREATH: 0
CONSTIPATION: 0
NERVOUS/ANXIOUS: 0
HEMATOCHEZIA: 0
CHILLS: 0
MYALGIAS: 1
COUGH: 0
NAUSEA: 0
PALPITATIONS: 0
ABDOMINAL PAIN: 0
HEMATURIA: 0
JOINT SWELLING: 0
DIARRHEA: 0

## 2022-08-04 ENCOUNTER — OFFICE VISIT (OUTPATIENT)
Dept: FAMILY MEDICINE | Facility: CLINIC | Age: 47
End: 2022-08-04
Payer: COMMERCIAL

## 2022-08-04 VITALS
OXYGEN SATURATION: 99 % | TEMPERATURE: 97.7 F | HEART RATE: 86 BPM | DIASTOLIC BLOOD PRESSURE: 74 MMHG | HEIGHT: 63 IN | WEIGHT: 202.4 LBS | RESPIRATION RATE: 16 BRPM | BODY MASS INDEX: 35.86 KG/M2 | SYSTOLIC BLOOD PRESSURE: 120 MMHG

## 2022-08-04 DIAGNOSIS — F41.1 GAD (GENERALIZED ANXIETY DISORDER): ICD-10-CM

## 2022-08-04 DIAGNOSIS — F43.10 PTSD (POST-TRAUMATIC STRESS DISORDER): ICD-10-CM

## 2022-08-04 DIAGNOSIS — M25.60 JOINT STIFFNESS: ICD-10-CM

## 2022-08-04 DIAGNOSIS — R79.82 CRP ELEVATED: ICD-10-CM

## 2022-08-04 DIAGNOSIS — R51.9 CHRONIC DAILY HEADACHE: ICD-10-CM

## 2022-08-04 DIAGNOSIS — Z13.6 CARDIOVASCULAR SCREENING; LDL GOAL LESS THAN 130: ICD-10-CM

## 2022-08-04 DIAGNOSIS — K29.71 GASTRITIS WITH HEMORRHAGE, UNSPECIFIED CHRONICITY, UNSPECIFIED GASTRITIS TYPE: ICD-10-CM

## 2022-08-04 DIAGNOSIS — F33.1 MAJOR DEPRESSIVE DISORDER, RECURRENT EPISODE, MODERATE (H): ICD-10-CM

## 2022-08-04 DIAGNOSIS — G62.9 PERIPHERAL POLYNEUROPATHY: ICD-10-CM

## 2022-08-04 DIAGNOSIS — Z12.4 CERVICAL CANCER SCREENING: ICD-10-CM

## 2022-08-04 DIAGNOSIS — Z00.00 ROUTINE PHYSICAL EXAMINATION: Primary | ICD-10-CM

## 2022-08-04 DIAGNOSIS — I10 BENIGN ESSENTIAL HYPERTENSION: ICD-10-CM

## 2022-08-04 PROBLEM — J18.9 COMMUNITY ACQUIRED PNEUMONIA: Status: RESOLVED | Noted: 2017-12-05 | Resolved: 2022-08-04

## 2022-08-04 LAB
ALBUMIN SERPL-MCNC: 3.6 G/DL (ref 3.4–5)
ALP SERPL-CCNC: 70 U/L (ref 40–150)
ALT SERPL W P-5'-P-CCNC: 21 U/L (ref 0–50)
ANION GAP SERPL CALCULATED.3IONS-SCNC: 6 MMOL/L (ref 3–14)
AST SERPL W P-5'-P-CCNC: 14 U/L (ref 0–45)
B BURGDOR IGG+IGM SER QL: 0.18
BILIRUB SERPL-MCNC: 0.2 MG/DL (ref 0.2–1.3)
BUN SERPL-MCNC: 19 MG/DL (ref 7–30)
CALCIUM SERPL-MCNC: 8.7 MG/DL (ref 8.5–10.1)
CHLORIDE BLD-SCNC: 103 MMOL/L (ref 94–109)
CHOLEST SERPL-MCNC: 213 MG/DL
CO2 SERPL-SCNC: 27 MMOL/L (ref 20–32)
CREAT SERPL-MCNC: 0.93 MG/DL (ref 0.52–1.04)
CRP SERPL-MCNC: 24.9 MG/L (ref 0–8)
ERYTHROCYTE [DISTWIDTH] IN BLOOD BY AUTOMATED COUNT: 12.3 % (ref 10–15)
ERYTHROCYTE [SEDIMENTATION RATE] IN BLOOD BY WESTERGREN METHOD: 59 MM/HR (ref 0–20)
FASTING STATUS PATIENT QL REPORTED: YES
FOLATE SERPL-MCNC: 6.6 NG/ML (ref 4.6–34.8)
GFR SERPL CREATININE-BSD FRML MDRD: 77 ML/MIN/1.73M2
GLUCOSE BLD-MCNC: 102 MG/DL (ref 70–99)
HBA1C MFR BLD: 5.6 % (ref 0–5.6)
HCT VFR BLD AUTO: 38.3 % (ref 35–47)
HDLC SERPL-MCNC: 45 MG/DL
HGB BLD-MCNC: 12.8 G/DL (ref 11.7–15.7)
LDLC SERPL CALC-MCNC: 115 MG/DL
MAGNESIUM SERPL-MCNC: 2 MG/DL (ref 1.6–2.3)
MCH RBC QN AUTO: 33.3 PG (ref 26.5–33)
MCHC RBC AUTO-ENTMCNC: 33.4 G/DL (ref 31.5–36.5)
MCV RBC AUTO: 100 FL (ref 78–100)
NONHDLC SERPL-MCNC: 168 MG/DL
PLATELET # BLD AUTO: 385 10E3/UL (ref 150–450)
POTASSIUM BLD-SCNC: 3.9 MMOL/L (ref 3.4–5.3)
PROT SERPL-MCNC: 7.3 G/DL (ref 6.8–8.8)
RBC # BLD AUTO: 3.84 10E6/UL (ref 3.8–5.2)
SODIUM SERPL-SCNC: 136 MMOL/L (ref 133–144)
TRIGL SERPL-MCNC: 267 MG/DL
TSH SERPL DL<=0.005 MIU/L-ACNC: 0.75 MU/L (ref 0.4–4)
WBC # BLD AUTO: 5 10E3/UL (ref 4–11)

## 2022-08-04 PROCEDURE — 86140 C-REACTIVE PROTEIN: CPT | Performed by: NURSE PRACTITIONER

## 2022-08-04 PROCEDURE — 83036 HEMOGLOBIN GLYCOSYLATED A1C: CPT | Performed by: NURSE PRACTITIONER

## 2022-08-04 PROCEDURE — 82746 ASSAY OF FOLIC ACID SERUM: CPT | Performed by: NURSE PRACTITIONER

## 2022-08-04 PROCEDURE — 87624 HPV HI-RISK TYP POOLED RSLT: CPT | Performed by: NURSE PRACTITIONER

## 2022-08-04 PROCEDURE — 99214 OFFICE O/P EST MOD 30 MIN: CPT | Mod: 25 | Performed by: NURSE PRACTITIONER

## 2022-08-04 PROCEDURE — 82306 VITAMIN D 25 HYDROXY: CPT | Performed by: NURSE PRACTITIONER

## 2022-08-04 PROCEDURE — 99396 PREV VISIT EST AGE 40-64: CPT | Performed by: NURSE PRACTITIONER

## 2022-08-04 PROCEDURE — 82607 VITAMIN B-12: CPT | Performed by: NURSE PRACTITIONER

## 2022-08-04 PROCEDURE — 86038 ANTINUCLEAR ANTIBODIES: CPT | Performed by: NURSE PRACTITIONER

## 2022-08-04 PROCEDURE — 96127 BRIEF EMOTIONAL/BEHAV ASSMT: CPT | Performed by: NURSE PRACTITIONER

## 2022-08-04 PROCEDURE — 86618 LYME DISEASE ANTIBODY: CPT | Performed by: NURSE PRACTITIONER

## 2022-08-04 PROCEDURE — 88141 CYTOPATH C/V INTERPRET: CPT | Performed by: PATHOLOGY

## 2022-08-04 PROCEDURE — 36415 COLL VENOUS BLD VENIPUNCTURE: CPT | Performed by: NURSE PRACTITIONER

## 2022-08-04 PROCEDURE — 86200 CCP ANTIBODY: CPT | Performed by: NURSE PRACTITIONER

## 2022-08-04 PROCEDURE — 84443 ASSAY THYROID STIM HORMONE: CPT | Performed by: NURSE PRACTITIONER

## 2022-08-04 PROCEDURE — 83735 ASSAY OF MAGNESIUM: CPT | Performed by: NURSE PRACTITIONER

## 2022-08-04 PROCEDURE — 80053 COMPREHEN METABOLIC PANEL: CPT | Performed by: NURSE PRACTITIONER

## 2022-08-04 PROCEDURE — 86364 TISS TRNSGLTMNASE EA IG CLAS: CPT | Performed by: NURSE PRACTITIONER

## 2022-08-04 PROCEDURE — 85027 COMPLETE CBC AUTOMATED: CPT | Performed by: NURSE PRACTITIONER

## 2022-08-04 PROCEDURE — 88175 CYTOPATH C/V AUTO FLUID REDO: CPT | Performed by: NURSE PRACTITIONER

## 2022-08-04 PROCEDURE — 85652 RBC SED RATE AUTOMATED: CPT | Performed by: NURSE PRACTITIONER

## 2022-08-04 PROCEDURE — 86431 RHEUMATOID FACTOR QUANT: CPT | Performed by: NURSE PRACTITIONER

## 2022-08-04 PROCEDURE — 80061 LIPID PANEL: CPT | Performed by: NURSE PRACTITIONER

## 2022-08-04 RX ORDER — LISINOPRIL 10 MG/1
10 TABLET ORAL DAILY
Qty: 90 TABLET | Refills: 3 | Status: SHIPPED | OUTPATIENT
Start: 2022-08-04 | End: 2023-01-02 | Stop reason: DRUGHIGH

## 2022-08-04 RX ORDER — TIZANIDINE 2 MG/1
2 TABLET ORAL 3 TIMES DAILY PRN
Qty: 270 TABLET | Refills: 3 | Status: SHIPPED | OUTPATIENT
Start: 2022-08-04 | End: 2023-05-02

## 2022-08-04 RX ORDER — HYDROCHLOROTHIAZIDE 25 MG/1
25 TABLET ORAL DAILY
Qty: 90 TABLET | Refills: 3 | Status: SHIPPED | OUTPATIENT
Start: 2022-08-04 | End: 2023-08-08

## 2022-08-04 RX ORDER — DULOXETIN HYDROCHLORIDE 30 MG/1
30 CAPSULE, DELAYED RELEASE ORAL 2 TIMES DAILY
Qty: 60 CAPSULE | Refills: 1 | Status: SHIPPED | OUTPATIENT
Start: 2022-08-04 | End: 2022-10-11

## 2022-08-04 ASSESSMENT — ENCOUNTER SYMPTOMS
FEVER: 0
ARTHRALGIAS: 1
MYALGIAS: 1
NERVOUS/ANXIOUS: 0
ABDOMINAL PAIN: 0
HEMATURIA: 0
DIZZINESS: 0
COUGH: 0
WEAKNESS: 0
PALPITATIONS: 0
NAUSEA: 0
HEMATOCHEZIA: 0
SORE THROAT: 0
JOINT SWELLING: 0
HEARTBURN: 1
DYSURIA: 0
DIARRHEA: 0
HEADACHES: 1
EYE PAIN: 0
BREAST MASS: 0
SHORTNESS OF BREATH: 0
CHILLS: 0
PARESTHESIAS: 1
CONSTIPATION: 0
FREQUENCY: 0

## 2022-08-04 ASSESSMENT — PATIENT HEALTH QUESTIONNAIRE - PHQ9
SUM OF ALL RESPONSES TO PHQ QUESTIONS 1-9: 8
SUM OF ALL RESPONSES TO PHQ QUESTIONS 1-9: 8
10. IF YOU CHECKED OFF ANY PROBLEMS, HOW DIFFICULT HAVE THESE PROBLEMS MADE IT FOR YOU TO DO YOUR WORK, TAKE CARE OF THINGS AT HOME, OR GET ALONG WITH OTHER PEOPLE: SOMEWHAT DIFFICULT
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
## 2022-08-05 LAB
ANA SER QL IF: NEGATIVE
CCP AB SER IA-ACNC: 1 U/ML
DEPRECATED CALCIDIOL+CALCIFEROL SERPL-MC: 17 UG/L (ref 20–75)
RHEUMATOID FACT SER NEPH-ACNC: 7 IU/ML
TTG IGA SER-ACNC: 0.8 U/ML
TTG IGG SER-ACNC: <0.6 U/ML
VIT B12 SERPL-MCNC: 417 PG/ML (ref 232–1245)

## 2022-08-06 NOTE — RESULT ENCOUNTER NOTE
Nolan Guerin    Your inflammatory markers are elevated but the testing for lupus and rheumatoid arthritis is negative. Instead of the EMG I think it would be better to start with Rheumatology and see if they can find the cause of the inflammation and if that is what is causing the pain in the legs. You should be getting a call to schedule. I referred you to Dr. Velazquez with [facility] in [place]. He is really good and can see patients sooner than in the  system. Check to make sure your insurance covers there.     [facility] [place]  [address] [facility] [address]  Phone: [phone]    The cholesterol numbers are high. Cholesterol lowering medications are recommended at a 10 year cardiac risk score of 7.5% or higher. Your cardiac risk score is:    The 10-year ASCVD risk score (Ashwin ROMERO Jr., et al., 2013) is: 4.9%    Values used to calculate the score:      Age: 46 years      Sex: Female      Is Non- : No      Diabetic: No      Tobacco smoker: Yes      Systolic Blood Pressure: 120 mmHg      Is BP treated: Yes      HDL Cholesterol: 45 mg/dL      Total Cholesterol: 213 mg/dL    Recommend heart healthy diet and recheck in 1 year. You can visit: [email or web address]  For some information on a healthy diet.     Vitamin D is low- recommend to start a vitamin D3 supplement (over the counter) 2,000 units daily.      Please let us know if you have any questions.     Take care,    ISAAC Dean CNP    TEST DESCRIPTIONS   CBC (Complete Blood Count) includes hemoglobin, hematocrit, white blood cells, etc. This test can be used to detect anemia, infection, and abnormalities in blood cells.   Cholesterol is one of the blood fats (lipids) and is the building block used by the body for cell wall and hormone production. Increased levels of cholesterol have been  "proven to directly contribute to heart disease and strokes.   Triglycerides are one of the blood fats (lipids) and are thought to be associated with heart disease. If you have had anything to eat or drink other than water for 12 hours before the test and your level is high, you should have the test repeated after a 12 hour fast. Abnormally high results may also be associated with diabetes, kidney and liver diseases.   LDL is the low density lipoprotein component of the cholesterol. It is the harmful substance that deposits cholesterol on the artery walls contributing to heart attacks and strokes. A high LDL level is associated with higher risk of coronary heart disease.   HDL stands for high density lipoprotein and refers to the so-called \"good\" cholesterol. HDL picks up excess cholesterol in the bloodstream and carries it back to the liver for disposal. Individuals with higher than average HDL seem to have a lower risk of coronary disease. Vigorous exercise will help increase the blood levels of HDL.   Risk Factor (Total cholesterol/HDL) is a commonly used ratio for cardiac risk assessment. Less than 5.0 for men and 4.4 for women is ideal.   Sodium is an electrolyte useful in diagnosis of dehydration, diabetes, hypertension, or other diseases involving electrolyte imbalance. It also preserves the balance between calcium and potassium to maintain normal heart action and equilibrium of the body.   Potassium is also an electrolyte that works with sodium to regulate the body's water balance and normalize heart rhythm.   Glucose is a measure of blood sugar and is one of the tests for diabetes. If you have not been fasting, your level will often be high. A low glucose level may be a cause of weakness or dizziness. Blood sugar ranks with cholesterol as a causative factor in arteriosclerosis and heart attacks.   BUN & Creatinine are waste products excreted by the kidneys. A high BUN can be related to a high protein diet, " heavy exercise, fever and infections, dehydration, kidney stones, and kidney disease. Creatinine elevation is less dependent on diet or exercise and better represents kidney impairment. Low values are not generally significant.   Calcium is a mineral in the blood controlled by the parathyroid glands and kidneys. It is important in the formation of bone, in muscle and nerve function, and in blood clotting. Disease of the parathyroid gland, diseased bones or kidneys, or defective absorption of calcium may cause abnormal levels from the intestine.   ALT, AST, Alk Phos are liver tests. Enzymes found in the liver as well as skeletal and cardiac muscle. Elevations can often be seen in alcoholism, liver or heart disease. Slightly abnormal values are not considered significant.   TSH stands for Thyroid Stimulating Hormone. A sensitive test used to determine how the thyroid gland is functioning. The thyroid gland produces hormones that control the body's metabolism. When too few hormones are produced (increased TSH), hypothyroidism occurs which can cause fatigue, sensitivity to cold, and weight gain - an overall slowing down of bodily functions. When too many thyroid hormones are produces (or decreased TSH), it creates a condition call hyperthyroidism (such as Graves' disease) which causes rapid heartbeat, weight loss, and dizziness, among other symptoms.   PSA stands for Prostate Specific Antigen. Elevated levels of PSA can increase with trauma, infection, inflammation, or disease processes in the prostate such as BPH (Benigh Prostatic Hypertrophy) or cancer.   Glycohemoglobin or HgBA1C is a 3-month average of blood sugar

## 2022-08-08 LAB
BKR LAB AP GYN ADEQUACY: ABNORMAL
BKR LAB AP GYN INTERPRETATION: ABNORMAL
BKR LAB AP HPV REFLEX: ABNORMAL
BKR LAB AP PREVIOUS ABNL DX: ABNORMAL
BKR LAB AP PREVIOUS ABNORMAL: ABNORMAL
PATH REPORT.COMMENTS IMP SPEC: ABNORMAL
PATH REPORT.COMMENTS IMP SPEC: ABNORMAL
PATH REPORT.RELEVANT HX SPEC: ABNORMAL

## 2022-08-11 ENCOUNTER — PATIENT OUTREACH (OUTPATIENT)
Dept: FAMILY MEDICINE | Facility: CLINIC | Age: 47
End: 2022-08-11

## 2022-09-12 ENCOUNTER — TELEPHONE (OUTPATIENT)
Dept: FAMILY MEDICINE | Facility: CLINIC | Age: 47
End: 2022-09-12

## 2022-09-12 DIAGNOSIS — G62.89 OTHER POLYNEUROPATHY: ICD-10-CM

## 2022-09-12 RX ORDER — GABAPENTIN 400 MG/1
1200 CAPSULE ORAL 3 TIMES DAILY
Qty: 270 CAPSULE | Refills: 3 | OUTPATIENT
Start: 2022-09-12

## 2022-09-12 NOTE — TELEPHONE ENCOUNTER
Pending Prescriptions:                       Disp   Refills    gabapentin (NEURONTIN) 400 MG capsule [Pha*270 ca*3        Sig: Take 3 capsules (1,200 mg) by mouth 3 times daily    Routing refill request to provider for review/approval because:  Drug not on the G refill protocol     Pt is asking for refill nearly 1 month early.  Last filled 6/6/22 for 30 day supply and 3 refills.    Last seen a month ago.    Vicki Bazan RN

## 2022-09-12 NOTE — TELEPHONE ENCOUNTER
She is on my schedule tomorrow for Colposcopy for abnormal pap- please let her know this needs to be done by OB/GYN- not sure if she needs the appt for anything else.

## 2022-09-12 NOTE — TELEPHONE ENCOUNTER
Left message on home phone  Cell rings 'busy'  Will try again later to reach her.  Priti Silva on 9/12/2022 at 9:55 AM

## 2022-09-12 NOTE — TELEPHONE ENCOUNTER
Last filled 8/22 for a 30 day supply. She is at max recommended dose already- no early refills- she is asking for a refill for 10 days early.    PDMP Review       Value Time User    State PDMP site checked  Yes 9/12/2022  2:31 PM Angelic More APRN CNP

## 2022-09-13 DIAGNOSIS — G62.89 OTHER POLYNEUROPATHY: ICD-10-CM

## 2022-09-14 NOTE — TELEPHONE ENCOUNTER
Yes my apologies this is one I was in the middle of when my computer shut down. Script pharmacy has is  so pt needs a new script for Sept fill.

## 2022-09-15 RX ORDER — GABAPENTIN 400 MG/1
1200 CAPSULE ORAL 3 TIMES DAILY
Qty: 270 CAPSULE | Refills: 3 | Status: SHIPPED | OUTPATIENT
Start: 2022-09-21 | End: 2022-12-05

## 2022-09-18 ENCOUNTER — HEALTH MAINTENANCE LETTER (OUTPATIENT)
Age: 47
End: 2022-09-18

## 2022-09-20 ENCOUNTER — APPOINTMENT (OUTPATIENT)
Dept: ULTRASOUND IMAGING | Facility: CLINIC | Age: 47
End: 2022-09-20
Attending: NURSE PRACTITIONER
Payer: COMMERCIAL

## 2022-09-20 ENCOUNTER — APPOINTMENT (OUTPATIENT)
Dept: GENERAL RADIOLOGY | Facility: CLINIC | Age: 47
End: 2022-09-20
Attending: NURSE PRACTITIONER
Payer: COMMERCIAL

## 2022-09-20 ENCOUNTER — HOSPITAL ENCOUNTER (EMERGENCY)
Facility: CLINIC | Age: 47
Discharge: HOME | End: 2022-09-20
Attending: NURSE PRACTITIONER | Admitting: NURSE PRACTITIONER
Payer: COMMERCIAL

## 2022-09-20 VITALS
WEIGHT: 202 LBS | DIASTOLIC BLOOD PRESSURE: 93 MMHG | BODY MASS INDEX: 37.17 KG/M2 | OXYGEN SATURATION: 91 % | HEIGHT: 62 IN | RESPIRATION RATE: 16 BRPM | SYSTOLIC BLOOD PRESSURE: 151 MMHG | TEMPERATURE: 98.2 F | HEART RATE: 79 BPM

## 2022-09-20 DIAGNOSIS — M79.651 PAIN OF RIGHT THIGH: ICD-10-CM

## 2022-09-20 PROCEDURE — 99285 EMERGENCY DEPT VISIT HI MDM: CPT | Mod: 25 | Performed by: NURSE PRACTITIONER

## 2022-09-20 PROCEDURE — 250N000013 HC RX MED GY IP 250 OP 250 PS 637: Performed by: NURSE PRACTITIONER

## 2022-09-20 PROCEDURE — 93971 EXTREMITY STUDY: CPT | Mod: RT

## 2022-09-20 PROCEDURE — 73590 X-RAY EXAM OF LOWER LEG: CPT | Mod: RT

## 2022-09-20 PROCEDURE — 73552 X-RAY EXAM OF FEMUR 2/>: CPT | Mod: RT

## 2022-09-20 PROCEDURE — 99284 EMERGENCY DEPT VISIT MOD MDM: CPT | Performed by: NURSE PRACTITIONER

## 2022-09-20 RX ORDER — OXYCODONE HYDROCHLORIDE 5 MG/1
5 TABLET ORAL EVERY 4 HOURS PRN
Status: DISCONTINUED | OUTPATIENT
Start: 2022-09-20 | End: 2022-09-20 | Stop reason: HOSPADM

## 2022-09-20 RX ORDER — HYDROCODONE BITARTRATE AND ACETAMINOPHEN 5; 325 MG/1; MG/1
1 TABLET ORAL ONCE
Status: COMPLETED | OUTPATIENT
Start: 2022-09-20 | End: 2022-09-20

## 2022-09-20 RX ORDER — OXYCODONE HYDROCHLORIDE 5 MG/1
5 TABLET ORAL EVERY 6 HOURS PRN
Qty: 12 TABLET | Refills: 0 | Status: SHIPPED | OUTPATIENT
Start: 2022-09-20 | End: 2022-09-23

## 2022-09-20 RX ADMIN — OXYCODONE HYDROCHLORIDE 5 MG: 5 TABLET ORAL at 14:48

## 2022-09-20 RX ADMIN — HYDROCODONE BITARTRATE AND ACETAMINOPHEN 1 TABLET: 5; 325 TABLET ORAL at 13:22

## 2022-09-20 ASSESSMENT — ENCOUNTER SYMPTOMS
ARTHRALGIAS: 1
JOINT SWELLING: 0
MYALGIAS: 1
COLOR CHANGE: 1

## 2022-09-20 ASSESSMENT — ACTIVITIES OF DAILY LIVING (ADL)
ADLS_ACUITY_SCORE: 35
ADLS_ACUITY_SCORE: 33

## 2022-09-20 NOTE — ED TRIAGE NOTES
Pt here with c/o R leg pain, bruising, numbness. Pt states that she lost her balance during the night the other night to get up to use the bathroom and fell back onto her bed. Pt states that she has increased pain and bruising to the R leg today.      Triage Assessment     Row Name 09/20/22 1207       Triage Assessment (Adult)    Airway WDL WDL       Respiratory WDL    Respiratory WDL WDL       Skin Circulation/Temperature WDL    Skin Circulation/Temperature WDL WDL       Cardiac WDL    Cardiac WDL WDL       Peripheral/Neurovascular WDL    Peripheral Neurovascular WDL WDL       Cognitive/Neuro/Behavioral WDL    Cognitive/Neuro/Behavioral WDL WDL

## 2022-09-20 NOTE — ED PROVIDER NOTES
"  History     Chief Complaint   Patient presents with     Leg Pain     HPI  Apoorva Roberts is a 46 year old female who presents to the emergency department for evaluation of right leg pain since yesterday. Today she noticed random bruising to the right thigh and was concerned about blood clot. No known injury or trauma. Patient is a daily smoker, no hormone use. No numbness, tingling or radiating pain. No fevers, abdominal pain, nausea, dysuria, urinary frequency, hematuria.     Allergies:  Allergies   Allergen Reactions     Indomethacin      Other reaction(s): GI Upset     Fluoxetine      Other reaction(s): Thrush     Paroxetine      Other reaction(s): Thrush       Problem List:    Patient Active Problem List    Diagnosis Date Noted     PTSD (post-traumatic stress disorder) 08/04/2022     Priority: Medium     GEOR[place]IANA (generalized anxiety disorder) 08/04/2022     Priority: Medium     Gastritis with hemorrhage, unspecified chronicity, unspecified gastritis type 08/04/2022     Priority: Medium     Cervical high risk HPV (human papillomavirus) test positive 09/02/2021     Priority: Medium     8/27/21 NIL Pap, + HR HPV (neg 16/18). Plan cotest in 1 year.   8/4/22 LSIL, +HR HPV (not 16/18). Plan: colposcopy due before 11/4/22 8/11/22 left message / mychart sent / mychart read         Marijuana use, continuous 09/17/2020     Priority: Medium     Tobacco use disorder 09/17/2020     Priority: Medium     Migraine without aura and without status migrainosus, not intractable 09/17/2020     Priority: Medium     Classic migraine 11/06/2019     Priority: Medium     Chronic daily headache 11/06/2019     Priority: Medium     Improved on daily Tizanidine- has seen Neurology       Peripheral neuropathy 11/06/2019     Priority: Medium     After hernia surgery in 2014- was told the surgeon \"nicked\" a couple nerves on the left       Gastroesophageal reflux disease 12/05/2017     Priority: Medium     Benign essential hypertension 12/05/2017 "     Priority: Medium     Amlodipine, hydrochlorothiazide, and metoprolol.       Major depressive disorder, recurrent episode, moderate (H) 12/05/2017     Priority: Medium     Morbid obesity (H) 12/05/2017     Priority: Medium     12/5/2017 Body mass index is 41.98 kg/(m^2).       Hidradenitis suppurativa 09/11/2017     Priority: Medium        Past Medical History:    Past Medical History:   Diagnosis Date     Cervical high risk HPV (human papillomavirus) test positive 08/27/2021     Community acquired pneumonia 12/5/2017     Depressive disorder      Hypertension        Past Surgical History:    Past Surgical History:   Procedure Laterality Date     ABDOMEN SURGERY       APPENDECTOMY       CHOLECYSTECTOMY       ESOPHAGOSCOPY, GASTROSCOPY, DUODENOSCOPY (EGD), COMBINED N/A 10/8/2021    Procedure: ESOPHAGOGASTRODUODENOSCOPY, WITH BIOPSY;  Surgeon: Slim Randle DO;  Location: [facility]     GYN SURGERY       HERNIA REPAIR       HYSTERECTOMY, PAP STILL INDICATED      Cervix still present       Family History:    Family History   Problem Relation Age of Onset     Diabetes Mother      Hypertension Mother      Hyperlipidemia Mother      Depression Mother      Substance Abuse Mother      Hypertension Father      Hyperlipidemia Father      Kidney Cancer Father      Other Cancer Father      Diabetes Maternal Grandmother      Depression Sister      Breast Cancer No family hx of        Social History:  Marital Status:   [2]  Social History     Tobacco Use     Smoking status: Current Every Day Smoker     Packs/day: 1.00     Years: 30.00     Pack years: 30.00     Types: Cigarettes     Smokeless tobacco: Never Used   Vaping Use     Vaping Use: Never used   Substance Use Topics     Alcohol use: Yes     Comment: 1-2 drinks a week     Drug use: Yes     Types: Marijuana     Comment: cbd        Medications:    oxyCODONE (ROXICODONE) 5 MG tablet  acetaminophen (TYLENOL) 325 MG tablet  butalbital-acetaminophen-caffeine  "(ESGIC) -40 MG tablet  DULoxetine (CYMBALTA) 30 MG capsule  fluticasone (FLONASE) 50 MCG/ACT nasal spray  [START ON 9/21/2022] gabapentin (NEURONTIN) 400 MG capsule  hydrochlorothiazide (HYDRODIURIL) 25 MG tablet  lisinopril (ZESTRIL) 10 MG tablet  omeprazole (PRILOSEC) 20 MG DR capsule  tiZANidine (ZANAFLEX) 2 MG tablet  zolpidem (AMBIEN) 5 MG tablet          Review of Systems   Musculoskeletal: Positive for arthralgias and myalgias. Negative for joint swelling.   Skin: Positive for color change.   All other systems reviewed and are negative.      Physical Exam   BP: (!) 164/89  Pulse: 98  Temp: 98.2  F (36.8  C)  Resp: 16  Height: 157.5 cm (5' 2\")  Weight: 91.6 kg (202 lb)  SpO2: 95 %      Physical Exam  Constitutional:       General: She is not in acute distress.     Appearance: Normal appearance.   Eyes:      Conjunctiva/sclera: Conjunctivae normal.      Pupils: Pupils are equal, round, and reactive to light.   Cardiovascular:      Rate and Rhythm: Normal rate.   Pulmonary:      Effort: Pulmonary effort is normal.   Musculoskeletal:      Cervical back: Normal range of motion.      Right hip: Normal.      Right upper leg: Tenderness present. No swelling or bony tenderness.      Right knee: Normal.      Right lower leg: Normal.      Comments: Right anterior thigh tenderness to squeeze/palpation, scattered bruising to the lateral thigh. Pulses and perfusion are equal bilaterally. No overlying erythema, edema, abrasions.       Skin:     General: Skin is warm.      Capillary Refill: Capillary refill takes less than 2 seconds.   Neurological:      General: No focal deficit present.      Mental Status: She is alert.         ED Course                 Procedures           Results for orders placed or performed during the hospital encounter of 09/20/22 (from the past 24 hour(s))   US Lower Extremity Venous Duplex Right    Narrative    US LOWER EXTREMITY VENOUS DUPLEX RIGHT 9/20/2022 1:49 PM    CLINICAL HISTORY: " pain, bruising, edema  TECHNIQUE: Venous Duplex ultrasound of the right lower extremity with  and without compression, augmentation and duplex. Color flow and  spectral Doppler with waveform analysis performed.    COMPARISON: None.    FINDINGS: Exam includes the common femoral, femoral, popliteal, and  contralateral common femoral veins as well as segmentally visualized  deep calf veins and greater saphenous vein.     RIGHT: No deep vein thrombosis. No superficial thrombophlebitis. No  popliteal cyst.      Impression    IMPRESSION:  1.  No deep venous thrombosis in the right lower extremity.    SHIRA GUARDADO MD         SYSTEM ID:  [id number]   XR Femur Right 2 Views    Narrative    FEMUR RIGHT TWO VIEWS September 20, 2022 2:39 PM     HISTORY: Pain.    COMPARISON: None.      Impression    IMPRESSION: The femur appears normal. No fracture or osseous lesion.    SHARI PETERSON MD         SYSTEM ID:  [id number]   XR Tibia and Fibula Right 2 Views    Narrative    TIBIA AND FIBULA RIGHT TWO VIEWS September 20, 2022 2:39 PM     HISTORY: Pain.    COMPARISON: None.      Impression    IMPRESSION: The tibia and fibula appear normal. There is no evidence  of fracture or osseous lesion.    SHARI PETERSON MD         SYSTEM ID:  [id number]       Medications   oxyCODONE (ROXICODONE) tablet 5 mg (5 mg Oral Given 9/20/22 1448)   HYDROcodone-acetaminophen (NORCO) 5-325 MG per tablet 1 tablet (1 tablet Oral Given 9/20/22 1322)       Assessments & Plan (with Medical Decision Making)   Apoorva Roberts is a 46 year old female who presents to the emergency department for evaluation of right leg pain since yesterday. Today she noticed random bruising to the right thigh and was concerned about blood clot. Vitals normal, exam as above. US completed and no evidence of DVT to the right leg, no superficial phlebitis. Xray without fracture or malalignment. Low concern for deep space infection. Offered patient option of blood work but would  prefer to discharge home and continue to monitor encouraged OTC medications for pain, oxycodone for increased pain. Discussed safe use of narcotics. Follow up with PCP or ortho if symptoms persist. Return with new or worsening symptoms. Discharged in good condition and ambulating safely.     I have reviewed the nursing notes.    I have reviewed the findings, diagnosis, plan and need for follow up with the patient.    Discharge Medication List as of 9/20/2022  3:42 PM      START taking these medications    Details   oxyCODONE (ROXICODONE) 5 MG tablet Take 1 tablet (5 mg) by mouth every 6 hours as needed for pain, Disp-12 tablet, R-0, E-Prescribe             Final diagnoses:   Pain of right thigh       9/20/2022   [facility] EMERGENCY DEPT     Niki Baker, APRN CNP  09/20/22 8954

## 2022-09-21 ENCOUNTER — PATIENT OUTREACH (OUTPATIENT)
Dept: FAMILY MEDICINE | Facility: CLINIC | Age: 47
End: 2022-09-21

## 2022-09-21 ENCOUNTER — E-VISIT (OUTPATIENT)
Dept: FAMILY MEDICINE | Facility: CLINIC | Age: 47
End: 2022-09-21
Payer: COMMERCIAL

## 2022-09-21 DIAGNOSIS — B37.0 THRUSH: Primary | ICD-10-CM

## 2022-09-21 PROCEDURE — 99421 OL DIG E/M SVC 5-10 MIN: CPT | Performed by: NURSE PRACTITIONER

## 2022-09-21 NOTE — TELEPHONE ENCOUNTER
Writer left message requesting a call back to the clinic.    First attempt.    Daljit MITTAL [facility]

## 2022-09-21 NOTE — TELEPHONE ENCOUNTER
What type of discharge? Emergency Department  Risk of Hospital admission or ED visit: 16%  Is a TCM episode required? No  When should the patient follow up with PCP? 14 days of discharge.    Daljit Sam RN  [facility]

## 2022-09-22 RX ORDER — NYSTATIN 100000/ML
500000 SUSPENSION, ORAL (FINAL DOSE FORM) ORAL 4 TIMES DAILY
Qty: 400 ML | Refills: 1 | Status: SHIPPED | OUTPATIENT
Start: 2022-09-22 | End: 2023-01-02

## 2022-09-22 NOTE — TELEPHONE ENCOUNTER
Patient presents to [facility] with complaints of being \"fussy\"/bilateral ear pulling. Onset a few days ago.       Denies known Latex allergy or symptoms of Latex sensitivity.    Social History     Tobacco Use   Smoking Status Not on file       Medications reviewed and updated.    Patient would like communication of results via:   [] Kali   [] Home Phone: [phone] (home)   [] Work Phone:  There is no work phone number on file.    [x] Cell Phone:    [phone] (mobile)        Emergency Contact: YULIA TOLBERT    [] Home:    [id number]     [] Work:         [] Mobile:            Emergency Contact: SIGRID HASKINS    [] Home:   [id number]    [] Work:         [] Mobile:  [phone]    [] Letter  Okay to leave message containing results? Yes     Provider E-Visit time total (minutes): 5

## 2022-09-22 NOTE — PATIENT INSTRUCTIONS
Thank you for choosing us for your care. I have placed an order for a prescription so that you can start treatment. I sent the swish and swallow antifungal to treat thrush. Your pharmacist will able to address any questions you may have about the medication.     If you're not feeling better within 5-7 days, please schedule an appointment.  You can schedule an appointment right here in [facility], or call [phone]  If the visit is for the same symptoms as your eVisit, we'll refund the cost of your eVisit if seen within seven days.

## 2022-10-08 DIAGNOSIS — F43.10 PTSD (POST-TRAUMATIC STRESS DISORDER): ICD-10-CM

## 2022-10-08 DIAGNOSIS — F33.1 MAJOR DEPRESSIVE DISORDER, RECURRENT EPISODE, MODERATE (H): ICD-10-CM

## 2022-10-08 DIAGNOSIS — F41.1 GAD (GENERALIZED ANXIETY DISORDER): ICD-10-CM

## 2022-10-10 NOTE — TELEPHONE ENCOUNTER
"Routing refill request to provider for review/approval because:  PHQ-9 score:    PHQ 8/4/2022   PHQ-9 Total Score 8   Q9: Thoughts of better off dead/self-harm past 2 weeks Not at all                 Pending Prescriptions:                       Disp   Refills    DULoxetine (CYMBALTA) 30 MG capsule [Pharm*60 cap*1        Sig: Take 1 capsule (30 mg) by mouth 2 times daily    Requested Prescriptions   Pending Prescriptions Disp Refills    DULoxetine (CYMBALTA) 30 MG capsule [Pharmacy Med Name: duloxetine 30 mg capsule,delayed release] 60 capsule 1     Sig: Take 1 capsule (30 mg) by mouth 2 times daily       Serotonin-Norepinephrine Reuptake Inhibitors  Failed - 10/8/2022  8:04 AM        Failed - Blood pressure under 140/90 in past 12 months     BP Readings from Last 3 Encounters:   09/20/22 (!) 151/93   08/04/22 120/74   05/23/22 138/80                 Failed - PHQ-9 score of less than 5 in past 6 months     Please review last PHQ-9 score.           Passed - Medication is active on med list        Passed - Patient is age 18 or older        Passed - No active pregnancy on record        Passed - No positive pregnancy test in past 12 months        Passed - Recent (6 mo) or future (30 days) visit within the authorizing provider's specialty     Patient had office visit in the last 6 months or has a visit in the next 30 days with authorizing provider or within the authorizing provider's specialty.  See \"Patient Info\" tab in inbasket, or \"Choose Columns\" in Meds & Orders section of the refill encounter.               Kavya Santos RN on 10/10/2022 at 2:36 PM    "

## 2022-10-11 RX ORDER — DULOXETIN HYDROCHLORIDE 30 MG/1
30 CAPSULE, DELAYED RELEASE ORAL 2 TIMES DAILY
Qty: 180 CAPSULE | Refills: 3 | Status: SHIPPED | OUTPATIENT
Start: 2022-10-11 | End: 2023-01-02 | Stop reason: SINTOL

## 2022-10-26 ENCOUNTER — MYC MEDICAL ADVICE (OUTPATIENT)
Dept: FAMILY MEDICINE | Facility: CLINIC | Age: 47
End: 2022-10-26

## 2022-10-26 DIAGNOSIS — B37.0 THRUSH: Primary | ICD-10-CM

## 2022-10-26 RX ORDER — FLUCONAZOLE 100 MG/1
100 TABLET ORAL DAILY
Qty: 15 TABLET | Refills: 0 | Status: SHIPPED | OUTPATIENT
Start: 2022-10-26 | End: 2022-11-10

## 2022-10-31 DIAGNOSIS — G62.89 OTHER POLYNEUROPATHY: ICD-10-CM

## 2022-10-31 RX ORDER — ZOLPIDEM TARTRATE 5 MG/1
5 TABLET ORAL
Qty: 30 TABLET | Refills: 5 | Status: SHIPPED | OUTPATIENT
Start: 2022-10-31 | End: 2023-01-02

## 2022-11-05 ENCOUNTER — APPOINTMENT (OUTPATIENT)
Dept: GENERAL RADIOLOGY | Facility: CLINIC | Age: 47
End: 2022-11-05
Attending: FAMILY MEDICINE
Payer: COMMERCIAL

## 2022-11-05 ENCOUNTER — HOSPITAL ENCOUNTER (EMERGENCY)
Facility: CLINIC | Age: 47
Discharge: HOME | End: 2022-11-05
Attending: FAMILY MEDICINE | Admitting: FAMILY MEDICINE
Payer: COMMERCIAL

## 2022-11-05 VITALS
TEMPERATURE: 99.1 F | RESPIRATION RATE: 16 BRPM | HEART RATE: 90 BPM | OXYGEN SATURATION: 98 % | SYSTOLIC BLOOD PRESSURE: 132 MMHG | WEIGHT: 210 LBS | BODY MASS INDEX: 38.64 KG/M2 | HEIGHT: 62 IN | DIASTOLIC BLOOD PRESSURE: 97 MMHG

## 2022-11-05 DIAGNOSIS — U07.1 INFECTION DUE TO 2019 NOVEL CORONAVIRUS: ICD-10-CM

## 2022-11-05 LAB
ALBUMIN SERPL BCG-MCNC: 3.9 G/DL (ref 3.5–5.2)
ALP SERPL-CCNC: 117 U/L (ref 35–104)
ALT SERPL W P-5'-P-CCNC: 19 U/L (ref 10–35)
ANION GAP SERPL CALCULATED.3IONS-SCNC: 14 MMOL/L (ref 7–15)
AST SERPL W P-5'-P-CCNC: 27 U/L (ref 10–35)
BASE EXCESS BLDV CALC-SCNC: 0.2 MMOL/L (ref -7.7–1.9)
BASOPHILS # BLD AUTO: 0 10E3/UL (ref 0–0.2)
BASOPHILS NFR BLD AUTO: 0 %
BILIRUB SERPL-MCNC: 0.2 MG/DL
BUN SERPL-MCNC: 11.2 MG/DL (ref 6–20)
CALCIUM SERPL-MCNC: 9.3 MG/DL (ref 8.6–10)
CHLORIDE SERPL-SCNC: 102 MMOL/L (ref 98–107)
CREAT SERPL-MCNC: 0.84 MG/DL (ref 0.51–0.95)
CRP SERPL-MCNC: 57.12 MG/L
DEPRECATED HCO3 PLAS-SCNC: 22 MMOL/L (ref 22–29)
EOSINOPHIL # BLD AUTO: 0 10E3/UL (ref 0–0.7)
EOSINOPHIL NFR BLD AUTO: 0 %
ERYTHROCYTE [DISTWIDTH] IN BLOOD BY AUTOMATED COUNT: 12.8 % (ref 10–15)
FLUAV RNA SPEC QL NAA+PROBE: NEGATIVE
FLUBV RNA RESP QL NAA+PROBE: NEGATIVE
GFR SERPL CREATININE-BSD FRML MDRD: 87 ML/MIN/1.73M2
GLUCOSE SERPL-MCNC: 145 MG/DL (ref 70–99)
HCO3 BLDV-SCNC: 25 MMOL/L (ref 21–28)
HCT VFR BLD AUTO: 38.6 % (ref 35–47)
HGB BLD-MCNC: 13.1 G/DL (ref 11.7–15.7)
IMM GRANULOCYTES # BLD: 0 10E3/UL
IMM GRANULOCYTES NFR BLD: 0 %
LACTATE SERPL-SCNC: 1.1 MMOL/L (ref 0.7–2)
LACTATE SERPL-SCNC: 2.3 MMOL/L (ref 0.7–2)
LYMPHOCYTES # BLD AUTO: 1.6 10E3/UL (ref 0.8–5.3)
LYMPHOCYTES NFR BLD AUTO: 33 %
MCH RBC QN AUTO: 33.4 PG (ref 26.5–33)
MCHC RBC AUTO-ENTMCNC: 33.9 G/DL (ref 31.5–36.5)
MCV RBC AUTO: 99 FL (ref 78–100)
MONOCYTES # BLD AUTO: 0.2 10E3/UL (ref 0–1.3)
MONOCYTES NFR BLD AUTO: 4 %
NEUTROPHILS # BLD AUTO: 3 10E3/UL (ref 1.6–8.3)
NEUTROPHILS NFR BLD AUTO: 63 %
NRBC # BLD AUTO: 0 10E3/UL
NRBC BLD AUTO-RTO: 0 /100
O2/TOTAL GAS SETTING VFR VENT: 0 %
PCO2 BLDV: 39 MM HG (ref 40–50)
PH BLDV: 7.41 [PH] (ref 7.32–7.43)
PLAT MORPH BLD: ABNORMAL
PLATELET # BLD AUTO: 494 10E3/UL (ref 150–450)
PO2 BLDV: 17 MM HG (ref 25–47)
POTASSIUM SERPL-SCNC: 3.6 MMOL/L (ref 3.4–5.3)
PROT SERPL-MCNC: 7.7 G/DL (ref 6.4–8.3)
RBC # BLD AUTO: 3.92 10E6/UL (ref 3.8–5.2)
RBC MORPH BLD: ABNORMAL
SARS-COV-2 RNA RESP QL NAA+PROBE: POSITIVE
SODIUM SERPL-SCNC: 138 MMOL/L (ref 136–145)
VARIANT LYMPHS BLD QL SMEAR: PRESENT
WBC # BLD AUTO: 4.8 10E3/UL (ref 4–11)

## 2022-11-05 PROCEDURE — 250N000013 HC RX MED GY IP 250 OP 250 PS 637: Performed by: FAMILY MEDICINE

## 2022-11-05 PROCEDURE — 96361 HYDRATE IV INFUSION ADD-ON: CPT | Performed by: FAMILY MEDICINE

## 2022-11-05 PROCEDURE — 83605 ASSAY OF LACTIC ACID: CPT | Performed by: FAMILY MEDICINE

## 2022-11-05 PROCEDURE — 258N000003 HC RX IP 258 OP 636: Performed by: FAMILY MEDICINE

## 2022-11-05 PROCEDURE — 36415 COLL VENOUS BLD VENIPUNCTURE: CPT | Performed by: FAMILY MEDICINE

## 2022-11-05 PROCEDURE — 87636 SARSCOV2 & INF A&B AMP PRB: CPT | Performed by: FAMILY MEDICINE

## 2022-11-05 PROCEDURE — 71045 X-RAY EXAM CHEST 1 VIEW: CPT

## 2022-11-05 PROCEDURE — 99284 EMERGENCY DEPT VISIT MOD MDM: CPT | Mod: CS | Performed by: FAMILY MEDICINE

## 2022-11-05 PROCEDURE — 80053 COMPREHEN METABOLIC PANEL: CPT | Performed by: FAMILY MEDICINE

## 2022-11-05 PROCEDURE — 82803 BLOOD GASES ANY COMBINATION: CPT | Performed by: FAMILY MEDICINE

## 2022-11-05 PROCEDURE — 85025 COMPLETE CBC W/AUTO DIFF WBC: CPT | Performed by: FAMILY MEDICINE

## 2022-11-05 PROCEDURE — 250N000011 HC RX IP 250 OP 636: Performed by: FAMILY MEDICINE

## 2022-11-05 PROCEDURE — 82040 ASSAY OF SERUM ALBUMIN: CPT | Performed by: FAMILY MEDICINE

## 2022-11-05 PROCEDURE — 86140 C-REACTIVE PROTEIN: CPT | Performed by: FAMILY MEDICINE

## 2022-11-05 PROCEDURE — C9803 HOPD COVID-19 SPEC COLLECT: HCPCS | Performed by: FAMILY MEDICINE

## 2022-11-05 PROCEDURE — 96374 THER/PROPH/DIAG INJ IV PUSH: CPT | Performed by: FAMILY MEDICINE

## 2022-11-05 PROCEDURE — 99284 EMERGENCY DEPT VISIT MOD MDM: CPT | Mod: CS,25 | Performed by: FAMILY MEDICINE

## 2022-11-05 RX ORDER — ACETAMINOPHEN 500 MG
1000 TABLET ORAL ONCE
Status: DISCONTINUED | OUTPATIENT
Start: 2022-11-05 | End: 2022-11-05 | Stop reason: HOSPADM

## 2022-11-05 RX ORDER — OXYCODONE HYDROCHLORIDE 5 MG/1
5 TABLET ORAL EVERY 6 HOURS PRN
Qty: 10 TABLET | Refills: 0 | Status: SHIPPED | OUTPATIENT
Start: 2022-11-05 | End: 2022-11-08

## 2022-11-05 RX ORDER — OLANZAPINE 2.5 MG/1
2.5 TABLET, FILM COATED ORAL ONCE
Status: COMPLETED | OUTPATIENT
Start: 2022-11-05 | End: 2022-11-05

## 2022-11-05 RX ORDER — ONDANSETRON 2 MG/ML
4 INJECTION INTRAMUSCULAR; INTRAVENOUS ONCE
Status: COMPLETED | OUTPATIENT
Start: 2022-11-05 | End: 2022-11-05

## 2022-11-05 RX ADMIN — OLANZAPINE 2.5 MG: 2.5 TABLET, FILM COATED ORAL at 11:04

## 2022-11-05 RX ADMIN — SODIUM CHLORIDE, POTASSIUM CHLORIDE, SODIUM LACTATE AND CALCIUM CHLORIDE 1000 ML: 600; 310; 30; 20 INJECTION, SOLUTION INTRAVENOUS at 10:31

## 2022-11-05 RX ADMIN — ONDANSETRON 4 MG: 2 INJECTION INTRAMUSCULAR; INTRAVENOUS at 10:35

## 2022-11-05 RX ADMIN — SODIUM CHLORIDE 1000 ML: 9 INJECTION, SOLUTION INTRAVENOUS at 12:06

## 2022-11-05 ASSESSMENT — ACTIVITIES OF DAILY LIVING (ADL)
ADLS_ACUITY_SCORE: 35
ADLS_ACUITY_SCORE: 35

## 2022-11-05 NOTE — ED NOTES
"Pt states that nausea/vomiting/diarrhea started about 2 weeks ago. Improved slightly, but the last 2 days \"have been worse\". Pt states that she has vomited twice today. Pt reports 8 loose stools in the last 8 hours. Pt states that cough is congested feeling \"but nothing comes up past my throat for me to get it out\".   Pt is a smoker, about 1 PPD.   "

## 2022-11-05 NOTE — ED PROVIDER NOTES
History     Chief Complaint   Patient presents with     Cough     Cough, ill for a couple weeks, also vomiting and diarrhea (worse the last couple days)     HPI  Apoorva Roberts is a 46 year old female, past medical history significant for PTSD, generalized anxiety disorder, gastritis with hemorrhage, marijuana use, tobacco use, migraine, chronic daily headache, peripheral neuropathy, GERD, hypertension, depression, morbid obesity, hidradenitis suppurativa, presents to the emergency department with concerns of 2 weeks of nausea vomiting diarrhea and now 3 days of cough congestion sore throat headache body aches chills fever.  Really no fluids by mouth for the last couple of days.  The patient is unvaccinated for COVID and is unvaccinated for seasonal flu this year.  No infectious contacts that she is aware of.    Allergies:  Allergies   Allergen Reactions     Indomethacin      Other reaction(s): GI Upset     Fluoxetine      Other reaction(s): Thrush     Paroxetine      Other reaction(s): Thrush       Problem List:    Patient Active Problem List    Diagnosis Date Noted     PTSD (post-traumatic stress disorder) 08/04/2022     Priority: Medium     [place]IA[place] (generalized anxiety disorder) 08/04/2022     Priority: Medium     Gastritis with hemorrhage, unspecified chronicity, unspecified gastritis type 08/04/2022     Priority: Medium     Cervical high risk HPV (human papillomavirus) test positive 09/02/2021     Priority: Medium     8/27/21 NIL Pap, + HR HPV (neg 16/18). Plan cotest in 1 year.   8/4/22 LSIL, +HR HPV (not 16/18). Plan: colposcopy due before 11/4/22 8/11/22 left message / mychart sent / mychart read  9/27/22 Reminder mychart  11/1/22 [place] not done. Tracking updated for 6 mo colp/pap due 2/4/23            Marijuana use, continuous 09/17/2020     Priority: Medium     Tobacco use disorder 09/17/2020     Priority: Medium     Migraine without aura and without status migrainosus, not intractable 09/17/2020      "Priority: Medium     Classic migraine 11/06/2019     Priority: Medium     Chronic daily headache 11/06/2019     Priority: Medium     Improved on daily Tizanidine- has seen Neurology       Peripheral neuropathy 11/06/2019     Priority: Medium     After hernia surgery in 2014- was told the surgeon \"nicked\" a couple nerves on the left       Gastroesophageal reflux disease 12/05/2017     Priority: Medium     Benign essential hypertension 12/05/2017     Priority: Medium     Amlodipine, hydrochlorothiazide, and metoprolol.       Major depressive disorder, recurrent episode, moderate (H) 12/05/2017     Priority: Medium     Morbid obesity (H) 12/05/2017     Priority: Medium     12/5/2017 Body mass index is 41.98 kg/(m^2).       Hidradenitis suppurativa 09/11/2017     Priority: Medium        Past Medical History:    Past Medical History:   Diagnosis Date     Cervical high risk HPV (human papillomavirus) test positive 08/27/2021     Community acquired pneumonia 12/5/2017     Depressive disorder      Hypertension        Past Surgical History:    Past Surgical History:   Procedure Laterality Date     ABDOMEN SURGERY       APPENDECTOMY       CHOLECYSTECTOMY       ESOPHAGOSCOPY, GASTROSCOPY, DUODENOSCOPY (EGD), COMBINED N/A 10/8/2021    Procedure: ESOPHAGOGASTRODUODENOSCOPY, WITH BIOPSY;  Surgeon: Slim Randle DO;  Location: [facility]     GYN SURGERY       HERNIA REPAIR       HYSTERECTOMY, PAP STILL INDICATED      Cervix still present       Family History:    Family History   Problem Relation Age of Onset     Diabetes Mother      Hypertension Mother      Hyperlipidemia Mother      Depression Mother      Substance Abuse Mother      Hypertension Father      Hyperlipidemia Father      Kidney Cancer Father      Other Cancer Father      Diabetes Maternal Grandmother      Depression Sister      Breast Cancer No family hx of        Social History:  Marital Status:   [2]  Social History     Tobacco Use     Smoking status: " "Every Day     Packs/day: 1.00     Years: 30.00     Pack years: 30.00     Types: Cigarettes     Smokeless tobacco: Never   Vaping Use     Vaping Use: Never used   Substance Use Topics     Alcohol use: Yes     Comment: 1-2 drinks a week     Drug use: Yes     Types: Marijuana     Comment: cbd        Medications:    nirmatrelvir and ritonavir (PAXLOVID) therapy pack  oxyCODONE (ROXICODONE) 5 MG tablet  acetaminophen (TYLENOL) 325 MG tablet  butalbital-acetaminophen-caffeine (ESGIC) -40 MG tablet  DULoxetine (CYMBALTA) 30 MG capsule  fluconazole (DIFLUCAN) 100 MG tablet  fluticasone (FLONASE) 50 MCG/ACT nasal spray  gabapentin (NEURONTIN) 400 MG capsule  hydrochlorothiazide (HYDRODIURIL) 25 MG tablet  lisinopril (ZESTRIL) 10 MG tablet  nystatin (MYCOSTATIN) 285809 UNIT/ML suspension  omeprazole (PRILOSEC) 20 MG DR capsule  tiZANidine (ZANAFLEX) 2 MG tablet  zolpidem (AMBIEN) 5 MG tablet          Review of Systems   All other systems reviewed and are negative.      Physical Exam   BP: (!) 167/104  Pulse: 118  Temp: 99.1  F (37.3  C)  Resp: 16  Height: 157.5 cm (5' 2\")  Weight: 95.3 kg (210 lb)  SpO2: 98 %      Physical Exam  Vitals and nursing note reviewed.   Constitutional:       General: She is not in acute distress.     Appearance: Normal appearance. She is normal weight. She is ill-appearing.   HENT:      Head: Normocephalic and atraumatic.      Right Ear: Tympanic membrane, ear canal and external ear normal.      Left Ear: Tympanic membrane, ear canal and external ear normal.      Nose: Nose normal.      Mouth/Throat:      Mouth: Mucous membranes are dry.      Pharynx: Oropharynx is clear.   Eyes:      Extraocular Movements: Extraocular movements intact.      Conjunctiva/sclera: Conjunctivae normal.      Pupils: Pupils are equal, round, and reactive to light.   Cardiovascular:      Rate and Rhythm: Normal rate and regular rhythm.      Pulses: Normal pulses.      Heart sounds: Normal heart sounds.   Pulmonary: "      Effort: Pulmonary effort is normal.      Breath sounds: Normal breath sounds.   Abdominal:      General: Bowel sounds are normal.      Palpations: Abdomen is soft.   Musculoskeletal:         General: Normal range of motion.      Cervical back: Normal range of motion and neck supple.   Skin:     General: Skin is warm and dry.      Capillary Refill: Capillary refill takes less than 2 seconds.   Neurological:      General: No focal deficit present.      Mental Status: She is alert and oriented to person, place, and time.   Psychiatric:         Mood and Affect: Mood normal.         Behavior: Behavior normal.         ED Course                 Procedures              Critical Care time:  none               Results for orders placed or performed during the hospital encounter of 11/05/22 (from the past 24 hour(s))   Symptomatic; Unknown Influenza A/B & SARS-CoV2 (COVID-19) Virus PCR Multiplex Nasopharyngeal    Specimen: Nasopharyngeal; Swab   Result Value Ref Range    Influenza A PCR Negative Negative    Influenza B PCR Negative Negative    SARS CoV2 PCR Positive (A) Negative    Narrative    Testing was performed using the dunia SARS-CoV-2 & Influenza A/B Assay on the dunia Johanna System. This test should be ordered for the detection of SARS-CoV-2 and influenza viruses in individuals who meet clinical and/or epidemiological criteria. Test performance is unknown in asymptomatic patients. This test is for in vitro diagnostic use under the FDA EUA for laboratories certified under CLIA to perform moderate and/or high complexity testing. This test has not been FDA cleared or approved. A negative result does not rule out the presence of PCR inhibitors in the specimen or target RNA in concentration below the limit of detection for the assay. If only one viral target is positive but coinfection with multiple targets is suspected, the sample should be re-tested with another FDA cleared, approved or authorized test, if coinfection  would change clinical management. [facility] are certified under the Clinical Laboratory Improvement Amendments of 1988 (CLIA-88) as qualified to perform moderate and/or high complexity laboratory testing.   CBC with platelets, differential    Narrative    The following orders were created for panel order CBC with platelets, differential.  Procedure                               Abnormality         Status                     ---------                               -----------         ------                     CBC with platelets and d...[[id number]]  Abnormal            Final result               RBC and Platelet Morphology[[id number]]  Abnormal            Final result                 Please view results for these tests on the individual orders.   Comprehensive metabolic panel   Result Value Ref Range    Sodium 138 136 - 145 mmol/L    Potassium 3.6 3.4 - 5.3 mmol/L    Chloride 102 98 - 107 mmol/L    Carbon Dioxide (CO2) 22 22 - 29 mmol/L    Anion Gap 14 7 - 15 mmol/L    Urea Nitrogen 11.2 6.0 - 20.0 mg/dL    Creatinine 0.84 0.51 - 0.95 mg/dL    Calcium 9.3 8.6 - 10.0 mg/dL    Glucose 145 (H) 70 - 99 mg/dL    Alkaline Phosphatase 117 (H) 35 - 104 U/L    AST 27 10 - 35 U/L    ALT 19 10 - 35 U/L    Protein Total 7.7 6.4 - 8.3 g/dL    Albumin 3.9 3.5 - 5.2 g/dL    Bilirubin Total 0.2 <=1.2 mg/dL    GFR Estimate 87 >60 mL/min/1.73m2   CRP Inflammation   Result Value Ref Range    CRP Inflammation 57.12 (H) <5.00 mg/L   Blood gas venous   Result Value Ref Range    pH Venous 7.41 7.32 - 7.43    pCO2 Venous 39 (L) 40 - 50 mm Hg    pO2 Venous 17 (L) 25 - 47 mm Hg    Bicarbonate Venous 25 21 - 28 mmol/L    Base Excess/Deficit (+/-) 0.2 -7.7 - 1.9 mmol/L    FIO2 0    Lactic acid whole blood   Result Value Ref Range    Lactic Acid 2.3 (H) 0.7 - 2.0 mmol/L   CBC with platelets and differential   Result Value Ref Range    WBC Count 4.8 4.0 - 11.0 10e3/uL    RBC Count 3.92 3.80 - 5.20 10e6/uL    Hemoglobin 13.1  11.7 - 15.7 g/dL    Hematocrit 38.6 35.0 - 47.0 %    MCV 99 78 - 100 fL    MCH 33.4 (H) 26.5 - 33.0 pg    MCHC 33.9 31.5 - 36.5 g/dL    RDW 12.8 10.0 - 15.0 %    Platelet Count 494 (H) 150 - 450 10e3/uL    % Neutrophils 63 %    % Lymphocytes 33 %    % Monocytes 4 %    % Eosinophils 0 %    % Basophils 0 %    % Immature Granulocytes 0 %    NRBCs per 100 WBC 0 <1 /100    Absolute Neutrophils 3.0 1.6 - 8.3 10e3/uL    Absolute Lymphocytes 1.6 0.8 - 5.3 10e3/uL    Absolute Monocytes 0.2 0.0 - 1.3 10e3/uL    Absolute Eosinophils 0.0 0.0 - 0.7 10e3/uL    Absolute Basophils 0.0 0.0 - 0.2 10e3/uL    Absolute Immature Granulocytes 0.0 <=0.4 10e3/uL    Absolute NRBCs 0.0 10e3/uL   RBC and Platelet Morphology   Result Value Ref Range    Platelet Assessment  Automated Count Confirmed. Platelet morphology is normal.     Automated Count Confirmed. Platelet morphology is normal.    Reactive Lymphocytes Present (A) None Seen    RBC Morphology Confirmed RBC Indices    XR Chest Port 1 View    Narrative    EXAM: XR CHEST PORT 1 VIEW  LOCATION: [facility]  DATE/TIME: 11/5/2022 11:15 AM    INDICATION: Cough, short of air  COMPARISON: None.      Impression    IMPRESSION: Negative chest.       Medications   acetaminophen (TYLENOL) tablet 1,000 mg (0 mg Oral Hold 11/5/22 1055)   lactated ringers BOLUS 1,000 mL (0 mLs Intravenous Stopped 11/5/22 1206)   0.9% sodium chloride BOLUS (1,000 mLs Intravenous New Bag 11/5/22 1206)   ondansetron (ZOFRAN) injection 4 mg (4 mg Intravenous Given 11/5/22 1035)   OLANZapine (zyPREXA) tablet 2.5 mg (2.5 mg Oral Given 11/5/22 1104)     1:03 PM  Results reviewed in the room with the patient and answered her questions.  She has no vital sign instability of concern at this point.  Mildly elevated lactate which I think is related to dehydration.  She does not appear septic.  Exam and history are consistent with acute viral infection and in this case COVID positive.  We discussed  Paxlovid at length and she would like to do this.  I have sent a prescription for it to our pharmacy here.  She would also like something stronger for headache in the context of chronic daily headaches and now with acute COVID-19 infection.  I have given her prescription for 10 tablets of oxycodone 5 mg and encouraged her to use Tylenol/ibuprofen as baseline pain medication.  Symptomatic supportive care discussed as well as appropriate quarantine and return criteria reviewed.  Disposition is to home.    Assessments & Plan (with Medical Decision Making)   Assessments and plan with medical decision making at the time stamp above.    Disclaimer: This note consists of symbols derived from keyboarding, dictation and/or voice recognition software. As a result, there may be errors in the script that have gone undetected. Please consider this when interpreting information found in this chart.      I have reviewed the nursing notes.    I have reviewed the findings, diagnosis, plan and need for follow up with the patient.          New Prescriptions    NIRMATRELVIR AND RITONAVIR (PAXLOVID) THERAPY PACK    Take 3 tablets by mouth 2 times daily for 5 days    OXYCODONE (ROXICODONE) 5 MG TABLET    Take 1 tablet (5 mg) by mouth every 6 hours as needed for breakthrough pain       Final diagnoses:   Infection due to 2019 novel coronavirus       11/5/2022   [facility] EMERGENCY DEPT     Tereso Naranjo MD  11/05/22 5688

## 2022-11-05 NOTE — ED TRIAGE NOTES
Cough, ill for a couple weeks, also vomiting and diarrhea (worse the last couple days)     Triage Assessment     Row Name 11/05/22 [facility]       Triage Assessment (Adult)    Airway WDL WDL       Cardiac WDL    Cardiac WDL WDL       Cognitive/Neuro/Behavioral WDL    Cognitive/Neuro/Behavioral WDL WDL

## 2022-11-05 NOTE — DISCHARGE INSTRUCTIONS
Push fluids, rest.  Tylenol/ibuprofen as needed for pain fever and comfort  you may use oxycodone sparingly for breakthrough pain the next couple of days.  Paxlovid as directed.

## 2022-11-07 ENCOUNTER — PATIENT OUTREACH (OUTPATIENT)
Dept: FAMILY MEDICINE | Facility: CLINIC | Age: 47
End: 2022-11-07

## 2022-11-07 NOTE — TELEPHONE ENCOUNTER
"ED/Discharge Protocol    \"Hi, my name is Veda Carter RN, a registered nurse, and I am calling on behalf of Dr. More's office at [place].  I am calling to follow up and see how things are going for you after your recent visit.\"    \"I see that you were in the (ER/UC/IP) on 11/5/22.    How are you doing now that you are home?\" Cough is not as bad.    Is patient experiencing symptoms that may require a hospital visit?  no    Discharge Instructions    \"Let's review your discharge instructions.  What is/are the follow-up recommendations?  Pt. Response: Paxlovid started    \"Were you instructed to make a follow-up appointment?\"  Pt. Response: yes     \"When you see the provider, I would recommend that you bring your discharge instructions with you.    Medications    \"How many new medications are you on since your hospitalization/ED visit?\"    0-1  \"How many of your current medicines changed (dose, timing, name, etc.) while you were in the hospital/ED visit?\"   0-1  \"Do you have questions about your medications?\"   No  \"Were you newly diagnosed with heart failure, COPD, diabetes or did you have a heart attack?\"   No  For patients on insulin: \"Did you start on insulin in the hospital or did you have your insulin dose changed?\"   No  Post Discharge Medication Reconciliation Status: patient was not discharged from an inpatient facility or TCU.    Was MTM referral placed (*Make sure to put transitions as reason for referral)?   No    Call Summary    \"Do you have any questions or concerns about your condition or care plan at the moment?\"    No  Triage nurse advice given: schedule follow up appointment    Patient was in ER 2 in the past year (assess appropriateness of ER visits.)      \"If you have questions or things don't continue to improve, we encourage you contact us through the main clinic number,  [phone].  Even if the clinic is not open, triage nurses are available 24/7 to help you.     We would like you to know " "that our clinic has extended hours (provide information).  We also have urgent care (provide details on closest location and hours/contact info)\"      \"Thank you for your time and take care!\"      "

## 2022-11-07 NOTE — TELEPHONE ENCOUNTER
What type of discharge? Emergency Department  Risk of Hospital admission or ED visit: 11%  Is a TCM episode required? No  When should the patient follow up with PCP? within 30 days of discharge.    Daljit Sam RN  [facility]

## 2022-12-05 ENCOUNTER — E-VISIT (OUTPATIENT)
Dept: FAMILY MEDICINE | Facility: CLINIC | Age: 47
End: 2022-12-05
Payer: COMMERCIAL

## 2022-12-05 DIAGNOSIS — G62.89 OTHER POLYNEUROPATHY: ICD-10-CM

## 2022-12-05 PROCEDURE — 99207 PR NON-BILLABLE SERV PER CHARTING: CPT | Performed by: NURSE PRACTITIONER

## 2022-12-05 RX ORDER — GABAPENTIN 400 MG/1
1200 CAPSULE ORAL 3 TIMES DAILY
Qty: 270 CAPSULE | Refills: 3 | Status: SHIPPED | OUTPATIENT
Start: 2023-01-02 | End: 2023-03-28

## 2022-12-05 NOTE — PATIENT INSTRUCTIONS
This was prescribed for 1 year in September, I re-newed the Rx and sent to [facility]. Since you filled the last one 12/3 the next is available 1/2/23. No charge for this visit.   ISAAC Dean CNP  [facility]

## 2022-12-15 ENCOUNTER — E-VISIT (OUTPATIENT)
Dept: FAMILY MEDICINE | Facility: CLINIC | Age: 47
End: 2022-12-15
Payer: COMMERCIAL

## 2022-12-15 DIAGNOSIS — R61 NIGHT SWEATS: Primary | ICD-10-CM

## 2022-12-15 PROCEDURE — 99207 PR NON-BILLABLE SERV PER CHARTING: CPT | Performed by: NURSE PRACTITIONER

## 2022-12-15 NOTE — PATIENT INSTRUCTIONS
Thank you for choosing us for your care. I think an in-clinic visit would be best next steps based on your symptoms. Please schedule a clinic appointment; you won t be charged for this eVisit.      You can schedule an appointment right here in [facility], or call [phone]

## 2023-01-02 ENCOUNTER — E-VISIT (OUTPATIENT)
Dept: FAMILY MEDICINE | Facility: CLINIC | Age: 48
End: 2023-01-02

## 2023-01-02 ENCOUNTER — TELEPHONE (OUTPATIENT)
Dept: FAMILY MEDICINE | Facility: CLINIC | Age: 48
End: 2023-01-02

## 2023-01-02 ENCOUNTER — OFFICE VISIT (OUTPATIENT)
Dept: FAMILY MEDICINE | Facility: CLINIC | Age: 48
End: 2023-01-02
Payer: COMMERCIAL

## 2023-01-02 VITALS
RESPIRATION RATE: 18 BRPM | OXYGEN SATURATION: 99 % | SYSTOLIC BLOOD PRESSURE: 162 MMHG | HEIGHT: 62 IN | BODY MASS INDEX: 37.25 KG/M2 | DIASTOLIC BLOOD PRESSURE: 94 MMHG | TEMPERATURE: 97.9 F | HEART RATE: 101 BPM | WEIGHT: 202.4 LBS

## 2023-01-02 DIAGNOSIS — G25.81 RESTLESS LEG SYNDROME: ICD-10-CM

## 2023-01-02 DIAGNOSIS — F41.1 GAD (GENERALIZED ANXIETY DISORDER): ICD-10-CM

## 2023-01-02 DIAGNOSIS — F33.1 MAJOR DEPRESSIVE DISORDER, RECURRENT EPISODE, MODERATE (H): ICD-10-CM

## 2023-01-02 DIAGNOSIS — Z87.11 HISTORY OF GASTRIC ULCER: ICD-10-CM

## 2023-01-02 DIAGNOSIS — G43.009 MIGRAINE WITHOUT AURA AND WITHOUT STATUS MIGRAINOSUS, NOT INTRACTABLE: ICD-10-CM

## 2023-01-02 DIAGNOSIS — E53.8 VITAMIN B12 DEFICIENCY (NON ANEMIC): ICD-10-CM

## 2023-01-02 DIAGNOSIS — R79.82 ELEVATED C-REACTIVE PROTEIN (CRP): ICD-10-CM

## 2023-01-02 DIAGNOSIS — Z12.31 VISIT FOR SCREENING MAMMOGRAM: ICD-10-CM

## 2023-01-02 DIAGNOSIS — I10 BENIGN ESSENTIAL HYPERTENSION: ICD-10-CM

## 2023-01-02 DIAGNOSIS — G47.00 INSOMNIA, UNSPECIFIED TYPE: ICD-10-CM

## 2023-01-02 DIAGNOSIS — G43.009 MIGRAINE WITHOUT AURA AND WITHOUT STATUS MIGRAINOSUS, NOT INTRACTABLE: Primary | ICD-10-CM

## 2023-01-02 DIAGNOSIS — R51.9 CHRONIC DAILY HEADACHE: Primary | ICD-10-CM

## 2023-01-02 DIAGNOSIS — G62.89 OTHER POLYNEUROPATHY: ICD-10-CM

## 2023-01-02 DIAGNOSIS — K27.9 PUD (PEPTIC ULCER DISEASE): Primary | ICD-10-CM

## 2023-01-02 DIAGNOSIS — K21.00 GASTROESOPHAGEAL REFLUX DISEASE WITH ESOPHAGITIS WITHOUT HEMORRHAGE: ICD-10-CM

## 2023-01-02 DIAGNOSIS — R61 NIGHT SWEATS: ICD-10-CM

## 2023-01-02 LAB
CRP SERPL-MCNC: 51.49 MG/L
ERYTHROCYTE [DISTWIDTH] IN BLOOD BY AUTOMATED COUNT: 13.5 % (ref 10–15)
ERYTHROCYTE [SEDIMENTATION RATE] IN BLOOD BY WESTERGREN METHOD: 1 MM/HR (ref 0–20)
FERRITIN SERPL-MCNC: 262 NG/ML (ref 6–175)
FSH SERPL IRP2-ACNC: 3.8 MIU/ML
HBA1C MFR BLD: 5.6 % (ref 0–5.6)
HCT VFR BLD AUTO: 39.3 % (ref 35–47)
HGB BLD-MCNC: 13.2 G/DL (ref 11.7–15.7)
IRON BINDING CAPACITY (ROCHE): 310 UG/DL (ref 240–430)
IRON SATN MFR SERPL: 16 % (ref 15–46)
IRON SERPL-MCNC: 51 UG/DL (ref 37–145)
MAGNESIUM SERPL-MCNC: 1.9 MG/DL (ref 1.7–2.3)
MCH RBC QN AUTO: 33.5 PG (ref 26.5–33)
MCHC RBC AUTO-ENTMCNC: 33.6 G/DL (ref 31.5–36.5)
MCV RBC AUTO: 100 FL (ref 78–100)
PLATELET # BLD AUTO: 369 10E3/UL (ref 150–450)
RBC # BLD AUTO: 3.94 10E6/UL (ref 3.8–5.2)
TSH SERPL DL<=0.005 MIU/L-ACNC: 1.28 UIU/ML (ref 0.3–4.2)
VIT B12 SERPL-MCNC: 494 PG/ML (ref 232–1245)
WBC # BLD AUTO: 10.8 10E3/UL (ref 4–11)

## 2023-01-02 PROCEDURE — 86140 C-REACTIVE PROTEIN: CPT | Performed by: NURSE PRACTITIONER

## 2023-01-02 PROCEDURE — 83735 ASSAY OF MAGNESIUM: CPT | Performed by: NURSE PRACTITIONER

## 2023-01-02 PROCEDURE — 83540 ASSAY OF IRON: CPT | Performed by: NURSE PRACTITIONER

## 2023-01-02 PROCEDURE — 36415 COLL VENOUS BLD VENIPUNCTURE: CPT | Performed by: NURSE PRACTITIONER

## 2023-01-02 PROCEDURE — 82607 VITAMIN B-12: CPT | Performed by: NURSE PRACTITIONER

## 2023-01-02 PROCEDURE — 85652 RBC SED RATE AUTOMATED: CPT | Performed by: NURSE PRACTITIONER

## 2023-01-02 PROCEDURE — 85027 COMPLETE CBC AUTOMATED: CPT | Performed by: NURSE PRACTITIONER

## 2023-01-02 PROCEDURE — 82728 ASSAY OF FERRITIN: CPT | Performed by: NURSE PRACTITIONER

## 2023-01-02 PROCEDURE — 99214 OFFICE O/P EST MOD 30 MIN: CPT | Performed by: NURSE PRACTITIONER

## 2023-01-02 PROCEDURE — 83036 HEMOGLOBIN GLYCOSYLATED A1C: CPT | Performed by: NURSE PRACTITIONER

## 2023-01-02 PROCEDURE — 99207 PR NON-BILLABLE SERV PER CHARTING: CPT | Performed by: NURSE PRACTITIONER

## 2023-01-02 PROCEDURE — 83001 ASSAY OF GONADOTROPIN (FSH): CPT | Performed by: NURSE PRACTITIONER

## 2023-01-02 PROCEDURE — 84443 ASSAY THYROID STIM HORMONE: CPT | Performed by: NURSE PRACTITIONER

## 2023-01-02 PROCEDURE — 83550 IRON BINDING TEST: CPT | Performed by: NURSE PRACTITIONER

## 2023-01-02 RX ORDER — LISINOPRIL 5 MG/1
5 TABLET ORAL DAILY
Qty: 90 TABLET | Refills: 0 | Status: SHIPPED | OUTPATIENT
Start: 2023-01-02 | End: 2023-03-28

## 2023-01-02 RX ORDER — PANTOPRAZOLE SODIUM 40 MG/1
40 TABLET, DELAYED RELEASE ORAL DAILY
Qty: 90 TABLET | Refills: 3 | Status: SHIPPED | OUTPATIENT
Start: 2023-01-02 | End: 2023-01-06

## 2023-01-02 RX ORDER — BUTALBITAL, ACETAMINOPHEN AND CAFFEINE 50; 325; 40 MG/1; MG/1; MG/1
1 TABLET ORAL EVERY 6 HOURS PRN
Qty: 20 TABLET | Refills: 3 | Status: SHIPPED | OUTPATIENT
Start: 2023-01-02 | End: 2023-05-02

## 2023-01-02 RX ORDER — ZOLPIDEM TARTRATE 10 MG/1
5-10 TABLET ORAL
Qty: 30 TABLET | Refills: 5 | Status: SHIPPED | OUTPATIENT
Start: 2023-01-02 | End: 2023-04-18

## 2023-01-02 RX ORDER — AMITRIPTYLINE HYDROCHLORIDE 50 MG/1
50 TABLET ORAL AT BEDTIME
Qty: 90 TABLET | Refills: 1 | Status: SHIPPED | OUTPATIENT
Start: 2023-01-02 | End: 2023-03-16 | Stop reason: SINTOL

## 2023-01-02 RX ORDER — ESCITALOPRAM OXALATE 10 MG/1
10 TABLET ORAL DAILY
Qty: 90 TABLET | Refills: 0 | Status: SHIPPED | OUTPATIENT
Start: 2023-01-02 | End: 2023-03-28

## 2023-01-02 NOTE — PATIENT INSTRUCTIONS
Take Cymbalta once daily for 1 week and then stop.   Take 1/2 tab of Amitriptyline for 2-3 weeks and increase to full tab if tolerated.  Take 1/2 tab Ambien nightly and other 1/2 as needed.  Restart Lisinopril at 5 mg (1/2 tab).

## 2023-01-02 NOTE — TELEPHONE ENCOUNTER
Per fax received from [facility] - PANTOPRAZOLE SODIUM 40 MG DR TABLETS is not covered by patient's Insurance Company  Dr. HILLS - Please choose:  1.  Change medication that is not covered to a different medication and send new prescription to patient's pharmacy?  2.  Patient will need to pay for the non-covered medication out-of-pocket?   3.  Try for Prior Authorization with Insurance Company to get medication covered?     KEY: [id number]  ADRIÁN VILLEGAS  [id number]

## 2023-01-02 NOTE — PROGRESS NOTES
Assessment & Plan     Chronic daily headache  Refill Esgic- trial Elavil for prevent  - amitriptyline (ELAVIL) 50 MG tablet; Take 1 tablet (50 mg) by mouth At Bedtime  - butalbital-acetaminophen-caffeine (ESGIC) -40 MG tablet; Take 1 tablet by mouth every 6 hours as needed for headaches or migraine    Migraine without aura and without status migrainosus, not intractable    - amitriptyline (ELAVIL) 50 MG tablet; Take 1 tablet (50 mg) by mouth At Bedtime  - butalbital-acetaminophen-caffeine (ESGIC) -40 MG tablet; Take 1 tablet by mouth every 6 hours as needed for headaches or migraine    Major depressive disorder, recurrent episode, moderate (H)  discontinue Duloxetine- ? Cause of insomnia and sweating  - escitalopram (LEXAPRO) 10 MG tablet; Take 1 tablet (10 mg) by mouth daily    Insomnia, unspecified type  Trial:  - amitriptyline (ELAVIL) 50 MG tablet; Take 1 tablet (50 mg) by mouth At Bedtime    Other polyneuropathy    - zolpidem (AMBIEN) 10 MG tablet; Take 0.5-1 tablets (5-10 mg) by mouth nightly as needed for sleep    Elevated C-reactive protein (CRP)    - CRP, inflammation; Future  - Erythrocyte sedimentation rate auto; Future  - Adult Rheumatology  Referral; Future  - CRP, inflammation  - Erythrocyte sedimentation rate auto    Restless leg syndrome  Continue gabapentin- check:  - Iron and iron binding capacity; Future  - Ferritin; Future  - Magnesium; Future  - TSH with free T4 reflex; Future  - Iron and iron binding capacity  - Ferritin  - Magnesium  - TSH with free T4 reflex    Night sweats  ? Side effect of Duloxetine  - Follicle stimulating hormone; Future  - TSH with free T4 reflex; Future  - Hemoglobin A1c; Future  - Follicle stimulating hormone  - TSH with free T4 reflex  - Hemoglobin A1c    Benign essential hypertension  Decrease dose  - lisinopril (ZESTRIL) 5 MG tablet; Take 1 tablet (5 mg) by mouth daily    GEORGIANA (generalized anxiety disorder)  Trial:  - escitalopram (LEXAPRO) 10  MG tablet; Take 1 tablet (10 mg) by mouth daily    History of gastric ulcer  Last EGD 2021  - Iron and iron binding capacity; Future  - Ferritin; Future  - CBC with platelets; Future  - Vitamin B12; Future  - pantoprazole (PROTONIX) 40 MG EC tablet; Take 1 tablet (40 mg) by mouth daily  - Iron and iron binding capacity  - Ferritin  - CBC with platelets  - Vitamin B12    Visit for screening mammogram    - MA SCREENING DIGITAL BILAT - Future  (s+30); Future           FUTURE APPOINTMENTS:       - Follow-up visit in 4-6 weeks, sooner PRN    See Patient Instructions    No follow-ups on file.    Time spent in chart review in preparation to see patient, time with patient for interview/exam, ordering medications/tests/and/or procedures, and time spent in charting and coordinating care: 30 minutes.     ISAAC [facility]    Eunice Guerin is a 47 year old, presenting for the following health issues:  Insomnia      History of Present Illness       Reason for visit:  Night sweats and horrible insomnia, worse than normal  Symptom onset:  3-4 weeks ago  Symptoms include:  Night sweats, cant sleep, irritable  Symptom intensity:  Moderate  Symptom progression:  Staying the same  Had these symptoms before:  No    She eats 2-3 servings of fruits and vegetables daily.She consumes 3 sweetened beverage(s) daily.She exercises with enough effort to increase her heart rate 10 to 19 minutes per day.  She exercises with enough effort to increase her heart rate 4 days per week. She is missing 2 dose(s) of medications per week.  She is not taking prescribed medications regularly due to remembering to take.    Today's PHQ-9         PHQ-9 Total Score: 8    PHQ-9 Q9 Thoughts of better off dead/self-harm past 2 weeks :   Not at all    How difficult have these problems made it for you to do your work, take care of things at home, or get along with other people: Somewhat difficult        Insomnia  Onset/Duration: 3-4 weeks   Description:   Frequency of insomnia:  several times a week  Time to fall asleep (sleep latency): 30 minutes  Middle of night awakening:  YES  Early morning awakening:  YES  Progression of Symptoms:  worsening  Accompanying Signs & Symptoms:  Daytime sleepiness/napping: YES  Excessive snoring/apnea: No  Restless legs: YES  Waking to urinate: YES  Chronic pain:  YES  Depression symptoms (if yes, do PHQ9): YES  Anxiety symptoms (if yes, do GEORGIANA-7): YES  History:  Prior Insomnia: YES  New stressful situation: No  Precipitating factors:   Caffeine intake: YES- large amounts   OTC decongestants: No  Any new medications: No  Alleviating factors:  Self medicating (alcohol, etc.):  No  Stress-reduction (exercise, yoga, meditation etc): No  Therapies tried and outcome: Ambien -  Helps her fall asleep but problems staying asleep       Medication Followup of lisinopril     Taking Medication as prescribed: no, stopped taking due to side effects    Side Effects:  Lightheaded- bp 90s systolic    Medication Helping Symptoms:  not applicable    Hypertension Follow-up      Do you check your blood pressure regularly outside of the clinic? Yes     Are you following a low salt diet? No    Are your blood pressures ever more than 140 on the top number (systolic) OR more   than 90 on the bottom number (diastolic), for example 140/90? Yes    Depression and Anxiety Follow-Up    How are you doing with your depression since your last visit? No change    How are you doing with your anxiety since your last visit?  No change    Are you having other symptoms that might be associated with depression or anxiety? Yes:  insomnia. headaches    Have you had a significant life event? No     Do you have any concerns with your use of alcohol or other drugs? No    Social History     Tobacco Use     Smoking status: Every Day     Packs/day: 1.00     Years: 30.00     Pack years: 30.00     Types: Cigarettes      "Smokeless tobacco: Never   Vaping Use     Vaping Use: Never used   Substance Use Topics     Alcohol use: Yes     Comment: 1-2 drinks a week     Drug use: Yes     Types: Marijuana     Comment: cbd     PHQ 5/20/2022 8/4/2022 1/2/2023   PHQ-9 Total Score 10 8 8   Q9: Thoughts of better off dead/self-harm past 2 weeks Not at all Not at all Not at all     GEORGIANA-7 SCORE 9/3/2020 8/27/2021 10/5/2021   Total Score 7 11 10         Suicide Assessment Five-step Evaluation and Treatment (SAFE-T)    Migraine     Since your last clinic visit, how have your headaches changed?  No change    How often are you getting headaches or migraines? 2/week     Are you able to do normal daily activities when you have a migraine? Yes    Are you taking rescue/relief medications? (Select all that apply) ibuprofen (Advil, Motrin), Tylenol and Other: Fioricet    How helpful is your rescue/relief medication?  I get some relief    Are you taking any medications to prevent migraines? (Select all that apply)  Neurontin    In the past 4 weeks, how often have you gone to urgent care or the emergency room because of your headaches?  0    Medication Followup of Omeprazole    Taking Medication as prescribed: yes    Side Effects:  None    Medication Helping Symptoms:  NO       Review of Systems   Constitutional, HEENT, cardiovascular, pulmonary, gi and gu systems are negative, except as otherwise noted.      Objective    BP (!) 162/94   Pulse 101   Temp 97.9  F (36.6  C) (Tympanic)   Resp 18   Ht 1.575 m (5' 2\")   Wt 91.8 kg (202 lb 6.4 oz)   SpO2 99%   BMI 37.02 kg/m    Body mass index is 37.02 kg/m .  Physical Exam   GENERAL: alert and no distress  NEURO: Normal strength and tone, mentation intact and speech normal  PSYCH: mentation appears normal, affect normal/bright    Results for orders placed or performed in visit on 01/02/23 (from the past 24 hour(s))   Erythrocyte sedimentation rate auto   Result Value Ref Range    Erythrocyte Sedimentation " Rate 1 0 - 20 mm/hr   CBC with platelets   Result Value Ref Range    WBC Count 10.8 4.0 - 11.0 10e3/uL    RBC Count 3.94 3.80 - 5.20 10e6/uL    Hemoglobin 13.2 11.7 - 15.7 g/dL    Hematocrit 39.3 35.0 - 47.0 %     78 - 100 fL    MCH 33.5 (H) 26.5 - 33.0 pg    MCHC 33.6 31.5 - 36.5 g/dL    RDW 13.5 10.0 - 15.0 %    Platelet Count 369 150 - 450 10e3/uL   Hemoglobin A1c   Result Value Ref Range    Hemoglobin A1C 5.6 0.0 - 5.6 %

## 2023-01-03 ENCOUNTER — APPOINTMENT (OUTPATIENT)
Dept: GENERAL RADIOLOGY | Facility: CLINIC | Age: 48
End: 2023-01-03
Attending: FAMILY MEDICINE
Payer: COMMERCIAL

## 2023-01-03 ENCOUNTER — HOSPITAL ENCOUNTER (EMERGENCY)
Facility: CLINIC | Age: 48
Discharge: HOME | End: 2023-01-03
Attending: FAMILY MEDICINE | Admitting: FAMILY MEDICINE
Payer: COMMERCIAL

## 2023-01-03 VITALS
TEMPERATURE: 98.9 F | WEIGHT: 201 LBS | HEIGHT: 62 IN | BODY MASS INDEX: 36.99 KG/M2 | HEART RATE: 77 BPM | RESPIRATION RATE: 20 BRPM | DIASTOLIC BLOOD PRESSURE: 82 MMHG | SYSTOLIC BLOOD PRESSURE: 141 MMHG | OXYGEN SATURATION: 94 %

## 2023-01-03 DIAGNOSIS — B34.9 VIRAL SYNDROME: ICD-10-CM

## 2023-01-03 PROBLEM — G25.81 RESTLESS LEG SYNDROME: Status: ACTIVE | Noted: 2023-01-03

## 2023-01-03 PROBLEM — E53.8 VITAMIN B12 DEFICIENCY (NON ANEMIC): Status: ACTIVE | Noted: 2023-01-03

## 2023-01-03 PROBLEM — R79.82 ELEVATED C-REACTIVE PROTEIN (CRP): Status: ACTIVE | Noted: 2023-01-03

## 2023-01-03 LAB
ALBUMIN SERPL BCG-MCNC: 4.1 G/DL (ref 3.5–5.2)
ALBUMIN UR-MCNC: NEGATIVE MG/DL
ALP SERPL-CCNC: 75 U/L (ref 35–104)
ALT SERPL W P-5'-P-CCNC: 15 U/L (ref 10–35)
ANION GAP SERPL CALCULATED.3IONS-SCNC: 14 MMOL/L (ref 7–15)
APPEARANCE UR: ABNORMAL
AST SERPL W P-5'-P-CCNC: 27 U/L (ref 10–35)
BACTERIA #/AREA URNS HPF: ABNORMAL /HPF
BASOPHILS # BLD AUTO: 0.1 10E3/UL (ref 0–0.2)
BASOPHILS NFR BLD AUTO: 0 %
BILIRUB SERPL-MCNC: 0.6 MG/DL
BILIRUB UR QL STRIP: NEGATIVE
BUN SERPL-MCNC: 11.6 MG/DL (ref 6–20)
CALCIUM SERPL-MCNC: 9.2 MG/DL (ref 8.6–10)
CHLORIDE SERPL-SCNC: 101 MMOL/L (ref 98–107)
COLOR UR AUTO: YELLOW
CREAT SERPL-MCNC: 0.76 MG/DL (ref 0.51–0.95)
DEPRECATED HCO3 PLAS-SCNC: 23 MMOL/L (ref 22–29)
EOSINOPHIL # BLD AUTO: 0 10E3/UL (ref 0–0.7)
EOSINOPHIL NFR BLD AUTO: 0 %
ERYTHROCYTE [DISTWIDTH] IN BLOOD BY AUTOMATED COUNT: 13.2 % (ref 10–15)
FLUAV RNA SPEC QL NAA+PROBE: NEGATIVE
FLUBV RNA RESP QL NAA+PROBE: NEGATIVE
GFR SERPL CREATININE-BSD FRML MDRD: >90 ML/MIN/1.73M2
GLUCOSE SERPL-MCNC: 111 MG/DL (ref 70–99)
GLUCOSE UR STRIP-MCNC: NEGATIVE MG/DL
HCG SERPL QL: NEGATIVE
HCT VFR BLD AUTO: 40 % (ref 35–47)
HGB BLD-MCNC: 13.5 G/DL (ref 11.7–15.7)
HGB UR QL STRIP: NEGATIVE
IMM GRANULOCYTES # BLD: 0.1 10E3/UL
IMM GRANULOCYTES NFR BLD: 1 %
KETONES UR STRIP-MCNC: 20 MG/DL
LEUKOCYTE ESTERASE UR QL STRIP: NEGATIVE
LYMPHOCYTES # BLD AUTO: 1.5 10E3/UL (ref 0.8–5.3)
LYMPHOCYTES NFR BLD AUTO: 12 %
MCH RBC QN AUTO: 33.2 PG (ref 26.5–33)
MCHC RBC AUTO-ENTMCNC: 33.8 G/DL (ref 31.5–36.5)
MCV RBC AUTO: 98 FL (ref 78–100)
MONOCYTES # BLD AUTO: 0.7 10E3/UL (ref 0–1.3)
MONOCYTES NFR BLD AUTO: 6 %
MUCOUS THREADS #/AREA URNS LPF: PRESENT /LPF
NEUTROPHILS # BLD AUTO: 10.3 10E3/UL (ref 1.6–8.3)
NEUTROPHILS NFR BLD AUTO: 81 %
NITRATE UR QL: NEGATIVE
NRBC # BLD AUTO: 0 10E3/UL
NRBC BLD AUTO-RTO: 0 /100
PH UR STRIP: 5 [PH] (ref 5–7)
PLATELET # BLD AUTO: 393 10E3/UL (ref 150–450)
POTASSIUM SERPL-SCNC: 3.7 MMOL/L (ref 3.4–5.3)
PROT SERPL-MCNC: 7.4 G/DL (ref 6.4–8.3)
RBC # BLD AUTO: 4.07 10E6/UL (ref 3.8–5.2)
RBC URINE: 1 /HPF
RSV RNA SPEC NAA+PROBE: NEGATIVE
SARS-COV-2 RNA RESP QL NAA+PROBE: NEGATIVE
SODIUM SERPL-SCNC: 138 MMOL/L (ref 136–145)
SP GR UR STRIP: 1.01 (ref 1–1.03)
SQUAMOUS EPITHELIAL: 12 /HPF
UROBILINOGEN UR STRIP-MCNC: NORMAL MG/DL
WBC # BLD AUTO: 12.6 10E3/UL (ref 4–11)
WBC URINE: 5 /HPF

## 2023-01-03 PROCEDURE — 93010 ELECTROCARDIOGRAM REPORT: CPT | Mod: 59 | Performed by: FAMILY MEDICINE

## 2023-01-03 PROCEDURE — C9803 HOPD COVID-19 SPEC COLLECT: HCPCS | Performed by: FAMILY MEDICINE

## 2023-01-03 PROCEDURE — 36415 COLL VENOUS BLD VENIPUNCTURE: CPT | Performed by: FAMILY MEDICINE

## 2023-01-03 PROCEDURE — 81001 URINALYSIS AUTO W/SCOPE: CPT | Performed by: FAMILY MEDICINE

## 2023-01-03 PROCEDURE — 93005 ELECTROCARDIOGRAM TRACING: CPT | Performed by: FAMILY MEDICINE

## 2023-01-03 PROCEDURE — 87637 SARSCOV2&INF A&B&RSV AMP PRB: CPT | Performed by: FAMILY MEDICINE

## 2023-01-03 PROCEDURE — 96374 THER/PROPH/DIAG INJ IV PUSH: CPT | Performed by: FAMILY MEDICINE

## 2023-01-03 PROCEDURE — 96375 TX/PRO/DX INJ NEW DRUG ADDON: CPT | Performed by: FAMILY MEDICINE

## 2023-01-03 PROCEDURE — 93005 ELECTROCARDIOGRAM TRACING: CPT | Mod: 59,76 | Performed by: FAMILY MEDICINE

## 2023-01-03 PROCEDURE — 80053 COMPREHEN METABOLIC PANEL: CPT | Performed by: FAMILY MEDICINE

## 2023-01-03 PROCEDURE — 96361 HYDRATE IV INFUSION ADD-ON: CPT | Performed by: FAMILY MEDICINE

## 2023-01-03 PROCEDURE — 71045 X-RAY EXAM CHEST 1 VIEW: CPT

## 2023-01-03 PROCEDURE — 84703 CHORIONIC GONADOTROPIN ASSAY: CPT | Performed by: FAMILY MEDICINE

## 2023-01-03 PROCEDURE — 258N000003 HC RX IP 258 OP 636: Performed by: FAMILY MEDICINE

## 2023-01-03 PROCEDURE — 85025 COMPLETE CBC W/AUTO DIFF WBC: CPT | Performed by: FAMILY MEDICINE

## 2023-01-03 PROCEDURE — 99284 EMERGENCY DEPT VISIT MOD MDM: CPT | Mod: CS | Performed by: FAMILY MEDICINE

## 2023-01-03 PROCEDURE — 250N000011 HC RX IP 250 OP 636: Performed by: FAMILY MEDICINE

## 2023-01-03 PROCEDURE — 93010 ELECTROCARDIOGRAM REPORT: CPT | Performed by: FAMILY MEDICINE

## 2023-01-03 PROCEDURE — 99285 EMERGENCY DEPT VISIT HI MDM: CPT | Mod: 25 | Performed by: FAMILY MEDICINE

## 2023-01-03 RX ORDER — KETOROLAC TROMETHAMINE 15 MG/ML
15 INJECTION, SOLUTION INTRAMUSCULAR; INTRAVENOUS ONCE
Status: COMPLETED | OUTPATIENT
Start: 2023-01-03 | End: 2023-01-03

## 2023-01-03 RX ORDER — ONDANSETRON 2 MG/ML
4 INJECTION INTRAMUSCULAR; INTRAVENOUS ONCE
Status: COMPLETED | OUTPATIENT
Start: 2023-01-03 | End: 2023-01-03

## 2023-01-03 RX ORDER — ONDANSETRON 4 MG/1
4-8 TABLET, ORALLY DISINTEGRATING ORAL EVERY 8 HOURS PRN
Qty: 12 TABLET | Refills: 0 | Status: SHIPPED | OUTPATIENT
Start: 2023-01-03 | End: 2023-03-28

## 2023-01-03 RX ORDER — HYDROMORPHONE HYDROCHLORIDE 1 MG/ML
0.5 INJECTION, SOLUTION INTRAMUSCULAR; INTRAVENOUS; SUBCUTANEOUS
Status: DISCONTINUED | OUTPATIENT
Start: 2023-01-03 | End: 2023-01-03 | Stop reason: HOSPADM

## 2023-01-03 RX ORDER — LANOLIN ALCOHOL/MO/W.PET/CERES
1000 CREAM (GRAM) TOPICAL DAILY
Qty: 90 TABLET | Refills: 3 | Status: SHIPPED | OUTPATIENT
Start: 2023-01-03 | End: 2023-05-02

## 2023-01-03 RX ADMIN — ONDANSETRON 4 MG: 2 INJECTION INTRAMUSCULAR; INTRAVENOUS at 10:49

## 2023-01-03 RX ADMIN — KETOROLAC TROMETHAMINE 15 MG: 15 INJECTION, SOLUTION INTRAMUSCULAR; INTRAVENOUS at 11:46

## 2023-01-03 RX ADMIN — HYDROMORPHONE HYDROCHLORIDE 0.5 MG: 1 INJECTION, SOLUTION INTRAMUSCULAR; INTRAVENOUS; SUBCUTANEOUS at 11:46

## 2023-01-03 RX ADMIN — SODIUM CHLORIDE 1000 ML: 9 INJECTION, SOLUTION INTRAVENOUS at 10:44

## 2023-01-03 RX ADMIN — SODIUM CHLORIDE 1000 ML: 9 INJECTION, SOLUTION INTRAVENOUS at 13:04

## 2023-01-03 ASSESSMENT — ACTIVITIES OF DAILY LIVING (ADL)
ADLS_ACUITY_SCORE: 35
ADLS_ACUITY_SCORE: 35

## 2023-01-03 NOTE — ED NOTES
"Offered pt ibuprofen and tylenol.   Pt states \" I can't take ibuprofen because of my stomach and I took tylenol this am\"  pt now states was on the way to work and crying and  drove pt to ER.  "

## 2023-01-03 NOTE — RESULT ENCOUNTER NOTE
Nolan Guerin    Your diabetes screening is negative. Your B 12 is at the low end of normal- I would like you to start a supplement to see if it helps with your restless symptoms at night and your hair. You are not having perimenopause symptoms, I think the sweats may be a side effect of the Duloxetine. Your iron levels are fine. Your inflammatory marker is still up- schedule with Rheumatology for further evaluation of your pain and inflammation. Please let us know if you have any questions.     Take care,    ISAAC Dean CNP

## 2023-01-03 NOTE — ED TRIAGE NOTES
"Pt here with leg pain, headache, \"feeling like I'm going to pass out all the time\". Pt also c/o vomiting \"the last 4 days\".      Triage Assessment     Row Name 01/03/23 0846       Triage Assessment (Adult)    Airway WDL WDL       Respiratory WDL    Respiratory WDL WDL       Skin Circulation/Temperature WDL    Skin Circulation/Temperature WDL WDL       Cardiac WDL    Cardiac WDL WDL       Peripheral/Neurovascular WDL    Peripheral Neurovascular WDL WDL       Cognitive/Neuro/Behavioral WDL    Cognitive/Neuro/Behavioral WDL WDL              "

## 2023-01-03 NOTE — ED NOTES
"Pt was seen by provider yesterday and \"didn't go anything\"  pt reports went to work yesterday and came home after 1800 feeling fatigue, unable to eat x 4 days and headache.  Pt woke up this am with anxiety and \"hard to keep my eyes open\"  "

## 2023-01-03 NOTE — DISCHARGE INSTRUCTIONS
RETURN TO THE EMERGENCY ROOM FOR THE FOLLOWING:    Severely worsened breathing, severely worsened pain, repeated episodes of fainting, or at anytime for any concern.    FOLLOW UP:    With your primary clinic if not improved over the next 5 days.  Or return to the ED as needed.    TREATMENT RECOMMENDATIONS:    Zofran as needed for nausea.    Ibuprofen 600 mg every six hours for pain (7 days duration).  Tylenol 1000 mg every six hours for pain (7 days duration).  Therefore, you can alternate these every three hours and do it safely.    NURSE ADVICE LINE:  [phone] or [phone]

## 2023-01-03 NOTE — ED PROVIDER NOTES
HPI   The patient is a 47-year-old female presenting with concerns about headache, leg pain, and lightheadedness.  She has a known history of hypertension and she takes hydrochlorothiazide 25 mg and lisinopril 5 mg.  She has multiple mental health problems including anxiety, depression, and PTSD.  She has a known history of reflux, gastritis, and GI hemorrhage.  She does not take medication for anticoagulation.  No antiplatelet medication.  She is obese.  She has a migraine history.  She smokes.  She uses marijuana.  She takes 1-2 drinks with alcohol weekly.    The patient has been with nausea and vomiting since Thursday or Friday of last week, 5 or 6 days ago.  She has been vomiting multiple times a day.  She has had diarrhea multiple times a day.  No other known sick contacts.  No recent travel.  No obviously tainted food.  No hematochezia or melena.  No hematemesis or coffee-ground emesis.  Subjective fever present.  No crampy abdominal pain.  No back or flank pain.  No pelvic pain.  She denies coughing or congestion.  She has been feeling lightheaded intermittently.  She feels extremely tired.  She describes feeling confused and out of it yesterday.  No dysuria, urgency, or frequency.  No vaginal discharge or irritation.  No vaginal bleeding that is unusual.  No skin rash.        Allergies:  Allergies   Allergen Reactions     Indomethacin      Other reaction(s): GI Upset     Fluoxetine      Other reaction(s): Thrush     Paroxetine      Other reaction(s): Thrush     Problem List:    Patient Active Problem List    Diagnosis Date Noted     Vitamin B12 deficiency (non anemic) 01/03/2023     Priority: Medium     Restless leg syndrome 01/03/2023     Priority: Medium     Elevated C-reactive protein (CRP) 01/03/2023     Priority: Medium     PTSD (post-traumatic stress disorder) 08/04/2022     Priority: Medium     GEORGIANA (generalized anxiety disorder) 08/04/2022     Priority: Medium     Gastritis with hemorrhage,  "unspecified chronicity, unspecified gastritis type 08/04/2022     Priority: Medium     Cervical high risk HPV (human papillomavirus) test positive 09/02/2021     Priority: Medium     8/27/21 NIL Pap, + HR HPV (neg 16/18). Plan cotest in 1 year.   8/4/22 LSIL, +HR HPV (not 16/18). Plan: colposcopy due before 11/4/22 8/11/22 left message / mychart sent / mychart read  9/27/22 Reminder mychart  11/1/22 [place] not done. Tracking updated for 6 mo colp/pap due 2/4/23            Marijuana use, continuous 09/17/2020     Priority: Medium     Tobacco use disorder 09/17/2020     Priority: Medium     Migraine without aura and without status migrainosus, not intractable 09/17/2020     Priority: Medium     Classic migraine 11/06/2019     Priority: Medium     Chronic daily headache 11/06/2019     Priority: Medium     Improved on daily Tizanidine- has seen Neurology       Peripheral neuropathy 11/06/2019     Priority: Medium     After hernia surgery in 2014- was told the surgeon \"nicked\" a couple nerves on the left       Gastroesophageal reflux disease 12/05/2017     Priority: Medium     Benign essential hypertension 12/05/2017     Priority: Medium     Amlodipine, hydrochlorothiazide, and metoprolol.       Major depressive disorder, recurrent episode, moderate (H) 12/05/2017     Priority: Medium     Morbid obesity (H) 12/05/2017     Priority: Medium     12/5/2017 Body mass index is 41.98 kg/(m^2).       Hidradenitis suppurativa 09/11/2017     Priority: Medium      Past Medical History:    Past Medical History:   Diagnosis Date     Cervical high risk HPV (human papillomavirus) test positive 08/27/2021     Community acquired pneumonia 12/5/2017     Depressive disorder      Hypertension      Past Surgical History:    Past Surgical History:   Procedure Laterality Date     ABDOMEN SURGERY       APPENDECTOMY       CHOLECYSTECTOMY       ESOPHAGOSCOPY, GASTROSCOPY, DUODENOSCOPY (EGD), COMBINED N/A 10/8/2021    Procedure: " "ESOPHAGOGASTRODUODENOSCOPY, WITH BIOPSY;  Surgeon: Slim Randle DO;  Location: [facility]     GYN SURGERY       HERNIA REPAIR       HYSTERECTOMY, PAP STILL INDICATED      Cervix still present     Family History:    Family History   Problem Relation Age of Onset     Diabetes Mother      Hypertension Mother      Hyperlipidemia Mother      Depression Mother      Substance Abuse Mother      Hypertension Father      Hyperlipidemia Father      Kidney Cancer Father      Other Cancer Father      Diabetes Maternal Grandmother      Depression Sister      Breast Cancer No family hx of      Social History:  Marital Status:   [2]  Social History     Tobacco Use     Smoking status: Every Day     Packs/day: 1.00     Years: 30.00     Pack years: 30.00     Types: Cigarettes     Smokeless tobacco: Never   Vaping Use     Vaping Use: Never used   Substance Use Topics     Alcohol use: Yes     Comment: 1-2 drinks a week     Drug use: Yes     Types: Marijuana     Comment: cbd      Medications:    ondansetron (ZOFRAN ODT) 4 MG ODT tab  acetaminophen (TYLENOL) 325 MG tablet  amitriptyline (ELAVIL) 50 MG tablet  butalbital-acetaminophen-caffeine (ESGIC) -40 MG tablet  cyanocobalamin (VITAMIN B-12) 1000 MCG tablet  escitalopram (LEXAPRO) 10 MG tablet  fluticasone (FLONASE) 50 MCG/ACT nasal spray  gabapentin (NEURONTIN) 400 MG capsule  hydrochlorothiazide (HYDRODIURIL) 25 MG tablet  lisinopril (ZESTRIL) 5 MG tablet  pantoprazole (PROTONIX) 40 MG EC tablet  predniSONE (DELTASONE) 20 MG tablet  tiZANidine (ZANAFLEX) 2 MG tablet  verapamil (CALAN) 40 MG tablet  zolpidem (AMBIEN) 10 MG tablet      Review of Systems   All other systems reviewed and are negative.      PE   BP: (!) 183/82  Pulse: (!) 123  Temp: 98.9  F (37.2  C)  Resp: 20  Height: 157.5 cm (5' 2\")  Weight: 91.2 kg (201 lb)  SpO2: 97 %  Physical Exam  Vitals reviewed.   Constitutional:       General: She is in acute distress.      Appearance: She is well-developed. " "     Comments: Patient obviously uncomfortable.  She has chills intermittently.  She is cooperative and answering questions.   HENT:      Head: Normocephalic and atraumatic.      Right Ear: External ear normal.      Left Ear: External ear normal.      Nose: Nose normal.      Mouth/Throat:      Mouth: Mucous membranes are dry.      Pharynx: Oropharynx is clear.   Eyes:      Extraocular Movements: Extraocular movements intact.      Conjunctiva/sclera: Conjunctivae normal.      Pupils: Pupils are equal, round, and reactive to light.   Cardiovascular:      Rate and Rhythm: Regular rhythm. Tachycardia present.      Heart sounds: Normal heart sounds.   Pulmonary:      Effort: Pulmonary effort is normal.      Breath sounds: Normal breath sounds.   Abdominal:      Palpations: Abdomen is soft.      Tenderness: There is no abdominal tenderness.   Musculoskeletal:         General: No swelling or tenderness. Normal range of motion.      Cervical back: Normal range of motion.   Skin:     General: Skin is warm and dry.   Neurological:      General: No focal deficit present.      Mental Status: She is alert and oriented to person, place, and time.   Psychiatric:         Behavior: Behavior normal.         ED COURSE and MDM   1129.  The patient has multiple concerns and multiple symptoms.  She looks quite uncomfortable and is tachycardic on arrival.  She has high blood pressure which waxes and wanes.  She reports taking her medication this morning as directed but she also reports dry heaving all day.  Lab values ordered.  Chest x-ray ordered.  COVID/influenza test pending.  Urine analysis and pregnancy test pending.  Fluid bolus, Zofran, Toradol, Dilaudid.  She reports extreme headache that has been off and on since symptoms started.  She describes daily headaches but \"this is a migraine.\"  Electronic medical chart reviewed, including medical problems, medications, medical allergies, social history.  Recent hospitalizations and " surgical procedures reviewed.  Recent clinic visits and consultations reviewed.  Recent labs and test results reviewed.  Nursing notes reviewed.    1257.  The patient does have improvement after fluid, Zofran, analgesia.  Low concern for severe underlying pathology.  I suspect she has a viral illness causing the vast majority of her symptoms.  She is likely dehydrated as well.  Zofran for home.  Return for worsening.  I will discharge the patient after second liter.  The patient's  is present in the room throughout and is involved in all conversations.  The patient agrees with the plan and would like to be discharged home.    EKG  (0903)   Interpretation performed by me.  Rate: 114     Rhythm: atrial     Axis: nl  Intervals: SC (12-2) 142, QRS (<12) 136, QTc (>5) 466  P wave: down in v1     QRS complex: nl   ST segment / T-wave: down in v1-2  Conclusion: Atrial tachycardia, P wave inversion in lead I possible atrial enlargement, T wave inversion in V1 to V2, similar to recent significance unknown.    EKG  (0912)   Interpretation performed by me.  Rate: 71     Rhythm: sinus     Axis: nl  Intervals: SC (12-2) 108, QRS (<12) 102, QTc (>5) 431  P wave: nl     QRS complex: nl   ST segment / T-wave: T wave inversion in V1 and V2.  Conclusion: T wave inversion in V1 and V2, significance unknown.    1258.  The patient, their parent if applicable, and/or their medical decision maker(s) and I have reviewed all of the available historical information, applicable PMH, physical exam findings, and objective diagnostic data gathered during this ED visit.  We then discussed all work-up options and then together agreed upon the course taken during this visit.  The ultimate disposition and plan was a cooperative decision made between myself and the patient, their parent if applicable, and/or their legal decision maker(s).  The risks and benefits of all decisions made during this visit were discussed to the best of my abilities  given the circumstances, and all parties are understanding of the pertinent ramifications of these decisions.      LABS  Labs Ordered and Resulted from Time of ED Arrival to Time of ED Departure   COMPREHENSIVE METABOLIC PANEL - Abnormal       Result Value    Sodium 138      Potassium 3.7      Chloride 101      Carbon Dioxide (CO2) 23      Anion Gap 14      Urea Nitrogen 11.6      Creatinine 0.76      Calcium 9.2      Glucose 111 (*)     Alkaline Phosphatase 75      AST 27      ALT 15      Protein Total 7.4      Albumin 4.1      Bilirubin Total 0.6      GFR Estimate >90     ROUTINE UA WITH MICROSCOPIC REFLEX TO CULTURE - Abnormal    Color Urine Yellow      Appearance Urine Slightly Cloudy (*)     Glucose Urine Negative      Bilirubin Urine Negative      Ketones Urine 20 (*)     Specific Gravity Urine 1.014      Blood Urine Negative      pH Urine 5.0      Protein Albumin Urine Negative      Urobilinogen Urine Normal      Nitrite Urine Negative      Leukocyte Esterase Urine Negative      Bacteria Urine Few (*)     Mucus Urine Present (*)     RBC Urine 1      WBC Urine 5      Squamous Epithelials Urine 12 (*)    CBC WITH PLATELETS AND DIFFERENTIAL - Abnormal    WBC Count 12.6 (*)     RBC Count 4.07      Hemoglobin 13.5      Hematocrit 40.0      MCV 98      MCH 33.2 (*)     MCHC 33.8      RDW 13.2      Platelet Count 393      % Neutrophils 81      % Lymphocytes 12      % Monocytes 6      % Eosinophils 0      % Basophils 0      % Immature Granulocytes 1      NRBCs per 100 WBC 0      Absolute Neutrophils 10.3 (*)     Absolute Lymphocytes 1.5      Absolute Monocytes 0.7      Absolute Eosinophils 0.0      Absolute Basophils 0.1      Absolute Immature Granulocytes 0.1      Absolute NRBCs 0.0     INFLUENZA A/B & SARS-COV2 PCR MULTIPLEX - Normal    Influenza A PCR Negative      Influenza B PCR Negative      RSV PCR Negative      SARS CoV2 PCR Negative     HCG QUALITATIVE PREGNANCY - Normal    hCG Serum Qualitative Negative          IMAGING  Images reviewed by me.  Radiology report also reviewed.  XR Chest Port 1 View   Final Result   IMPRESSION: No acute findings. The lungs are clear and there are no   pleural effusions. Normal heart size.      DANIELLA LALA MD            SYSTEM ID:  [id number]          Procedures    Medications   0.9% sodium chloride BOLUS (0 mLs Intravenous Stopped 1/3/23 1303)   ondansetron (ZOFRAN) injection 4 mg (4 mg Intravenous Given 1/3/23 1049)   ketorolac (TORADOL) injection 15 mg (15 mg Intravenous Given 1/3/23 1146)   0.9% sodium chloride BOLUS (0 mLs Intravenous Stopped 1/3/23 1404)         IMPRESSION       ICD-10-CM    1. Viral syndrome  B34.9                Medication List      Started    ondansetron 4 MG ODT tab  Commonly known as: ZOFRAN ODT  4-8 mg, Oral, EVERY 8 HOURS PRN                        Armando Tena MD  01/03/23 3616       Armando Tena MD  01/10/23 3419

## 2023-01-03 NOTE — PATIENT INSTRUCTIONS
I submitted the PA's for both- I will let you know if I hear anything, otherwise pharmacy should notify you when they go through. Changing the directions for the butalbital did not seem to make a difference, unless the formulary changed after the beginning of the year.

## 2023-01-04 ENCOUNTER — PATIENT OUTREACH (OUTPATIENT)
Dept: FAMILY MEDICINE | Facility: CLINIC | Age: 48
End: 2023-01-04

## 2023-01-04 NOTE — TELEPHONE ENCOUNTER
What type of discharge? Emergency Department  Risk of Hospital admission or ED visit: 16%  Is a TCM episode required? No  When should the patient follow up with PCP? within 30 days of discharge.    Daljit Sam RN  [facility]

## 2023-01-05 NOTE — TELEPHONE ENCOUNTER
PRIOR AUTHORIZATION DENIED    Medication: Pantoprazole 40mg PA DENIED    Denial Date: 1/5/2023    Denial Rational:       Appeal Information:

## 2023-01-05 NOTE — TELEPHONE ENCOUNTER
Prior Authorization Retail Medication Request    Medication/Dose: pantoprazole sodium  ICD code (if different than what is on RX):    Previously Tried and Failed:  Robinul; prilosec 20, 40mg; protonix 40; carafate  Rationale:      Insurance Name:  Not provided  Insurance ID:  Not provided  CMM Key: [id number]      Pharmacy Information (if different than what is on RX)  Name:    Phone:

## 2023-01-05 NOTE — TELEPHONE ENCOUNTER
PA Initiation    Medication: Pantoprazole 40mg PA INITIATED  Insurance Company: [facility] - Phone [phone] Fax [phone]  Pharmacy Filling the Rx: [id number] [address] AT [address]  Filling Pharmacy Phone: [phone]  Filling Pharmacy Fax:    Start Date: 1/5/2023    Central Prior Authorization Team   Phone: [phone]

## 2023-01-06 ENCOUNTER — E-VISIT (OUTPATIENT)
Dept: FAMILY MEDICINE | Facility: CLINIC | Age: 48
End: 2023-01-06
Payer: COMMERCIAL

## 2023-01-06 DIAGNOSIS — G43.009 MIGRAINE WITHOUT AURA AND WITHOUT STATUS MIGRAINOSUS, NOT INTRACTABLE: Primary | ICD-10-CM

## 2023-01-06 PROCEDURE — 99421 OL DIG E/M SVC 5-10 MIN: CPT | Performed by: NURSE PRACTITIONER

## 2023-01-06 RX ORDER — PANTOPRAZOLE SODIUM 40 MG/1
40 TABLET, DELAYED RELEASE ORAL DAILY
Qty: 90 TABLET | Refills: 3 | Status: SHIPPED | OUTPATIENT
Start: 2023-01-06 | End: 2023-01-17

## 2023-01-06 RX ORDER — PREDNISONE 20 MG/1
40 TABLET ORAL DAILY
Qty: 10 TABLET | Refills: 0 | Status: SHIPPED | OUTPATIENT
Start: 2023-01-06 | End: 2023-01-11

## 2023-01-06 RX ORDER — VERAPAMIL HYDROCHLORIDE 40 MG/1
40 TABLET ORAL 3 TIMES DAILY
Qty: 90 TABLET | Refills: 0 | Status: SHIPPED | OUTPATIENT
Start: 2023-01-06 | End: 2023-03-28

## 2023-01-06 NOTE — PATIENT INSTRUCTIONS
I sent in a prescription for a medication that relaxes the blood vessels (verapamil) and for an anti-inflammatory (prednisone) for 5 days. We can keep you on the verapamil long term if it helps. A prior auth for the butalbital was submitted and I haven't heard anything- see if the pharmacy got an approval. The protonix (heartburn) was denied, I re-submitted it under a different diagnosis to see if that would help. You can take 40 mg of the Omeprazole and add Pepcid in the meantime if you need to.       Thank you for choosing us for your care. I have placed an order for a prescription so that you can start treatment. View your full visit summary for details by clicking on the link below. Your pharmacist will able to address any questions you may have about the medication.     If you're not feeling better within 5-7 days, please schedule an appointment.  You can schedule an appointment right here in [facility], or call [phone]  If the visit is for the same symptoms as your eVisit, we'll refund the cost of your eVisit if seen within seven days.                Migraine Headache   A migraine headache is an often severe type of headache. It's different from other types of headaches in that symptoms other than pain occur with the it. For instance, a classic migraine headache means visual symptoms (or aura) such as flashes of light, blind spots or other vision changes, warns you a headache is coming on. Nausea and vomiting, lightheadedness, sensitivity to light or sound, and other visual disturbances are common migraine symptoms. The pain may last from a few hours to several days. It's not clear why migraines occur, but certain factors called triggers can raise the risk of having a migraine attack. A migraine may be triggered by emotional stress or depression, or by hormone changes during the menstrual cycle. Other triggers include certain birth control pills, overuse of migraine medicines, alcohol or caffeine, foods  with tyramine such as aged cheese and wine, eyestrain, weather changes, missed meals, or too little or too much sleep.  Home care  Follow these tips when taking care of yourself at home:    Don t drive yourself home if you were given pain medicine for your headache or are having visual symptoms. Instead, have someone else drive you home. Try to sleep when you get home. You should feel much better when you wake up.    Cold can help ease migraine symptoms. Put an ice pack wrapped in a thin towel on your forehead or at the base of your skull. Put heat on the back of your neck to help ease any neck spasm.    Drink only clear liquids or eat a light diet until your symptoms get better. This will help you prevent nausea and vomiting.  How to prevent migraines  Pay attention to what seems to trigger your headache. Try to stay away from the triggers when you can. If you have headaches often, consider keeping a headache diary. In it, write down what you were doing, feeling, or eating in the hours before each headache. Show this to your healthcare provider to help find the cause of your headaches.  If stress seems to be a trigger for your headaches, figure out what is causing stress in your life. Learn new ways to handle your stress. Ideas include regular exercise, biofeedback, self-hypnosis, yoga, and meditation. Talk with your healthcare provider to find out more information about managing stress. Many books and digital media are also available on this subject.  Tyramine is a substance found in many foods. It can trigger a migraine in some people. These foods contain tyramine:    Chocolate    Yogurt    All cheeses, but especially aged cheeses    Smoked or pickled fish and meat, including herring, caviar, bologna, pepperoni, and salami    Liver    Avocados    Bananas    Figs    Raisins    Red wine  Try staying away from these foods for 1 to 2 months to see if you have fewer headaches.  How to treat future headaches    Take  time out at the first sign of a headache, if possible. Find a quiet, dark, comfortable place to sit or lie down. Let yourself relax or sleep.    Put an ice pack wrapped in a thin towel on your forehead or on the area of greatest pain. A heating pad and massage may help if you are having a muscle spasm and tightness in your neck.    If you have been prescribed a medicine to stop a migraine headache, use this at the first warning sign of the headache for best results. First signs may be an aura or pain.    If you have been prescribed a medicine to prevent the headaches, it's important to take the medicine as directed. Many of these medicines may take a few weeks to start preventing headaches, so it's important to not give up on them right away. If you continue to have just as many headaches after taking these medicines for a while, talk with your doctor to see if the dose needs to be changed or if a different medicine is advised.    If you need to take medicine often for your migraine, talk with your healthcare provider about other ways to prevent your headaches.    Follow-up care  Follow up with your healthcare provider, or as advised. Talk with your provider if you have frequent headaches. He or she can figure out a treatment plan. Ask if you can have medicine to take at home the next time you get a bad headache. This may keep you from having to visit the emergency department in the future. You may need to see a headache specialist (neurologist) if you continue to have headaches.  When to seek medical advice  Call your healthcare provider right away if any of these occur:    Your head pain gets worse, or doesn t get better within 24 hours    You can t keep liquids down (repeated vomiting)    Pain in your sinuses, ears, or throat    Fever of 100.4  F (38  C) or higher, or as directed by your healthcare provider    Stiff neck    Extreme drowsiness, confusion, or fainting    Dizziness, or dizziness with spinning  sensation (vertigo)    Weakness or trouble feeling in an arm or leg, or on one side of your face    Trouble talking or seeing  [employer] last reviewed this educational content on 9/1/2019 2000-2021 [employer]. All rights reserved. This information is not intended as a substitute for professional medical care. Always follow your healthcare professional's instructions.

## 2023-01-09 ENCOUNTER — HOSPITAL ENCOUNTER (EMERGENCY)
Facility: CLINIC | Age: 48
Discharge: HOME | End: 2023-01-09
Attending: FAMILY MEDICINE | Admitting: FAMILY MEDICINE
Payer: COMMERCIAL

## 2023-01-09 VITALS
SYSTOLIC BLOOD PRESSURE: 167 MMHG | OXYGEN SATURATION: 100 % | HEART RATE: 86 BPM | DIASTOLIC BLOOD PRESSURE: 96 MMHG | TEMPERATURE: 97.5 F | WEIGHT: 200 LBS | HEIGHT: 62 IN | BODY MASS INDEX: 36.8 KG/M2 | RESPIRATION RATE: 22 BRPM

## 2023-01-09 DIAGNOSIS — K59.00 CONSTIPATION, UNSPECIFIED CONSTIPATION TYPE: ICD-10-CM

## 2023-01-09 PROCEDURE — 250N000013 HC RX MED GY IP 250 OP 250 PS 637: Performed by: FAMILY MEDICINE

## 2023-01-09 PROCEDURE — 99284 EMERGENCY DEPT VISIT MOD MDM: CPT | Performed by: FAMILY MEDICINE

## 2023-01-09 PROCEDURE — 93010 ELECTROCARDIOGRAM REPORT: CPT | Mod: 76 | Performed by: FAMILY MEDICINE

## 2023-01-09 PROCEDURE — 93005 ELECTROCARDIOGRAM TRACING: CPT | Performed by: FAMILY MEDICINE

## 2023-01-09 PROCEDURE — 93010 ELECTROCARDIOGRAM REPORT: CPT | Performed by: FAMILY MEDICINE

## 2023-01-09 PROCEDURE — 93005 ELECTROCARDIOGRAM TRACING: CPT | Mod: 76 | Performed by: FAMILY MEDICINE

## 2023-01-09 PROCEDURE — 99283 EMERGENCY DEPT VISIT LOW MDM: CPT | Performed by: FAMILY MEDICINE

## 2023-01-09 RX ADMIN — DOCUSATE SODIUM 226 ML: 50 LIQUID ORAL at 11:43

## 2023-01-09 ASSESSMENT — ENCOUNTER SYMPTOMS
VOMITING: 0
HEADACHES: 0
CONSTIPATION: 1
SORE THROAT: 0
DYSURIA: 0
ABDOMINAL PAIN: 1
CHILLS: 0
COUGH: 0
FEVER: 0
SHORTNESS OF BREATH: 0
WHEEZING: 0
FREQUENCY: 0
SINUS PRESSURE: 0
PALPITATIONS: 0
DIAPHORESIS: 0
DIARRHEA: 0
NAUSEA: 0
BLOOD IN STOOL: 0

## 2023-01-09 ASSESSMENT — ACTIVITIES OF DAILY LIVING (ADL)
ADLS_ACUITY_SCORE: 35
ADLS_ACUITY_SCORE: 35

## 2023-01-09 NOTE — ED PROVIDER NOTES
History     Chief Complaint   Patient presents with     Constipation     HPI  Apoorva Roberts is a 47 year old female who presents with history of hypertension, migraine headaches, tobacco abuse, obesity, chronic marijuana use.  Prior appendectomy cholecystectomy hernia repair.  She has a history of chronic diarrhea and uses occasional Imodium.  She did so about 2 weeks ago and then for the last 10 days has been unable to stool.  She has been sitting on the toilet trying to disimpact herself and this has been unsuccessful at home.  Spending hours trying to strain at the stool.  Some of associate abdominal pain.  No associated vomiting.  Has been eating at least once daily.  No associated fever or systemic symptoms.  No significant abdominal distention.  No change in urine.      Allergies:  Allergies   Allergen Reactions     Indomethacin      Other reaction(s): GI Upset     Fluoxetine      Other reaction(s): Thrush     Paroxetine      Other reaction(s): Thrush       Problem List:    Patient Active Problem List    Diagnosis Date Noted     Vitamin B12 deficiency (non anemic) 01/03/2023     Priority: Medium     Restless leg syndrome 01/03/2023     Priority: Medium     Elevated C-reactive protein (CRP) 01/03/2023     Priority: Medium     PTSD (post-traumatic stress disorder) 08/04/2022     Priority: Medium     GEORGIANA (generalized anxiety disorder) 08/04/2022     Priority: Medium     Gastritis with hemorrhage, unspecified chronicity, unspecified gastritis type 08/04/2022     Priority: Medium     Cervical high risk HPV (human papillomavirus) test positive 09/02/2021     Priority: Medium     8/27/21 NIL Pap, + HR HPV (neg 16/18). Plan cotest in 1 year.   8/4/22 LSIL, +HR HPV (not 16/18). Plan: colposcopy due before 11/4/22 8/11/22 left message / mychart sent / mychart read  9/27/22 Reminder mychart  11/1/22 [place] not done. Tracking updated for 6 mo colp/pap due 2/4/23            Marijuana use, continuous 09/17/2020      "Priority: Medium     Tobacco use disorder 09/17/2020     Priority: Medium     Migraine without aura and without status migrainosus, not intractable 09/17/2020     Priority: Medium     Classic migraine 11/06/2019     Priority: Medium     Chronic daily headache 11/06/2019     Priority: Medium     Improved on daily Tizanidine- has seen Neurology       Peripheral neuropathy 11/06/2019     Priority: Medium     After hernia surgery in 2014- was told the surgeon \"nicked\" a couple nerves on the left       Gastroesophageal reflux disease 12/05/2017     Priority: Medium     Benign essential hypertension 12/05/2017     Priority: Medium     Amlodipine, hydrochlorothiazide, and metoprolol.       Major depressive disorder, recurrent episode, moderate (H) 12/05/2017     Priority: Medium     Morbid obesity (H) 12/05/2017     Priority: Medium     12/5/2017 Body mass index is 41.98 kg/(m^2).       Hidradenitis suppurativa 09/11/2017     Priority: Medium        Past Medical History:    Past Medical History:   Diagnosis Date     Cervical high risk HPV (human papillomavirus) test positive 08/27/2021     Community acquired pneumonia 12/5/2017     Depressive disorder      Hypertension        Past Surgical History:    Past Surgical History:   Procedure Laterality Date     ABDOMEN SURGERY       APPENDECTOMY       CHOLECYSTECTOMY       ESOPHAGOSCOPY, GASTROSCOPY, DUODENOSCOPY (EGD), COMBINED N/A 10/8/2021    Procedure: ESOPHAGOGASTRODUODENOSCOPY, WITH BIOPSY;  Surgeon: Slim Randle DO;  Location: [facility]     GYN SURGERY       HERNIA REPAIR       HYSTERECTOMY, PAP STILL INDICATED      Cervix still present       Family History:    Family History   Problem Relation Age of Onset     Diabetes Mother      Hypertension Mother      Hyperlipidemia Mother      Depression Mother      Substance Abuse Mother      Hypertension Father      Hyperlipidemia Father      Kidney Cancer Father      Other Cancer Father      Diabetes Maternal Grandmother  " "    Depression Sister      Breast Cancer No family hx of        Social History:  Marital Status:   [2]  Social History     Tobacco Use     Smoking status: Every Day     Packs/day: 1.00     Years: 30.00     Pack years: 30.00     Types: Cigarettes     Smokeless tobacco: Never   Vaping Use     Vaping Use: Never used   Substance Use Topics     Alcohol use: Yes     Comment: 1-2 drinks a week     Drug use: Yes     Types: Marijuana     Comment: cbd        Medications:    acetaminophen (TYLENOL) 325 MG tablet  amitriptyline (ELAVIL) 50 MG tablet  butalbital-acetaminophen-caffeine (ESGIC) -40 MG tablet  cyanocobalamin (VITAMIN B-12) 1000 MCG tablet  escitalopram (LEXAPRO) 10 MG tablet  fluticasone (FLONASE) 50 MCG/ACT nasal spray  gabapentin (NEURONTIN) 400 MG capsule  hydrochlorothiazide (HYDRODIURIL) 25 MG tablet  lisinopril (ZESTRIL) 5 MG tablet  ondansetron (ZOFRAN ODT) 4 MG ODT tab  pantoprazole (PROTONIX) 40 MG EC tablet  predniSONE (DELTASONE) 20 MG tablet  tiZANidine (ZANAFLEX) 2 MG tablet  verapamil (CALAN) 40 MG tablet  zolpidem (AMBIEN) 10 MG tablet          Review of Systems   Constitutional: Negative for chills, diaphoresis and fever.   HENT: Negative for ear pain, sinus pressure and sore throat.    Eyes: Negative for visual disturbance.   Respiratory: Negative for cough, shortness of breath and wheezing.    Cardiovascular: Negative for chest pain and palpitations.   Gastrointestinal: Positive for abdominal pain and constipation. Negative for blood in stool, diarrhea, nausea and vomiting.   Genitourinary: Negative for dysuria, frequency and urgency.   Skin: Negative for rash.   Neurological: Negative for headaches.   All other systems reviewed and are negative.      Physical Exam   BP: 126/83  Pulse: (!) 145  Temp: 97.5  F (36.4  C)  Resp: 22  Height: 157.5 cm (5' 2\")  Weight: 90.7 kg (200 lb)  SpO2: 100 %      Physical Exam  Constitutional:       General: She is in acute distress.      Appearance: " She is not diaphoretic.   HENT:      Head: Atraumatic.   Eyes:      Conjunctiva/sclera: Conjunctivae normal.   Cardiovascular:      Rate and Rhythm: Normal rate and regular rhythm.      Heart sounds: No murmur heard.  Pulmonary:      Effort: Pulmonary effort is normal. No respiratory distress.      Breath sounds: Normal breath sounds. No stridor. No wheezing or rhonchi.   Abdominal:      General: Abdomen is flat. There is no distension.      Palpations: There is no mass.      Tenderness: There is no abdominal tenderness. There is no guarding.   Musculoskeletal:      Cervical back: Neck supple.      Right lower leg: No edema.      Left lower leg: No edema.   Skin:     Coloration: Skin is not pale.      Findings: No rash.   Neurological:      General: No focal deficit present.      Mental Status: She is alert.      Motor: No weakness.       Rectal exam is with impaction and disimpaction is initiated.  Patient was able to perform some pushing at the stool while I worked manually and was able to get out a moderate sized stool distally.  This was of relatively soft consistency.    ED Course                 Procedures                EKG Interpretation:      Interpreted by Yousif Franco MD  EKG done at 1120 hrs. demonstrates a sinus rhythm 91 bpm with a normal axis.  There is minimal ST depression V4 through V6.  There is a normal R progression.  No Q waves.  Normal intervalws with the exception that the IN is short but I do not see an obvious delta.  No ectopy.  Normal conduction.  Impression sinus rhythm 91 bpm with short IN and lateral ST segments look slightly depressed, but not that is similar to what I see on her EKG from January 3           EKG Interpretation:      Interpreted by Yousif Franco MD  EKG done at 1223 hrs. demonstrates a sinus rhythm at 71 bpm normal axis.  I see no significant ST change on this EKG.  T wave inversion 3 aVF.  Flatter T waves more generally.  There is a normal R progression.  No Q  waves.  Normal intervals.  This was with the exception of a short MA.  Impression sinus rhythm 71 bpm no significant change from prior EKG or from earlier EKGs.  I do not see significant ST change on this EKG.  EKG was originally done because of the presenting heart rate was listed as 145 but EKG demonstrates no significant heart rate changes      Critical Care time:  none               No results found for this or any previous visit (from the past 24 hour(s)).    Medications - No data to display    Assessments & Plan (with Medical Decision Making)     MDM: Apoorva Roberts is a 47 year old female presenting with impacted stool which was disimpacted here and we will plan for fleets enema.  Her abdomen is benign she has no systemic findings.  Her heart rate was recorded as high and we will recheck now.  She has had labs done in the last week related to a syncopal episode.  No indication to repeat at this point less other findings.  EKG has been ordered because of the elevated heart rate.  At this point no indication for abdominal imaging.  She has no cardiopulmonary symptoms.    EKG is normal with the exception of the ST changes laterally may be slightly depressed but when I rechecked the EKG it is similar to prior.  Patient has no chest pain shortness of breath or other systemic symptoms.    Following disimpaction of stool in the emergency department, the patient was able to use a enema with good success.  Will discharge home with management as below.    I have reviewed the nursing notes.    I have reviewed the findings, diagnosis, plan and need for follow up with the patient.       Medical Decision Making  The patient presented with a problem that is a chronic illness mild to moderate exacerbation, progression, or side effect of treatment.    The patient's evaluation involved:  ordering and review of 1 test(s) (see separate area of note for details)    The patient's management involved only simple and very low risk  treatment.        New Prescriptions    No medications on file       Final diagnoses:   Constipation, unspecified constipation type - this was disimpacted, removed further with enema/.  follow bowel regimen. return for worsening       1/9/2023   [facility] EMERGENCY DEPT     Yousif Franco MD  01/09/23 0201

## 2023-01-09 NOTE — ED NOTES
Pt up in bathroom on arrival to room. Pt states has been taking stool softeners for the past 5 days. Pt states normally has chronic diarrhea.

## 2023-01-09 NOTE — DISCHARGE INSTRUCTIONS
ICD-10-CM    1. Constipation, unspecified constipation type  K59.00     this was disimpacted, removed further with enema/.  follow bowel regimen. return for worsening        Bowel Regimen for Constipation:  Maintain 64 oz clear fluid per day indefinitely  Start with Fleets Enema x1 and hold for as long as possible (20-30 minutes)  Next take magnesium citrate 300 ml (one bottle) and stay close to bathroom for 6 hours  Next, on the next day start miralax 1 capful in 8 oz fluid daily for 7 days  Next, when better and while maintaining 64 oz fluid pre day, start fiber supplement, Citrucel or metamucil daily.    if magnesium citrate is not available, you can use miralax 238 g bottle and dissolve in 64 oz gatorade.  Take 32 oz in the am and 32 oz in the pm.  Start with one dose of bisacodyl 10 mg (2 tabs) two hours before the miralax.  Once cleaned out, you can start the fiber supplementation with 64 oz fluid per days as above.

## 2023-01-09 NOTE — ED NOTES
Pt back to room, unable to have BM, pt tearful c/o rectal pain. Pt had small amt of liquid stool, no solid.

## 2023-01-09 NOTE — ED TRIAGE NOTES
Pt here with 10 days of constipation. States she has been pushing since 0700. Tried stool softeners.      Triage Assessment     Row Name 01/09/23 1000       Triage Assessment (Adult)    Airway WDL WDL       Respiratory WDL    Respiratory WDL WDL       Skin Circulation/Temperature WDL    Skin Circulation/Temperature WDL WDL       Cardiac WDL    Cardiac WDL WDL       Peripheral/Neurovascular WDL    Peripheral Neurovascular WDL WDL       Cognitive/Neuro/Behavioral WDL    Cognitive/Neuro/Behavioral WDL WDL

## 2023-01-17 ENCOUNTER — E-VISIT (OUTPATIENT)
Dept: FAMILY MEDICINE | Facility: CLINIC | Age: 48
End: 2023-01-17
Payer: COMMERCIAL

## 2023-01-17 DIAGNOSIS — K27.9 PUD (PEPTIC ULCER DISEASE): Primary | ICD-10-CM

## 2023-01-17 DIAGNOSIS — K21.00 GASTROESOPHAGEAL REFLUX DISEASE WITH ESOPHAGITIS WITHOUT HEMORRHAGE: ICD-10-CM

## 2023-01-17 PROBLEM — R87.810 CERVICAL HIGH RISK HPV (HUMAN PAPILLOMAVIRUS) TEST POSITIVE: Status: ACTIVE | Noted: 2021-09-02

## 2023-01-17 PROCEDURE — 99421 OL DIG E/M SVC 5-10 MIN: CPT | Performed by: NURSE PRACTITIONER

## 2023-01-17 RX ORDER — FAMOTIDINE 40 MG/1
40 TABLET, FILM COATED ORAL AT BEDTIME
Qty: 90 TABLET | Refills: 1 | Status: SHIPPED | OUTPATIENT
Start: 2023-01-17 | End: 2023-05-09

## 2023-01-17 RX ORDER — SUCRALFATE 1 G/1
1 TABLET ORAL 4 TIMES DAILY PRN
Qty: 120 TABLET | Refills: 11 | Status: SHIPPED | OUTPATIENT
Start: 2023-01-17 | End: 2023-05-02

## 2023-01-17 RX ORDER — ESOMEPRAZOLE MAGNESIUM 40 MG/1
40 CAPSULE, DELAYED RELEASE ORAL
Qty: 90 CAPSULE | Refills: 3 | Status: SHIPPED | OUTPATIENT
Start: 2023-01-17 | End: 2023-03-28

## 2023-01-17 NOTE — PATIENT INSTRUCTIONS
Thank you for choosing us for your care. I have placed an order for a prescription so that you can start treatment. View your full visit summary for details by clicking on the link below. Your pharmacist will able to address any questions you may have about the medication.      If you're not feeling better within 2 weeks please schedule an appointment.  You can schedule an appointment right here in [facility], or call [phone]  If the visit is for the same symptoms as your eVisit, we'll refund the cost of your eVisit if seen within seven days.          Diagnoses: PUD (peptic ulcer disease)   Gastroesophageal reflux disease with esophagitis without hemorrhage   Order: Adult Gi  Referral - Consult Only        Comment: Please be aware that coverage of these services is subject to the terms and limitations of your health insurance plan.  Call member services at your health plan with any benefit or coverage questions.   [facility] will call you to coordinate your care as prescribed by the provider.  If you don t hear from a representative within 2 business days, please call [phone].     Zoltan Guerin- I sent 3 prescriptions- they all work differently.   - Take Nexium in the morning on empty stomach  - Take the Carafate as needed for stomach pain, acid reflux  - Take the Famotidine before bed on empty stomach  - Avoid all factors that exacerbate symptoms.   - I would like you to see a GI specialist- I put the referral in- you can call the number above if you do not hear from them.     GERD (Adult)    The esophagus is a tube that carries food from the mouth to the stomach. A valve (the LES, lower esophageal sphincter) at the lower end of the esophagus prevents stomach acid from flowing upward. When this valve doesn't work properly, stomach contents may repeatedly flow back up (reflux) into the esophagus. This is called gastroesophageal reflux disease (GERD). GERD can irritate the esophagus. It can  "cause problems with pain, swallowing or breathing. In severe cases, GERD can cause recurrent pneumonia (from aspiration or breathing in particles) or other serious problems.  Symptoms of reflux include burning, pressure or sharp pain in the upper abdomen or mid to lower chest. The pain can spread to the neck, back, or shoulder. There may be belching, an acid taste in the back of the throat, chronic cough, or sore throat, or hoarseness. GERD symptoms often occur during the day after a big meal. They can also occur at night when lying down.   Home care  Lifestyle changes can help reduce symptoms. If needed, your healthcare provider may prescribe medicines. Symptoms often improve with treatment, but if treatment is stopped, the symptoms often return after a few months. So most persons with GERD will need to continue treatment or get treatment on and off.  Lifestyle changes    Limit or avoid fatty, fried, and spicy foods, as well as coffee, chocolate, mint, and foods with high acid content such as tomatoes and citrus fruit and juices (orange, grapefruit, lemon).    Don t eat large meals, especially at night. Frequent, smaller meals are best. Don't lie down right after eating. And don t eat anything 3 hours before going to bed.    Don't drink alcohol or smoke. As much as possible, stay away from second hand smoke.    If you are overweight, losing weight will reduce symptoms.     Don't wear tight clothing around your stomach area.    If your symptoms occur during sleep, use a foam wedge to elevate your upper body (not just your head.) Or, place 4\" blocks under the head of your bed. Or use 2 bed risers under your bedframe.  Medicines  If needed, medicines can help relieve the symptoms of GERD and prevent damage to the esophagus. Discuss a medicine plan with your healthcare provider. This may include one or more of the following medicines:    Antacids to help neutralize the normal acids in your stomach.    Acid blockers " (Histamine or H2 blockers) to decrease acid production.    Acid inhibitors (proton pump inhibitors PPIs) to decrease acid production in a different way than the blockers. They may work better, but can take a little longer to take effect.  Take an antacid 30 to 60 minutes after eating and at bedtime, but not at the same time as an acid blocker.  Try not to take medicines such as ibuprofen and aspirin. If you are taking aspirin for your heart or other medical reasons, talk to your healthcare provider about stopping it.  Follow-up care  Follow up with your healthcare provider or as advised by our staff.  When to seek medical advice  Call your healthcare provider if any of the following occur:    Stomach pain gets worse or moves to the lower right abdomen (appendix area)    Chest pain appears or gets worse, or spreads to the back, neck, shoulder, or arm    An over-the-counter trial of medicine doesn't relieve your symptoms    Weight loss that can't be explained    Trouble or pain swallowing    Frequent vomiting (can t keep down liquids)    Blood in the stool or vomit (red or black in color)    Feeling weak or dizzy    Fever of 100.4 F (38 C) or higher, or as directed by your healthcare provider  Bob last reviewed this educational content on 3/1/2018    8096-3346 [employer]. All rights reserved. This information is not intended as a substitute for professional medical care. Always follow your healthcare professional's instructions.          Lifestyle Changes for Controlling GERD  When you have GERD, stomach acid feels as if it s backing up toward your mouth. Making lifestyle changes can often improve your symptoms. This is true if you take medicine to control your GERD or not. Talk with your healthcare provider about the following suggestions. They may help you get relief from your symptoms.  Raise your head    Reflux is more likely to happen when you re lying down flat. That's because stomach fluid  can flow backward more easily. Raising the head of your bed 4 to 6 inches can help. To do this:    Slide blocks or books under the legs at the head of your bed. Or put a wedge under the mattress. Many [employer] stores can make a wedge for you. The wedge should go from your waist to the top of your head.    Don t just prop your head up on a few pillows. This increases pressure on your stomach. It can make GERD worse.  Watch your eating habits  Certain foods may increase the acid in your stomach. Or they may relax the lower esophageal sphincter. This makes GERD more likely. It s best to avoid the following if they cause you symptoms:    Coffee, tea, and carbonated drinks (with and without caffeine)    Fatty, fried, or spicy food    Mint, chocolate, onions, tomatoes, and citrus    Peppermint    Any other foods that seem to irritate your stomach or cause you pain  Relieve the pressure  Tips include the following:    Eat smaller meals, even if you have to eat more often.    Don t lie down right after you eat. Wait a few hours for your stomach to empty.    Don't wear tight belts or tight-fitting clothes.    Lose any extra weight.  Tobacco and alcohol  Don't smoke tobacco or drink alcohol. They can make GERD symptoms worse.  [employer] last reviewed this educational content on 6/1/2019 2000-2021 [employer]. All rights reserved. This information is not intended as a substitute for professional medical care. Always follow your healthcare professional's instructions.

## 2023-01-29 ENCOUNTER — HEALTH MAINTENANCE LETTER (OUTPATIENT)
Age: 48
End: 2023-01-29

## 2023-02-14 NOTE — TELEPHONE ENCOUNTER
FYI to provider - Patient is lost to pap tracking follow-up. Attempts to contact pt have been made per reminder process and there has been no reply and/or no appt scheduled. Contact hx listed below.     8/27/21 NIL Pap, + HR HPV (neg 16/18). Plan cotest in 1 year.   8/4/22 LSIL, +HR HPV (not 16/18). Plan: colposcopy due before 11/4/22 8/11/22 left message / mychart sent / mychart read  9/27/22 Reminder mychart  11/1/22 [place] not done. Tracking updated for 6 mo colp/pap due 2/4/23 1/17/23 Reminder mychart -- read  2/14/23 Lost to follow-up for pap tracking       Mckenzie Dominguez RN BSN, Pap Tracking

## 2023-03-15 ENCOUNTER — E-VISIT (OUTPATIENT)
Dept: FAMILY MEDICINE | Facility: CLINIC | Age: 48
End: 2023-03-15
Payer: COMMERCIAL

## 2023-03-15 DIAGNOSIS — G47.00 INSOMNIA, UNSPECIFIED TYPE: Primary | ICD-10-CM

## 2023-03-15 DIAGNOSIS — R06.83 SNORING: ICD-10-CM

## 2023-03-15 DIAGNOSIS — I10 BENIGN ESSENTIAL HYPERTENSION: ICD-10-CM

## 2023-03-15 PROCEDURE — 99207 E-CONSULT TO SLEEP MEDICINE (ADULT OUTPT PROVIDER TO SPECIALIST WRITTEN QUESTION & RESPONSE): CPT | Performed by: NURSE PRACTITIONER

## 2023-03-15 PROCEDURE — 99422 OL DIG E/M SVC 11-20 MIN: CPT | Performed by: NURSE PRACTITIONER

## 2023-03-16 PROBLEM — I10 BENIGN ESSENTIAL HYPERTENSION: Status: ACTIVE | Noted: 2017-12-05

## 2023-03-16 RX ORDER — MIRTAZAPINE 15 MG/1
15 TABLET, FILM COATED ORAL AT BEDTIME
Qty: 90 TABLET | Refills: 0 | Status: SHIPPED | OUTPATIENT
Start: 2023-03-16 | End: 2023-05-02

## 2023-03-20 NOTE — PATIENT INSTRUCTIONS
Thank you for choosing us for your care. I have placed an order for a prescription so that you can start treatment. View your full visit summary for details by clicking on the link below. Your pharmacist will able to address any questions you may have about the medication.     If you're not feeling better within 5-7 days, please schedule an appointment.  You can schedule an appointment right here in [facility], or call [phone]  If the visit is for the same symptoms as your eVisit, we'll refund the cost of your eVisit if seen within seven days.      I will let you know when I hear back from the Sleep Med provider.     Angelic

## 2023-03-21 ENCOUNTER — E-CONSULT (OUTPATIENT)
Dept: SLEEP MEDICINE | Facility: CLINIC | Age: 48
End: 2023-03-21
Payer: COMMERCIAL

## 2023-03-21 DIAGNOSIS — R06.83 SNORING: ICD-10-CM

## 2023-03-21 DIAGNOSIS — G47.00 INSOMNIA, UNSPECIFIED TYPE: Primary | ICD-10-CM

## 2023-03-21 NOTE — PROGRESS NOTES
3/21/2023     E-Consult has been denied due to: Complexity of question, needs in-person referral.    Interprofessional consultation requested by:  Angelic More APRN CNP      Clinical Question/Purpose: MY CLINICAL QUESTION IS: possible sleep apnea    Patient assessment and information reviewed: 47-year-old with significant insomnia and requires more complex evaluation-this condition interferes with the accuracy of home testing and therefore home sleep test may not be indicated.    Recommendations: Referral to sleep medicine      The recommendations provided in this E-Consult are based on a review of clinical data pertinent to the clinical question presented, without a review of the patient's complete medical record or, the benefit of a comprehensive in-person or virtual patient evaluation. This consultation should not replace the clinical judgement and evaluation of the provider ordering this E-Consult. Any new clinical issues, or changes in patient status since the filing of this E-Consult will need to be taken into account when assessing these recommendations. Please contact me if you have further questions.    My total time spent reviewing clinical information and formulating assessment was 5 minutes.        EDWARD STODDARD MD

## 2023-03-28 ENCOUNTER — OFFICE VISIT (OUTPATIENT)
Dept: FAMILY MEDICINE | Facility: CLINIC | Age: 48
End: 2023-03-28
Payer: COMMERCIAL

## 2023-03-28 VITALS
TEMPERATURE: 97.9 F | DIASTOLIC BLOOD PRESSURE: 72 MMHG | RESPIRATION RATE: 16 BRPM | WEIGHT: 191 LBS | BODY MASS INDEX: 35.15 KG/M2 | HEART RATE: 106 BPM | SYSTOLIC BLOOD PRESSURE: 104 MMHG | HEIGHT: 62 IN | OXYGEN SATURATION: 100 %

## 2023-03-28 DIAGNOSIS — F33.1 MAJOR DEPRESSIVE DISORDER, RECURRENT EPISODE, MODERATE (H): ICD-10-CM

## 2023-03-28 DIAGNOSIS — F41.1 GAD (GENERALIZED ANXIETY DISORDER): ICD-10-CM

## 2023-03-28 DIAGNOSIS — K29.60 EROSIVE GASTRITIS: ICD-10-CM

## 2023-03-28 DIAGNOSIS — G62.89 OTHER POLYNEUROPATHY: ICD-10-CM

## 2023-03-28 DIAGNOSIS — K27.9 PUD (PEPTIC ULCER DISEASE): ICD-10-CM

## 2023-03-28 DIAGNOSIS — I10 BENIGN ESSENTIAL HYPERTENSION: ICD-10-CM

## 2023-03-28 DIAGNOSIS — R51.9 OCCIPITAL HEADACHE: Primary | ICD-10-CM

## 2023-03-28 DIAGNOSIS — F41.0 PANIC ATTACK: ICD-10-CM

## 2023-03-28 PROCEDURE — 99214 OFFICE O/P EST MOD 30 MIN: CPT | Performed by: NURSE PRACTITIONER

## 2023-03-28 RX ORDER — ESCITALOPRAM OXALATE 10 MG/1
10 TABLET ORAL DAILY
Qty: 90 TABLET | Refills: 0 | Status: SHIPPED | OUTPATIENT
Start: 2023-03-28 | End: 2023-05-02

## 2023-03-28 RX ORDER — OMEPRAZOLE 40 MG/1
40 CAPSULE, DELAYED RELEASE ORAL DAILY
Qty: 90 CAPSULE | Refills: 3 | Status: SHIPPED | OUTPATIENT
Start: 2023-03-28 | End: 2023-04-03

## 2023-03-28 RX ORDER — LISINOPRIL 5 MG/1
5 TABLET ORAL DAILY
Qty: 90 TABLET | Refills: 3 | Status: SHIPPED | OUTPATIENT
Start: 2023-03-28 | End: 2024-02-27

## 2023-03-28 RX ORDER — HYDROXYZINE HYDROCHLORIDE 50 MG/1
50 TABLET, FILM COATED ORAL EVERY 6 HOURS PRN
Qty: 90 TABLET | Refills: 3 | Status: SHIPPED | OUTPATIENT
Start: 2023-03-28 | End: 2023-06-30

## 2023-03-28 RX ORDER — GABAPENTIN 400 MG/1
1200 CAPSULE ORAL 3 TIMES DAILY
Qty: 270 CAPSULE | Refills: 3 | Status: SHIPPED | OUTPATIENT
Start: 2023-03-28 | End: 2023-08-03

## 2023-03-28 RX ORDER — HYDROCODONE BITARTRATE AND ACETAMINOPHEN 5; 325 MG/1; MG/1
1 TABLET ORAL EVERY 6 HOURS PRN
Qty: 18 TABLET | Refills: 0 | Status: SHIPPED | OUTPATIENT
Start: 2023-03-28 | End: 2023-05-02

## 2023-03-28 RX ORDER — PANTOPRAZOLE SODIUM 40 MG/1
40 TABLET, DELAYED RELEASE ORAL DAILY
Qty: 90 TABLET | Refills: 0 | Status: SHIPPED | OUTPATIENT
Start: 2023-03-28 | End: 2023-03-28

## 2023-03-28 ASSESSMENT — PATIENT HEALTH QUESTIONNAIRE - PHQ9
10. IF YOU CHECKED OFF ANY PROBLEMS, HOW DIFFICULT HAVE THESE PROBLEMS MADE IT FOR YOU TO DO YOUR WORK, TAKE CARE OF THINGS AT HOME, OR GET ALONG WITH OTHER PEOPLE: VERY DIFFICULT
SUM OF ALL RESPONSES TO PHQ QUESTIONS 1-9: 12
SUM OF ALL RESPONSES TO PHQ QUESTIONS 1-9: 12

## 2023-03-28 NOTE — PROGRESS NOTES
"  Assessment & Plan     Occipital headache  Will refer for occipital nerve block  - Pain Management  Referral; Future  - HYDROcodone-acetaminophen (NORCO) 5-325 MG tablet; Take 1 tablet by mouth every 6 hours as needed for pain  - Adult Neurology  Referral; Future    Panic attack    - hydrOXYzine (ATARAX) 50 MG tablet; Take 1 tablet (50 mg) by mouth every 6 hours as needed for anxiety    Other polyneuropathy    - gabapentin (NEURONTIN) 400 MG capsule; Take 3 capsules (1,200 mg) by mouth 3 times daily    Major depressive disorder, recurrent episode, moderate (H)  Stable, continue at same dose, will re-evaluate once the headaches and insomnia are managed  - escitalopram (LEXAPRO) 10 MG tablet; Take 1 tablet (10 mg) by mouth daily    GEORGIANA (generalized anxiety disorder)  Stable, continue at same dose, will re-evaluate once the headaches and insomnia are managed  - escitalopram (LEXAPRO) 10 MG tablet; Take 1 tablet (10 mg) by mouth daily    Benign essential hypertension  Controlled, decrease dose  - lisinopril (ZESTRIL) 5 MG tablet; Take 1 tablet (5 mg) by mouth daily    PUD (peptic ulcer disease)  Needs to be on strong PPI- discussed long term risk vs benefit    - omeprazole (PRILOSEC) 40 MG DR capsule; Take 1 capsule (40 mg) by mouth daily    Erosive gastritis    - omeprazole (PRILOSEC) 40 MG DR capsule; Take 1 capsule (40 mg) by mouth daily             BMI:   Estimated body mass index is 34.93 kg/m  as calculated from the following:    Height as of this encounter: 1.575 m (5' 2\").    Weight as of this encounter: 86.6 kg (191 lb).   Weight management plan: Discussed healthy diet and exercise guidelines    CONSULTATION/REFERRAL to PAIN, NEURO    FUTURE APPOINTMENTS:       - Follow-up visit in 1-2 months, sooner PRN. Continue on Remeron at night for now- has only taken for 2 nights.     See Patient Instructions    Time spent in chart review in preparation to see patient, time with patient for " interview/exam, ordering medications/tests/and/or procedures, and time spent in charting and coordinating care: 35 minutes.     ISAAC Dodson CNP  [facility]    Eunice Guerin is a 47 year old, presenting for the following health issues:  Insomnia    History of Present Illness       Reason for visit:  Horrible insomnia and night sweats. Also more migraines    She eats 2-3 servings of fruits and vegetables daily.She consumes 3 sweetened beverage(s) daily.She exercises with enough effort to increase her heart rate 10 to 19 minutes per day.  She exercises with enough effort to increase her heart rate 4 days per week. She is missing 2 dose(s) of medications per week.  She is not taking prescribed medications regularly due to other.    Today's PHQ-9         PHQ-9 Total Score: 12    PHQ-9 Q9 Thoughts of better off dead/self-harm past 2 weeks :   Not at all    How difficult have these problems made it for you to do your work, take care of things at home, or get along with other people: Very difficult       Insomnia  Onset/Duration: ongoing  Description:   Frequency of insomnia:  nightly  Time to fall asleep (sleep latency): 15-30 minutes with ambien   Middle of night awakening:  YES  Early morning awakening:  YES  Progression of Symptoms:  worsening  Accompanying Signs & Symptoms:  Daytime sleepiness/napping: YES  Excessive snoring/apnea: No  Restless legs: YES  Waking to urinate: YES  Chronic pain:  YES  Depression symptoms (if yes, do PHQ9): YES  Anxiety symptoms (if yes, do GEORGIANA-7): YES  History:  Prior Insomnia: YES  New stressful situation: YES- 3 deaths in the family in the last couple months   Precipitating factors:   Caffeine intake: YES- moderate amounts   OTC decongestants: No  Any new medications: YES- mirtazapine   Alleviating factors:  Self medicating (alcohol, etc.):  No  Stress-reduction (exercise, yoga, meditation etc): No  Therapies tried and outcome: Ambien -   usually effective at least to get to sleep     Headache  Onset: 2 months     Description:   Location: bilateral in the occipital area comes across head to left side   Character: throbbing pain  Frequency:  Daily   Duration:  An hour to all day     Intensity: severe    Progression of Symptoms:  same    Accompanying Signs & Symptoms:  Stiff neck: YES  Neck or upper back pain: YES  Fever: no   Sinus pressure: no   Nausea or vomiting: YES  Dizziness: YES  Numbness: no   Weakness: no   Visual changes: no     History:   Head trauma: no   Family history of migraines: YES  Previous tests for headaches: no   Neurologist evaluations: YES- many years ago  Able to do daily activities: no   Wake with a headaches: YES  Do headaches wake you up: YES  Daily pain medication use: YES  Work/school stressors/changes: no     Precipitating factors:   Does light make it worse: YES  Does sound make it worse: YES    Alleviating factors:  Does sleep help: no     Therapies Tried and outcome: Ibuprofen (Advil, Motrin) and Tylenol     Hypertension Follow-up      Do you check your blood pressure regularly outside of the clinic? Yes     Are you following a low salt diet? No    Are your blood pressures ever more than 140 on the top number (systolic) OR more   than 90 on the bottom number (diastolic), for example 140/90? Yes    Depression and Anxiety Follow-Up    How are you doing with your depression since your last visit? Worsened     How are you doing with your anxiety since your last visit?  Worsened - panic attacks    Are you having other symptoms that might be associated with depression or anxiety? Yes:  headaches, insomnia, gastritis    Have you had a significant life event? No     Do you have any concerns with your use of alcohol or other drugs? No    Social History     Tobacco Use     Smoking status: Every Day     Packs/day: 1.00     Years: 30.00     Pack years: 30.00     Types: Cigarettes     Smokeless tobacco: Never   Vaping Use      "Vaping Use: Never used   Substance Use Topics     Alcohol use: Yes     Comment: 1-2 drinks a week     Drug use: Yes     Types: Marijuana     Comment: cbd     PHQ 8/4/2022 1/2/2023 3/28/2023   PHQ-9 Total Score 8 8 12   Q9: Thoughts of better off dead/self-harm past 2 weeks Not at all Not at all Not at all     GEORGIANA-7 SCORE 9/3/2020 8/27/2021 10/5/2021   Total Score 7 11 10         Suicide Assessment Five-step Evaluation and Treatment (SAFE-T)        Medication Followup of Gabapentin    Taking Medication as prescribed: yes    Side Effects:  None    Medication Helping Symptoms:  helps    Medication Followup of Famotidine    Taking Medication as prescribed: yes    Side Effects:  None    Medication Helping Symptoms:  Helps some, Protonix and Nexium declined by insurance- EGD 10/2021- shows PUD and erosive gastritis- worsening symptoms after stopping Omeprazole 40 mg    Review of Systems   Constitutional, HEENT, cardiovascular, pulmonary, gi and gu systems are negative, except as otherwise noted.      Objective    /72   Pulse 106   Temp 97.9  F (36.6  C) (Tympanic)   Resp 16   Ht 1.575 m (5' 2\")   Wt 86.6 kg (191 lb)   SpO2 100%   BMI 34.93 kg/m    Body mass index is 34.93 kg/m .  Physical Exam   GENERAL: alert and no distress  CV: regular rates and rhythm  NEURO: Normal strength and tone, mentation intact and speech normal  PSYCH: mentation appears normal, affect normal/bright and anxious                    "

## 2023-03-30 PROBLEM — E66.01 MORBID OBESITY (H): Status: RESOLVED | Noted: 2017-12-05 | Resolved: 2023-03-30

## 2023-03-30 PROBLEM — R51.9 OCCIPITAL HEADACHE: Status: ACTIVE | Noted: 2023-03-30

## 2023-04-03 ENCOUNTER — TELEPHONE (OUTPATIENT)
Dept: FAMILY MEDICINE | Facility: CLINIC | Age: 48
End: 2023-04-03
Payer: COMMERCIAL

## 2023-04-03 DIAGNOSIS — K27.9 PUD (PEPTIC ULCER DISEASE): ICD-10-CM

## 2023-04-03 DIAGNOSIS — K29.60 EROSIVE GASTRITIS: ICD-10-CM

## 2023-04-03 RX ORDER — OMEPRAZOLE 40 MG/1
40 CAPSULE, DELAYED RELEASE ORAL DAILY
Qty: 30 CAPSULE | Refills: 1 | Status: SHIPPED | OUTPATIENT
Start: 2023-04-03 | End: 2023-05-09

## 2023-04-03 NOTE — TELEPHONE ENCOUNTER
Per fax received from [facility] - Omeprazole (PRILOSEC) 40 MG DR capsule is not covered by patient's Insurance Company  ELIDIA More - Please choose:  1.  Change medication that is not covered to a different medication and send new prescription to patient's pharmacy?  2.  Patient will need to pay for the non-covered medication out-of-pocket?   3.  Try for Prior Authorization with Insurance Company to get medication covered?        P.A. Phone #:  [phone]       P.A.  ID#:  [id number]

## 2023-04-07 ENCOUNTER — NURSE TRIAGE (OUTPATIENT)
Dept: NURSING | Facility: CLINIC | Age: 48
End: 2023-04-07
Payer: COMMERCIAL

## 2023-04-08 NOTE — TELEPHONE ENCOUNTER
Apoorva calling with temp 101.0, mild headache, vomited yesterday and mild chills. States eyes and skin have burning sensation, denies rash or redness. Care advice is to treat at home. Agreed to call back if worse.  Yvonne Warner RN on 4/7/2023 at 8:33 PM        Reason for Disposition    [1] Fever AND [2] no signs of serious infection or localizing symptoms (all other triage questions negative)    Additional Information    Negative: [1] Difficulty breathing AND [2] bluish lips, tongue or face    Negative: Shock suspected (e.g., cold/pale/clammy skin, too weak to stand, low BP, rapid pulse)    Negative: Difficult to awaken or acting confused (e.g., disoriented, slurred speech)    Negative: New-onset rash with multiple purple (or blood-colored) spots or dots    Negative: Sounds like a life-threatening emergency to the triager    Negative: [1] Headache AND [2] stiff neck (can't touch chin to chest)    Negative: Difficulty breathing    Negative: IV Drug Use (IVDU)    Negative: [1] Drinking very little AND [2] dehydration suspected (e.g., no urine > 12 hours, very dry mouth, very lightheaded)    Negative: Widespread rash and cause unknown    Negative: Patient sounds very sick or weak to the triager  (Exception: mild weakness and hasn't taken fever medicine)    Negative: Fever > 104 F (40 C)    Negative: [1] Fever > 101 F (38.3 C) AND [2] age > 60 years    Negative: [1] Fever > 100.0 F (37.8 C) AND [2] bedridden (e.g., nursing home patient, CVA, chronic illness, recovering from surgery)    Negative: [1] Fever > 100.0 F (37.8 C) AND [2] indwelling urinary catheter (e.g., Ruth, Coude)    Negative: [1] Fever > 100.0 F (37.8 C) AND [2] has port (portacath), central line, or PICC line    Negative: [1] Fever > 100.0 F (37.8 C) AND [2] diabetes mellitus or weak immune system (e.g., HIV positive, cancer chemo, splenectomy, organ transplant, chronic steroids)    Negative: [1] Fever > 100.0 F (37.8 C) AND [2] surgery in the last  month    Negative: Transplant patient (e.g., kidney, liver, lung, heart)    Negative: Severe chills (i.e., feeling extremely cold WITH shaking chills)    Negative: Fever present > 3 days (72 hours)    Negative: [1] Fever > 100.0 F (37.8 C) AND [2] foreign travel to a developing country in the last month    Negative: [1] Intermittent fever > 100.0 F (37.8 C) AND [2] lasts > 3 weeks    Protocols used: FEVER-A-AH

## 2023-04-11 NOTE — PROGRESS NOTES
"GASTROENTEROLOGY NEW PATIENT / RETURN VIRTUAL VISIT      How would you like to obtain your AVS? MyChart  If the video visit is dropped, the invitation should be resent by: Text to cell phone: [phone]  Will anyone else be joining your video visit? No  ..      GASTROENTEROLOGY NEW PATIENT VIDEO VISIT    CC/REFERRING MD:    Angelic More    REASON FOR CONSULTATION:   Referred by Angelic More for GERD      HISTORY OF PRESENT ILLNESS:  Apoorva Roberts is 47 year old female with history of GERD, gastritis, migraine, PTSD, cholecystectomy, and chronic marijuana use,  who presents for a consultation on worsening in acid reflux.Reported first occurrence of stomach ulcer at age 17. Had gastric ulcerations on her last EGD in October 2021.   Patient stated that she always had some symptoms of GERD, but \"it has never been as bad as now\". Despite taking 20 mg of omeprazole a day and 40 mg of famotidine before bed, she feels acid in her throat all the times even when she wakes up at night. She denies problems with swallowing, but reported some discomfort in her chest. Complains of pain in upper abdomen, mainly on the right.  No nausea or vomiting. Appetite is OK, she eats twice a day. Patient believes that she lost approximately 12-13 lbs over the past year.  Patient complains of persistent diarrhea since her gallbladder surgery. Stated that she never has formed stools. She has to be careful with eating because diarrhea occurs almost immediately after a meal. Complains of fecal urgency. No black or bloody stools. She is not on any restrictions for her diet. No history of lactose or gluten intolerance.  Has been taking ibuprofen chronically 2-3 times a week, sometimes more often. Smokes 1 pack a day. Rare alcohol use. Daily marijuana user. No OTC drug or supplement reported.    PREVIOUS ENDOSCOPY:  10/8/2021  Findings:        The examined duodenum was normal.        Scattered moderate " inflammation characterized by adherent blood,        erythema and shallow ulcerations was found in the gastric body, in the        gastric antrum and in the prepyloric region of the stomach. Biopsies        were taken with a cold forceps for Helicobacter pylori testing.        A 1 cm hiatal hernia was present.   Final Diagnosis   A(1).  Stomach, antrum, biopsy:  -Transitional type gastric mucosa with no significant pathological changes.  - Negative for H. Pylori organisms on routine stains.  - Negative for intestinal metaplasia.   -Negative for dysplasia or malignancy         HISTORY:     has a past medical history of Cervical high risk HPV (human papillomavirus) test positive (08/27/2021), Community acquired pneumonia (12/5/2017), Depressive disorder, and Hypertension.     has a past surgical history that includes Abdomen surgery; appendectomy; Cholecystectomy; GYN surgery; hernia repair; hysterectomy, pap still indicated; and Esophagoscopy, gastroscopy, duodenoscopy (EGD), combined (N/A, 10/8/2021).     reports that she has been smoking cigarettes. She has a 30.00 pack-year smoking history. She has never used smokeless tobacco. She reports current alcohol use. She reports current drug use. Drug: Marijuana.    family history includes Depression in her mother and sister; Diabetes in her maternal grandmother and mother; Hyperlipidemia in her father and mother; Hypertension in her father and mother; Kidney Cancer in her father; Other Cancer in her father; Substance Abuse in her mother.    ALLERGIES:     Allergies   Allergen Reactions     Indomethacin      Other reaction(s): GI Upset     Fluoxetine      Other reaction(s): Thrush     Paroxetine      Other reaction(s): Thrush       PERTINENT MEDICATIONS:    Current Outpatient Medications:      omeprazole (PRILOSEC) 40 MG DR capsule, Take 1 capsule (40 mg) by mouth daily, Disp: 30 capsule, Rfl: 1     acetaminophen (TYLENOL) 325 MG tablet, Take 650 mg by mouth every 6  hours as needed for mild pain, Disp: , Rfl:      butalbital-acetaminophen-caffeine (ESGIC) -40 MG tablet, Take 1 tablet by mouth every 6 hours as needed for headaches or migraine, Disp: 20 tablet, Rfl: 3     cyanocobalamin (VITAMIN B-12) 1000 MCG tablet, Take 1 tablet (1,000 mcg) by mouth daily, Disp: 90 tablet, Rfl: 3     escitalopram (LEXAPRO) 10 MG tablet, Take 1 tablet (10 mg) by mouth daily, Disp: 90 tablet, Rfl: 0     famotidine (PEPCID) 40 MG tablet, Take 1 tablet (40 mg) by mouth At Bedtime, Disp: 90 tablet, Rfl: 1     gabapentin (NEURONTIN) 400 MG capsule, Take 3 capsules (1,200 mg) by mouth 3 times daily, Disp: 270 capsule, Rfl: 3     hydrochlorothiazide (HYDRODIURIL) 25 MG tablet, Take 1 tablet (25 mg) by mouth daily, Disp: 90 tablet, Rfl: 3     HYDROcodone-acetaminophen (NORCO) 5-325 MG tablet, Take 1 tablet by mouth every 6 hours as needed for pain, Disp: 18 tablet, Rfl: 0     hydrOXYzine (ATARAX) 50 MG tablet, Take 1 tablet (50 mg) by mouth every 6 hours as needed for anxiety, Disp: 90 tablet, Rfl: 3     lisinopril (ZESTRIL) 5 MG tablet, Take 1 tablet (5 mg) by mouth daily, Disp: 90 tablet, Rfl: 3     mirtazapine (REMERON) 15 MG tablet, Take 1 tablet (15 mg) by mouth At Bedtime, Disp: 90 tablet, Rfl: 0     sucralfate (CARAFATE) 1 GM tablet, Take 1 tablet (1 g) by mouth 4 times daily as needed (stomach pain), Disp: 120 tablet, Rfl: 11     tiZANidine (ZANAFLEX) 2 MG tablet, Take 1 tablet (2 mg) by mouth 3 times daily as needed for muscle spasms, Disp: 270 tablet, Rfl: 3     zolpidem (AMBIEN) 10 MG tablet, Take 0.5-1 tablets (5-10 mg) by mouth nightly as needed for sleep, Disp: 30 tablet, Rfl: 5      ROS:     No fevers or chills  No weight loss  No blurry vision, double vision or change in vision  No sore throat  No lymphadenopathy  No headache, paraesthesias, or weakness in a limb  No shortness of breath or wheezing  No chest pain or pressure  No arthralgias or myalgias  No rashes or skin  changes  No odynophagia or dysphagia  No  hematochezia, melena  No dysuria, frequency or urgency  No hot/cold intolerance or polyria  No anxiety or depression    PHYSICAL EXAMINATION:  Wt:   Wt Readings from Last 2 Encounters:   03/28/23 86.6 kg (191 lb)   01/09/23 90.7 kg (200 lb)      Physical Exam  Vitals reviewed: There were no vitals taken for this visit.   Constitutional: aaox3, cooperative, pleasant, not dyspneic/diaphoretic, no acute distress  Eyes: Sclera anicteric/injected  Respiratory: Unlabored breathing, speaking in full sentences.   Psych: Normal affect, speech is clear and appropriate.Neatly groomed      Recent Labs   Lab Test 01/03/23  1045 01/02/23  1402   WBC 12.6* 10.8   HGB 13.5 13.2   HCT 40.0 39.3    369     Recent Labs   Lab Test 01/03/23  1045 11/05/22  1030   ALT 15 19   AST 27 27     Recent Labs   Lab Test 01/03/23  1045 11/05/22  1030   CR 0.76 0.84     TSH   Date Value Ref Range Status   01/02/2023 1.28 0.30 - 4.20 uIU/mL Final   08/04/2022 0.75 0.40 - 4.00 mU/L Final         ASSESSMENT/PLAN:  Apoorva Roberts is a 47 year old female who presents for a consultation on management of GERD symptoms and chronic diarrhea. Patient stated that her acid reflux has never been as bad as in the past few month. She is on 20 mg of omeprazole and 40 mg of famotidine. Stated that her insurance declined coverage for 40 mg dose of omeprazole.  She is not taking any medications for diarrhea. She is skipping meals when invited to a social event to prevent diarrhea accidents.  Recommended the following:  - Referral to an upper GI endoscopy;  - Start sucralfate 1 gram 3-4 times a day, between meals and before bed.  - Continue omeprazole and famotidine.  - Start cholestyramine 4 gram at supper. Increase the dose in one week to twice a day if needed,  - Consider smoking cessation.      ICD-10-CM    1. Diarrhea due to malabsorption  K90.9 cholestyramine (QUESTRAN) 4 GM/DOSE powder    R19.7       2. History  of peptic ulcer  Z87.11 Adult GI  Referral - Consult Only     Adult GI  Referral - Procedure Only     sucralfate (CARAFATE) 1 GM tablet      3. Gastroesophageal reflux disease with esophagitis without hemorrhage  K21.00 Adult GI  Referral - Consult Only     Adult GI  Referral - Procedure Only     sucralfate (CARAFATE) 1 GM tablet      4. Hiatal hernia  K44.9 Adult GI  Referral - Procedure Only     sucralfate (CARAFATE) 1 GM tablet      5. Tobacco use disorder  F17.200         Follow up after EGD in 6-8 weeks.  This note was created with Dragon voice recognition software. Inadvertent minor typographic errors may occur in transcription. Feel free to contact the provider if you have any questions.  I sincerity appreciate an opportunity to provide consultation for this pleasant patient.    ALEXANDER Hanks-GILMA  [facility],  Gastroenterology,  [place], [place]    Video-Visit Details    Type of service:  Video Visit    Video Start Time: 1035    Video End Time:1057    Originating Location (pt. Location): Home    Distant Location (provider location):  [place], [place]/ off-site    Platform used for Video Visit: Komal

## 2023-04-11 NOTE — H&P (VIEW-ONLY)
"GASTROENTEROLOGY NEW PATIENT / RETURN VIRTUAL VISIT      How would you like to obtain your AVS? MyChart  If the video visit is dropped, the invitation should be resent by: Text to cell phone: [phone]  Will anyone else be joining your video visit? No  ..      GASTROENTEROLOGY NEW PATIENT VIDEO VISIT    CC/REFERRING MD:    Angelic More    REASON FOR CONSULTATION:   Referred by Angelic More for GERD      HISTORY OF PRESENT ILLNESS:  Apoorva Roberts is 47 year old female with history of GERD, gastritis, migraine, PTSD, cholecystectomy, and chronic marijuana use,  who presents for a consultation on worsening in acid reflux.Reported first occurrence of stomach ulcer at age 17. Had gastric ulcerations on her last EGD in October 2021.   Patient stated that she always had some symptoms of GERD, but \"it has never been as bad as now\". Despite taking 20 mg of omeprazole a day and 40 mg of famotidine before bed, she feels acid in her throat all the times even when she wakes up at night. She denies problems with swallowing, but reported some discomfort in her chest. Complains of pain in upper abdomen, mainly on the right.  No nausea or vomiting. Appetite is OK, she eats twice a day. Patient believes that she lost approximately 12-13 lbs over the past year.  Patient complains of persistent diarrhea since her gallbladder surgery. Stated that she never has formed stools. She has to be careful with eating because diarrhea occurs almost immediately after a meal. Complains of fecal urgency. No black or bloody stools. She is not on any restrictions for her diet. No history of lactose or gluten intolerance.  Has been taking ibuprofen chronically 2-3 times a week, sometimes more often. Smokes 1 pack a day. Rare alcohol use. Daily marijuana user. No OTC drug or supplement reported.    PREVIOUS ENDOSCOPY:  10/8/2021  Findings:        The examined duodenum was normal.        Scattered moderate " inflammation characterized by adherent blood,        erythema and shallow ulcerations was found in the gastric body, in the        gastric antrum and in the prepyloric region of the stomach. Biopsies        were taken with a cold forceps for Helicobacter pylori testing.        A 1 cm hiatal hernia was present.   Final Diagnosis   A(1).  Stomach, antrum, biopsy:  -Transitional type gastric mucosa with no significant pathological changes.  - Negative for H. Pylori organisms on routine stains.  - Negative for intestinal metaplasia.   -Negative for dysplasia or malignancy         HISTORY:     has a past medical history of Cervical high risk HPV (human papillomavirus) test positive (08/27/2021), Community acquired pneumonia (12/5/2017), Depressive disorder, and Hypertension.     has a past surgical history that includes Abdomen surgery; appendectomy; Cholecystectomy; GYN surgery; hernia repair; hysterectomy, pap still indicated; and Esophagoscopy, gastroscopy, duodenoscopy (EGD), combined (N/A, 10/8/2021).     reports that she has been smoking cigarettes. She has a 30.00 pack-year smoking history. She has never used smokeless tobacco. She reports current alcohol use. She reports current drug use. Drug: Marijuana.    family history includes Depression in her mother and sister; Diabetes in her maternal grandmother and mother; Hyperlipidemia in her father and mother; Hypertension in her father and mother; Kidney Cancer in her father; Other Cancer in her father; Substance Abuse in her mother.    ALLERGIES:     Allergies   Allergen Reactions     Indomethacin      Other reaction(s): GI Upset     Fluoxetine      Other reaction(s): Thrush     Paroxetine      Other reaction(s): Thrush       PERTINENT MEDICATIONS:    Current Outpatient Medications:      omeprazole (PRILOSEC) 40 MG DR capsule, Take 1 capsule (40 mg) by mouth daily, Disp: 30 capsule, Rfl: 1     acetaminophen (TYLENOL) 325 MG tablet, Take 650 mg by mouth every 6  hours as needed for mild pain, Disp: , Rfl:      butalbital-acetaminophen-caffeine (ESGIC) -40 MG tablet, Take 1 tablet by mouth every 6 hours as needed for headaches or migraine, Disp: 20 tablet, Rfl: 3     cyanocobalamin (VITAMIN B-12) 1000 MCG tablet, Take 1 tablet (1,000 mcg) by mouth daily, Disp: 90 tablet, Rfl: 3     escitalopram (LEXAPRO) 10 MG tablet, Take 1 tablet (10 mg) by mouth daily, Disp: 90 tablet, Rfl: 0     famotidine (PEPCID) 40 MG tablet, Take 1 tablet (40 mg) by mouth At Bedtime, Disp: 90 tablet, Rfl: 1     gabapentin (NEURONTIN) 400 MG capsule, Take 3 capsules (1,200 mg) by mouth 3 times daily, Disp: 270 capsule, Rfl: 3     hydrochlorothiazide (HYDRODIURIL) 25 MG tablet, Take 1 tablet (25 mg) by mouth daily, Disp: 90 tablet, Rfl: 3     HYDROcodone-acetaminophen (NORCO) 5-325 MG tablet, Take 1 tablet by mouth every 6 hours as needed for pain, Disp: 18 tablet, Rfl: 0     hydrOXYzine (ATARAX) 50 MG tablet, Take 1 tablet (50 mg) by mouth every 6 hours as needed for anxiety, Disp: 90 tablet, Rfl: 3     lisinopril (ZESTRIL) 5 MG tablet, Take 1 tablet (5 mg) by mouth daily, Disp: 90 tablet, Rfl: 3     mirtazapine (REMERON) 15 MG tablet, Take 1 tablet (15 mg) by mouth At Bedtime, Disp: 90 tablet, Rfl: 0     sucralfate (CARAFATE) 1 GM tablet, Take 1 tablet (1 g) by mouth 4 times daily as needed (stomach pain), Disp: 120 tablet, Rfl: 11     tiZANidine (ZANAFLEX) 2 MG tablet, Take 1 tablet (2 mg) by mouth 3 times daily as needed for muscle spasms, Disp: 270 tablet, Rfl: 3     zolpidem (AMBIEN) 10 MG tablet, Take 0.5-1 tablets (5-10 mg) by mouth nightly as needed for sleep, Disp: 30 tablet, Rfl: 5      ROS:     No fevers or chills  No weight loss  No blurry vision, double vision or change in vision  No sore throat  No lymphadenopathy  No headache, paraesthesias, or weakness in a limb  No shortness of breath or wheezing  No chest pain or pressure  No arthralgias or myalgias  No rashes or skin  changes  No odynophagia or dysphagia  No  hematochezia, melena  No dysuria, frequency or urgency  No hot/cold intolerance or polyria  No anxiety or depression    PHYSICAL EXAMINATION:  Wt:   Wt Readings from Last 2 Encounters:   03/28/23 86.6 kg (191 lb)   01/09/23 90.7 kg (200 lb)      Physical Exam  Vitals reviewed: There were no vitals taken for this visit.   Constitutional: aaox3, cooperative, pleasant, not dyspneic/diaphoretic, no acute distress  Eyes: Sclera anicteric/injected  Respiratory: Unlabored breathing, speaking in full sentences.   Psych: Normal affect, speech is clear and appropriate.Neatly groomed      Recent Labs   Lab Test 01/03/23  1045 01/02/23  1402   WBC 12.6* 10.8   HGB 13.5 13.2   HCT 40.0 39.3    369     Recent Labs   Lab Test 01/03/23  1045 11/05/22  1030   ALT 15 19   AST 27 27     Recent Labs   Lab Test 01/03/23  1045 11/05/22  1030   CR 0.76 0.84     TSH   Date Value Ref Range Status   01/02/2023 1.28 0.30 - 4.20 uIU/mL Final   08/04/2022 0.75 0.40 - 4.00 mU/L Final         ASSESSMENT/PLAN:  Apoorva Roberts is a 47 year old female who presents for a consultation on management of GERD symptoms and chronic diarrhea. Patient stated that her acid reflux has never been as bad as in the past few month. She is on 20 mg of omeprazole and 40 mg of famotidine. Stated that her insurance declined coverage for 40 mg dose of omeprazole.  She is not taking any medications for diarrhea. She is skipping meals when invited to a social event to prevent diarrhea accidents.  Recommended the following:  - Referral to an upper GI endoscopy;  - Start sucralfate 1 gram 3-4 times a day, between meals and before bed.  - Continue omeprazole and famotidine.  - Start cholestyramine 4 gram at supper. Increase the dose in one week to twice a day if needed,  - Consider smoking cessation.      ICD-10-CM    1. Diarrhea due to malabsorption  K90.9 cholestyramine (QUESTRAN) 4 GM/DOSE powder    R19.7       2. History  of peptic ulcer  Z87.11 Adult GI  Referral - Consult Only     Adult GI  Referral - Procedure Only     sucralfate (CARAFATE) 1 GM tablet      3. Gastroesophageal reflux disease with esophagitis without hemorrhage  K21.00 Adult GI  Referral - Consult Only     Adult GI  Referral - Procedure Only     sucralfate (CARAFATE) 1 GM tablet      4. Hiatal hernia  K44.9 Adult GI  Referral - Procedure Only     sucralfate (CARAFATE) 1 GM tablet      5. Tobacco use disorder  F17.200         Follow up after EGD in 6-8 weeks.  This note was created with Dragon voice recognition software. Inadvertent minor typographic errors may occur in transcription. Feel free to contact the provider if you have any questions.  I sincerity appreciate an opportunity to provide consultation for this pleasant patient.    ALEXANDER Hanks-GILMA  [facility],  Gastroenterology,  [place], [place]    Video-Visit Details    Type of service:  Video Visit    Video Start Time: 1035    Video End Time:1057    Originating Location (pt. Location): Home    Distant Location (provider location):  [place], [place]/ off-site    Platform used for Video Visit: Komal

## 2023-04-17 DIAGNOSIS — G62.89 OTHER POLYNEUROPATHY: ICD-10-CM

## 2023-04-17 ASSESSMENT — SLEEP AND FATIGUE QUESTIONNAIRES
HOW LIKELY ARE YOU TO NOD OFF OR FALL ASLEEP WHEN YOU ARE A PASSENGER IN A CAR FOR AN HOUR WITHOUT A BREAK: SLIGHT CHANCE OF DOZING
HOW LIKELY ARE YOU TO NOD OFF OR FALL ASLEEP WHILE SITTING QUIETLY AFTER LUNCH WITHOUT ALCOHOL: WOULD NEVER DOZE
HOW LIKELY ARE YOU TO NOD OFF OR FALL ASLEEP WHILE SITTING INACTIVE IN A PUBLIC PLACE: WOULD NEVER DOZE
HOW LIKELY ARE YOU TO NOD OFF OR FALL ASLEEP WHILE WATCHING TV: SLIGHT CHANCE OF DOZING
HOW LIKELY ARE YOU TO NOD OFF OR FALL ASLEEP WHILE SITTING AND READING: SLIGHT CHANCE OF DOZING
HOW LIKELY ARE YOU TO NOD OFF OR FALL ASLEEP IN A CAR, WHILE STOPPED FOR A FEW MINUTES IN TRAFFIC: WOULD NEVER DOZE
HOW LIKELY ARE YOU TO NOD OFF OR FALL ASLEEP WHILE LYING DOWN TO REST IN THE AFTERNOON WHEN CIRCUMSTANCES PERMIT: WOULD NEVER DOZE
HOW LIKELY ARE YOU TO NOD OFF OR FALL ASLEEP WHILE SITTING AND TALKING TO SOMEONE: WOULD NEVER DOZE

## 2023-04-17 NOTE — PROGRESS NOTES
Does Apoorva have a CPAP/Bipap?  No         [place] Sleep Scale: 3  BMI: 34.93  STOP BAN    Apoorva is a 47 year old who is being evaluated via a billable video visit.      How would you like to obtain your AVS? MyChart  If the video visit is dropped, the invitation should be resent by: Text to cell phone: [phone]  Will anyone else be joining your video visit? No              Subjective   Apoorva is a 47 year old, presenting for the following health issues:  Sleep Problem (Insomnia, unspecified type Snoring//)        Objective           Vitals:  No vitals were obtained today due to virtual visit.    Physical Exam   GENERAL: Healthy, alert and no distress  EYES: Eyes grossly normal to inspection.  No discharge or erythema, or obvious scleral/conjunctival abnormalities.  RESP: No audible wheeze, cough, or visible cyanosis.  No visible retractions or increased work of breathing.    SKIN: Visible skin clear. No significant rash, abnormal pigmentation or lesions.  NEURO: Cranial nerves grossly intact.  Mentation and speech appropriate for age.  PSYCH: Mentation appears normal, affect normal/bright, judgement and insight intact, normal speech and appearance well-groomed.                Video-Visit Details    Type of service:  Video Visit   Video Start Time: 11:05 AM  Video End Time:11:59 AM    Originating Location (pt. Location): Home    Distant Location (provider location):  On-site  Platform used for Video Visit: [facility]    Outpatient Sleep Medicine Consultation:      Name: Apoorva Roberts MRN# [id number]   Age: 47 year old YOB: 1975     Date of Consultation: 2023  Consultation is requested by: ISAAC Dodson CNP  [address] [place] [address] Angelic More  Primary care provider: Angelic More       Reason for Sleep Consult:     Apoorva Roberts is sent by Angelic More for a sleep consultation regarding insomnia, snoring. .    Patient s Reason for visit  Apoorva  ED Roberts main reason for visit: I have always had trouble falling asleep but now i have a lot more trouble staying asleep and waking up multiple times a night or waking up at 2am and cant fall back to sleep.  Patient states problem(s) started: The new stuff about 6 months ago  Apoorva Roberts's goals for this visit: Figure out whats causing my sleep issues           Assessment and Plan:     Summary Sleep Diagnoses:  Sleep onset/maintenence insomnia- struggles with sleep onset insomnia for many years, long term use of Ambien. Sleep maintenance insomnia is more recent.   Suspected sleep apnea- snoring, fatigue, frequent awakenings.   Restless legs vs peripheral neuropathy     Comorbid Diagnoses:  Peripheral neuropathy  GERD  HTN  RLS  Depression/anxiety  Migraines  PTSD      Summary Recommendations:  In lab study for further evaluation of suspected sleep apnea, restless legs.   Reviewed caffeine use, sleep hygiene. We discussed a restricted sleep schedule. Will refer to sleep psychology for insomnia. Ultimate goal would be to stop Ambien.   No orders of the defined types were placed in this encounter.        Summary Counseling:    Sleep Testing Reviewed  Obstructive Sleep Apnea Reviewed  Complications of Untreated Sleep Apnea Reviewed      Medical Decision-making:   Educational materials provided in instructions    Total time spent reviewing medical records, history and physical examination, review of previous testing and interpretation as well as documentation on this date:55 min    CC: Angelic More          History of Present Illness:     Past Sleep Evaluations:    SLEEP-WAKE SCHEDULE:     Work/School Days: Patient goes to school/work: Yes   Usually gets into bed at 11pm  Takes patient about If i take my ambien, about 30 minutes.If i dont take it,sometimes it can take me 2 hours to fall asleep to fall asleep  Has trouble falling asleep 4-5 nights per week  Wakes up in the middle of the night 2-3 times.  Wakes  up due to Pain;Use the bathroom;Anxiety;Nightmares;Uncertain  She has trouble falling back asleep 3-4 times a week.   It usually takes Sometimes i cant fall back to sleep.When i can,it usually takes anywhere from 1-2 hours to get back to sleep  Patient is usually up at 7am  Uses alarm: Yes    Weekends/Non-work Days/All Other Days:  Usually gets into bed at 11pm-1am depending on what im doing   Takes patient about Same as usual unless i havent slept much for a couple days. Then im usually so tired it doesnt take long.but then im usually up ip for a while to fall asleep  Patient is usually up at All depends. Usually 7am-8am  Uses alarm: No    Sleep Need  Patient gets  4-5 hours average sleep on average   Patient thinks she needs about Id like to get between 7 and 8 hours sleep    Apoorva Roberts prefers to sleep in this position(s): Side   Patient states they do the following activities in bed: Read;Watch TV;Use phone, computer, or tablet    Naps  Patient takes a purposeful nap None. I know i wont be able to sleep for sure if i nap during the day times a week and naps are usually I dont nap in duration  She feels better after a nap: No  She dozes off unintentionally 2-3 probably days per week  Patient has had a driving accident or near-miss due to sleepiness/drowsiness: No      SLEEP DISRUPTIONS:    Breathing/Snoring  Patient snores:Yes  Other people complain about her snoring: No  Patient has been told she stops breathing in her sleep:No  She has issues with the following: Morning headaches;Morning mouth dryness;Stuffy nose when you wake up;Heartburn or reflux at night;Getting up to urinate more than once    Movement:  Patient gets pain, discomfort, with an urge to move:  Yes  It happens when she is resting:  Yes  It happens more at night:  Yes  Patient has been told she kicks her legs at night:  Yes     Behaviors in Sleep:  Apoorva Roberts has experienced the following behaviors while sleeping: Recurring  Nightmares;Teeth grinding;Night terrors (screaming,yelling or acting afraid but not recalling event)  She has experienced sudden muscle weakness during the day: No      Is there anything else you would like your sleep provider to know:          CAFFEINE AND OTHER SUBSTANCES:    Patient consumes caffeinated beverages per day:  2-3  Last caffeine use is usually: 8-9pm  List of any prescribed or over the counter stimulants that patient takes: None  List of any prescribed or over the counter sleep medication patient takes: Ambien and tylenol pm  List of previous sleep medications that patient has tried: Lunesta, amitriptyline, trazadone, more i just dont remember remember what they were  Patient drinks alcohol to help them sleep: No  Patient drinks alcohol near bedtime: No    Family History:  Patient has a family member been diagnosed with a sleep disorder: Yes  My mother         SCALES:    EPWORTH SLEEPINESS SCALE         4/17/2023    10:31 AM    [place] Sleepiness Scale ( DOMINGA Kwan  [id number]<br>ESS - [place]/English - Final version - 21 Nov 07 - [facility] Research [employer].)   Sitting and reading Slight chance of dozing   Watching TV Slight chance of dozing   Sitting, inactive in a public place (e.g. a theatre or a meeting) Would never doze   As a passenger in a car for an hour without a break Slight chance of dozing   Lying down to rest in the afternoon when circumstances permit Would never doze   Sitting and talking to someone Would never doze   Sitting quietly after a lunch without alcohol Would never doze   In a car, while stopped for a few minutes in traffic Would never doze   [place] Score (MC) 3   [place] Score (Sleep) 3         INSOMNIA SEVERITY INDEX (JOAQUIN)          4/17/2023    10:14 AM   Insomnia Severity Index (JOAQUIN)   Difficulty falling asleep 2   Difficulty staying asleep 3   Problems waking up too early 3   How SATISFIED/DISSATISFIED are you with your CURRENT sleep pattern? 4   How NOTICEABLE to others do you  think your sleep problem is in terms of impairing the quality of your life? 2   How WORRIED/DISTRESSED are you about your current sleep problem? 4   To what extent do you consider your sleep problem to INTERFERE with your daily functioning (e.g. daytime fatigue, mood, ability to function at work/daily chores, concentration, memory, mood, etc.) CURRENTLY? 4   JOAQUIN Total Score 22       Guidelines for Scoring/Interpretation:  Total score categories:  0-7 = No clinically significant insomnia   8-14 = Subthreshold insomnia   15-21 = Clinical insomnia (moderate severity)  22-28 = Clinical insomnia (severe)  Used via courtesy of www.Pet360th.va.gov with permission from Giuseppe Morrison PhD., [facility]      STOP BANG         4/17/2023    10:33 AM   STOP BANG Questionnaire (  2008, the American Society of Anesthesiologists, Inc. [employer]   1. Snoring - Do you snore loudly (louder than talking or loud enough to be heard through closed doors)? No   2. Tired - Do you often feel tired, fatigued, or sleepy during daytime? Yes   3. Observed - Has anyone observed you stop breathing during your sleep? No   4. Blood pressure - Do you have or are you being treated for high blood pressure? Yes   5. BMI - BMI more than 35 kg/m2? Yes   6. Age - Age over 50 yr old? No   7. Neck circumference - Neck circumference greater than 40 cm? No   8. Gender - Gender male? No   STOP BANG Score (MC): 4 (High risk of DIEGO)         GAD7        10/5/2021     9:49 AM   GEORGIANA-7    1. Feeling nervous, anxious, or on edge 1   2. Not being able to stop or control worrying 2   3. Worrying too much about different things 2   4. Trouble relaxing 1   5. Being so restless that it is hard to sit still 1   6. Becoming easily annoyed or irritable 1   7. Feeling afraid, as if something awful might happen 2   GEORGIANA-7 Total Score 10         CAGE-AID         View : No data to display.                CAGE-AID reprinted with permission from the  "[facility] Journal, EVELIN Catherine. and KATHERINE Paniagua, \"Conjoint screening questionnaires for alcohol and drug abuse\" [facility] 94: 135-140, 1995.      PATIENT HEALTH QUESTIONNAIRE-9 (PHQ - 9)        3/28/2023     2:51 PM   PHQ-9 ([employer])   1.  Little interest or pleasure in doing things 2   2.  Feeling down, depressed, or hopeless 2   3.  Trouble falling or staying asleep, or sleeping too much 3   4.  Feeling tired or having little energy 2   5.  Poor appetite or overeating 2   6.  Feeling bad about yourself - or that you are a failure or have let yourself or your family down 0   7.  Trouble concentrating on things, such as reading the newspaper or watching television 1   8.  Moving or speaking so slowly that other people could have noticed. Or the opposite - being so fidgety or restless that you have been moving around a lot more than usual 0   9.  Thoughts that you would be better off dead, or of hurting yourself in some way 0   PHQ-9 Total Score 12   6.  Feeling bad about yourself 0   7.  Trouble concentrating 1   8.  Moving slowly or restless 0   9.  Suicidal or self-harm thoughts 0   1.  Little interest or pleasure in doing things More than half the days   2.  Feeling down, depressed, or hopeless More than half the days   3.  Trouble falling or staying asleep, or sleeping too much Nearly every day   4.  Feeling tired or having little energy More than half the days   5.  Poor appetite or overeating More than half the days   6.  Feeling bad about yourself Not at all   7.  Trouble concentrating Several days   8.  Moving slowly or restless Not at all   9.  Suicidal or self-harm thoughts Not at all   PHQ-9 via [facility] TOTAL SCORE-----> 12 (Moderate depression)   Difficulty at work, home, or with people Very difficult       Developed by [employer]hat[employer], Matilde Briones, Nathanael Castañeda and colleagues, with an educational kayla from [employer] No permission required to reproduce, translate, " display or distribute.        Allergies:    Allergies   Allergen Reactions     Indomethacin      Other reaction(s): GI Upset     Fluoxetine      Other reaction(s): Thrush     Paroxetine      Other reaction(s): Thrush       Medications:    Current Outpatient Medications   Medication Sig Dispense Refill     omeprazole (PRILOSEC) 40 MG DR capsule Take 1 capsule (40 mg) by mouth daily 30 capsule 1     acetaminophen (TYLENOL) 325 MG tablet Take 650 mg by mouth every 6 hours as needed for mild pain       butalbital-acetaminophen-caffeine (ESGIC) -40 MG tablet Take 1 tablet by mouth every 6 hours as needed for headaches or migraine 20 tablet 3     cyanocobalamin (VITAMIN B-12) 1000 MCG tablet Take 1 tablet (1,000 mcg) by mouth daily 90 tablet 3     escitalopram (LEXAPRO) 10 MG tablet Take 1 tablet (10 mg) by mouth daily 90 tablet 0     famotidine (PEPCID) 40 MG tablet Take 1 tablet (40 mg) by mouth At Bedtime 90 tablet 1     gabapentin (NEURONTIN) 400 MG capsule Take 3 capsules (1,200 mg) by mouth 3 times daily 270 capsule 3     hydrochlorothiazide (HYDRODIURIL) 25 MG tablet Take 1 tablet (25 mg) by mouth daily 90 tablet 3     HYDROcodone-acetaminophen (NORCO) 5-325 MG tablet Take 1 tablet by mouth every 6 hours as needed for pain 18 tablet 0     hydrOXYzine (ATARAX) 50 MG tablet Take 1 tablet (50 mg) by mouth every 6 hours as needed for anxiety 90 tablet 3     lisinopril (ZESTRIL) 5 MG tablet Take 1 tablet (5 mg) by mouth daily 90 tablet 3     mirtazapine (REMERON) 15 MG tablet Take 1 tablet (15 mg) by mouth At Bedtime 90 tablet 0     sucralfate (CARAFATE) 1 GM tablet Take 1 tablet (1 g) by mouth 4 times daily as needed (stomach pain) 120 tablet 11     tiZANidine (ZANAFLEX) 2 MG tablet Take 1 tablet (2 mg) by mouth 3 times daily as needed for muscle spasms 270 tablet 3     zolpidem (AMBIEN) 10 MG tablet Take 0.5-1 tablets (5-10 mg) by mouth nightly as needed for sleep 30 tablet 5       Problem List:  Patient Active  "Problem List    Diagnosis Date Noted     Occipital headache 03/30/2023     Priority: Medium     Vitamin B12 deficiency (non anemic) 01/03/2023     Priority: Medium     Restless leg syndrome 01/03/2023     Priority: Medium     Elevated C-reactive protein (CRP) 01/03/2023     Priority: Medium     PTSD (post-traumatic stress disorder) 08/04/2022     Priority: Medium     GEORGIANA (generalized anxiety disorder) 08/04/2022     Priority: Medium     Gastritis with hemorrhage, unspecified chronicity, unspecified gastritis type 08/04/2022     Priority: Medium     Cervical high risk HPV (human papillomavirus) test positive 09/02/2021     Priority: Medium     8/27/21 NIL Pap, + HR HPV (neg 16/18). Plan cotest in 1 year.   8/4/22 LSIL, +HR HPV (not 16/18). Plan: colposcopy due before 11/4/22 2/14/23 Lost to follow-up for pap tracking          Marijuana use, continuous 09/17/2020     Priority: Medium     Tobacco use disorder 09/17/2020     Priority: Medium     Migraine without aura and without status migrainosus, not intractable 09/17/2020     Priority: Medium     Classic migraine 11/06/2019     Priority: Medium     Chronic daily headache 11/06/2019     Priority: Medium     Improved on daily Tizanidine- has seen Neurology       Peripheral neuropathy 11/06/2019     Priority: Medium     After hernia surgery in 2014- was told the surgeon \"nicked\" a couple nerves on the left       Gastroesophageal reflux disease 12/05/2017     Priority: Medium     Benign essential hypertension 12/05/2017     Priority: Medium     Major depressive disorder, recurrent episode, moderate (H) 12/05/2017     Priority: Medium     Hidradenitis suppurativa 09/11/2017     Priority: Medium        Past Medical/Surgical History:  Past Medical History:   Diagnosis Date     Cervical high risk HPV (human papillomavirus) test positive 08/27/2021     Community acquired pneumonia 12/5/2017 12/5/2017 chest radiograph: RLL infiltrate. Influenza negative. procalcitonin " 0.23.     Depressive disorder      Hypertension      Past Surgical History:   Procedure Laterality Date     ABDOMEN SURGERY       APPENDECTOMY       CHOLECYSTECTOMY       ESOPHAGOSCOPY, GASTROSCOPY, DUODENOSCOPY (EGD), COMBINED N/A 10/8/2021    Procedure: ESOPHAGOGASTRODUODENOSCOPY, WITH BIOPSY;  Surgeon: Slim Randle DO;  Location: [facility]     GYN SURGERY       HERNIA REPAIR       HYSTERECTOMY, PAP STILL INDICATED      Cervix still present       Social History:  Social History     Socioeconomic History     Marital status:      Spouse name: Not on file     Number of children: Not on file     Years of education: Not on file     Highest education level: Not on file   Occupational History     Not on file   Tobacco Use     Smoking status: Every Day     Packs/day: 1.00     Years: 30.00     Pack years: 30.00     Types: Cigarettes     Smokeless tobacco: Never   Vaping Use     Vaping status: Never Used   Substance and Sexual Activity     Alcohol use: Yes     Comment: 1-2 drinks a week     Drug use: Yes     Types: Marijuana     Comment: cbd     Sexual activity: Yes     Partners: Male     Birth control/protection: None   Other Topics Concern     Parent/sibling w/ CABG, MI or angioplasty before 65F 55M? No   Social History Narrative     Not on file     Social Determinants of Health     Financial Resource Strain: Not on file   Food Insecurity: Not on file   Transportation Needs: Not on file   Physical Activity: Not on file   Stress: Not on file   Social Connections: Not on file   Intimate Partner Violence: Not on file   Housing Stability: Not on file       Family History:  Family History   Problem Relation Age of Onset     Diabetes Mother      Hypertension Mother      Hyperlipidemia Mother      Depression Mother      Substance Abuse Mother      Hypertension Father      Hyperlipidemia Father      Kidney Cancer Father      Other Cancer Father      Diabetes Maternal Grandmother      Depression Sister      Breast  Cancer No family hx of        Review of Systems:  A complete review of systems reviewed by me is negative with the exeption of what has been mentioned in the history of present illness.  In the last TWO WEEKS have you experienced any of the following symptoms?  Fevers: Yes  Night Sweats: Yes  Weight Gain: No  Pain at Night: Yes  Double Vision: No  Changes in Vision: No  Difficulty Breathing through Nose: No  Sore Throat in Morning: Yes  Dry Mouth in the Morning: Yes  Shortness of Breath Lying Flat: No  Shortness of Breath With Activity: No  Awakening with Shortness of Breath: No  Increased Cough: No  Heart Racing at Night: No  Swelling in Feet or Legs: No  Diarrhea at Night: Yes  Heartburn at Night: Yes  Urinating More than Once at Night: Yes  Losing Control of Urine at Night: No  Joint Pains at Night: Yes  Headaches in Morning: Yes  Weakness in Arms or Legs: No  Depressed Mood: Yes  Anxiety: Yes     Physical Examination:  Vitals: There were no vitals taken for this visit.  BMI= There is no height or weight on file to calculate BMI.             Data: All pertinent previous laboratory data reviewed     Recent Labs   Lab Test 01/03/23  1045 11/05/22  1030    138   POTASSIUM 3.7 3.6   CHLORIDE 101 102   CO2 23 22   ANIONGAP 14 14   * 145*   BUN 11.6 11.2   CR 0.76 0.84   JULIET 9.2 9.3       Recent Labs   Lab Test 01/03/23  1045   WBC 12.6*   RBC 4.07   HGB 13.5   HCT 40.0   MCV 98   MCH 33.2*   MCHC 33.8   RDW 13.2          Recent Labs   Lab Test 01/03/23  1045   PROTTOTAL 7.4   ALBUMIN 4.1   BILITOTAL 0.6   ALKPHOS 75   AST 27   ALT 15       TSH   Date Value   01/02/2023 1.28 uIU/mL   08/04/2022 0.75 mU/L       No results found for: UAMP, UBARB, BENZODIAZEUR, UCANN, UCOC, OPIT, UPCP    Iron Sat Index   Date/Time Value Ref Range Status   01/02/2023 02:02 PM 16 15 - 46 % Final     Ferritin   Date/Time Value Ref Range Status   01/02/2023 02:02  (H) 6 - 175 ng/mL Final       No results found for:  PH, PHARTERIAL, PO2, [id number], SAT, PCO2, HCO3, BASEEXCESS, MEL, BEB    @LABRCNTIPR(phv:4,pco2v:4,po2v:4,hco3v:4,greta:4,o2per:4)@    Echocardiology: No results found for this or any previous visit (from the past 4320 hour(s)).    Chest x-ray: No results found for this or any previous visit from the past 365 days.      Chest CT: No results found for this or any previous visit from the past 365 days.      PFT: Most Recent [facility] Pulmonary Function Testing    No results found for: 20001  No results found for: 20002  No results found for: 20003  No results found for: 20015  No results found for: 20016  No results found for: 20027  No results found for: 20028  No results found for: 20029  No results found for: 20079  No results found for: 20080  No results found for: 20081  No results found for: 20335  No results found for: 20105  No results found for: 20053  No results found for: 20054  No results found for: 20055      Jay Allne [facility] 4/19/2023

## 2023-04-18 RX ORDER — ZOLPIDEM TARTRATE 10 MG/1
5-10 TABLET ORAL
Qty: 30 TABLET | Refills: 5 | Status: SHIPPED | OUTPATIENT
Start: 2023-04-18 | End: 2023-10-23

## 2023-04-19 ENCOUNTER — TELEPHONE (OUTPATIENT)
Dept: SLEEP MEDICINE | Facility: CLINIC | Age: 48
End: 2023-04-19

## 2023-04-19 ENCOUNTER — VIRTUAL VISIT (OUTPATIENT)
Dept: SLEEP MEDICINE | Facility: CLINIC | Age: 48
End: 2023-04-19
Attending: NURSE PRACTITIONER
Payer: COMMERCIAL

## 2023-04-19 DIAGNOSIS — R29.818 SUSPECTED SLEEP APNEA: Primary | ICD-10-CM

## 2023-04-19 DIAGNOSIS — G25.81 RESTLESS LEGS SYNDROME (RLS): ICD-10-CM

## 2023-04-19 DIAGNOSIS — G47.00 INSOMNIA, UNSPECIFIED TYPE: ICD-10-CM

## 2023-04-19 DIAGNOSIS — R06.83 SNORING: ICD-10-CM

## 2023-04-19 PROCEDURE — 99204 OFFICE O/P NEW MOD 45 MIN: CPT | Mod: VID | Performed by: PHYSICIAN ASSISTANT

## 2023-04-19 NOTE — PATIENT INSTRUCTIONS
A common problem for people with insomnia is spending too much time in bed  trying  to sleep.  You really should only be in bed for no more than 7-8 hours per night.  Spending too much time in bed can lead to being awake and having an  overactive  mind.  One effective way to address this problem is reducing how much time you spend in bed each night.  Be careful with driving or other dangerous activities when trying these strategeis however.  We recommend that you follow these steps to improve your insomnia:  Set a new sleep schedule where you reduce the time you spend in bed by 1-2 hours, e.g. Go to bed at 1 AM and get up at 7  Keep this sleep schedule every day of the week including the weekends for two weeks.  After two weeks you can add 30-60 minutes more time in bed if you have been sleeping more soundly.  If you still aren t sleeping well, reduce the time you spend in bed by another 30-60 but not less than 5 hours per night.  Please keep a log or diary during this time to track your sleep pattern     Stress, tension, and anxiety can be big factors contributing to insomnia.  If you can t relax your mind and body, then you won t be able to sleep.  You would likely benefit from trying some of the relaxation exercises that we ve assembled below.  These are all internet based links.  If you don t have or use a computer, you may benefit from one of the stress management books listed below that you can get at your public library or at a local bookstore for a relatively low price.      Copy and paste into web browser address window   https://www.youYactraq Onlineube.com/watch?v=Nzef9gvYwEz    Progressive Muscle Relaxation (PMR):   http://www.[employer].Dibsie/progressive-muscle-relaxation-exercise.html   http://studentsupport.[facility]/counseling/resources/self-help/relaxation-and-stress-management/   Deep Breathing Exercises:  http://www.[employer].Dibsie/breathing-awareness.html  "    Meditation:   www.[employer]  www.[employer].guidedTerraplay Systemsmeditation-site.com You may have to pay for some of these resources.    Guided Imagery:  http://www.Giftindia24x7.com/guided-imagery-scripts.html   http://AP[employer]/library/oogumwgjof-iwttzf-qohvdgi/    [place] site under construction : http://www.[place]/Services/SleepMedicine/Audio/index.htm             MY TREATMENT INFORMATION FOR SLEEP APNEA-  Apoorva Roberts    DOCTOR : STEVIE Alba-GILMA    Am I having a sleep study at a sleep center?  --->Due to normal delays, you will be contacted within 2-4 weeks to schedule    Am I having a home sleep study?  --->Watch the video for the device you are using:    -/drop off device-   https://www.Bragster.com/watch?v=yGGFBdELGhk    -Disposable device sent out require phone/computer application-   https://www.[employer] Innovation & Technologyube.com/watch?v=BCce_vbiwxE      Frequently asked questions:  1. What is Obstructive Sleep Apnea (DIEGO)? DIEGO is the most common type of sleep apnea. Apnea means, \"without breath.\"  Apnea is most often caused by narrowing or collapse of the upper airway as muscles relax during sleep.   Almost everyone has occasional apneas. Most people with sleep apnea have had brief interruptions at night frequently for many years.  The severity of sleep apnea is related to how frequent and severe the events are.   2. What are the consequences of DIEGO? Symptoms include: feeling sleepy during the day, snoring loudly, gasping or stopping of breathing, trouble sleeping, and occasionally morning headaches or heartburn at night.  Sleepiness can be serious and even increase the risk of falling asleep while driving. Other health consequences may include development of high blood pressure and other cardiovascular disease in persons who are susceptible. Untreated DIEGO  can contribute to heart disease, stroke and diabetes.   3. What are the treatment options? In most situations, sleep apnea is a lifelong disease " that must be managed with daily therapy. Medications are not effective for sleep apnea and surgery is generally not considered until other therapies have been tried. Your treatment is your choice . Continuous Positive Airway (CPAP) works right away and is the therapy that is effective in nearly everyone. An oral device to hold your jaw forward is usually the next most reliable option. Other options include postioning devices (to keep you off your back), weight loss, and surgery including a tongue pacing device. There is more detail about some of these options below.  4. Are my sleep studies covered by insurance? Although we will request verification of coverage, we advise you also check in advance of the study to ensure there is coverage.    Important tips for those choosing CPAP and similar devices   Know your equipment:  CPAP is continuous positive airway pressure that prevents obstructive sleep apnea by keeping the throat from collapsing while you are sleeping. In most cases, the device is  smart  and can slowly self-adjusts if your throat collapses and keeps a record every day of how well you are treated-this information is available to you and your care team.  BPAP is bilevel positive airway pressure that keeps your throat open and also assists each breath with a pressure boost to maintain adequate breathing.  Special kinds of BPAP are used in patients who have inadequate breathing from lung or heart disease. In most cases, the device is  smart  and can slowly self-adjusts to assist breathing. Like CPAP, the device keeps a record of how well you are treated.  Your mask is your connection to the device. You get to choose what feels most comfortable and the staff will help to make sure if fits. Here: are some examples of the different masks that are available:       Key points to remember on your journey with sleep apnea:  Sleep study.  PAP devices often need to be adjusted during a sleep study to show that they  are effective and adjusted right.  Good tips to remember: Try wearing just the mask during a quiet time during the day so your body adapts to wearing it. A humidifier is recommended for comfort in most cases to prevent drying of your nose and throat. Allergy medication from your provider may help you if you are having nasal congestion.  Getting settled-in. It takes more than one night for most of us to get used to wearing a mask. Try wearing just the mask during a quiet time during the day so your body adapts to wearing it. A humidifier is recommended for comfort in most cases. Our team will work with you carefully on the first day and will be in contact within 4 days and again at 2 and 4 weeks for advice and remote device adjustments. Your therapy is evaluated by the device each day.   Use it every night. The more you are able to sleep naturally for 7-8 hours, the more likely you will have good sleep and to prevent health risks or symptoms from sleep apnea. Even if you use it 4 hours it helps. Occasionally all of us are unable to use a medical therapy, in sleep apnea, it is not dangerous to miss one night.   Communicate. Call our skilled team on the number provided on the first day if your visit for problems that make it difficult to wear the device. Over 2 out of 3 patients can learn to wear the device long-term with help from our team. Remember to call our team or your sleep providers if you are unable to wear the device as we may have other solutions for those who cannot adapt to mask CPAP therapy. It is recommended that you sleep your sleep provider within the first 3 months and yearly after that if you are not having problems.   Use it for your health. We encourage use of CPAP masks during daytime quiet periods to allow your face and brain to adapt to the sensation of CPAP so that it will be a more natural sensation to awaken to at night or during naps. This can be very useful during the first few weeks or  months of adapting to CPAP though it does not help medically to wear CPAP during wakefulness and  should not be used as a strategy just to meet guidelines.  Take care of your equipment. Make sure you clean your mask and tubing using directions every day and that your filter and mask are replaced as recommended or if they are not working.     BESIDES CPAP, WHAT OTHER THERAPIES ARE THERE?    Positioning Device  Positioning devices are generally used when sleep apnea is mild and only occurs on your back.This example shows a pillow that straps around the waist. It may be appropriate for those whose sleep study shows milder sleep apnea that occurs primarily when lying flat on one's back. Preliminary studies have shown benefit but effectiveness at home may need to be verified by a home sleep test. These devices are generally not covered by medical insurance.  Examples of devices that maintain sleeping on the back to prevent snoring and mild sleep apnea.    Belt type body positioner  http://[employer]/    Electronic reminder  http://nightshifttherapy.[employer]/            Oral Appliance  What is oral appliance therapy?  An oral appliance device fits on your teeth at night like a retainer used after having braces. The device is made by a specialized dentist and requires several visits over 1-2 months before a manufactured device is made to fit your teeth and is adjusted to prevent your sleep apnea. Once an oral device is working properly, snoring should be improved. A home sleep test may be recommended at that time if to determine whether the sleep apnea is adequately treated.       Some things to remember:  -Oral devices are often, but not always, covered by your medical insurance. Be sure to check with your insurance provider.   -If you are referred for oral therapy, you will be given a list of specialized dentists to consider or you may choose to visit the Web site of the American Academy of Dental Sleep Medicine  -Oral  devices are less likely to work if you have severe sleep apnea or are extremely overweight.     More detailed information  An oral appliance is a small acrylic device that fits over the upper and lower teeth  (similar to a retainer or a mouth guard). This device slightly moves jaw forward, which moves the base of the tongue forward, opens the airway, improves breathing for effective treat snoring and obstructive sleep apnea in perhaps 7 out of 10 people .  The best working devices are custom-made by a dental device  after a mold is made of the teeth 1, 2, 3.  When is an oral appliance indicated?  Oral appliance therapy is recommended as a first-line treatment for patients with primary snoring, mild sleep apnea, and for patients with moderate sleep apnea who prefer appliance therapy to use of CPAP4, 5. Severity of sleep apnea is determined by sleep testing and is based on the number of respiratory events per hour of sleep.   How successful is oral appliance therapy?  The success rate of oral appliance therapy in patients with mild sleep apnea is 75-80% while in patients with moderate sleep apnea it is 50-70%. The chance of success in patients with severe sleep apnea is 40-50%. The research also shows that oral appliances have a beneficial effect on the cardiovascular health of DIEGO patients at the same magnitude as CPAP therapy7.  Oral appliances should be a second-line treatment in cases of severe sleep apnea, but if not completely successful then a combination therapy utilizing CPAP plus oral appliance therapy may be effective. Oral appliances tend to be effective in a broad range of patients although studies show that the patients who have the highest success are females, younger patients, those with milder disease, and less severe obesity. 3, 6.   Finding a dentist that practices dental sleep medicine  Specific training is available through the American Academy of Dental Sleep Medicine for dentists  interested in working in the field of sleep. To find a dentist who is educated in the field of sleep and the use of oral appliances, near you, visit the Web site of the American Academy of Dental Sleep Medicine.    References  1. Tracy et al. Objectively measured vs self-reported compliance during oral appliance therapy for sleep-disordered breathing. Chest 2013; 144(5): 9656-3336.  2. Edwin et al. Objective measurement of compliance during oral appliance therapy for sleep-disordered breathing. Thorax 2013; 68(1): 91-96.  3. Rochelle et al. Mandibular advancement devices in 620 men and women with DIEGO and snoring: tolerability and predictors of treatment success. Chest 2004; 125: 6764-9619.  4. Sandra et al. Oral appliances for snoring and DIEGO: a review. Sleep 2006; 29: 244-262.  5. Kong et al. Oral appliance treatment for DIEGO: an update. J Clin Sleep Med 2014; 10(2): 215-227.  6. Liz et al. Predictors of OSAH treatment outcome. J Dent Res 2007; 86: 2003-7633.      Weight Loss:    Weight loss is a long-term strategy that may improve sleep apnea in some patients.    Weight management is a personal decision and the decision should be based on your interest and the potential benefits.  If you are interested in exploring weight loss strategies, the following discussion covers the impact on weight loss on sleep apnea and the approaches that may be successful.    Being overweight does not necessarily mean you will have health consequences.  Those who have BMI over 35 or over 27 with existing medical conditions carries greater risk.   Weight loss decreases severity of sleep apnea in most people with obesity. For those with mild obesity who have developed snoring with weight gain, even 15-30 pound weight loss can improve and occasionally eliminate sleep apnea.  Structured and life-long dietary and health habits are necessary to lose weight and keep healthier weight levels.     Though there may be  significant health benefits from weight loss, long-term weight loss is very difficult to achieve- studies show success with dietary management in less than 10% of people. In addition, substantial weight loss may require years of dietary control and may be difficult if patients have severe obesity. In these cases, surgical management may be considered.  Finally, older individuals who have tolerated obesity without health complications may be less likely to benefit from weight loss strategies.      [unfilled]    Surgery:    Surgery for obstructive sleep apnea is considered generally only when other therapies fail to work. Surgery may be discussed with you if you are having a difficult time tolerating CPAP and or when there is an abnormal structure that requires surgical correction.  Nose and throat surgeries often enlarge the airway to prevent collapse.  Most of these surgeries create pain for 1-2 weeks and up to half of the most common surgeries are not effective throughout life.  You should carefully discuss the benefits and drawbacks to surgery with your sleep provider and surgeon to determine if it is the best solution for you.   More information  Surgery for DIEGO is directed at areas that are responsible for narrowing or complete obstruction of the airway during sleep.  There are a wide range of procedures available to enlarge and/or stabilize the airway to prevent blockage of breathing in the three major areas where it can occur: the palate, tongue, and nasal regions.  Successful surgical treatment depends on the accurate identification of the factors responsible for obstructive sleep apnea in each person.  A personalized approach is required because there is no single treatment that works well for everyone.  Because of anatomic variation, consultation with an examination by a sleep surgeon is a critical first step in determining what surgical options are best for each patient.  In some cases, examination during  sedation may be recommended in order to guide the selection of procedures.  Patients will be counseled about risks and benefits as well as the typical recovery course after surgery. Surgery is typically not a cure for a person s DIEGO.  However, surgery will often significantly improve one s DIEGO severity (termed  success rate ).  Even in the absence of a cure, surgery will decrease the cardiovascular risk associated with OSA7; improve overall quality of life8 (sleepiness, functionality, sleep quality, etc).      Palate Procedures:  Patients with DIEGO often have narrowing of their airway in the region of their tonsils and uvula.  The goals of palate procedures are to widen the airway in this region as well as to help the tissues resist collapse.  Modern palate procedure techniques focus on tissue conservation and soft tissue rearrangement, rather than tissue removal.  Often the uvula is preserved in this procedure. Residual sleep apnea is common in patient after pharyngoplasty with an average reduction in sleep apnea events of 33%2.      Tongue Procedures:  ExamWhile patients are awake, the muscles that surround the throat are active and keep this region open for breathing. These muscles relax during sleep, allowing the tongue and other structures to collapse and block breathing.  There are several different tongue procedures available.  Selection of a tongue base procedure depends on characteristics seen on physical exam.  Generally, procedures are aimed at removing bulky tissues in this area or preventing the back of the tongue from falling back during sleep.  Success rates for tongue surgery range from 50-62%3.    Hypoglossal Nerve Stimulation:  Hypoglossal nerve stimulation has recently received approval from the [place] Food and Drug Administration for the treatment of obstructive sleep apnea.  This is based on research showing that the system was safe and effective in treating sleep apnea6.  Results showed  that the median AHI score decreased 68%, from 29.3 to 9.0. This therapy uses an implant system that senses breathing patterns and delivers mild stimulation to airway muscles, which keeps the airway open during sleep.  The system consists of three fully implanted components: a small generator (similar in size to a pacemaker), a breathing sensor, and a stimulation lead.  Using a small handheld remote, a patient turns the therapy on before bed and off upon awakening.    Candidates for this device must be greater than 18 years of age, have moderate to severe DIEGO (AHI between 15-65), BMI less than 35, have tried CPAP/oral appliance for at least 8 weeks without success, and have appropriate upper airway anatomy (determined by a sleep endoscopy performed by Dr. Willian Lopez).    Hypoglossal Nerve Stimulation Pathway:    The sleep surgeon s office will work with the patient through the insurance prior-authorization process (including communications and appeals).    Nasal Procedures:  Nasal obstruction can interfere with nasal breathing during the day and night.  Studies have shown that relief of nasal obstruction can improve the ability of some patients to tolerate positive airway pressure therapy for obstructive sleep apnea1.  Treatment options include medications such as nasal saline, topical corticosteroid and antihistamine sprays, and oral medications such as antihistamines or decongestants. Non-surgical treatments can include external nasal dilators for selected patients. If these are not successful by themselves, surgery can improve the nasal airway either alone or in combination with these other options.      Combination Procedures:  Combination of surgical procedures and other treatments may be recommended, particularly if patients have more than one area of narrowing or persistent positional disease.  The success rate of combination surgery ranges from 66-80%2,3.    References  Елена DILLARD. The Role of the Nose in  Snoring and Obstructive Sleep Apnoea: An Update.  Eur Arch Otorhinolaryngol. 2011; 268: 1365-73.   Charles SM; Estefani JA; Katheryn JR; Pallanch JF; Christa MB; Rashad SG; Florence CORTEZ. Surgical modifications of the upper airway for obstructive sleep apnea in adults: a systematic review and meta-analysis. SLEEP 2010;33(10):0110-9088. Mary Jane BELL. Hypopharyngeal surgery in obstructive sleep apnea: an evidence-based medicine review.  Arch Otolaryngol Head Neck Surg. 2006 Feb;132(2):206-13.  Eleno YH1, Magdalene Y, Mark KEYSHAWN. The efficacy of anatomically based multilevel surgery for obstructive sleep apnea. Otolaryngol Head Neck Surg. 2003 Oct;129(4):327-35.  Kezirian E, Goldberg A. Hypopharyngeal Surgery in Obstructive Sleep Apnea: An Evidence-Based Medicine Review. Arch Otolaryngol Head Neck Surg. 2006 Feb;132(2):206-13.  Carlos DUMONT et al. Upper-Airway Stimulation for Obstructive Sleep Apnea.  N Engl J Med. 2014 Jan 9;370(2):139-49.  Peker Y et al. Increased Incidence of Cardiovascular Disease in Middle-aged Men with Obstructive Sleep Apnea. Am J Respir Crit Care Med; 2002 166: 159-165  Cox EM et al. Studying Life Effects and Effectiveness of Palatopharyngoplasty (SLEEP) study: Subjective Outcomes of Isolated Uvulopalatopharyngoplasty. Otolaryngol Head Neck Surg. 2011; 144: 623-631.        WHAT IF I ONLY HAVE SNORING?    Mandibular advancement devices, lateral sleep positioning, long-term weight loss and treatment of nasal allergies have been shown to improve snoring.  Exercising tongue muscles with a game (https://apps.OutTrippin/us/candace/soundly-reduce-snoring/yt3859124662) or stimulating the tongue during the day with a device (https://doi.org/10.3390/edp38883572) have improved snoring in some individuals.    Remember to Drive Safe... Drive Alive     Sleep health profoundly affects your health, mood, and your safety.  Thirty three percent of the population (one in three of us) is not getting enough sleep and many have a sleep  disorder. Not getting enough sleep or having an untreated / undertreated sleep condition may make us sleepy without even knowing it. In fact, our driving could be dramatically impaired due to our sleep health. As your provider, here are some things I would like you to know about driving:     Here are some warning signs for impairment and dangerous drowsy driving:              -Having been awake more than 16 hours               -Looking tired               -Eyelid drooping              -Head nodding (it could be too late at this point)              -Driving for more than 30 minutes     Some things you could do to make the driving safer if you are experiencing some drowsiness:              -Stop driving and rest              -Call for transportation              -Make sure your sleep disorder is adequately treated     Some things that have been shown NOT to work when experiencing drowsiness while driving:              -Turning on the radio              -Opening windows              -Eating any  distracting  /  entertaining  foods (e.g., sunflower seeds, candy, or any other)              -Talking on the phone      Your decision may not only impact your life, but also the life of others. Please, remember to drive safe for yourself and all of us.

## 2023-04-20 ENCOUNTER — VIRTUAL VISIT (OUTPATIENT)
Dept: GASTROENTEROLOGY | Facility: CLINIC | Age: 48
End: 2023-04-20
Attending: NURSE PRACTITIONER
Payer: COMMERCIAL

## 2023-04-20 VITALS — BODY MASS INDEX: 34.75 KG/M2 | WEIGHT: 190 LBS

## 2023-04-20 DIAGNOSIS — R19.7 DIARRHEA DUE TO MALABSORPTION: Primary | ICD-10-CM

## 2023-04-20 DIAGNOSIS — Z87.11 HISTORY OF PEPTIC ULCER: ICD-10-CM

## 2023-04-20 DIAGNOSIS — K44.9 HIATAL HERNIA: ICD-10-CM

## 2023-04-20 DIAGNOSIS — F17.200 TOBACCO USE DISORDER: ICD-10-CM

## 2023-04-20 DIAGNOSIS — K21.00 GASTROESOPHAGEAL REFLUX DISEASE WITH ESOPHAGITIS WITHOUT HEMORRHAGE: ICD-10-CM

## 2023-04-20 DIAGNOSIS — K90.9 DIARRHEA DUE TO MALABSORPTION: Primary | ICD-10-CM

## 2023-04-20 PROCEDURE — 99204 OFFICE O/P NEW MOD 45 MIN: CPT | Mod: VID | Performed by: NURSE PRACTITIONER

## 2023-04-20 RX ORDER — SUCRALFATE 1 G/1
1 TABLET ORAL 3 TIMES DAILY
Qty: 90 TABLET | Refills: 0 | Status: SHIPPED | OUTPATIENT
Start: 2023-04-20 | End: 2023-10-04

## 2023-04-20 RX ORDER — CHOLESTYRAMINE 4 G/9G
4 POWDER, FOR SUSPENSION ORAL 2 TIMES DAILY WITH MEALS
Qty: 348 G | Refills: 3 | Status: SHIPPED | OUTPATIENT
Start: 2023-04-20 | End: 2023-10-04

## 2023-04-20 ASSESSMENT — PAIN SCALES - GENERAL: PAINLEVEL: SEVERE PAIN (7)

## 2023-04-20 NOTE — PATIENT INSTRUCTIONS
It was a pleasure taking care of you today.  I've included a brief summary of our discussion and care plan from today's visit below.  Please review this information with your primary care provider.  ______________________________________________________________________    My recommendations are summarized as follows:    As we discussed today, I placed a referral for upper GI endoscopy. Our schedulers will contact you to set up the procedure.    2.  Continue to take famotidine and omeprazole. Start taking 1 gram of sucralfate 3-4 times a day. Dissolve a tablet in a small amount of water, mix it, and take between meals and one dose before bed. Do not smoke, eat or drink for at least 30 min after taking the pill.    3. 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 Let's try cholestyramine 4 gram with supper. You can increase the dose to twice a day if needed in a week.    4. Do not skip meals. Add more fiber to your diet or take a fiber supplement for your diarrhea.    Return to GI Clinic in 6-8 weeks to review your progress.    ______________________________________________________________________    Bile salt induced diarrhea  When the gallbladder is removed, bile is no longer stored; it is instead released into the small intestine in a steady flow. This interrupts the normal loop by which bile acids are meant to move from the liver to the small intestine and then reabsorbed back into the blood and delivered to the liver. Because of this, the intestines are overloaded with bile that cannot be properly reabsorbed.  Excess bile, in turn, draws abnormally high levels of water and salts from the bloodstream into the intestine, causing diarrhea.  The interruption of the loop impacts the speed by which digestion normally occurs- the time it takes for food to move through the gut and exit the body is accelerated, leading to watery and poorly formed stools.  If you have frequent loose stools, you may experience pain, discomfort,  "or burning in and around the anus. Diarrhea contains both bile and stomach acid, both of which are very irritating to the skin.   Baby wipes are a great way to gently clean without causing more irritation. You can always put them in the refrigerator for extra soothing.          What is a hiatal hernia?  A hiatal hernia is when a part of the stomach moves up into the chest area. Normally, the stomach sits below the diaphragm. The diaphragm is the layer of muscle that separates the organs in the chest from the organs in the belly. The esophagus, the tube that carries food from the mouth to the stomach, passes through a hole in the diaphragm. In people with a hiatal hernia, the stomach pushes up through that hole, too.  There are 2 types of hiatal hernia:   ?Sliding hernia - This is when the top of the stomach and the lower part of the esophagus squeeze up into the space above the diaphragm. This is the most common type of hiatal hernia.  ?Paraesophageal hernia - This is when the top of the stomach squeezes up into the space above the diaphragm. This is not very common, but it can be serious if the stomach folds up on itself. It can also cause bleeding from the stomach or trouble breathing.    What are the symptoms of a hiatal hernia?  A hiatal hernia does not usually cause symptoms. In some cases, though, it can cause stomach acid to leak into the esophagus. This is called \"acid reflux\" or \"gastroesophageal reflux.\" It can cause symptoms like:  ?Burning in the chest, known as heartburn  ?Burning in the throat or an acid taste in the throat  ?Stomach or chest pain  ?Trouble swallowing  ?A raspy voice or a sore throat  ?Unexplained cough    How are hiatal hernias treated?  People who have symptoms caused by a hiatal hernia can get treatment for their symptoms.  Treatment for symptoms involves taking the medicines that are used for acid reflux. People with a paraesophageal hernia, and some people with a sliding hernia, " need surgery. For this surgery, the surgeon pulls the stomach back down and repairs the hole in the diaphragm. This way, the stomach does not slide up again.      ______________________________________________________________________    Who do I call with any questions after my visit?  Please be in touch if there are any further questions that arise following today's visit.  There are multiple ways to contact your gastroenterology care team.      During business hours, you may reach a Gastroenterology nurse at [phone], option 3.     To schedule or reschedule an appointment, please call [phone].   To schedule your imaging studies (CT, MRI, ultrasound)  call [phone] (or toll-free # [phone])  To schedule your lab work at [facility], please call [phone]    You can always send a secure message through [employer].  [employer] messages are answered by your nurse or doctor typically within 24 hours.  Please allow extra time on weekends and holidays.      For urgent/emergent questions after business hours, you may reach the on-call GI Fellow by contacting the [facility]  at [phone].    In order for your refill to be processed in a timely fashion, it is your responsibility to ensure you follow the recommendations from your provider regarding your laboratory studies and follow up appointments.       How will I get the results of any tests ordered?    You will receive all of your results.  If you have signed up for [employer], any tests ordered at your visit will be available to you after your physician reviews them.  Typically this takes 1-2 weeks.  If there are urgent results that require a change in your care plan, your physician or nurse will call you to discuss the next steps.   What is [employer]?  [employer] is a secure way for you to access all of your healthcare records from the [facility].  It is a web based computer program, so you can sign on  to it from any location.  It also allows you to send secure messages to your care team.  I recommend signing up for [employer] access if you have not already done so and are comfortable with using a computer.    How to I schedule a follow-up visit?  If you did not schedule a follow-up visit today, please call [phone] to schedule a follow-up office visit.      Sincerely,  COURTNEY Hanks,  [facility],  Division of Gastroenterology   ([facility])

## 2023-04-24 DIAGNOSIS — G62.89 OTHER POLYNEUROPATHY: ICD-10-CM

## 2023-04-24 RX ORDER — ZOLPIDEM TARTRATE 5 MG/1
5 TABLET ORAL
Qty: 30 TABLET | Refills: 5 | Status: SHIPPED | OUTPATIENT
Start: 2023-04-24 | End: 2023-05-09

## 2023-05-02 ENCOUNTER — TELEPHONE (OUTPATIENT)
Dept: GASTROENTEROLOGY | Facility: CLINIC | Age: 48
End: 2023-05-02
Payer: COMMERCIAL

## 2023-05-02 PROBLEM — K29.71 GASTRITIS WITH HEMORRHAGE, UNSPECIFIED CHRONICITY, UNSPECIFIED GASTRITIS TYPE: Status: RESOLVED | Noted: 2022-08-04 | Resolved: 2023-05-02

## 2023-05-02 NOTE — TELEPHONE ENCOUNTER
Screening Questions  BLUE  KIND OF PREP RED  LOCATION [review exclusion criteria] GREEN  SEDATION TYPE    Y  Are you active on mychart?   Christine Ortega APRN CNP   Ordering/Referring Provider?    [employer]  What type of coverage do you have?  N  Have you had a positive covid test in the last 14 days?    34.7  1. BMI  [BMI 40+ - review exclusion criteria - MAC + [facility]]            *NEED PAC APPT AT [facility] + MAC (ONLY)*     SELF   2. Are you able to give consent for your medical care? [IF NO,RN REVIEW]          N  3. Are you taking any prescription pain medications on a routine schedule   (ex narcotics: tramadol, oxycodone, roxicodone, oxycontin,  and percocet)?    [RN Review]             N - GABAPENTIN   3a. EXTENDED PREP What kind of prescription?     N 4. Do you have any chemical dependencies such as alcohol, street drugs, or methadone?    **If yes 3- 5 , please schedule with MAC sedation.**          IF YES TO ANY 6 - 10 - HOSPITAL SETTING ONLY.     N 6.   Do you need assistance transferring?     N 7.   Have you had a heart or lung transplant?    N 8.   Are you currently on dialysis?   N 9.   Do you use daily home oxygen?   N 10. Do you take nitroglycerin?   10a. N If yes, how often?     11. [FEMALES]   Are you currently pregnant?     11a.  If yes, how many weeks? [ Greater than 12 weeks, OR NEEDED]    N 12. [review exclusion criteria]  Do you have any implantable devices in your body (pacemaker, defib, LVAD)?            *NEED PAC APPT AT [facility]*     N 13. Do you have Pulmonary Hypertension?             *NEED PAC APPT AT [facility]*     N 14. In the past 6 months, have you had any heart related issues including cardiomyopathy or heart attack?     N 14a. If yes, did it require cardiac stenting if so when?     N 15. Have you had a stroke or Transient ischemic attack (TIA - aka  mini stroke ) within 6 months?      N 16. Do you have mod to severe Obstructive Sleep Apnea?  [Hospital only]    N 17. Do you have SEVERE AND UNCONTROLLED  "asthma?              *NEED PAC APPT AT [facility]*     N 18. Are you currently taking any blood thinners?     18a. No. Continue to 19.   18b. Yes/no Blood Thinner: No [CONTINUE TO #19]    N 19. Do you take the medication Phentermine?    19a. If yes, \"Hold for 7 days before procedure.  Please consult your prescribing provider if you have questions about holding this medication.\"     N  20. Do you have chronic kidney disease?      N  21. Do you have a diagnosis of diabetes?     N  22. On a regular basis do you go 3-5 days between bowel movements?     23. Preferred LOCAL Pharmacy for Pre Prescription    [ LIST ONLY ONE PHARMACY]        [id number] - [place], [place] - [address] [place] [address] N AT [facility] [address]        - CLOSING REMINDERS -    Informed patient they will need an adult          Cannot take any type of public or medical transportation alone    Conscious Sedation- Needs  for 6 hours after the procedure        MAC/General-Needs  for 24 hours after procedure    Pre-Procedure Covid test to be completed         [[place] [facility]/HOME Testing Required] + ADULT to ACCOMPANY     Confirmed Nurse will call to complete assessment       - SCHEDULING DETAILS -      Hospital Setting Required? If yes, what is the exclusion?:  N      Additional comments:  PRIORITY ORDER 1-2 WEEKS       EMERSONEN   Surgeon   05/08/2023  Date of Procedure  MAC  Sedation Type     N  PAC / Pre-op Required   Location  [facility] [place]   Type of Procedure Scheduled  Upper Endoscopy [EGD]          "

## 2023-05-05 ENCOUNTER — ANESTHESIA EVENT (OUTPATIENT)
Dept: SURGERY | Facility: AMBULATORY SURGERY CENTER | Age: 48
End: 2023-05-05
Payer: COMMERCIAL

## 2023-05-06 NOTE — ED AVS SNAPSHOT
[facility] Emergency Department  [address] [address]  Phone:  [phone]  Fax:  [phone]                                    Apoorva Roberts   MRN: [id number]    Department:  [facility] Emergency Department   Date of Visit:  8/23/2020           After Visit Summary Signature Page    I have received my discharge instructions, and my questions have been answered. I have discussed any challenges I see with this plan with the nurse or doctor.    ..........................................................................................................................................  Patient/Patient Representative Signature      ..........................................................................................................................................  Patient Representative Print Name and Relationship to Patient    ..................................................               ................................................  Date                                   Time    ..........................................................................................................................................  Reviewed by Signature/Title    ...................................................              ..............................................  Date                                               Time          22EPIC Rev 08/18        Right:/chest, general

## 2023-05-08 ENCOUNTER — ANESTHESIA (OUTPATIENT)
Dept: SURGERY | Facility: AMBULATORY SURGERY CENTER | Age: 48
End: 2023-05-08
Payer: COMMERCIAL

## 2023-05-08 ENCOUNTER — HOSPITAL ENCOUNTER (OUTPATIENT)
Facility: AMBULATORY SURGERY CENTER | Age: 48
Discharge: HOME | End: 2023-05-08
Attending: SURGERY
Payer: COMMERCIAL

## 2023-05-08 VITALS
TEMPERATURE: 98.8 F | HEART RATE: 90 BPM | SYSTOLIC BLOOD PRESSURE: 110 MMHG | HEIGHT: 62 IN | WEIGHT: 195 LBS | DIASTOLIC BLOOD PRESSURE: 67 MMHG | BODY MASS INDEX: 35.88 KG/M2 | OXYGEN SATURATION: 98 % | RESPIRATION RATE: 16 BRPM

## 2023-05-08 DIAGNOSIS — Z87.11 HISTORY OF PEPTIC ULCER: ICD-10-CM

## 2023-05-08 DIAGNOSIS — K44.9 HIATAL HERNIA: ICD-10-CM

## 2023-05-08 DIAGNOSIS — K21.00 GASTROESOPHAGEAL REFLUX DISEASE WITH ESOPHAGITIS WITHOUT HEMORRHAGE: ICD-10-CM

## 2023-05-08 LAB — UPPER GI ENDOSCOPY: NORMAL

## 2023-05-08 RX ORDER — PROPOFOL 10 MG/ML
INJECTION, EMULSION INTRAVENOUS PRN
Status: DISCONTINUED | OUTPATIENT
Start: 2023-05-08 | End: 2023-05-08

## 2023-05-08 RX ORDER — ONDANSETRON 2 MG/ML
INJECTION INTRAMUSCULAR; INTRAVENOUS PRN
Status: DISCONTINUED | OUTPATIENT
Start: 2023-05-08 | End: 2023-05-08

## 2023-05-08 RX ORDER — GLYCOPYRROLATE 0.2 MG/ML
INJECTION, SOLUTION INTRAMUSCULAR; INTRAVENOUS PRN
Status: DISCONTINUED | OUTPATIENT
Start: 2023-05-08 | End: 2023-05-08

## 2023-05-08 RX ORDER — PROPOFOL 10 MG/ML
INJECTION, EMULSION INTRAVENOUS CONTINUOUS PRN
Status: DISCONTINUED | OUTPATIENT
Start: 2023-05-08 | End: 2023-05-08

## 2023-05-08 RX ORDER — LIDOCAINE 40 MG/G
CREAM TOPICAL
Status: DISCONTINUED | OUTPATIENT
Start: 2023-05-08 | End: 2023-05-09 | Stop reason: HOSPADM

## 2023-05-08 RX ORDER — OXYCODONE HYDROCHLORIDE 10 MG/1
10 TABLET ORAL
Status: DISCONTINUED | OUTPATIENT
Start: 2023-05-08 | End: 2023-05-09 | Stop reason: HOSPADM

## 2023-05-08 RX ORDER — OXYCODONE HYDROCHLORIDE 5 MG/1
5 TABLET ORAL
Status: DISCONTINUED | OUTPATIENT
Start: 2023-05-08 | End: 2023-05-09 | Stop reason: HOSPADM

## 2023-05-08 RX ORDER — LIDOCAINE HYDROCHLORIDE 10 MG/ML
INJECTION, SOLUTION INFILTRATION; PERINEURAL PRN
Status: DISCONTINUED | OUTPATIENT
Start: 2023-05-08 | End: 2023-05-08

## 2023-05-08 RX ORDER — SODIUM CHLORIDE, SODIUM LACTATE, POTASSIUM CHLORIDE, CALCIUM CHLORIDE 600; 310; 30; 20 MG/100ML; MG/100ML; MG/100ML; MG/100ML
INJECTION, SOLUTION INTRAVENOUS CONTINUOUS
Status: DISCONTINUED | OUTPATIENT
Start: 2023-05-08 | End: 2023-05-09 | Stop reason: HOSPADM

## 2023-05-08 RX ADMIN — PROPOFOL 30 MG: 10 INJECTION, EMULSION INTRAVENOUS at 14:08

## 2023-05-08 RX ADMIN — PROPOFOL 200 MCG/KG/MIN: 10 INJECTION, EMULSION INTRAVENOUS at 14:05

## 2023-05-08 RX ADMIN — GLYCOPYRROLATE 0.2 MG: 0.2 INJECTION, SOLUTION INTRAMUSCULAR; INTRAVENOUS at 14:05

## 2023-05-08 RX ADMIN — LIDOCAINE HYDROCHLORIDE 5 ML: 10 INJECTION, SOLUTION INFILTRATION; PERINEURAL at 14:05

## 2023-05-08 RX ADMIN — Medication 100 MCG: at 14:12

## 2023-05-08 RX ADMIN — SODIUM CHLORIDE, SODIUM LACTATE, POTASSIUM CHLORIDE, CALCIUM CHLORIDE: 600; 310; 30; 20 INJECTION, SOLUTION INTRAVENOUS at 12:43

## 2023-05-08 RX ADMIN — PROPOFOL 30 MG: 10 INJECTION, EMULSION INTRAVENOUS at 14:05

## 2023-05-08 RX ADMIN — ONDANSETRON 4 MG: 2 INJECTION INTRAMUSCULAR; INTRAVENOUS at 14:06

## 2023-05-08 ASSESSMENT — LIFESTYLE VARIABLES: TOBACCO_USE: 1

## 2023-05-08 ASSESSMENT — ENCOUNTER SYMPTOMS
SEIZURES: 0
DYSRHYTHMIAS: 0

## 2023-05-08 ASSESSMENT — COPD QUESTIONNAIRES: COPD: 0

## 2023-05-08 NOTE — PROGRESS NOTES
I, the writer, have viewed a picture on the patient's phone showing a Negative COVID-19 result.    Mika Pastor RN on 5/8/2023 at 12:24 PM

## 2023-05-08 NOTE — ANESTHESIA POSTPROCEDURE EVALUATION
Patient: Apoorva Roberts    Procedure: Procedure(s):  ESOPHAGOGASTRODUODENOSCOPY, WITH BIOPSY       Anesthesia Type:  MAC    Note:  Disposition: Outpatient   Postop Pain Control: Uneventful            Sign Out: Well controlled pain   PONV: No   Neuro/Psych: Uneventful            Sign Out: Acceptable/Baseline neuro status   Airway/Respiratory: Uneventful            Sign Out: Acceptable/Baseline resp. status   CV/Hemodynamics: Uneventful            Sign Out: Acceptable CV status; No obvious hypovolemia; No obvious fluid overload   Other NRE: NONE   DID A NON-ROUTINE EVENT OCCUR? No           Last vitals:  Vitals Value Taken Time   /67 05/08/23 1430   Temp 98.8  F (37.1  C) 05/08/23 1423   Pulse 90 05/08/23 1430   Resp 14 05/08/23 1423   SpO2 98 % 05/08/23 1430   Vitals shown include unvalidated device data.    Electronically Signed By: Ludivina Mojica MD  May 8, 2023  2:34 PM

## 2023-05-08 NOTE — ANESTHESIA PREPROCEDURE EVALUATION
Anesthesia Pre-Procedure Evaluation    Patient: Apoorva Roberts   MRN: [id number] : 1975        Procedure : Procedure(s):  ESOPHAGOGASTRODUODENOSCOPY, WITH BIOPSY          Past Medical History:   Diagnosis Date     Cervical high risk HPV (human papillomavirus) test positive 2021     Community acquired pneumonia 2017 chest radiograph: RLL infiltrate. Influenza negative. procalcitonin 0.23.     Depressive disorder      Gastroesophageal reflux disease      Hiatal hernia      Hypertension      Other chronic pain      Walking troubles       Past Surgical History:   Procedure Laterality Date     ABDOMEN SURGERY       APPENDECTOMY       CHOLECYSTECTOMY       ESOPHAGOSCOPY, GASTROSCOPY, DUODENOSCOPY (EGD), COMBINED N/A 10/8/2021    Procedure: ESOPHAGOGASTRODUODENOSCOPY, WITH BIOPSY;  Surgeon: Slim Randle DO;  Location: [facility]     GYN SURGERY       HERNIA REPAIR       HYSTERECTOMY, PAP STILL INDICATED      Cervix still present      Allergies   Allergen Reactions     Indomethacin      Other reaction(s): GI Upset     Fluoxetine      Other reaction(s): Thrush     Paroxetine      Other reaction(s): Thrush      Social History     Tobacco Use     Smoking status: Every Day     Packs/day: 1.00     Years: 30.00     Pack years: 30.00     Types: Cigarettes     Smokeless tobacco: Never   Vaping Use     Vaping status: Never Used   Substance Use Topics     Alcohol use: Yes     Comment: 1-2 drinks a week      Wt Readings from Last 1 Encounters:   23 88.5 kg (195 lb)        Anesthesia Evaluation   Pt has had prior anesthetic.     No history of anesthetic complications       ROS/MED HX  ENT/Pulmonary:     (+) tobacco use,  (-) asthma, COPD, sleep apnea, DIEGO risk factors and recent URI   Neurologic: Comment: migraine   (-) no seizures and no CVA   Cardiovascular:     (+) hypertension----- (-) taking anticoagulants/antiplatelets, syncope and arrhythmias   METS/Exercise Tolerance: >4 METS     Hematologic:    (-) anemia   Musculoskeletal:       GI/Hepatic:     (+) GERD,     Renal/Genitourinary:    (-) renal disease   Endo:    (-) Type I DM, Type II DM, thyroid disease and obesity   Psychiatric/Substance Use:     (+) psychiatric history Recreational drug usage: Cannabis. (-) chronic opioid use history   Infectious Disease:    (-) Recent Fever   Malignancy:       Other:            Physical Exam    Airway        Mallampati: II   TM distance: > 3 FB   Neck ROM: full   Mouth opening: > 3 cm    Respiratory Devices and Support         Dental     Comment: Upper dentures        Cardiovascular          Rhythm and rate: regular and normal     Pulmonary   pulmonary exam normal                OUTSIDE LABS:  CBC:   Lab Results   Component Value Date    WBC 12.6 (H) 01/03/2023    WBC 10.8 01/02/2023    HGB 13.5 01/03/2023    HGB 13.2 01/02/2023    HCT 40.0 01/03/2023    HCT 39.3 01/02/2023     01/03/2023     01/02/2023     BMP:   Lab Results   Component Value Date     01/03/2023     11/05/2022    POTASSIUM 3.7 01/03/2023    POTASSIUM 3.6 11/05/2022    CHLORIDE 101 01/03/2023    CHLORIDE 102 11/05/2022    CO2 23 01/03/2023    CO2 22 11/05/2022    BUN 11.6 01/03/2023    BUN 11.2 11/05/2022    CR 0.76 01/03/2023    CR 0.84 11/05/2022     (H) 01/03/2023     (H) 11/05/2022     COAGS: No results found for: PTT, INR, FIBR  POC:   Lab Results   Component Value Date    HCGS Negative 01/03/2023     HEPATIC:   Lab Results   Component Value Date    ALBUMIN 4.1 01/03/2023    PROTTOTAL 7.4 01/03/2023    ALT 15 01/03/2023    AST 27 01/03/2023    ALKPHOS 75 01/03/2023    BILITOTAL 0.6 01/03/2023     OTHER:   Lab Results   Component Value Date    LACT 1.1 11/05/2022    A1C 5.6 01/02/2023    JULIET 9.2 01/03/2023    MAG 1.9 01/02/2023    LIPASE 64 (L) 08/23/2020    TSH 1.28 01/02/2023    CRP 24.9 (H) 08/04/2022    SED 1 01/02/2023       Anesthesia Plan    ASA Status:  2   NPO Status:  NPO  Appropriate    Anesthesia Type: MAC.     - Reason for MAC: immobility needed, straight local not clinically adequate              Consents    Anesthesia Plan(s) and associated risks, benefits, and realistic alternatives discussed. Questions answered and patient/representative(s) expressed understanding.    - Discussed:     - Discussed with:  Patient      - Extended Intubation/Ventilatory Support Discussed: No.      - Patient is DNR/DNI Status: No    Use of blood products discussed: No .     Postoperative Care            Comments:                uLdivina Mojica MD

## 2023-05-08 NOTE — INTERVAL H&P NOTE
"I have reviewed the surgical (or preoperative) H&P that is linked to this encounter, and examined the patient. Noted changes include: Heart: regular rate and rhythm.   Lungs: Normal respiratory effort.     Clemente Strong MD, FACS  Office: [phone]  [facility]   General and Bariatric Surgery      Clinical Conditions Present on Arrival:  Clinically Significant Risk Factors Present on Admission                  # Obesity: Estimated body mass index is 35.67 kg/m  as calculated from the following:    Height as of this encounter: 1.575 m (5' 2\").    Weight as of this encounter: 88.5 kg (195 lb).       "

## 2023-05-08 NOTE — ANESTHESIA CARE TRANSFER NOTE
Patient: Apoorva Roberts    Procedure: Procedure(s):  ESOPHAGOGASTRODUODENOSCOPY, WITH BIOPSY       Diagnosis: History of peptic ulcer [Z87.11]  Gastroesophageal reflux disease with esophagitis without hemorrhage [K21.00]  Hiatal hernia [K44.9]  Diagnosis Additional Information: No value filed.    Anesthesia Type:   MAC     Note:    Oropharynx: oropharynx clear of all foreign objects  Level of Consciousness: awake  Oxygen Supplementation: room air    Independent Airway: airway patency satisfactory and stable  Dentition: dentition unchanged  Vital Signs Stable: post-procedure vital signs reviewed and stable    Patient transferred to: Phase II    Handoff Report: Identifed the Patient, Identified the Reponsible Provider, Reviewed the pertinent medical history, Discussed the surgical course, Reviewed Intra-OP anesthesia mangement and issues during anesthesia, Set expectations for post-procedure period and Allowed opportunity for questions and acknowledgement of understanding      Vitals:  Vitals Value Taken Time   /62 05/08/23 1423   Temp 37.1  C (98.8  F) 05/08/23 1423   Pulse 92 05/08/23 1423   Resp 14 05/08/23 1423   SpO2 98 % 05/08/23 1423       Electronically Signed By: ISAAC Diaz CRNA  May 8, 2023  2:27 PM

## 2023-05-08 NOTE — DISCHARGE INSTRUCTIONS
Activity and Diet  You were given medicine for sedation. You may be dizzy or sleepy.  For 24 hours:   Do not drive or use heavy equipment.   Do not make important decisions.   Do not drink any alcohol.  You may return to your normal diet and medicines.    Discomfort   Air was placed during the exam in order to see it. Walking helps to pass the air.   You may take Tylenol (acetaminophen) for pain unless your doctor has told you not to.    When to call:    Call right away if you have:   Severe abdominal or chest pain not relieved with passing air.   More than 1 to 2 Tablespoons of bleeding from your rectum.   Fever above 100.6  F (37.5  C).    If you have severe pain, bleeding, or shortness of breath, go to an emergency room.    If you have questions, call:  Monday to Friday, 7 a.m. to 4:30 p.m.  [phone]

## 2023-05-09 ENCOUNTER — E-VISIT (OUTPATIENT)
Dept: FAMILY MEDICINE | Facility: CLINIC | Age: 48
End: 2023-05-09
Payer: COMMERCIAL

## 2023-05-09 DIAGNOSIS — K29.60 EROSIVE GASTRITIS: ICD-10-CM

## 2023-05-09 DIAGNOSIS — K27.9 PUD (PEPTIC ULCER DISEASE): ICD-10-CM

## 2023-05-09 DIAGNOSIS — K04.7 TOOTH ABSCESS: Primary | ICD-10-CM

## 2023-05-09 PROCEDURE — 99421 OL DIG E/M SVC 5-10 MIN: CPT | Performed by: NURSE PRACTITIONER

## 2023-05-09 RX ORDER — OMEPRAZOLE 40 MG/1
40 CAPSULE, DELAYED RELEASE ORAL DAILY
Qty: 30 CAPSULE | Refills: 11 | Status: SHIPPED | OUTPATIENT
Start: 2023-05-09 | End: 2023-05-11

## 2023-05-09 RX ORDER — FAMOTIDINE 40 MG/1
40 TABLET, FILM COATED ORAL AT BEDTIME
Qty: 90 TABLET | Refills: 3 | Status: SHIPPED | OUTPATIENT
Start: 2023-05-09 | End: 2023-10-04

## 2023-05-09 RX ORDER — HYDROCODONE BITARTRATE AND ACETAMINOPHEN 5; 325 MG/1; MG/1
1 TABLET ORAL EVERY 4 HOURS PRN
Qty: 18 TABLET | Refills: 0 | Status: SHIPPED | OUTPATIENT
Start: 2023-05-09 | End: 2023-05-12

## 2023-05-09 NOTE — PATIENT INSTRUCTIONS
Thank you for choosing us for your care. I have placed an order for a prescription (antibiotic and pain control) so that you can start treatment. View your full visit summary for details by clicking on the link below. Your pharmacist will able to address any questions you may have about the medication.     If you're not feeling better within 5-7 days, please schedule an appointment.  You can schedule an appointment right here in [facility], or call [phone]  If the visit is for the same symptoms as your eVisit, we'll refund the cost of your eVisit if seen within seven days.      I also refilled the Famotidine and Omeprazole that were due. I discontinued the 5 mg Ambien- you should just be getting the 10 mg tabs.     Please let us know if you have any questions.    Angelic   PV Angios completed by Dr. Kee with Iso-Sidra 370 contrast;   Cc

## 2023-05-11 ENCOUNTER — TELEPHONE (OUTPATIENT)
Dept: SURGERY | Facility: CLINIC | Age: 48
End: 2023-05-11

## 2023-05-11 ENCOUNTER — TELEPHONE (OUTPATIENT)
Dept: FAMILY MEDICINE | Facility: CLINIC | Age: 48
End: 2023-05-11
Payer: COMMERCIAL

## 2023-05-11 DIAGNOSIS — K27.9 PUD (PEPTIC ULCER DISEASE): ICD-10-CM

## 2023-05-11 DIAGNOSIS — K29.60 EROSIVE GASTRITIS: ICD-10-CM

## 2023-05-11 RX ORDER — OMEPRAZOLE 40 MG/1
40 CAPSULE, DELAYED RELEASE ORAL DAILY
Qty: 30 CAPSULE | Refills: 1 | Status: SHIPPED | OUTPATIENT
Start: 2023-05-11 | End: 2024-02-09

## 2023-05-11 NOTE — TELEPHONE ENCOUNTER
Left message for patient to call back.       ED [facility] Hospitalgarrett Smith RN  [facility]  General Surgery  [address] [place] [address]  [email or web address]   Office:[phone]  Employed by [facility] [employer],

## 2023-05-11 NOTE — TELEPHONE ENCOUNTER
Per fax received from Romaine - omeprazole (PRILOSEC) 40 MG DR capsule is not covered by patient's Insurance Company  ELIDIA More - Please choose:  1.  Change medication that is not covered to a different medication and send new prescription to patient's pharmacy?  2.  Patient will need to pay for the non-covered medication out-of-pocket?   3.  Try for Prior Authorization with Insurance Company to get medication covered?        P.A. Phone #:  [phone]       P.A.  ID#:  [id number]

## 2023-05-11 NOTE — TELEPHONE ENCOUNTER
----- Message from Clemente Strong MD sent at 5/10/2023  1:17 PM CDT -----  Regarding: path results call back  Please call patient to let her know other than some chronic esophagitis, no other abnormalities were noted from the biopsies that she had performed.      Clemente Strong MD, FACS  Office: [phone]  [facility] and Bariatric Surgery

## 2023-05-17 DIAGNOSIS — R51.9 OCCIPITAL HEADACHE: Primary | ICD-10-CM

## 2023-05-17 RX ORDER — BUTALBITAL, ACETAMINOPHEN AND CAFFEINE 50; 325; 40 MG/1; MG/1; MG/1
TABLET ORAL
Qty: 20 TABLET | Refills: 1 | Status: SHIPPED | OUTPATIENT
Start: 2023-05-17 | End: 2023-09-18

## 2023-05-20 ENCOUNTER — MYC MEDICAL ADVICE (OUTPATIENT)
Dept: FAMILY MEDICINE | Facility: CLINIC | Age: 48
End: 2023-05-20
Payer: COMMERCIAL

## 2023-06-13 ENCOUNTER — HOSPITAL ENCOUNTER (EMERGENCY)
Facility: CLINIC | Age: 48
Discharge: LEFT BEFORE DISCHARGE | End: 2023-06-13
Admitting: EMERGENCY MEDICINE
Payer: COMMERCIAL

## 2023-06-13 VITALS
DIASTOLIC BLOOD PRESSURE: 105 MMHG | TEMPERATURE: 98.1 F | HEART RATE: 105 BPM | BODY MASS INDEX: 35.88 KG/M2 | HEIGHT: 62 IN | WEIGHT: 195 LBS | OXYGEN SATURATION: 99 % | SYSTOLIC BLOOD PRESSURE: 155 MMHG

## 2023-06-13 PROCEDURE — 99281 EMR DPT VST MAYX REQ PHY/QHP: CPT | Performed by: EMERGENCY MEDICINE

## 2023-06-13 NOTE — ED TRIAGE NOTES
"Pt states \"I have a cyst on my labia since Saturday.\"      Triage Assessment     Row Name 06/13/23 5585       Triage Assessment (Adult)    Airway WDL WDL       Respiratory WDL    Respiratory WDL WDL       Skin Circulation/Temperature WDL    Skin Circulation/Temperature WDL WDL       Cardiac WDL    Cardiac WDL WDL       Peripheral/Neurovascular WDL    Peripheral Neurovascular WDL WDL       Cognitive/Neuro/Behavioral WDL    Cognitive/Neuro/Behavioral WDL WDL              "

## 2023-06-14 ENCOUNTER — PATIENT OUTREACH (OUTPATIENT)
Dept: FAMILY MEDICINE | Facility: CLINIC | Age: 48
End: 2023-06-14
Payer: COMMERCIAL

## 2023-06-14 DIAGNOSIS — R51.9 CHRONIC DAILY HEADACHE: Primary | ICD-10-CM

## 2023-06-14 RX ORDER — TIZANIDINE 2 MG/1
TABLET ORAL
Qty: 720 TABLET | Refills: 3 | Status: SHIPPED | OUTPATIENT
Start: 2023-06-14 | End: 2024-05-22 | Stop reason: ALTCHOICE

## 2023-06-14 NOTE — TELEPHONE ENCOUNTER
ED/Discharge Protocol    Patient left ER without evaluation and completed an Evisit with Angelic More and received care advice and medication.     Paulina Love RN on 6/14/2023 at 3:37 PM

## 2023-06-14 NOTE — TELEPHONE ENCOUNTER
Routing to ordering provider for consideration, not on refill protocol. Noted discontinued by other provider on 5.2 no notes noted.           Marcie Garcia RN MSN

## 2023-06-14 NOTE — TELEPHONE ENCOUNTER
What type of discharge? Emergency Department  Risk of Hospital admission or ED visit: 20%  Is a TCM episode required? Yes  When should the patient follow up with PCP? 14 days of discharge.    Patient left ER without being seen.    Paulina Love RN  [facility]

## 2023-06-30 DIAGNOSIS — F41.0 PANIC ATTACK: ICD-10-CM

## 2023-06-30 RX ORDER — HYDROXYZINE HYDROCHLORIDE 50 MG/1
TABLET, FILM COATED ORAL
Qty: 90 TABLET | Refills: 0 | Status: SHIPPED | OUTPATIENT
Start: 2023-06-30 | End: 2023-07-17

## 2023-06-30 NOTE — TELEPHONE ENCOUNTER
"Requested Prescriptions   Pending Prescriptions Disp Refills     hydrOXYzine (ATARAX) 50 MG tablet [Pharmacy Med Name: HYDROXYZINE HCL 50MG TABS (WHITE)] 90 tablet 3     Sig: TAKE 1 TABLET(50 MG) BY MOUTH EVERY 6 HOURS AS NEEDED FOR ANXIETY       Antihistamines Protocol Passed - 6/30/2023 12:49 PM        Passed - Recent (12 mo) or future (30 days) visit within the authorizing provider's specialty     Patient has had an office visit with the authorizing provider or a provider within the authorizing providers department within the previous 12 mos or has a future within next 30 days. See \"Patient Info\" tab in inbasket, or \"Choose Columns\" in Meds & Orders section of the refill encounter.              Passed - Patient is age 3 or older     Apply age and/or weight-based dosing for peds patients age 3 and older.    Forward request to provider for patients under the age of 3.          Passed - Medication is active on med list           Patient has follow up scheduled with Angelic More 7/12/23.    Paulina Love RN on 6/30/2023 at 12:53 PM    "

## 2023-07-05 ENCOUNTER — HOSPITAL ENCOUNTER (EMERGENCY)
Facility: CLINIC | Age: 48
Discharge: HOME | End: 2023-07-05
Attending: FAMILY MEDICINE | Admitting: FAMILY MEDICINE
Payer: COMMERCIAL

## 2023-07-05 ENCOUNTER — PATIENT OUTREACH (OUTPATIENT)
Dept: CARE COORDINATION | Facility: CLINIC | Age: 48
End: 2023-07-05
Payer: COMMERCIAL

## 2023-07-05 ENCOUNTER — APPOINTMENT (OUTPATIENT)
Dept: GENERAL RADIOLOGY | Facility: CLINIC | Age: 48
End: 2023-07-05
Attending: EMERGENCY MEDICINE
Payer: COMMERCIAL

## 2023-07-05 VITALS
SYSTOLIC BLOOD PRESSURE: 135 MMHG | RESPIRATION RATE: 18 BRPM | BODY MASS INDEX: 34.96 KG/M2 | HEART RATE: 84 BPM | OXYGEN SATURATION: 99 % | HEIGHT: 62 IN | TEMPERATURE: 98 F | WEIGHT: 190 LBS | DIASTOLIC BLOOD PRESSURE: 79 MMHG

## 2023-07-05 DIAGNOSIS — J06.9 UPPER RESPIRATORY TRACT INFECTION, UNSPECIFIED TYPE: ICD-10-CM

## 2023-07-05 DIAGNOSIS — J98.01 ACUTE BRONCHOSPASM: ICD-10-CM

## 2023-07-05 LAB
FLUAV RNA SPEC QL NAA+PROBE: NEGATIVE
FLUBV RNA RESP QL NAA+PROBE: NEGATIVE
RSV RNA SPEC NAA+PROBE: NEGATIVE
SARS-COV-2 RNA RESP QL NAA+PROBE: NEGATIVE

## 2023-07-05 PROCEDURE — 87637 SARSCOV2&INF A&B&RSV AMP PRB: CPT | Performed by: EMERGENCY MEDICINE

## 2023-07-05 PROCEDURE — 99284 EMERGENCY DEPT VISIT MOD MDM: CPT | Performed by: FAMILY MEDICINE

## 2023-07-05 PROCEDURE — 87637 SARSCOV2&INF A&B&RSV AMP PRB: CPT | Performed by: FAMILY MEDICINE

## 2023-07-05 PROCEDURE — 99284 EMERGENCY DEPT VISIT MOD MDM: CPT | Mod: 25 | Performed by: FAMILY MEDICINE

## 2023-07-05 PROCEDURE — 71046 X-RAY EXAM CHEST 2 VIEWS: CPT

## 2023-07-05 RX ORDER — PREDNISONE 20 MG/1
20 TABLET ORAL 2 TIMES DAILY
Qty: 10 TABLET | Refills: 0 | Status: SHIPPED | OUTPATIENT
Start: 2023-07-05 | End: 2023-07-10

## 2023-07-05 RX ORDER — ALBUTEROL SULFATE 90 UG/1
2 AEROSOL, METERED RESPIRATORY (INHALATION) EVERY 4 HOURS PRN
Qty: 8.5 G | Refills: 0 | Status: SHIPPED | OUTPATIENT
Start: 2023-07-05 | End: 2023-08-10

## 2023-07-05 NOTE — ED TRIAGE NOTES
Patient reports productive cough for past week.     Triage Assessment     Row Name 07/05/23 5042       Triage Assessment (Adult)    Airway WDL WDL       Respiratory WDL    Respiratory WDL X;rhythm/pattern    Rhythm/Pattern, Respiratory shortness of breath       Skin Circulation/Temperature WDL    Skin Circulation/Temperature WDL WDL       Cardiac WDL    Cardiac WDL WDL       Peripheral/Neurovascular WDL    Peripheral Neurovascular WDL WDL       Cognitive/Neuro/Behavioral WDL    Cognitive/Neuro/Behavioral WDL WDL

## 2023-07-06 ENCOUNTER — PATIENT OUTREACH (OUTPATIENT)
Dept: FAMILY MEDICINE | Facility: CLINIC | Age: 48
End: 2023-07-06
Payer: COMMERCIAL

## 2023-07-06 NOTE — TELEPHONE ENCOUNTER
Hospital/TCU/ED for chronic condition Discharge Protocol    Has follow-up appointment scheduled.    Nargis Grimaldo RN

## 2023-07-06 NOTE — ED PROVIDER NOTES
"  HPI   Patient is a 47-year-old female presenting with cough and congestion.  She feels mildly short of breath and has excessive coughing spasms.  Occasional production with coughing.  No chest pain.  No fever.  Sick for 10 days.  No vomiting.  No diarrhea.  No myalgia.  Smoker.  She has had to use prednisone in the past, albuterol in the past.  No formal diagnosis of asthma or COPD.        Allergies:  Allergies   Allergen Reactions     Indomethacin      Other reaction(s): GI Upset     Fluoxetine      Other reaction(s): Thrush     Paroxetine      Other reaction(s): Thrush     Problem List:    Patient Active Problem List    Diagnosis Date Noted     Occipital headache 03/30/2023     Priority: Medium     Vitamin B12 deficiency (non anemic) 01/03/2023     Priority: Medium     Restless leg syndrome 01/03/2023     Priority: Medium     Elevated C-reactive protein (CRP) 01/03/2023     Priority: Medium     PTSD (post-traumatic stress disorder) 08/04/2022     Priority: Medium     GEORGIANA (generalized anxiety disorder) 08/04/2022     Priority: Medium     Cervical high risk HPV (human papillomavirus) test positive 09/02/2021     Priority: Medium     8/27/21 NIL Pap, + HR HPV (neg 16/18). Plan cotest in 1 year.   8/4/22 LSIL, +HR HPV (not 16/18). Plan: colposcopy due before 11/4/22 2/14/23 Lost to follow-up for pap tracking          Marijuana use, continuous 09/17/2020     Priority: Medium     Tobacco use disorder 09/17/2020     Priority: Medium     Migraine without aura and without status migrainosus, not intractable 09/17/2020     Priority: Medium     Chronic daily headache 11/06/2019     Priority: Medium     Improved on daily Tizanidine- has seen Neurology       Peripheral neuropathy 11/06/2019     Priority: Medium     After hernia surgery in 2014- was told the surgeon \"nicked\" a couple nerves on the left       Gastroesophageal reflux disease 12/05/2017     Priority: Medium     Benign essential hypertension 12/05/2017     " Priority: Medium     Major depressive disorder, recurrent episode, moderate (H) 12/05/2017     Priority: Medium     Hidradenitis suppurativa 09/11/2017     Priority: Medium      Past Medical History:    Past Medical History:   Diagnosis Date     Cervical high risk HPV (human papillomavirus) test positive 08/27/2021     Community acquired pneumonia 12/05/2017     Depressive disorder      Gastroesophageal reflux disease      Hiatal hernia      Hypertension      Other chronic pain      Walking troubles      Past Surgical History:    Past Surgical History:   Procedure Laterality Date     ABDOMEN SURGERY       APPENDECTOMY       CHOLECYSTECTOMY       ESOPHAGOSCOPY, GASTROSCOPY, DUODENOSCOPY (EGD), COMBINED N/A 10/8/2021    Procedure: ESOPHAGOGASTRODUODENOSCOPY, WITH BIOPSY;  Surgeon: Slim Randle DO;  Location: [facility]     ESOPHAGOSCOPY, GASTROSCOPY, DUODENOSCOPY (EGD), COMBINED N/A 5/8/2023    Procedure: ESOPHAGOGASTRODUODENOSCOPY, WITH BIOPSY;  Surgeon: Clemente Strong MD;  Location: [facility]     GYN SURGERY       HERNIA REPAIR       HYSTERECTOMY, PAP STILL INDICATED      Cervix still present     Family History:    Family History   Problem Relation Age of Onset     Diabetes Mother      Hypertension Mother      Hyperlipidemia Mother      Depression Mother      Substance Abuse Mother      Hypertension Father      Hyperlipidemia Father      Kidney Cancer Father      Other Cancer Father      Diabetes Maternal Grandmother      Depression Sister      Breast Cancer No family hx of      Social History:  Marital Status:   [2]  Social History     Tobacco Use     Smoking status: Every Day     Packs/day: 1.00     Years: 30.00     Pack years: 30.00     Types: Cigarettes     Smokeless tobacco: Never   Vaping Use     Vaping Use: Never used   Substance Use Topics     Alcohol use: Yes     Comment: 1-2 drinks a week     Drug use: Yes     Types: Marijuana     Comment: cbd      Medications:    albuterol (PROAIR  "HFA/PROVENTIL HFA/VENTOLIN HFA) 108 (90 Base) MCG/ACT inhaler  predniSONE (DELTASONE) 20 MG tablet  acetaminophen (TYLENOL) 325 MG tablet  butalbital-acetaminophen-caffeine (ESGIC) -40 MG tablet  cholestyramine (QUESTRAN) 4 GM/DOSE powder  famotidine (PEPCID) 40 MG tablet  gabapentin (NEURONTIN) 400 MG capsule  hydrochlorothiazide (HYDRODIURIL) 25 MG tablet  hydrOXYzine (ATARAX) 50 MG tablet  IBUPROFEN PO  lisinopril (ZESTRIL) 5 MG tablet  Loratadine-Pseudoephedrine (CLARITIN-D 24 HOUR PO)  omeprazole (PRILOSEC) 40 MG DR capsule  sucralfate (CARAFATE) 1 GM tablet  tiZANidine (ZANAFLEX) 2 MG tablet  zolpidem (AMBIEN) 10 MG tablet      Review of Systems   All other systems reviewed and are negative.      PE   BP: (!) 161/93  Pulse: 116  Temp: 98  F (36.7  C)  Resp: 16  Height: 157.5 cm (5' 2\")  Weight: 86.2 kg (190 lb)  SpO2: 99 %  Physical Exam  Vitals reviewed.   Constitutional:       General: She is not in acute distress.     Appearance: She is well-developed.   HENT:      Head: Normocephalic and atraumatic.      Right Ear: External ear normal.      Left Ear: External ear normal.      Nose: Nose normal.      Mouth/Throat:      Mouth: Mucous membranes are moist.      Pharynx: Oropharynx is clear.   Eyes:      Extraocular Movements: Extraocular movements intact.      Conjunctiva/sclera: Conjunctivae normal.      Pupils: Pupils are equal, round, and reactive to light.   Cardiovascular:      Rate and Rhythm: Normal rate and regular rhythm.   Pulmonary:      Effort: Pulmonary effort is normal.      Comments: Mild wheezing.  Musculoskeletal:         General: Normal range of motion.      Cervical back: Normal range of motion.   Skin:     General: Skin is warm and dry.   Neurological:      Mental Status: She is alert and oriented to person, place, and time.   Psychiatric:         Behavior: Behavior normal.         ED COURSE and [facility]   2014.  Patient has symptoms and signs as described above.  Patient has a chest x-ray " that is negative.  No antibiotics.  She is wheezing with coughing spasms.  Albuterol and prednisone recommended.  Infection is likely viral.    Electronic medical chart reviewed, including medical problems, medications, medical allergies, social history.  Recent hospitalizations and surgical procedures reviewed.  Recent clinic visits and consultations reviewed.  Recent labs and test results reviewed.  Nursing notes reviewed.    The patient, their parent if applicable, and/or their medical decision maker(s) and I have reviewed all of the available historical information, applicable PMH, physical exam findings, and objective diagnostic data gathered during this ED visit.  We then discussed all work-up options and then together agreed upon the course taken during this visit.  The ultimate disposition and plan was a cooperative decision made between myself and the patient, their parent if applicable, and/or their legal decision maker(s).  The risks and benefits of all decisions made during this visit were discussed to the best of my abilities given the circumstances, and all parties are understanding of the pertinent ramifications of these decisions.      LABS  Labs Ordered and Resulted from Time of ED Arrival to Time of ED Departure   INFLUENZA A/B, RSV, & SARS-COV2 PCR - Normal       Result Value    Influenza A PCR Negative      Influenza B PCR Negative      RSV PCR Negative      SARS CoV2 PCR Negative         IMAGING  Images reviewed by me.  Radiology report also reviewed.  Chest XR,  PA & LAT   Final Result   IMPRESSION: Negative chest. Lungs clear.          Procedures    Medications - No data to display      IMPRESSION       ICD-10-CM    1. Upper respiratory tract infection, unspecified type  J06.9       2. Acute bronchospasm  J98.01                Medication List      Started    albuterol 108 (90 Base) MCG/ACT inhaler  Commonly known as: PROAIR HFA/PROVENTIL HFA/VENTOLIN HFA  2 puffs, Inhalation, EVERY 4 HOURS  PRN     predniSONE 20 MG tablet  Commonly known as: DELTASONE  20 mg, Oral, 2 TIMES DAILY                        Armando Tena MD  07/05/23 2015

## 2023-07-11 ENCOUNTER — E-VISIT (OUTPATIENT)
Dept: FAMILY MEDICINE | Facility: CLINIC | Age: 48
End: 2023-07-11
Payer: COMMERCIAL

## 2023-07-11 DIAGNOSIS — J20.9 ACUTE BRONCHITIS WITH SYMPTOMS > 10 DAYS: Primary | ICD-10-CM

## 2023-07-11 PROCEDURE — 99422 OL DIG E/M SVC 11-20 MIN: CPT | Performed by: NURSE PRACTITIONER

## 2023-07-11 RX ORDER — AZITHROMYCIN 250 MG/1
TABLET, FILM COATED ORAL
Qty: 6 TABLET | Refills: 0 | Status: SHIPPED | OUTPATIENT
Start: 2023-07-11 | End: 2023-07-16

## 2023-07-11 NOTE — PATIENT INSTRUCTIONS
"    Dear Apoorva Roberts    After reviewing your responses, I've been able to diagnose you with \"Bronchitis\" which is a common infection of your lungs. While this is most commonly caused by a virus, the symptoms you have given suggest you should be treated with antibiotics.     I have sent a Z pack to your pharmacy to treat this infection.     It is important that you take all of your prescribed medication even if your symptoms are improving after a few doses. Taking all of your medicine helps prevent the symptoms from returning.     If your symptoms worsen, you develop chest pain or shortness of breath, fevers over 101, or are not improving in 5 days, please contact your primary care provider for an appointment or visit any of our convenient Walk-in Care or Urgent Care Centers to be seen which can be found on our website here.    Thanks again for choosing us as your health care partner,    ISAAC Dodson CNP    "

## 2023-07-12 ASSESSMENT — PATIENT HEALTH QUESTIONNAIRE - PHQ9
10. IF YOU CHECKED OFF ANY PROBLEMS, HOW DIFFICULT HAVE THESE PROBLEMS MADE IT FOR YOU TO DO YOUR WORK, TAKE CARE OF THINGS AT HOME, OR GET ALONG WITH OTHER PEOPLE: SOMEWHAT DIFFICULT
SUM OF ALL RESPONSES TO PHQ QUESTIONS 1-9: 7
SUM OF ALL RESPONSES TO PHQ QUESTIONS 1-9: 7

## 2023-07-13 ENCOUNTER — OFFICE VISIT (OUTPATIENT)
Dept: FAMILY MEDICINE | Facility: CLINIC | Age: 48
End: 2023-07-13
Payer: COMMERCIAL

## 2023-07-13 VITALS
BODY MASS INDEX: 34.96 KG/M2 | TEMPERATURE: 99 F | HEIGHT: 62 IN | SYSTOLIC BLOOD PRESSURE: 130 MMHG | WEIGHT: 190 LBS | DIASTOLIC BLOOD PRESSURE: 84 MMHG | HEART RATE: 97 BPM | RESPIRATION RATE: 18 BRPM | OXYGEN SATURATION: 99 %

## 2023-07-13 DIAGNOSIS — J98.01 BRONCHOSPASM: ICD-10-CM

## 2023-07-13 DIAGNOSIS — J40 BRONCHITIS: Primary | ICD-10-CM

## 2023-07-13 DIAGNOSIS — F33.1 MAJOR DEPRESSIVE DISORDER, RECURRENT EPISODE, MODERATE (H): ICD-10-CM

## 2023-07-13 DIAGNOSIS — E53.8 VITAMIN B12 DEFICIENCY (NON ANEMIC): ICD-10-CM

## 2023-07-13 DIAGNOSIS — F41.0 PANIC DISORDER WITHOUT AGORAPHOBIA: ICD-10-CM

## 2023-07-13 DIAGNOSIS — F43.10 PTSD (POST-TRAUMATIC STRESS DISORDER): ICD-10-CM

## 2023-07-13 DIAGNOSIS — F41.1 GAD (GENERALIZED ANXIETY DISORDER): ICD-10-CM

## 2023-07-13 PROCEDURE — 99214 OFFICE O/P EST MOD 30 MIN: CPT | Performed by: NURSE PRACTITIONER

## 2023-07-13 RX ORDER — IPRATROPIUM BROMIDE AND ALBUTEROL SULFATE 2.5; .5 MG/3ML; MG/3ML
1 SOLUTION RESPIRATORY (INHALATION) EVERY 4 HOURS PRN
Qty: 90 ML | Refills: 3 | Status: SHIPPED | OUTPATIENT
Start: 2023-07-13

## 2023-07-13 RX ORDER — CODEINE PHOSPHATE AND GUAIFENESIN 10; 100 MG/5ML; MG/5ML
1-2 SOLUTION ORAL EVERY 4 HOURS PRN
Qty: 180 ML | Refills: 0 | Status: SHIPPED | OUTPATIENT
Start: 2023-07-13 | End: 2023-07-13

## 2023-07-13 RX ORDER — BUSPIRONE HYDROCHLORIDE 5 MG/1
TABLET ORAL
Qty: 120 TABLET | Refills: 1 | Status: SHIPPED | OUTPATIENT
Start: 2023-07-13 | End: 2023-09-14 | Stop reason: DRUGHIGH

## 2023-07-13 RX ORDER — BUSPIRONE HYDROCHLORIDE 5 MG/1
TABLET ORAL
Qty: 120 TABLET | Refills: 1 | Status: SHIPPED | OUTPATIENT
Start: 2023-07-13 | End: 2023-07-13

## 2023-07-13 RX ORDER — LANOLIN ALCOHOL/MO/W.PET/CERES
1000 CREAM (GRAM) TOPICAL DAILY
Qty: 90 TABLET | Refills: 3 | Status: SHIPPED | OUTPATIENT
Start: 2023-07-13

## 2023-07-13 RX ORDER — CODEINE PHOSPHATE AND GUAIFENESIN 10; 100 MG/5ML; MG/5ML
1-2 SOLUTION ORAL EVERY 4 HOURS PRN
Qty: 180 ML | Refills: 0 | Status: SHIPPED | OUTPATIENT
Start: 2023-07-13 | End: 2023-10-04

## 2023-07-13 ASSESSMENT — PATIENT HEALTH QUESTIONNAIRE - PHQ9
10. IF YOU CHECKED OFF ANY PROBLEMS, HOW DIFFICULT HAVE THESE PROBLEMS MADE IT FOR YOU TO DO YOUR WORK, TAKE CARE OF THINGS AT HOME, OR GET ALONG WITH OTHER PEOPLE: SOMEWHAT DIFFICULT
SUM OF ALL RESPONSES TO PHQ QUESTIONS 1-9: 7

## 2023-07-13 NOTE — PROGRESS NOTES
Assessment & Plan     Bronchitis  Continue Z pack course  - guaiFENesin-codeine (ROBITUSSIN AC) 100-10 MG/5ML solution; Take 5-10 mLs by mouth every 4 hours as needed for cough    Vitamin B12 deficiency (non anemic)    - cyanocobalamin (VITAMIN B-12) 1000 MCG tablet; Take 1 tablet (1,000 mcg) by mouth daily  - Vitamin B12; Future    Panic disorder without agoraphobia    - Adult Mental [facility]  Referral; Future  - busPIRone (BUSPAR) 5 MG tablet; Take 1 tab by mouth twice daily for 1 week and then increase to 2 tabs twice daily    GEORGIANA (generalized anxiety disorder)    - Adult [facility]  Referral; Future  - busPIRone (BUSPAR) 5 MG tablet; Take 1 tab by mouth twice daily for 1 week and then increase to 2 tabs twice daily    Major depressive disorder, recurrent episode, moderate (H)    - Adult [facility]  Referral; Future    PTSD (post-traumatic stress disorder)    [facility]    Bronchospasm    - ipratropium - albuterol 0.5 mg/2.5 mg/3 mL (DUONEB) 0.5-2.5 (3) MG/3ML neb solution; Take 1 vial (3 mLs) by nebulization every 4 hours as needed for shortness of breath, wheezing or cough             Nicotine/Tobacco Cessation:  She reports that she has been smoking cigarettes. She has a 30.00 pack-year smoking history. She has never used smokeless tobacco.  Nicotine/Tobacco Cessation Plan:   Information offered: Patient not interested at this time      CONSULTATION/REFERRAL to psychiatry     FUTURE APPOINTMENTS:       - Follow-up visit in 2-3 weeks, sooner PRN, repeat Lab in 2 months    See Patient Instructions    Time spent in chart review in preparation to see patient, time with patient for interview/exam, ordering medications/tests/and/or procedures, and time spent in charting and coordinating care: 30 minutes.     ISAAC Dodson [facility]    Eunice Guerin is a 47 year old, presenting for the following  health issues:  Cold Symptoms        7/13/2023     1:57 PM   Additional Questions   Roomed by Alla WARD     History of Present Illness       Reason for visit:  Im tired all the time,my body hurts and i have no energy. And erendira been sick for more than 2 weeks with a bad cough, sore throat and sinus congestion. Im miserable and im tired of feeling sick.  Symptom onset:  3-4 weeks ago  Symptoms include:  Weakness, tired, horrible coughing thats makingy head and ribs and back hurt really bad  Symptom intensity:  Severe  Symptom progression:  Worsening  Had these symptoms before:  Yes  Has tried/received treatment for these symptoms:  Yes  Previous treatment was successful:  No  What makes it worse:  Laying down  What makes it better:  Not really    She eats 0-1 servings of fruits and vegetables daily.She consumes 2 sweetened beverage(s) daily.She exercises with enough effort to increase her heart rate 10 to 19 minutes per day.  She exercises with enough effort to increase her heart rate 4 days per week. She is missing 1 dose(s) of medications per week.  She is not taking prescribed medications regularly due to remembering to take.    Today's PHQ-9         PHQ-9 Total Score: 7    PHQ-9 Q9 Thoughts of better off dead/self-harm past 2 weeks :   Not at all    How difficult have these problems made it for you to do your work, take care of things at home, or get along with other people: Somewhat difficult       Acute Illness  Acute illness concerns: cough and congestion    Onset/Duration: 3 weeks   Symptoms:  Fever: YES- highest was 101-a week ago   Chills/Sweats: YES  Headache (location?): YES  Sinus Pressure: YES  Conjunctivitis:  No  Ear Pain: YES: bilateral  Rhinorrhea: YES  Congestion: YES- nasal and chest   Sore Throat: YES  Cough: YES-productive of yellow sputum, with shortness of breath with exertion, rib and back pain from cough   Wheeze: YES  Decreased Appetite: YES  Nausea: No  Vomiting: No  Diarrhea:  "No  Dysuria/Freq.: No  Dysuria or Hematuria: No  Fatigue/Achiness: YES  Sick/Strep Exposure: No  Therapies tried and outcome: zpack (started yesterday), prednisone, albuterol inhaler, tylenol, ibuprofen, cough medicine       Anxiety Follow-Up    How are you doing with your anxiety since your last visit? Worsened     Are you having other symptoms that might be associated with anxiety? Yes:  insomnia, panic attacks    Have you had a significant life event? Health Concerns     Are you feeling depressed? No    Do you have any concerns with your use of alcohol or other drugs? No    Social History     Tobacco Use     Smoking status: Every Day     Packs/day: 1.00     Years: 30.00     Pack years: 30.00     Types: Cigarettes     Smokeless tobacco: Never   Vaping Use     Vaping Use: Never used   Substance Use Topics     Alcohol use: Yes     Comment: 1-2 drinks a week     Drug use: Yes     Types: Marijuana     Comment: cbd         9/3/2020    11:55 AM 8/27/2021     5:41 PM 10/5/2021     9:49 AM   GEORGIANA-7 SCORE   Total Score 7 11 10         1/2/2023     6:11 AM 3/28/2023     2:51 PM 7/12/2023     2:41 PM   PHQ   PHQ-9 Total Score 8 12 7   Q9: Thoughts of better off dead/self-harm past 2 weeks Not at all Not at all Not at all         Medication Followup of B12    Taking Medication as prescribed: NO-pharmacy said Rx was canceled    Side Effects:  None    Medication Helping Symptoms:  not applicable        Review of Systems   Constitutional, HEENT, cardiovascular, pulmonary, gi and gu systems are negative, except as otherwise noted.      Objective    /84   Pulse 97   Temp 99  F (37.2  C) (Tympanic)   Resp 18   Ht 1.575 m (5' 2\")   Wt 86.2 kg (190 lb)   SpO2 99%   BMI 34.75 kg/m    Body mass index is 34.75 kg/m .  Physical Exam   GENERAL: alert and no distress  EYES: Eyes grossly normal to inspection and conjunctivae and sclerae normal  HENT: ear canals and TM's normal, nose and mouth without ulcers or lesions  NECK: no " adenopathy, no asymmetry, masses, or scars and thyroid normal to palpation  RESP: lungs clear to auscultation - no rales, rhonchi or wheezes and frequent harsh cough  CV: regular rates and rhythm, normal S1 S2, no S3 or S4, no murmur, click or rub and no peripheral edema  SKIN: no suspicious lesions or rashes  NEURO: Normal strength and tone, mentation intact and speech normal  PSYCH: mentation appears normal, affect normal/bright

## 2023-07-17 DIAGNOSIS — F41.0 PANIC ATTACK: ICD-10-CM

## 2023-07-17 RX ORDER — HYDROXYZINE HYDROCHLORIDE 50 MG/1
TABLET, FILM COATED ORAL
Qty: 90 TABLET | Refills: 0 | Status: SHIPPED | OUTPATIENT
Start: 2023-07-17 | End: 2023-08-28

## 2023-07-19 ENCOUNTER — PATIENT OUTREACH (OUTPATIENT)
Dept: CARE COORDINATION | Facility: CLINIC | Age: 48
End: 2023-07-19
Payer: COMMERCIAL

## 2023-07-25 NOTE — DISCHARGE INSTRUCTIONS
Event Note Event Note Nutrition Services Oncology PA Oncology Physician Assistant RETURN TO THE EMERGENCY ROOM FOR THE FOLLOWING:    Severely worsened breathing, vomiting and dehydration, or at anytime for any concern.    FOLLOW UP:    With your primary clinic only as needed.    TREATMENT RECOMMENDATIONS:    Albuterol for wheezing as needed.    Prednisone 40 mg tonight and then 20 mg twice daily thereafter.    NURSE ADVICE LINE:  [phone] or [phone]     Urology  PA Note/Event Note Event Note Event Note IR Pre-Procedure Note/Event Note Urology

## 2023-07-29 DIAGNOSIS — K21.9 GASTROESOPHAGEAL REFLUX DISEASE: Primary | ICD-10-CM

## 2023-07-31 NOTE — PROGRESS NOTES
Rheumatology Clinic Visit  [facility]  ARTURO Bustillo     Apoorva Roberts MRN# [id number]   YOB: 1975 Age: 47 year old   Date of Visit: 08/02/2023  Primary care provider: Angelic More          Assessment and Plan:     Polyarthralgia  Elevated CRP    Patient presents today for an initial evaluation of polyarthralgia.  She states that her pain started in 2013 after she had nerves cut during a hernia repair surgery.  She states that she has had pain since then however she feels that it has been progressing and the pain has now been traveling.  She has pain in her neck and her back but the hip pain has been continuously bothersome for her.  She states that she will wake up in pain.  At times she is able to get up and move around which seems to help but other times the pain is so severe she is not able to move.  No significant peripheral joint pain.  Physical examination today did not show any active synovitis, dactylitis, tenosynovitis or enthesopathy.  She had full fist formation and normal  strength.  Previous laboratory evaluations and imaging studies were reviewed, results below.    Patient did have an elevated C-reactive protein on evaluation in the past.  Unfortunately when this was initially checked she had been diagnosed with COVID at the same time.  Discussed with the patient that an infection can be the cause for the elevated inflammatory marker.  It was checked approximately 6 weeks later and had gone down slightly but was still quite elevated.  At this time I would recommend rechecking these levels.  She did however have bronchitis which was treated approximately 3 weeks ago.  If there is a mild elevation discussed with the patient that it could still be from a previous infection.  She verbalized her understanding.  We will have her recheck the inflammatory markers as well as check her HLA-B27 status.  Discussed with the patient that her CCP antibody and rheumatoid  factor were negative.  Her symptoms are also not suggestive of rheumatoid arthritis.  Her STACY was negative which rules out a connective tissue disorder.  Discussed with the patient that this could be a spondyloarthropathy.  Therefore we will also get an x-ray of her pelvis which will get her SI joints as well as her hips.  We will check her HLA-B27 status as well.  I will contact the patient with the results once the evaluation is complete.    Plan:     Schedule follow-up with Arely Hayes PA-C as needed  Imaging: xray pelvis  Labs: HLAB27, CRP and Sed Rate  Other: Other options that may help: mediterranean diet, quitting smoking and exercise    Arely Hayes, PAC  Rheumatology         History of Present Illness:   Apoorva Roberts presents for evaluation of joint pain.      She reports that her pain started in 2013. She had a hernia repaired, she states that the surgeon cut 2 nerves. She has had pain since that. She feels that this has been getting worse. She states that the pain has been traveling. She has pain in her back and neck. She occasionally gets hip pain as well. She feels that her hips are always very sore. She states the pain will wake her up. She states that sometimes moving helps, but other times the pain is so severe she cannot move. No fevers. No history of uveitis or iritis. No personal or family history of psoriasis, ulcerative colitis or crohn's. No skin rashes. No Raynaud's. She does have some heartburn. No dry eyes or dry mouth. No history of pericarditis or pleuritis. She reports always feeling fatigued.     She was given prednisone for a URI but is unsure if it was helpful for her joints as she was having issues with her breathing.          Review of Systems:     Constitutional: negative  Skin: negative  Eyes: negative  Ears/Nose/Throat: negative  Respiratory: No shortness of breath, dyspnea on exertion, cough, or hemoptysis  Cardiovascular: negative  Gastrointestinal:  "negative  Genitourinary: negative  Musculoskeletal: as above  Neurologic: negative  Psychiatric: negative  Hematologic/Lymphatic/Immunologic: negative  Endocrine: negative         Active Problem List:     Patient Active Problem List    Diagnosis Date Noted    Occipital headache 03/30/2023     Priority: Medium    Vitamin B12 deficiency (non anemic) 01/03/2023     Priority: Medium    Restless leg syndrome 01/03/2023     Priority: Medium    Elevated C-reactive protein (CRP) 01/03/2023     Priority: Medium    PTSD (post-traumatic stress disorder) 08/04/2022     Priority: Medium    GEORGIANA (generalized anxiety disorder) 08/04/2022     Priority: Medium    Cervical high risk HPV (human papillomavirus) test positive 09/02/2021     Priority: Medium     8/27/21 NIL Pap, + HR HPV (neg 16/18). Plan cotest in 1 year.   8/4/22 LSIL, +HR HPV (not 16/18). Plan: colposcopy due before 11/4/22 2/14/23 Lost to follow-up for pap tracking         Marijuana use, continuous 09/17/2020     Priority: Medium    Tobacco use disorder 09/17/2020     Priority: Medium    Migraine without aura and without status migrainosus, not intractable 09/17/2020     Priority: Medium    Chronic daily headache 11/06/2019     Priority: Medium     Improved on daily Tizanidine- has seen Neurology      Peripheral neuropathy 11/06/2019     Priority: Medium     After hernia surgery in 2014- was told the surgeon \"nicked\" a couple nerves on the left      Gastroesophageal reflux disease 12/05/2017     Priority: Medium    Benign essential hypertension 12/05/2017     Priority: Medium    Major depressive disorder, recurrent episode, moderate (H) 12/05/2017     Priority: Medium    Hidradenitis suppurativa 09/11/2017     Priority: Medium            Past Medical History:     Past Medical History:   Diagnosis Date    Cervical high risk HPV (human papillomavirus) test positive 08/27/2021    Community acquired pneumonia 12/05/2017 12/5/2017 chest radiograph: RLL infiltrate. " Influenza negative. procalcitonin 0.23.    Depressive disorder     Gastroesophageal reflux disease     Hiatal hernia     Hypertension     Other chronic pain     Walking troubles      Past Surgical History:   Procedure Laterality Date    ABDOMEN SURGERY      APPENDECTOMY      CHOLECYSTECTOMY      ESOPHAGOSCOPY, GASTROSCOPY, DUODENOSCOPY (EGD), COMBINED N/A 10/8/2021    Procedure: ESOPHAGOGASTRODUODENOSCOPY, WITH BIOPSY;  Surgeon: Slim Randle DO;  Location: [facility]    ESOPHAGOSCOPY, GASTROSCOPY, DUODENOSCOPY (EGD), COMBINED N/A 5/8/2023    Procedure: ESOPHAGOGASTRODUODENOSCOPY, WITH BIOPSY;  Surgeon: Clemente Strong MD;  Location: [facility]    GYN SURGERY      HERNIA REPAIR      HYSTERECTOMY, PAP STILL INDICATED      Cervix still present            Social History:     Social History     Socioeconomic History    Marital status:      Spouse name: Not on file    Number of children: Not on file    Years of education: Not on file    Highest education level: Not on file   Occupational History    Not on file   Tobacco Use    Smoking status: Every Day     Packs/day: 1.00     Years: 30.00     Pack years: 30.00     Types: Cigarettes     Passive exposure: Past    Smokeless tobacco: Never   Vaping Use    Vaping Use: Never used   Substance and Sexual Activity    Alcohol use: Yes     Comment: 1-2 drinks a week    Drug use: Yes     Types: Marijuana     Comment: cbd    Sexual activity: Yes     Partners: Male     Birth control/protection: None   Other Topics Concern    Parent/sibling w/ CABG, MI or angioplasty before 65F 55M? No   Social History Narrative    Not on file     Social Determinants of Health     Financial Resource Strain: Not on file   Food Insecurity: Not on file   Transportation Needs: Not on file   Physical Activity: Not on file   Stress: Not on file   Social Connections: Not on file   Intimate Partner Violence: Not on file   Housing Stability: Not on file          Family History:     Family  History   Problem Relation Age of Onset    Diabetes Mother     Hypertension Mother     Hyperlipidemia Mother     Depression Mother     Substance Abuse Mother     Hypertension Father     Hyperlipidemia Father     Kidney Cancer Father     Other Cancer Father     Diabetes Maternal Grandmother     Depression Sister     Breast Cancer No family hx of             Allergies:     Allergies   Allergen Reactions    Indomethacin      Other reaction(s): GI Upset    Fluoxetine      Other reaction(s): Thrush    Paroxetine      Other reaction(s): Thrush            Medications:     Current Outpatient Medications   Medication Sig Dispense Refill    acetaminophen (TYLENOL) 325 MG tablet Take 650 mg by mouth every 6 hours as needed for mild pain      albuterol (PROAIR HFA/PROVENTIL HFA/VENTOLIN HFA) 108 (90 Base) MCG/ACT inhaler Inhale 2 puffs into the lungs every 4 hours as needed for shortness of breath or wheezing 8.5 g 0    busPIRone (BUSPAR) 5 MG tablet Take 1 tab by mouth twice daily for 1 week and then increase to 2 tabs twice daily 120 tablet 1    butalbital-acetaminophen-caffeine (ESGIC) -40 MG tablet TAKE 1 TABLET BY MOUTH DAILY AS NEEDED HEADACHES. NO MORE THAN 2 DAYS PER WEEK 20 tablet 1    cholestyramine (QUESTRAN) 4 GM/DOSE powder Take 4 g by mouth 2 times daily (with meals) Start with 4 gram at supper. Increase the dose to twice a day if needed. 348 g 3    cyanocobalamin (VITAMIN B-12) 1000 MCG tablet Take 1 tablet (1,000 mcg) by mouth daily 90 tablet 3    famotidine (PEPCID) 40 MG tablet Take 1 tablet (40 mg) by mouth At Bedtime 90 tablet 3    gabapentin (NEURONTIN) 400 MG capsule Take 3 capsules (1,200 mg) by mouth 3 times daily 270 capsule 3    guaiFENesin-codeine (ROBITUSSIN AC) 100-10 MG/5ML solution Take 5-10 mLs by mouth every 4 hours as needed for cough 180 mL 0    hydrochlorothiazide (HYDRODIURIL) 25 MG tablet Take 1 tablet (25 mg) by mouth daily 90 tablet 3    hydrOXYzine (ATARAX) 50 MG tablet TAKE 1  "TABLET(50 MG) BY MOUTH EVERY 6 HOURS AS NEEDED FOR ANXIETY 90 tablet 0    IBUPROFEN PO       ipratropium - albuterol 0.5 mg/2.5 mg/3 mL (DUONEB) 0.5-2.5 (3) MG/3ML neb solution Take 1 vial (3 mLs) by nebulization every 4 hours as needed for shortness of breath, wheezing or cough 90 mL 3    lisinopril (ZESTRIL) 5 MG tablet Take 1 tablet (5 mg) by mouth daily 90 tablet 3    Loratadine-Pseudoephedrine (CLARITIN-D 24 HOUR PO)       omeprazole (PRILOSEC) 20 MG DR capsule TAKE 1 CAPSULE BY MOUTH DAILY 270 capsule 1    omeprazole (PRILOSEC) 40 MG DR capsule Take 1 capsule (40 mg) by mouth daily 30 capsule 1    sucralfate (CARAFATE) 1 GM tablet Take 1 tablet (1 g) by mouth 3 times daily Take between meals and one dose before bed. Dissolve in a small amount of water. Do not eat, smoke, or drink for 30 min after the medication 90 tablet 0    tiZANidine (ZANAFLEX) 2 MG tablet TAKE 1 TABLET BY MOUTH THREE TIMES DAILY AS NEEDED FOR MUSCLE SPASMS 720 tablet 3    zolpidem (AMBIEN) 10 MG tablet Take 0.5-1 tablets (5-10 mg) by mouth nightly as needed for sleep 30 tablet 5            Physical Exam:   Blood pressure (!) 141/89, pulse 94, resp. rate 18, height 1.575 m (5' 2\"), weight 88.1 kg (194 lb 3.2 oz), SpO2 97 %, not currently breastfeeding.  Wt Readings from Last 6 Encounters:   08/02/23 88.1 kg (194 lb 3.2 oz)   07/13/23 86.2 kg (190 lb)   07/05/23 86.2 kg (190 lb)   06/13/23 88.5 kg (195 lb)   05/08/23 88.5 kg (195 lb)   04/20/23 86.2 kg (190 lb)     Constitutional: well-developed, appearing stated age; cooperative  Eyes: nl PERRLA, conjunctiva, sclera  ENT: nl external ears, nose, hearing, lips, teeth, gums, throat. No mucositis.   No mucous membrane lesions, normal saliva pool  Neck: no mass or thyroid enlargement  Resp: lungs clear to auscultation  CV: RRR, no murmurs, rubs or gallops, no edema  Lymph: no cervical, supraclavicular or epitrochlear nodes  MS: The TMJ, neck, shoulder, elbow, wrist, MCP/PIP/DIP, spine, hip, " knee, ankle, and foot MTP/IP joints were examined and found normal. No active synovitis or altered joint anatomy. Full joint ROM. Normal  strength. No dactylitis,  tenosynovitis, enthesopathy.   Skin: no nail pitting, alopecia, rash, nodules or lesions.   Psych: nl judgement, orientation, memory, affect.           Data:   Imaging:  MRI right knee 8/8/2018  CONCLUSION:   1. Mild degenerative changes in the patellofemoral compartment, with a focal   high-grade chondral defect.     2.  No meniscal or ligament tear.     3.  Small joint effusion.     Laboratory:  8/4/2022  CCP antibody 1.0  Rheumatoid factor 7  TTG negative  STACY negative    1/2/2023  CRP 51.49  Ferritin 262  Hemoglobin A1c 5.6  Iron studies normal  TSH 1.28  Vitamin B12 494  White blood cell count 10.8, hemoglobin 13.2, platelet count 369  Sed rate 1

## 2023-07-31 NOTE — TELEPHONE ENCOUNTER
"Prescription approved per [facility] Refill Protocol.    Pending Prescriptions:                       Disp   Refills    omeprazole (PRILOSEC) 20 MG DR capsule [P*270 ca*1            Sig: TAKE 1 CAPSULE BY MOUTH DAILY      Requested Prescriptions   Pending Prescriptions Disp Refills    omeprazole (PRILOSEC) 20 MG DR capsule [Pharmacy Med Name: OMEPRAZOLE 20MG CAPSULES] 270 capsule 1     Sig: TAKE 1 CAPSULE BY MOUTH DAILY       PPI Protocol Passed - 7/29/2023 11:55 AM        Passed - Not on Clopidogrel (unless Pantoprazole ordered)        Passed - No diagnosis of osteoporosis on record        Passed - Recent (12 mo) or future (30 days) visit within the authorizing provider's specialty     Patient has had an office visit with the authorizing provider or a provider within the authorizing providers department within the previous 12 mos or has a future within next 30 days. See \"Patient Info\" tab in inbasket, or \"Choose Columns\" in Meds & Orders section of the refill encounter.              Passed - Medication is active on med list        Passed - Patient is age 18 or older        Passed - No active pregnacy on record        Passed - No positive pregnancy test in past 12 months           Kavya Santos RN on 7/31/2023 at 2:09 PM    "

## 2023-08-02 ENCOUNTER — TELEPHONE (OUTPATIENT)
Dept: FAMILY MEDICINE | Facility: CLINIC | Age: 48
End: 2023-08-02

## 2023-08-02 ENCOUNTER — OFFICE VISIT (OUTPATIENT)
Dept: RHEUMATOLOGY | Facility: CLINIC | Age: 48
End: 2023-08-02
Payer: COMMERCIAL

## 2023-08-02 ENCOUNTER — MYC MEDICAL ADVICE (OUTPATIENT)
Dept: FAMILY MEDICINE | Facility: CLINIC | Age: 48
End: 2023-08-02

## 2023-08-02 ENCOUNTER — ANCILLARY PROCEDURE (OUTPATIENT)
Dept: GENERAL RADIOLOGY | Facility: CLINIC | Age: 48
End: 2023-08-02
Attending: PHYSICIAN ASSISTANT
Payer: COMMERCIAL

## 2023-08-02 VITALS
HEIGHT: 62 IN | HEART RATE: 94 BPM | WEIGHT: 194.2 LBS | SYSTOLIC BLOOD PRESSURE: 141 MMHG | RESPIRATION RATE: 18 BRPM | BODY MASS INDEX: 35.74 KG/M2 | OXYGEN SATURATION: 97 % | DIASTOLIC BLOOD PRESSURE: 89 MMHG

## 2023-08-02 DIAGNOSIS — M25.50 POLYARTHRALGIA: ICD-10-CM

## 2023-08-02 DIAGNOSIS — R79.82 ELEVATED C-REACTIVE PROTEIN (CRP): ICD-10-CM

## 2023-08-02 DIAGNOSIS — M25.50 POLYARTHRALGIA: Primary | ICD-10-CM

## 2023-08-02 DIAGNOSIS — E53.8 VITAMIN B12 DEFICIENCY (NON ANEMIC): ICD-10-CM

## 2023-08-02 LAB
CRP SERPL-MCNC: <3 MG/L
ERYTHROCYTE [SEDIMENTATION RATE] IN BLOOD BY WESTERGREN METHOD: 29 MM/HR (ref 0–20)
VIT B12 SERPL-MCNC: 642 PG/ML (ref 232–1245)

## 2023-08-02 PROCEDURE — 85652 RBC SED RATE AUTOMATED: CPT | Performed by: PHYSICIAN ASSISTANT

## 2023-08-02 PROCEDURE — 99204 OFFICE O/P NEW MOD 45 MIN: CPT | Performed by: PHYSICIAN ASSISTANT

## 2023-08-02 PROCEDURE — 82607 VITAMIN B-12: CPT | Performed by: PHYSICIAN ASSISTANT

## 2023-08-02 PROCEDURE — 36415 COLL VENOUS BLD VENIPUNCTURE: CPT | Performed by: PHYSICIAN ASSISTANT

## 2023-08-02 PROCEDURE — 86140 C-REACTIVE PROTEIN: CPT | Performed by: PHYSICIAN ASSISTANT

## 2023-08-02 PROCEDURE — 72170 X-RAY EXAM OF PELVIS: CPT | Mod: TC | Performed by: STUDENT IN AN ORGANIZED HEALTH CARE EDUCATION/TRAINING PROGRAM

## 2023-08-02 PROCEDURE — 81374 HLA I TYPING 1 ANTIGEN LR: CPT | Performed by: PHYSICIAN ASSISTANT

## 2023-08-02 ASSESSMENT — PAIN SCALES - GENERAL: PAINLEVEL: SEVERE PAIN (6)

## 2023-08-02 NOTE — TELEPHONE ENCOUNTER
Per fax received from [facility] - Omeprazole (PRILOSEC) 20 MG DR capsule  is not covered by patient's Insurance Company  ELIDIA More - Please choose:  1.  Change medication that is not covered to a different medication and send new prescription to patient's pharmacy?  2.  Patient will need to pay for the non-covered medication out-of-pocket?   3.  Try for Prior Authorization with Insurance Company to get medication covered?        P.A. Phone #: [phone]       P.A.  ID#:  [id number]

## 2023-08-02 NOTE — PATIENT INSTRUCTIONS
After Visit Instructions:     Thank you for coming to [facility]umat[facility] for your care. It is my goal to partner with you to help you reach your optimal state of health.       Plan:     Schedule follow-up with Arely Hayes PA-C as needed  Imaging: xray pelvis  Labs: HLAB27, CRP and Sed Rate  Other: Other options that may help: mediterranean diet, quitting smoking and exercise      Arely Hayes PA-C  [facility]  [facility]    Contact information: [facility]  Clinic Number:  [phone]  Please call or send a [employer]urom[employer] message with any questions about your care

## 2023-08-03 DIAGNOSIS — G62.89 OTHER POLYNEUROPATHY: ICD-10-CM

## 2023-08-03 RX ORDER — GABAPENTIN 400 MG/1
CAPSULE ORAL
Qty: 270 CAPSULE | Refills: 3 | Status: SHIPPED | OUTPATIENT
Start: 2023-08-03 | End: 2023-11-29

## 2023-08-03 NOTE — TELEPHONE ENCOUNTER
Prior Authorization Retail Medication Request    Medication/Dose: Omeprazole (PRILOSEC) 40 MG DR capsule  ICD code (if different than what is on RX):    Previously Tried and Failed:  nexium; pepcid; robinul; prilosec 20 mg; protonix 40 mg; carafate  Rationale:  patient has used previously with success    Insurance Name:  [phone]  Insurance ID:  [id number]      Pharmacy Information (if different than what is on RX)  Name:    Phone:

## 2023-08-04 DIAGNOSIS — I10 BENIGN ESSENTIAL HYPERTENSION: ICD-10-CM

## 2023-08-04 NOTE — TELEPHONE ENCOUNTER
Central Prior Authorization Team   Phone: [phone]    PA Initiation    Medication: OMEPRAZOLE 40 MG PO CPDR  Insurance Company: [employer] - Phone [phone] Fax [phone]  Pharmacy Filling the Rx: [id number] - [address] AT [facility] & [address]  Filling Pharmacy Phone: [phone]  Filling Pharmacy Fax:    Start Date: 8/4/2023

## 2023-08-07 LAB
B LOCUS: NORMAL
B27TEST METHOD: NORMAL

## 2023-08-07 NOTE — ED NOTES
Bed: ED24  Expected date:   Expected time:   Means of arrival:   Comments:   Consent 2/Introductory Paragraph: Mohs surgery was explained to the patient and consent was obtained. The risks, benefits and alternatives to therapy were discussed in detail. Specifically, the risks of infection, scarring, bleeding, prolonged wound healing, incomplete removal, allergy to anesthesia, nerve injury and recurrence were addressed. Prior to the procedure, the treatment site was clearly identified and confirmed by the patient. All components of Universal Protocol/PAUSE Rule completed.

## 2023-08-07 NOTE — TELEPHONE ENCOUNTER
Prior Authorization Approval    Medication: OMEPRAZOLE 40 MG PO CPDR  Authorization Effective Date: 5/7/2023  Authorization Expiration Date: 8/5/2024  Approved Dose/Quantity:   Reference #:     Insurance Company: [employer] - Phone [phone] Fax [phone]  Expected CoPay:       CoPay Card Available:      Financial Assistance Needed:   Which Pharmacy is filling the prescription: [id number] - [place], [place] - [address] [place] [address] AT [facility] & [address]  Pharmacy Notified: Yes  Patient Notified: No  **Instructed pharmacy to notify patient when script is ready to /ship.**

## 2023-08-07 NOTE — TELEPHONE ENCOUNTER
"Has appointment scheduled for 8/14/23      Requested Prescriptions   Pending Prescriptions Disp Refills    hydrochlorothiazide (HYDRODIURIL) 25 MG tablet [Pharmacy Med Name: HYDROCHLOROTHIAZIDE 25MG TABLETS] 90 tablet 3     Sig: TAKE 1 TABLET BY MOUTH DAILY       Diuretics (Including Combos) Protocol Failed - 8/4/2023  2:06 PM        Failed - Blood pressure under 140/90 in past 12 months     BP Readings from Last 3 Encounters:   08/02/23 (!) 141/89   07/13/23 130/84   07/05/23 135/79                 Passed - Recent (12 mo) or future (30 days) visit within the authorizing provider's specialty     Patient has had an office visit with the authorizing provider or a provider within the authorizing providers department within the previous 12 mos or has a future within next 30 days. See \"Patient Info\" tab in inbasket, or \"Choose Columns\" in Meds & Orders section of the refill encounter.              Passed - Medication is active on med list        Passed - Patient is age 18 or older        Passed - No active pregancy on record        Passed - Normal serum creatinine on file in past 12 months     Recent Labs   Lab Test 01/03/23  1045   CR 0.76              Passed - Normal serum potassium on file in past 12 months     Recent Labs   Lab Test 01/03/23  1045   POTASSIUM 3.7                    Passed - Normal serum sodium on file in past 12 months     Recent Labs   Lab Test 01/03/23  1045                 Passed - No positive pregnancy test in past 12 months             "

## 2023-08-08 RX ORDER — HYDROCHLOROTHIAZIDE 25 MG/1
25 TABLET ORAL DAILY
Qty: 90 TABLET | Refills: 3 | Status: SHIPPED | OUTPATIENT
Start: 2023-08-08 | End: 2024-04-22

## 2023-08-10 DIAGNOSIS — J40 BRONCHITIS: Primary | ICD-10-CM

## 2023-08-10 RX ORDER — ALBUTEROL SULFATE 90 UG/1
2 AEROSOL, METERED RESPIRATORY (INHALATION) EVERY 4 HOURS PRN
Qty: 8.5 G | Refills: 0 | Status: SHIPPED | OUTPATIENT
Start: 2023-08-10

## 2023-08-23 ASSESSMENT — PATIENT HEALTH QUESTIONNAIRE - PHQ9
10. IF YOU CHECKED OFF ANY PROBLEMS, HOW DIFFICULT HAVE THESE PROBLEMS MADE IT FOR YOU TO DO YOUR WORK, TAKE CARE OF THINGS AT HOME, OR GET ALONG WITH OTHER PEOPLE: SOMEWHAT DIFFICULT
SUM OF ALL RESPONSES TO PHQ QUESTIONS 1-9: 7
10. IF YOU CHECKED OFF ANY PROBLEMS, HOW DIFFICULT HAVE THESE PROBLEMS MADE IT FOR YOU TO DO YOUR WORK, TAKE CARE OF THINGS AT HOME, OR GET ALONG WITH OTHER PEOPLE: SOMEWHAT DIFFICULT

## 2023-08-23 NOTE — PROGRESS NOTES
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 Information has been obtained for the following:  na .     Emergency Contact  was obtained.      Clinical Substantiation/medical necessity for the above recommendations:  na.    7. CARLITOS:    CARLITOS:  Discussed the general effects of drugs and alcohol on health and well-being. Provider gave patient printed information about the effects of chemical use on their health and well being. Recommendations:  limit .     8. Records:   These were not available for review at time of assessment.   Information in this assessment was obtained from the medical record and  provided by patient who is a good historian. Patient will have open access to their mental health medical record.    9.   Interactive Complexity: No    Provider Name/ Credentials:  Rufina Farnsworth [facility] [place] LICSW  August 24, 2023      Answers submitted by the patient for this visit:  Patient Health Questionnaire (Submitted on 8/23/2023)  If you checked off any problems, how difficult have these problems made it for you to do your work, take care of things at home, or get along with other people?: Somewhat difficult  PHQ9 TOTAL SCORE: 7

## 2023-08-24 ENCOUNTER — VIRTUAL VISIT (OUTPATIENT)
Dept: PSYCHIATRY | Facility: CLINIC | Age: 48
End: 2023-08-24
Attending: NURSE PRACTITIONER
Payer: COMMERCIAL

## 2023-08-24 ENCOUNTER — VIRTUAL VISIT (OUTPATIENT)
Dept: BEHAVIORAL HEALTH | Facility: CLINIC | Age: 48
End: 2023-08-24
Payer: COMMERCIAL

## 2023-08-24 DIAGNOSIS — F43.10 PTSD (POST-TRAUMATIC STRESS DISORDER): ICD-10-CM

## 2023-08-24 DIAGNOSIS — F41.1 GENERALIZED ANXIETY DISORDER: Primary | ICD-10-CM

## 2023-08-24 DIAGNOSIS — F33.1 MODERATE EPISODE OF RECURRENT MAJOR DEPRESSIVE DISORDER (H): ICD-10-CM

## 2023-08-24 DIAGNOSIS — F41.0 PANIC ATTACK: ICD-10-CM

## 2023-08-24 DIAGNOSIS — F41.1 GAD (GENERALIZED ANXIETY DISORDER): Primary | ICD-10-CM

## 2023-08-24 DIAGNOSIS — F33.1 MAJOR DEPRESSIVE DISORDER, RECURRENT EPISODE, MODERATE (H): ICD-10-CM

## 2023-08-24 DIAGNOSIS — Z86.59 HISTORY OF POSTTRAUMATIC STRESS DISORDER (PTSD): ICD-10-CM

## 2023-08-24 PROCEDURE — 90791 PSYCH DIAGNOSTIC EVALUATION: CPT | Mod: 95 | Performed by: SOCIAL WORKER

## 2023-08-24 PROCEDURE — 99204 OFFICE O/P NEW MOD 45 MIN: CPT | Mod: VID | Performed by: NURSE PRACTITIONER

## 2023-08-24 RX ORDER — BUSPIRONE HYDROCHLORIDE 30 MG/1
30 TABLET ORAL 2 TIMES DAILY
Qty: 60 TABLET | Refills: 0 | Status: SHIPPED | OUTPATIENT
Start: 2023-08-24 | End: 2023-09-14

## 2023-08-24 ASSESSMENT — COLUMBIA-SUICIDE SEVERITY RATING SCALE - C-SSRS
6. HAVE YOU EVER DONE ANYTHING, STARTED TO DO ANYTHING, OR PREPARED TO DO ANYTHING TO END YOUR LIFE?: NO
2. HAVE YOU ACTUALLY HAD ANY THOUGHTS OF KILLING YOURSELF?: NO
TOTAL  NUMBER OF ABORTED OR SELF INTERRUPTED ATTEMPTS LIFETIME: NO
ATTEMPT LIFETIME: NO
1. HAVE YOU WISHED YOU WERE DEAD OR WISHED YOU COULD GO TO SLEEP AND NOT WAKE UP?: NO
TOTAL  NUMBER OF INTERRUPTED ATTEMPTS LIFETIME: NO

## 2023-08-24 ASSESSMENT — PAIN SCALES - GENERAL: PAINLEVEL: SEVERE PAIN (7)

## 2023-08-24 NOTE — PROGRESS NOTES
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 insomnia that she thinks was likely PTSD at a young age. She has been using ambien since 2009. Denies any side effects, reports unable to sleep without at this time. She does have sleep med appt planned for 11/2023. Reports her depression is severely high today as well. Denies any SI/SIB/HI. No history of jostin, psychosis. Denies any medication previously being helpful for depressive symptoms, no known trials of mood stabilizers or SGAs. Given difficulty finding tolerable medication, recommending initial optimization of current buspirone for anxiety and possible benefit to depressive symptoms. Restart hydroxyzine as needed for anxiety. Follow-up with this provider and [facility] [place] in 3 weeks. Patient agreeable to plan.     Medication side effects and alternatives reviewed. Health promotion activities recommended and reviewed today. All questions addressed. Education and counseling completed regarding risks and benefits of medications and psychotherapy options. Recommend therapy for additional support.     Treatment Plan:  INCREASE TO buspirone 15 mg (3x tablets) twice a day for 1 week. Then INCREASE to buspirone 20 mg (4x tablets) for 1 week. Then INCREASE to buspirone 30 mg twice a day. Script sent for 1 month of 30 mg tablets.  CONTINUE hydroxyzine 50 mg (1/2 - 1 tablet) as needed for  anxiety.  Continue all other medications per primary care provider.   Safety plan reviewed. To the Emergency Department as needed or call after hours crisis line at [phone] or [phone]. [place] Crisis Text Line: Text [place] to 029306 or  Suicide LifeLine Chat: suicideVPIsystems.org/chat  Schedule an appointment with me and Rufina Farnsworth [facility], Behavioral Health Consultant in 3 weeks or sooner as needed.  Call [facility] at [phone] to schedule.  Follow up with primary care provider as planned or sooner if needed for acute medical concerns.  Call the psychiatric nurse line with medication questions or concerns at [phone].  MyChart may be used to communicate with your provider, but this is not intended to be used for emergencies.    Patient Education:  Medication side effects and alternatives reviewed. Health promotion activities recommended and reviewed today. All questions addressed. Education and counseling completed regarding risks and benefits of medications and psychotherapy options.  Consent provided by patient/guardian  Call the psychiatric nurse line with medication questions or concerns at [phone].  MyChart may be used to communicate with your provider, but this is not intended to be used for emergencies.  Medlineplus.gov is information for patients.  It is run by the National Library of Medicine and it contains information about all disorders, diseases and all medications.      Community Resources:    National Suicide Prevention Lifeline: 175.406.8136 (TTY: 785.555.9189). Call anytime for help.  (www.suicidepreventionlifeline.org)  National [employer] on Mental Illness (www.oumar.org): 924.641.2296 or 998-929-4057.   Mental Health Association (www.mentalhealth.org): 501.492.9895 or 830-232-2067.  [place] Crisis Text Line: Text [place] to 240099  Suicide LifeLine Chat: suicideVPIsystems.org/chat    Administrative Billing:     Level of Medical Decision Making:    - At least 1 chronic problem that is not stable  - Engaged in prescription drug management during visit (discussed any medication benefits, side effects, alternatives, etc.)         Patient Status:  CCPS MD/DO/NP/PA providers offer care a specialty service for Primary Care Providers in the [facility] that seek to optimize psychotropic medications for unstable patients.  Once medications have been optimized, our providers discharge the patient back to the referring Primary Care Provider for ongoing medication management.  This type of system allows our providers to serve a high volume of patients.   Patient will continue to be seen for ongoing consultation and stabilization.    Signed:   Susi Carter, MSN, APRN, PMHNP-BC  Collaborative Care Psychiatry Service (CCPS)  [facility]

## 2023-08-24 NOTE — Clinical Note
Thank you for the Psychiatry referral to the [facility] Care Psychiatry Service (CCPS). Our psychiatry providers act as a specialty service for Primary Care Providers in the [facility] who seek to optimize medications for unstable patients.  Once medications have been optimized, our providers discharge the patient back to the referring Primary Care Provider for ongoing medication management.  This type of system allows our providers to serve a high volume of patients.   Please see my Impression and Plan. I will continue to work with them in stabilizing their mood.  If you have any questions or concerns, please let me know. Thank you again!  ISAAC Rebolledo, PMSUKHIP-BC Collaborative Care Psychiatry Service (CCPS) [facility]

## 2023-08-24 NOTE — NURSING NOTE
Is the patient currently in the state of [place]? YES    Visit mode:VIDEO    If the visit is dropped, the patient can be reconnected by: VIDEO VISIT: Text to cell phone:   Telephone Information:   Mobile [phone]   Mobile Not on file.       Will anyone else be joining the visit? NO  (If patient encounters technical issues they should call [phone]    How would you like to obtain your AVS? MyChart    Are changes needed to the allergy or medication list? Pt stated no changes to allergies and Pt stated no med changes    Reason for visit: Consult    Odalys Ruiz [facility]    Care team has reviewed attendance agreement with patient. Patient advised that two failed appointments within 6 months may lead to termination of current episode of care.

## 2023-08-24 NOTE — PATIENT INSTRUCTIONS
**For crisis resources, please see the information at the end of this document**     Thank you for coming to the [facility] [place] [facility].    TREATMENT PLAN:  Medications:   INCREASE TO buspirone 15 mg (3x tablets) twice a day for 1 week. Then INCREASE to buspirone 20 mg (4x tablets) for 1 week. Then INCREASE to buspirone 30 mg twice a day. Script sent for 1 month of 30 mg tablets.  CONTINUE hydroxyzine 50 mg (1/2 - 1 tablet) as needed for anxiety.  Continue all other medications per primary care provider.     Follow-up:   Schedule an appointment with me and ROSCOE Bravo, Behavioral Health Consultant in 3 weeks or sooner as needed.  Call [facility] at [phone] to schedule.  Follow up with primary care provider as planned or sooner if needed for acute medical concerns.  Call the psychiatric nurse line with medication questions or concerns at [phone].  SOS Online Backuphart may be used to communicate with your provider, but this is not intended to be used for emergencies.      Financial Assistance [phone]  [facility] Billing [phone]  Central Billing Office, [facility]: [phone]  [facility] Billing [phone]  Medical Records [phone]  [facility] Patient Bill of Rights [email or web address]       MENTAL HEALTH CRISIS RESOURCES:  For a emergency help, please call 911 or go to the nearest Emergency Department.     Emergency Walk-In Options:   EmPATH Unit @ [facility] ([place]): [phone] - Specialized mental health emergency area designed to be calming  [facility] ([place]): [phone]  [facility] ([place]): [phone]  [facility]): [phone]    [facility] Crisis Information:   [place]: [phone]  [place]: [phone]  [place] ([place]) - Adult: [phone]     Child: [phone]  [place] - Adult: [phone]      [facility]: [phone]  [place]: [phone]  List of all [facility] resources:   https://[place].gov/dhs/people-we-serve/adults/health-care/mental-health/resources/crisis-contacts.jsp    National Crisis Information:   National Suicide & Crisis Lifeline: Call 988        For online chat options, visit https://suicidepreventionlifeline.org/chat/  Poison Control Center: 2-613-500-9669  Poison Control Center: 4-170-215-5047  Trans Lifeline: 9-892-405-9531 - Hotline for transgender people of all ages  The [employer] Project: 2-257-522-9828 - Hotline for LGBT youth     For Non-Emergency Support:   Fast Tracker: Mental Health & Substance Use Disorder Resources -   https://www.eriQoon.org/       Again thank you for choosing [facility] MENTAL HEALTH & ADDICTION [place] [facility] and please let us know how we can best partner with you to improve you and your family's health.    You may be receiving a survey regarding this appointment. We would love to have your feedback, both positive and negative. The survey is done by an external company, so your answers are anonymous.        Patient Education   Collaborative Care Psychiatry Service  What to Expect  Here's what to expect from your Collaborative Care Psychiatry Service (CCPS).   About CCPS  CCPS means 2 people work together to help you get better. You'll meet with a behavioral health clinician and a psychiatric doctor. A behavioral health clinician helps people with mental health problems by talking with them. A psychiatric doctor helps people by giving them medicine.  How it works  At every visit, you'll see the behavioral health clinician (BHC) first. They'll talk with you about how you're doing and teach you how to feel better.   Then you'll see the psychiatric doctor. This doctor can help you deal with troubling thoughts and feelings by giving you medicine. They'll make sure you know the plan for your care.   CCPS usually takes 3 to 6 visits. If you need more visits, we  "may have you start seeing a different psychiatric doctor for ongoing care.  If you have any questions or concerns, we'll be glad to talk with you.  About visits  Be open  At your visits, please talk openly about your problems. It may feel hard, but it's the best way for us to help you.  Cancelling visits  If you can't come to your visit, please call us right away at [phone]. If you don't cancel at least 24 hours (1 full day) before your visit, that's \"late cancellation.\"  Being late to visits  Being very late is the same as not showing up. You will be a \"no show\" if:  Your appointment starts with a [facility], and you're more than 15 minutes late for a 30-minute (half hour) visit. This will also cancel your appointment with the psychiatric doctor.  Your appointment is with a psychiatric doctor only, and you're more than 15 minutes late for a 30-minute (half hour) visit.  Your appointment is with a psychiatric doctor only, and you're more than 30 minutes late for a 60-minute (full hour) visit.  If you cancel late or don't show up 2 times within 6 months, we may end your care.   Getting help between visits  If you need help between visits, you can call us Monday to Friday from 8 a.m. to 4:30 p.m. at [phone].  Emergency care  Call 911 or go to the nearest emergency department if your life or someone else's life is in danger.  Call 988 anytime to reach the national Suicide and Crisis hotline.  Medicine refills  To refill your medicine, call your pharmacy. You can also call [facility]'s Behavioral Access at [phone], Monday to Friday, 8 a.m. to 4:30 p.m. It can take 1 to 3 business days to get a refill.   Forms, letters, and tests  You may have papers to fill out, like FMLA, short-term disability, and workability. We can help you with these forms at your visits, but you must have an appointment. You may need more than 1 visit for this, to be in an intensive therapy program, or both.  Before we can " give you medicine for ADHD, we may refer you to get tested for it or confirm it another way.  We may not be able to give you an emotional support animal letter.  We don't do mental health checks ordered by the court.   We don't do mental health testing, but we can refer you to get tested.   Thank you for choosing us for your care.  For informational purposes only. Not to replace the advice of your health care provider. Copyright   2022 [facility]. All rights reserved. [employer] [id number] - 12/22.

## 2023-08-28 DIAGNOSIS — F41.0 PANIC ATTACK: ICD-10-CM

## 2023-08-28 RX ORDER — HYDROXYZINE HYDROCHLORIDE 50 MG/1
TABLET, FILM COATED ORAL
Qty: 90 TABLET | Refills: 0 | Status: SHIPPED | OUTPATIENT
Start: 2023-08-28 | End: 2024-06-05

## 2023-08-28 NOTE — TELEPHONE ENCOUNTER
"Prescription approved per [facility] Refill Protocol.     Requested Prescriptions   Pending Prescriptions Disp Refills    hydrOXYzine (ATARAX) 50 MG tablet [Pharmacy Med Name: HYDROXYZINE HCL 50MG TABS (WHITE)] 90 tablet 0     Sig: TAKE 1 TABLET(50 MG) BY MOUTH EVERY 6 HOURS AS NEEDED FOR ANXIETY       Antihistamines Protocol Passed - 8/28/2023 12:57 PM        Passed - Recent (12 mo) or future (30 days) visit within the authorizing provider's specialty     Patient has had an office visit with the authorizing provider or a provider within the authorizing providers department within the previous 12 mos or has a future within next 30 days. See \"Patient Info\" tab in inbasket, or \"Choose Columns\" in Meds & Orders section of the refill encounter.              Passed - Patient is age 3 or older     Apply age and/or weight-based dosing for peds patients age 3 and older.    Forward request to provider for patients under the age of 3.          Passed - Medication is active on med list                 Talha Galaviz RN 08/28/23 1:04 PM   "

## 2023-09-13 NOTE — PROGRESS NOTES
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    Discussed patient's past thrush that was attributed to alternative SSRIs and risk with same drug class. She has not experienced with all SSRI trials, and we will monitor closely for sx.       Community Resources:    National Suicide Prevention Lifeline: 330.777.1564 (TTY: 681.842.4408). Call anytime for help.  (www.suicidepreventionlifeline.org)  National [place] on Mental Illness (www.[place].org): 578.857.9555 or 236-874-5860.   Mental Health Association (www.mentalhealth.org): 706.519.9255 or 798-063-5158.  [place] Crisis Text Line: Text [place] to 071992  Suicide LifeLine Chat: suicidePostSharp Technologies.org/chat    Administrative Billing:     Level of Medical Decision Making:   - At least 1 chronic problem that is not stable  - Engaged in prescription drug management during visit (discussed any medication benefits, side effects, alternatives, etc.)             Patient Status:  CCPS MD/DO/NP/PA providers offer care a specialty service for Primary Care Providers in the [facility] that seek to optimize psychotropic medications for unstable patients.  Once medications have been optimized, our providers discharge the patient back to the referring Primary Care Provider for ongoing medication management.  This type of system allows our providers to serve a high  volume of patients.   Patient will continue to be seen for ongoing consultation and stabilization.    Signed:   Susi Carter, MSN, APRN, PMHNP-BC  Collaborative Care Psychiatry Service (CCPS)  [facility]    Chart documentation done in part with Dragon Voice Recognition software.  Although reviewed after completion, some word and grammatical errors may remain.

## 2023-09-13 NOTE — PROGRESS NOTES
[facility]iatry [facility]  September 14, 2023      Behavioral Health Clinician Progress Note    Patient Name: Apoorva Roberts           Service Type:  Individual      Service Location:   [facility]ious [facility] / Email (patient reached)     Session Start Time: 9:00 am  Session End Time: 9:25 am      Session Length: 16 - 37      Attendees: Patient     Service Modality:  Video Visit:      Provider verified identity through the following two step process.  Patient provided:  Patient is known previously to provider    Telemedicine Visit: The patient's condition can be safely assessed and treated via synchronous audio and visual telemedicine encounter.      Reason for Telemedicine Visit: Services only offered telehealth    Originating Site (Patient Location): Patient's home    Distant Site (Provider Location): [facility]    Consent:  The patient/guardian has verbally consented to: the potential risks and benefits of telemedicine (video visit) versus in person care; bill my insurance or make self-payment for services provided; and responsibility for payment of non-covered services.     Patient would like the video invitation sent by:  My Chart    Mode of Communication:  Video Conference via [facility]    Distant Location (Provider):  On-site    As the provider I attest to compliance with applicable laws and regulations related to telemedicine.    Visit Activities (Refresh list every visit): [facility] Only    Diagnostic Assessment Date: 8/24/23  Treatment Plan Review Date: 12/14/23  See Flowsheets for today's PHQ-9 and GEORGIANA-7 results  Previous PHQ-9:       3/28/2023     2:51 PM 7/12/2023     2:41 PM 8/23/2023    10:42 AM   PHQ-9 SCORE   PHQ-9 Total Score MyChart 12 (Moderate depression) 7 (Mild depression) 7 (Mild depression)   PHQ-9 Total Score 12 7 7    7     Previous GEORGIANA-7:       9/3/2020    11:55 AM 8/27/2021     5:41 PM 10/5/2021     9:49 AM   GEORGIANA-7 SCORE   Total Score 7 11 10  "      SORAYA LEVEL:       No data to display                DATA  Extended Session (60+ minutes): No  Interactive Complexity: No  Crisis: No  H Patient: No    Treatment Objective(s) Addressed in This Session:  Target Behavior(s):  anxiety and depression    Depressed Mood: Increase interest, engagement, and pleasure in doing things  Decrease frequency and intensity of feeling down, depressed, hopeless  Improve quantity and quality of night time sleep / decrease daytime naps  Feel less tired and more energy during the day   Improve diet, appetite, mindful eating, and / or meal planning  Identify negative self-talk and behaviors: challenge core beliefs, myths, and actions  Improve concentration, focus, and mindfulness in daily activities   Feel less fidgety, restless or slow in daily activities / interpersonal interactions  Anxiety: will experience a reduction in anxiety, will develop more effective coping skills to manage anxiety symptoms, will develop healthy cognitive patterns and beliefs, and will increase ability to function adaptively    Current Stressors / Issues:   update: Reports little difference since medication changes.Continues to have panic attacks several times a week. Utilizing coping skills learned in therapy. Continues to have high general anxiety and increased isolation.    Stresses: Step mom dx with terminal cancer. Deaths in the family. Relationship with son.   Appetite: some difficulty with not being hungry due to stress.   Sleep: unremarkable, dx of insomnia, best 6 hrs.   Therapy: has resources for therapist that work with her schedule. Has had a really good relationship with previous therapist. Considering reaching out to therapist and making it work with her schedule. Discussed skills for dealing with isolation and depression. Discussed hating feeling she is having to \"force myself to do daily activities\". Discussed feeling emotional, physical and mental drained. Reviewed skills for panic " "attacks. Discussed staying in the present, focusing less on past and future events. Validated pts frustrations. Discussed self care acts she can do to help herself, ie reading and watching tv.     CARLITOS: na  Preg: na  Most important: minimal change since increase medication. Would like to make medication changes a bit quicker.     8/24/23 (DA)  The reason for seeking services at this time is: \"Anxiety and depression\".  The problem(s) began 05/01/05.     First appointment with patient in [facility] and was advised of the short-term, team based structure of the model including role of C and provider. Patient indicated understanding of the model and agreed to proceed with services as described.     Met with pt for initial assessment. States she has had a hx of anxiety, depression and PTSD for many years. She was dx with insomnia as a teen. In the past year sx have become increasingly worse with depression and anxiety. Reports she has been struggling this past year with 4 deaths in the family and step mom has terminal cancer. Her relationship with son is very poor currently. She has begun isolating more from friends and family. States she is having difficulty with concentration and focus. Really enjoys reading but has not been able to do this well lately. Reports long hx of panic attacks but had been having them about once a month and was able to manage them with skills learned in therapy. Now panic attacks happen several times a week. She has also noted some of her PTSD sx increasing more as time has been progressing.      Interested in therapy, saw therapist at the end of last year for 6 months. This became very difficult to manage with work schedule. [facility] provided resources for therapist that might be able to provide more convenient hours for her.      Patient has attempted to resolve these concerns in the past through medications and therapy .      Progress on Treatment Objective(s) / Homework:  Satisfactory progress - " PRECONTEMPLATION (Not seeing need for change); Intervened by educating the patient about the effects of current behavior on health.  Evoked information about reasons to continue behavior, express concern / recommendations, and explored any change talk    Motivational Interviewing    MI Intervention: Expressed Empathy/Understanding, Supported Autonomy, Collaboration, Evocation, Permission to raise concern or advise, Open-ended questions, Reflections: simple and complex, Change talk (evoked), and Reframe     Change Talk Expressed by the Patient: Desire to change Ability to change Reasons to change Need to change    Provider Response to Change Talk: E - Evoked more info from patient about behavior change, A - Affirmed patient's thoughts, decisions, or attempts at behavior change, R - Reflected patient's change talk, and S - Summarized patient's change talk statements    Assessments completed prior to visit:  The following assessments were completed by patient for this visit:  PROMIS 10-Global Health (all questions and answers displayed):       8/17/2023    10:10 AM   PROMIS 10   In general, would you say your health is: Good   In general, would you say your quality of life is: Good   In general, how would you rate your physical health? Good   In general, how would you rate your mental health, including your mood and your ability to think? Fair   In general, how would you rate your satisfaction with your social activities and relationships? Good   In general, please rate how well you carry out your usual social activities and roles Fair   To what extent are you able to carry out your everyday physical activities such as walking, climbing stairs, carrying groceries, or moving a chair? Moderately   In the past 7 days, how often have you been bothered by emotional problems such as feeling anxious, depressed, or irritable? Sometimes   In the past 7 days, how would you rate your fatigue on average? Moderate   In the past 7  days, how would you rate your pain on average, where 0 means no pain, and 10 means worst imaginable pain? 7   In general, would you say your health is: 3    3   In general, would you say your quality of life is: 3    3   In general, how would you rate your physical health? 3    3   In general, how would you rate your mental health, including your mood and your ability to think? 2    2   In general, how would you rate your satisfaction with your social activities and relationships? 3    3   In general, please rate how well you carry out your usual social activities and roles. (This includes activities at home, at work and in your community, and responsibilities as a parent, child, spouse, employee, friend, etc.) 2    2   To what extent are you able to carry out your everyday physical activities such as walking, climbing stairs, carrying groceries, or moving a chair? 3    3   In the past 7 days, how often have you been bothered by emotional problems such as feeling anxious, depressed, or irritable? 3    3   In the past 7 days, how would you rate your fatigue on average? 3    3   In the past 7 days, how would you rate your pain on average, where 0 means no pain, and 10 means worst imaginable pain? 7    7   Global Mental Health Score 11    11   Global Physical Health Score 11    11   PROMIS TOTAL - SUBSCORES 22    22       Care Plan review completed: Yes    Medication Review:  No changes to current psychiatric medication(s)    Medication Compliance:  Yes    Changes in Health Issues:   None reported    Chemical Use Review:   Substance Use: Chemical use reviewed, no active concerns identified      Tobacco Use: No current tobacco use.      Assessment: Current Emotional / Mental Status (status of significant symptoms):  Risk status (Self / Other harm or suicidal ideation)  Patient denies a history of suicidal ideation, suicide attempts, self-injurious behavior, homicidal ideation, homicidal behavior, and and other safety  concerns  Patient denies current fears or concerns for personal safety.  Patient denies current or recent suicidal ideation or behaviors.  Patient denies current or recent homicidal ideation or behaviors.  Patient denies current or recent self injurious behavior or ideation.  Patient denies other safety concerns.  A safety and risk management plan has not been developed at this time, however patient was encouraged to call [place] [phone] should there be a change in any of these risk factors.    Appearance:   Appropriate   Eye Contact:   Good   Psychomotor Behavior: Normal   Attitude:   Cooperative   Orientation:   All  Speech   Rate / Production: Normal    Volume:  Normal   Mood:    Anxious  Depressed   Affect:    Appropriate   Thought Content:  Clear   Thought Form:  Coherent  Logical   Insight:    Fair     Diagnoses:  1. Generalized anxiety disorder    2. Panic attack    3. Moderate episode of recurrent major depressive disorder (H)        Collateral Reports Completed:  Communicated with: Susi GRAY CNP    Plan: (Homework, other):  Patient was given information about behavioral services and encouraged to schedule a follow up appointment with the clinic [facility]  tbd .  She was also given information about mental health symptoms and treatment options .  CD Recommendations: No indications of CD issues. STACY Bravo    ______________________________________________________________________    Integrated Primary Care Behavioral Health Treatment Plan    Patient's Name: Apoorva Roberts  YOB: 1975    Date of Creation: 9/14/23  Date Treatment Plan Last Reviewed/Revised: 9/14/23    DSM5 Diagnoses: 296.32 (F33.1) Major Depressive Disorder, Recurrent Episode, Moderate _ or 300.02 (F41.1) Generalized Anxiety Disorder  Psychosocial / Contextual Factors:  Contextual influences on patient's health include: Contextual Factors: Individual Factors management of MH and Family Factors several deaths in the  family and strained relationships    PROMIS (reviewed every 90 days):     Referral / Collaboration:  Referral to another professional/service is not indicated at this time..    Anticipated number of session for this episode of care: 4-6 sessions  Anticipation frequency of session:  tbd  Anticipated Duration of each session: 16-37 minutes  Treatment plan will be reviewed in 90 days or when goals have been changed.       MeasurableTreatment Goal(s) related to diagnosis / functional impairment(s)  Goal 1: Patient will work with providers to manage symptoms     I will know I've met my goal when less anxious and depressed.      Objective #A (Patient Action)    Patient will  attend all appointments, take medication as prescribed .  Status: New - Date: 9/14/23      Intervention(s)  Therapist will  Monitor and assist in overcoming barriers to treatment adherence .    Objective #B  Patient will  consider all recommendations offered .  Status: New - Date: 9/14/23      Intervention(s)  Therapist will  educate patient on treatment options, clarify concerns, work with pt to overcome any resistance to compliance. .          Patient has reviewed and agreed to the above plan.      Rufina Farnsworth, STACY  September 14, 2023

## 2023-09-14 ENCOUNTER — VIRTUAL VISIT (OUTPATIENT)
Dept: BEHAVIORAL HEALTH | Facility: CLINIC | Age: 48
End: 2023-09-14
Payer: COMMERCIAL

## 2023-09-14 ENCOUNTER — VIRTUAL VISIT (OUTPATIENT)
Dept: PSYCHIATRY | Facility: CLINIC | Age: 48
End: 2023-09-14
Payer: COMMERCIAL

## 2023-09-14 DIAGNOSIS — F41.1 GAD (GENERALIZED ANXIETY DISORDER): ICD-10-CM

## 2023-09-14 DIAGNOSIS — F41.1 GENERALIZED ANXIETY DISORDER: Primary | ICD-10-CM

## 2023-09-14 DIAGNOSIS — F33.1 MAJOR DEPRESSIVE DISORDER, RECURRENT EPISODE, MODERATE (H): Primary | ICD-10-CM

## 2023-09-14 DIAGNOSIS — F33.1 MODERATE EPISODE OF RECURRENT MAJOR DEPRESSIVE DISORDER (H): ICD-10-CM

## 2023-09-14 DIAGNOSIS — F43.10 PTSD (POST-TRAUMATIC STRESS DISORDER): ICD-10-CM

## 2023-09-14 DIAGNOSIS — F41.0 PANIC ATTACK: ICD-10-CM

## 2023-09-14 PROCEDURE — 99214 OFFICE O/P EST MOD 30 MIN: CPT | Mod: VID | Performed by: NURSE PRACTITIONER

## 2023-09-14 PROCEDURE — 90832 PSYTX W PT 30 MINUTES: CPT | Mod: 95 | Performed by: SOCIAL WORKER

## 2023-09-14 RX ORDER — BUSPIRONE HYDROCHLORIDE 30 MG/1
30 TABLET ORAL 2 TIMES DAILY
Qty: 60 TABLET | Refills: 0 | Status: SHIPPED | OUTPATIENT
Start: 2023-09-14 | End: 2023-10-10

## 2023-09-14 RX ORDER — SERTRALINE HYDROCHLORIDE 25 MG/1
TABLET, FILM COATED ORAL
Qty: 60 TABLET | Refills: 0 | Status: SHIPPED | OUTPATIENT
Start: 2023-09-14 | End: 2023-10-10

## 2023-09-14 NOTE — NURSING NOTE
Is the patient currently in the state of [place]? YES    Visit mode:VIDEO    If the visit is dropped, the patient can be reconnected by: VIDEO VISIT: Text to cell phone:   Telephone Information:   Mobile [phone]   Mobile Not on file.       Will anyone else be joining the visit? NO  (If patient encounters technical issues they should call [phone]    How would you like to obtain your AVS? MyChart    Are changes needed to the allergy or medication list? Pt stated no changes to allergies and Pt stated no med changes    Reason for visit: RECHTANISHA VAZQUEZF

## 2023-09-14 NOTE — PATIENT INSTRUCTIONS
**For crisis resources, please see the information at the end of this document**     Thank you for coming to the [facility] [place] [facility].    TREATMENT PLAN:  Medications:   START sertraline 12.5 mg (1/2 tablet) for 4 days. Then INCREASE to 25 mg (1 tablet) daily for 1 week. Then INCREASE to 50 mg every day (2x tablets). Script sent for #60.   CONTINUE buspirone 30 mg twice a day. Script sent for #60.  Continue all other medications per primary care provider.     Follow-up:   Schedule an appointment with me in 3-4 weeks or sooner as needed.  Call [facility] at [phone] to schedule.  Continue therapy with STACY Bravo, Behavioral Health Consultant.   Follow up with primary care provider as planned or sooner if needed for acute medical concerns.  Call the psychiatric nurse line with medication questions or concerns at [phone].  Pluralsighthart may be used to communicate with your provider, but this is not intended to be used for emergencies.    Psychoeducation:  Patient to monitor for signs and symptoms of serotonin syndrome/serotonin toxicity (eg, hyperreflexia, clonus, hyperthermia, diaphoresis, tremor, autonomic instability, mental status changes) when these drugs are combined.    Discussed patient's past thrush that was attributed to alternative SSRIs and risk with same drug class. She has not experienced with all SSRI trials, and we will monitor closely for symptoms.         Financial Assistance [phone]  [facility] Billing [phone]  Central Billing Office, [facility]: [phone]  [facility]illing [phone]  Medical Records [phone]  [facility] Patient Bill of Rights [email or web address]       MENTAL HEALTH CRISIS RESOURCES:  For a emergency help, please call 911 or go to the nearest Emergency Department.     Emergency Walk-In Options:   EmPATH Unit @ [facility] ([place]):  [phone] - Specialized mental health emergency area designed to be calming  [facility] ([place]): [phone]  [facility] ([place]): [phone]  [facility] ([place]): [phone]    [facility] Crisis Information:   Jorge: [phone]  Jose: [phone]  [place] ([place]) - Adult: [phone]     Child: [phone]  [place] - Adult: [phone]     Child: [phone]  [place]: [phone]  List of all [facility] resources:   https://[place].gov/dhs/people-we-serve/adults/health-care/mental-health/resources/crisis-contacts.jsp    National Crisis Information:   National Suicide & Crisis Lifeline: Call 457        For online chat options, visit https://suicidepreventionlifeline.org/chat/  Poison Control Center: 9-571-940-0726  Poison Control Center: 8-535-231-8938  Trans Lifeline: 3-603-284-6474 - Hotline for transgender people of all ages  The [employer] Project: 1-321-294-3278 - Hotline for LGBT youth     For Non-Emergency Support:   Fast Tracker: Mental Health & Substance Use Disorder Resources -   https://www.fasttrackermn.org/       Again thank you for choosing [facility] [place] [facility] and please let us know how we can best partner with you to improve you and your family's health.    You may be receiving a survey regarding this appointment. We would love to have your feedback, both positive and negative. The survey is done by an external company, so your answers are anonymous.        Patient Education   Collaborative Care Psychiatry Service  What to Expect  Here's what to expect from your Collaborative Care Psychiatry Service (CCPS).   About CCPS  CCPS means 2 people work together to help you get better. You'll meet with a behavioral health clinician and a psychiatric doctor. A behavioral health clinician helps people with mental health problems by talking with them. A psychiatric doctor helps people by giving them  "medicine.  How it works  At every visit, you'll see the behavioral health clinician (BHC) first. They'll talk with you about how you're doing and teach you how to feel better.   Then you'll see the psychiatric doctor. This doctor can help you deal with troubling thoughts and feelings by giving you medicine. They'll make sure you know the plan for your care.   CCPS usually takes 3 to 6 visits. If you need more visits, we may have you start seeing a different psychiatric doctor for ongoing care.  If you have any questions or concerns, we'll be glad to talk with you.  About visits  Be open  At your visits, please talk openly about your problems. It may feel hard, but it's the best way for us to help you.  Cancelling visits  If you can't come to your visit, please call us right away at [phone]. If you don't cancel at least 24 hours (1 full day) before your visit, that's \"late cancellation.\"  Being late to visits  Being very late is the same as not showing up. You will be a \"no show\" if:  Your appointment starts with a BHC, and you're more than 15 minutes late for a 30-minute (half hour) visit. This will also cancel your appointment with the psychiatric doctor.  Your appointment is with a psychiatric doctor only, and you're more than 15 minutes late for a 30-minute (half hour) visit.  Your appointment is with a psychiatric doctor only, and you're more than 30 minutes late for a 60-minute (full hour) visit.  If you cancel late or don't show up 2 times within 6 months, we may end your care.   Getting help between visits  If you need help between visits, you can call us Monday to Friday from 8 a.m. to 4:30 p.m. at [phone].  Emergency care  Call 911 or go to the nearest emergency department if your life or someone else's life is in danger.  Call 248 anytime to reach the national Suicide and Crisis hotline.  Medicine refills  To refill your medicine, call your pharmacy. You can also call  [facility] " [facility] at [phone], Monday to Friday, 8 a.m. to 4:30 p.m. It can take 1 to 3 business days to get a refill.   Forms, letters, and tests  You may have papers to fill out, like FMLA, short-term disability, and workability. We can help you with these forms at your visits, but you must have an appointment. You may need more than 1 visit for this, to be in an intensive therapy program, or both.  Before we can give you medicine for ADHD, we may refer you to get tested for it or confirm it another way.  We may not be able to give you an emotional support animal letter.  We don't do mental health checks ordered by the court.   We don't do mental health testing, but we can refer you to get tested.   Thank you for choosing us for your care.  For informational purposes only. Not to replace the advice of your health care provider. Copyright   2022 [facility]. All rights reserved. Mpax [phone] - 12/22.

## 2023-09-18 ENCOUNTER — TELEPHONE (OUTPATIENT)
Dept: FAMILY MEDICINE | Facility: CLINIC | Age: 48
End: 2023-09-18

## 2023-09-18 ENCOUNTER — E-VISIT (OUTPATIENT)
Dept: FAMILY MEDICINE | Facility: CLINIC | Age: 48
End: 2023-09-18

## 2023-09-18 DIAGNOSIS — R51.9 OCCIPITAL HEADACHE: ICD-10-CM

## 2023-09-18 DIAGNOSIS — G43.009 MIGRAINE WITHOUT AURA AND WITHOUT STATUS MIGRAINOSUS, NOT INTRACTABLE: Primary | ICD-10-CM

## 2023-09-18 PROCEDURE — 99207 PR NON-BILLABLE SERV PER CHARTING: CPT | Performed by: NURSE PRACTITIONER

## 2023-09-18 RX ORDER — BUTALBITAL, ACETAMINOPHEN AND CAFFEINE 50; 325; 40 MG/1; MG/1; MG/1
TABLET ORAL
Qty: 20 TABLET | Refills: 1 | Status: SHIPPED | OUTPATIENT
Start: 2023-09-18 | End: 2023-12-29

## 2023-09-18 NOTE — TELEPHONE ENCOUNTER
Pharmacy staff called to say that Esgic is not covered by insurance, there has been a PA in the past which was denied. Pt has paid cash for this before, authorization needed from PCP that it is okay for pt to pay cash as her insurance is restricted.   Odalys Green RN

## 2023-09-18 NOTE — TELEPHONE ENCOUNTER
Called pharmacy and informed them of provider's note below.    Paulina Love RN on 9/18/2023 at 2:19 PM

## 2023-09-19 ENCOUNTER — MYC MEDICAL ADVICE (OUTPATIENT)
Dept: GASTROENTEROLOGY | Facility: CLINIC | Age: 48
End: 2023-09-19
Payer: COMMERCIAL

## 2023-10-02 DIAGNOSIS — G62.89 OTHER POLYNEUROPATHY: ICD-10-CM

## 2023-10-02 RX ORDER — ZOLPIDEM TARTRATE 5 MG/1
5 TABLET ORAL
Qty: 30 TABLET | Refills: 5 | Status: SHIPPED | OUTPATIENT
Start: 2023-10-02 | End: 2023-10-23 | Stop reason: DRUGHIGH

## 2023-10-04 ENCOUNTER — VIRTUAL VISIT (OUTPATIENT)
Dept: GASTROENTEROLOGY | Facility: CLINIC | Age: 48
End: 2023-10-04
Attending: NURSE PRACTITIONER
Payer: COMMERCIAL

## 2023-10-04 DIAGNOSIS — K90.9 DIARRHEA DUE TO MALABSORPTION: ICD-10-CM

## 2023-10-04 DIAGNOSIS — K29.30 CHRONIC SUPERFICIAL GASTRITIS WITHOUT BLEEDING: Primary | ICD-10-CM

## 2023-10-04 DIAGNOSIS — K27.9 PUD (PEPTIC ULCER DISEASE): ICD-10-CM

## 2023-10-04 DIAGNOSIS — R19.7 DIARRHEA DUE TO MALABSORPTION: ICD-10-CM

## 2023-10-04 DIAGNOSIS — K21.00 GASTROESOPHAGEAL REFLUX DISEASE WITH ESOPHAGITIS WITHOUT HEMORRHAGE: ICD-10-CM

## 2023-10-04 DIAGNOSIS — K44.9 HIATAL HERNIA: ICD-10-CM

## 2023-10-04 DIAGNOSIS — Z87.11 HISTORY OF PEPTIC ULCER: ICD-10-CM

## 2023-10-04 DIAGNOSIS — K31.7 POLYP OF DUODENUM: ICD-10-CM

## 2023-10-04 PROCEDURE — 99214 OFFICE O/P EST MOD 30 MIN: CPT | Mod: VID | Performed by: NURSE PRACTITIONER

## 2023-10-04 RX ORDER — RABEPRAZOLE SODIUM 20 MG/1
20 TABLET, DELAYED RELEASE ORAL DAILY
Qty: 30 TABLET | Refills: 1 | Status: SHIPPED | OUTPATIENT
Start: 2023-10-04 | End: 2023-12-04

## 2023-10-04 RX ORDER — CHOLESTYRAMINE 4 G/9G
4 POWDER, FOR SUSPENSION ORAL 2 TIMES DAILY WITH MEALS
Qty: 348 G | Refills: 3 | Status: SHIPPED | OUTPATIENT
Start: 2023-10-04

## 2023-10-04 RX ORDER — BACLOFEN 10 MG/1
10 TABLET ORAL AT BEDTIME
Qty: 30 TABLET | Refills: 0 | Status: SHIPPED | OUTPATIENT
Start: 2023-10-04 | End: 2024-02-09

## 2023-10-04 RX ORDER — SUCRALFATE 1 G/1
1 TABLET ORAL 3 TIMES DAILY
Qty: 90 TABLET | Refills: 3 | Status: SHIPPED | OUTPATIENT
Start: 2023-10-04 | End: 2024-06-05

## 2023-10-04 RX ORDER — FAMOTIDINE 40 MG/1
40 TABLET, FILM COATED ORAL 2 TIMES DAILY
Qty: 60 TABLET | Refills: 3 | Status: SHIPPED | OUTPATIENT
Start: 2023-10-04 | End: 2024-06-05

## 2023-10-04 NOTE — PROGRESS NOTES
"GGASTROENTEROLOGY RETURN PATIENT VIRTUAL VISIT      How would you like to obtain your AVS? MyChart  If the video visit is dropped, the invitation should be resent by: Text to cell phone: [phone]  Will anyone else be joining your video visit? No  ..    Video-Visit Details    Type of service:  Video Visit    Video Start Time (time video started): 1534    Video End Time (time video stopped): 1543    Originating Location (pt. Location): Other in a parked car      Distant Location (provider location):  Off-site    Mode of Communication:  Video Conference via [employer]    Physician has received verbal consent for a Video Visit from the patient? Yes    ASTROENTEROLOGY RETURN PATIENT VIDEO VISIT    CC/REFERRING MD:    Angelic More      REASON FOR CONSULTATION:    Follow Up (Diarrhea due to malabsorption /History of peptic ulcer /Gastroesophageal reflux disease with esophagitis without hemorrhage /Hiatal hernia /)      HISTORY OF PRESENT ILLNESS:  Apoorva Roberts is 47 year old female who presents for follow up on GERD and chronic diarrhea. Hx of peptic ulcer. Referred to EGD. Patient was on omeprazole and famotidine her insurance covers only 3 months treatment. Has been buying omeprazole and Tums.  Ran out carafate. Stated that she is still having persistent acid reflux.  Hx of chronic diarrhea with fecal urgency since cholecystectomy. No history of lactose or gluten intolerance . We started cholestyramine on the last visit , which works very well. Patient stated that her stools are \"normal\"- regular and formed. She is still taking 4 gram of cholestyramine once a week and as needed.     History of GERD, gastritis, migraine, PTSD, cholecystectomy, and chronic marijuana use     PREVIOUS ENDOSCOPY:  5/8/2023  Findings:         -  A few small flat polyps with no bleeding were found in the duodenal bulb.         -  The gastroesophageal flap valve was visualized endoscopically and classified as Hill Grade III " "(minimal fold, loose to endoscope, hiatal hernia likely).          -  Patchy mild inflammation characterized by erythema was found in the prepyloric region of the stomach. Esophagogastric landmarks were identified: the Z-line was found at 37 cm,  the gastroesophageal junction was found at 37 cm and the site of hiatal narrowing was found at 37 cm from the incisors.          -  Multiple small plaques were found in the middle third of the esophagus.              A few duodenal polyps.  Biopsied.     DIAGNOSIS:   A) DUODENUM, \"POLYP\", BIOPSY:        - BENIGN POLYPOID DUODENAL MUCOSA WITH PROMINENT BRUNNER GLANDS,   MILD                   NON-SPECIFIC CHRONIC INFLAMMATION, AND REACTIVE CHANGES   (SEE COMMENT)        - NO EVIDENCE OF CELIAC DISEASE        - NO EVIDENCE OF DYSPLASIA OR NEOPLASIA     B) ESOPHAGUS, BIOPSY:        - CHRONIC ESOPHAGITIS WITH FOCAL REACTIVE CHANGES, WITHOUT   INTRAEPITHELIAL    EOSINOPHILIA   - NO GLANDULAR TYPE EPITHELIUM IDENTIFIED IN SECTIONS          10/8/2021  Findings:        The examined duodenum was normal.        Scattered moderate inflammation characterized by adherent blood,        erythema and shallow ulcerations was found in the gastric body, in the        gastric antrum and in the prepyloric region of the stomach. Biopsies        were taken with a cold forceps for Helicobacter pylori testing.        A 1 cm hiatal hernia was present.   Final Diagnosis   A(1).  Stomach, antrum, biopsy:  -Transitional type gastric mucosa with no significant pathological changes.  - Negative for H. Pylori organisms on routine stains.  - Negative for intestinal metaplasia.   -Negative for dysplasia or malignancy     RADIOLOGY:      HISTORY:   has a past medical history of Cervical high risk HPV (human papillomavirus) test positive (08/27/2021), Community acquired pneumonia (12/05/2017), Depressive disorder, Gastroesophageal reflux disease, Hiatal hernia, Hypertension, Other chronic pain, and Walking " troubles.     has a past surgical history that includes Abdomen surgery; appendectomy; Cholecystectomy; GYN surgery; hernia repair; hysterectomy, pap still indicated; Esophagoscopy, gastroscopy, duodenoscopy (EGD), combined (N/A, 10/8/2021); and Esophagoscopy, gastroscopy, duodenoscopy (EGD), combined (N/A, 5/8/2023).     reports that she has been smoking cigarettes. She has a 30.00 pack-year smoking history. She has been exposed to tobacco smoke. She has never used smokeless tobacco. She reports current alcohol use. She reports current drug use. Drug: Marijuana.    family history includes Depression in her mother and sister; Diabetes in her maternal grandmother and mother; Hyperlipidemia in her father and mother; Hypertension in her father and mother; Kidney Cancer in her father; Other Cancer in her father; Substance Abuse in her mother.    ALLERGIES:     Allergies   Allergen Reactions    Indomethacin      Other reaction(s): GI Upset    Fluoxetine      Other reaction(s): Thrush    Paroxetine      Other reaction(s): Thrush       PERTINENT MEDICATIONS:    Current Outpatient Medications:     acetaminophen (TYLENOL) 325 MG tablet, Take 650 mg by mouth every 6 hours as needed for mild pain, Disp: , Rfl:     albuterol (PROAIR HFA/PROVENTIL HFA/VENTOLIN HFA) 108 (90 Base) MCG/ACT inhaler, Inhale 2 puffs into the lungs every 4 hours as needed for shortness of breath or wheezing, Disp: 8.5 g, Rfl: 0    busPIRone HCl (BUSPAR) 30 MG tablet, Take 1 tablet (30 mg) by mouth 2 times daily, Disp: 60 tablet, Rfl: 0    butalbital-acetaminophen-caffeine (ESGIC) -40 MG tablet, TAKE 1 TABLET BY MOUTH DAILY AS NEEDED FOR HEADACHE. NO MORE THAN 2 DAYS EVERY WEEK, Disp: 20 tablet, Rfl: 1    cholestyramine (QUESTRAN) 4 GM/DOSE powder, Take 4 g by mouth 2 times daily (with meals) Start with 4 gram at supper. Increase the dose to twice a day if needed., Disp: 348 g, Rfl: 3    cyanocobalamin (VITAMIN B-12) 1000 MCG tablet, Take 1 tablet  (1,000 mcg) by mouth daily, Disp: 90 tablet, Rfl: 3    famotidine (PEPCID) 40 MG tablet, Take 1 tablet (40 mg) by mouth At Bedtime, Disp: 90 tablet, Rfl: 3    gabapentin (NEURONTIN) 400 MG capsule, TAKE 3 CAPSULES(1200 MG) BY MOUTH THREE TIMES DAILY, Disp: 270 capsule, Rfl: 3    guaiFENesin-codeine (ROBITUSSIN AC) 100-10 MG/5ML solution, Take 5-10 mLs by mouth every 4 hours as needed for cough, Disp: 180 mL, Rfl: 0    hydrochlorothiazide (HYDRODIURIL) 25 MG tablet, TAKE 1 TABLET BY MOUTH DAILY, Disp: 90 tablet, Rfl: 3    hydrOXYzine (ATARAX) 50 MG tablet, TAKE 1 TABLET(50 MG) BY MOUTH EVERY 6 HOURS AS NEEDED FOR ANXIETY, Disp: 90 tablet, Rfl: 0    IBUPROFEN PO, , Disp: , Rfl:     ipratropium - albuterol 0.5 mg/2.5 mg/3 mL (DUONEB) 0.5-2.5 (3) MG/3ML neb solution, Take 1 vial (3 mLs) by nebulization every 4 hours as needed for shortness of breath, wheezing or cough, Disp: 90 mL, Rfl: 3    lisinopril (ZESTRIL) 5 MG tablet, Take 1 tablet (5 mg) by mouth daily, Disp: 90 tablet, Rfl: 3    Loratadine-Pseudoephedrine (CLARITIN-D 24 HOUR PO), , Disp: , Rfl:     omeprazole (PRILOSEC) 40 MG DR capsule, Take 1 capsule (40 mg) by mouth daily, Disp: 30 capsule, Rfl: 1    sertraline (ZOLOFT) 25 MG tablet, Take 0.5 tablets (12.5 mg) by mouth daily for 4 days, THEN 1 tablet (25 mg) daily for 7 days, THEN 2 tablets (50 mg) daily for 25 days., Disp: 60 tablet, Rfl: 0    sucralfate (CARAFATE) 1 GM tablet, Take 1 tablet (1 g) by mouth 3 times daily Take between meals and one dose before bed. Dissolve in a small amount of water. Do not eat, smoke, or drink for 30 min after the medication, Disp: 90 tablet, Rfl: 0    tiZANidine (ZANAFLEX) 2 MG tablet, TAKE 1 TABLET BY MOUTH THREE TIMES DAILY AS NEEDED FOR MUSCLE SPASMS, Disp: 720 tablet, Rfl: 3    zolpidem (AMBIEN) 10 MG tablet, Take 0.5-1 tablets (5-10 mg) by mouth nightly as needed for sleep, Disp: 30 tablet, Rfl: 5    zolpidem (AMBIEN) 5 MG tablet, Take 1 tablet (5 mg) by mouth nightly  as needed for sleep, Disp: 30 tablet, Rfl: 5      ROS:     No fevers or chills  No weight loss  No blurry vision, double vision or change in vision  No sore throat  No lymphadenopathy  No headache, paraesthesias, or weakness in a limb  No shortness of breath or wheezing  No chest pain or pressure  No arthralgias or myalgias  No rashes or skin changes  No odynophagia or dysphagia  No  hematochezia, melena  No dysuria, frequency or urgency  No hot/cold intolerance or polyria  No anxiety or depression    PHYSICAL EXAMINATION:  Wt:   Wt Readings from Last 2 Encounters:   08/02/23 88.1 kg (194 lb 3.2 oz)   07/13/23 86.2 kg (190 lb)      Physical Exam  Vitals reviewed: There were no vitals taken for this visit.   Constitutional: aaox3, cooperative, pleasant, not dyspneic/diaphoretic, no acute distress  Eyes: Sclera anicteric/injected  Respiratory: Unlabored breathing, speaking in full sentences.   Psych: Normal affect, speech is clear and appropriate.Neatly groomed    RECENT LABS:   Recent Labs   Lab Test 01/03/23  1045 01/02/23  1402   WBC 12.6* 10.8   HGB 13.5 13.2   HCT 40.0 39.3    369     Recent Labs   Lab Test 01/03/23  1045 11/05/22  1030   ALT 15 19   AST 27 27     Recent Labs   Lab Test 01/03/23  1045 11/05/22  1030   CR 0.76 0.84     TSH   Date Value Ref Range Status   01/02/2023 1.28 0.30 - 4.20 uIU/mL Final   08/04/2022 0.75 0.40 - 4.00 mU/L Final         ASSESSMENT/PLAN:    ICD-10-CM    1. Chronic superficial gastritis without bleeding  K29.30 RABEprazole (ACIPHEX) 20 MG EC tablet      2. Hiatal hernia  K44.9 RABEprazole (ACIPHEX) 20 MG EC tablet     sucralfate (CARAFATE) 1 GM tablet      3. Polyp of duodenum  K31.7       4. Gastroesophageal reflux disease with esophagitis without hemorrhage  K21.00 sucralfate (CARAFATE) 1 GM tablet      5. Diarrhea due to malabsorption  K90.9 RABEprazole (ACIPHEX) 20 MG EC tablet    R19.7 cholestyramine (QUESTRAN) 4 GM/DOSE powder      6. PUD (peptic ulcer disease)   K27.9 famotidine (PEPCID) 40 MG tablet      7. History of peptic ulcer  Z87.11 RABEprazole (ACIPHEX) 20 MG EC tablet     sucralfate (CARAFATE) 1 GM tablet         Apoorva Roberts is a 47 year old female who presents for a follow up on GERD and chronic diarrhea. Diarrhea had resolved with cholestyramine. Will renew the prescription.  Complains of persistent acid reflux, every day. Takes TUMs and drinks milk to suppress symptoms. When can afford, buys Prilosec. Stated that her insurance covers only 3 months/year supply of a PPI. Said that her PCP sent Pas but coverage was still declined. Stated that her symptoms were controlled when she was on a PPI and famotidine. She ran out Carafate.  We reviewed her EGD report from 5/8/23. Finding of gastritis, hiatal hernia, and duodenal polyps. There were white plaques on esophageal mucosa, EoE was suspected, but not confirmed by histology report. Plan for a follow up EGD in 2 years unless symptoms become worse.  I ordered Rabeprazole and renewed prescriptions for sucralfate and famotidine. A trial of baclofen at  was offered.  Reminded on GERD diet. Suggested not to use milk for acid reflux as it could make symptoms worse.    Follow up in 8 months  This note was created with Dragon voice recognition software. Inadvertent minor typographic errors may occur in transcription. Feel free to contact the provider if you have any questions.  I sincerely appreciate an opportunity to provide consultation for this pleasant patient.    COUTRNEY Hanks  [facility],  Gastroenterology,  [place], [place]

## 2023-10-04 NOTE — PATIENT INSTRUCTIONS
"It was a pleasure taking care of you today.  I've included a brief summary of our discussion and care plan from today's visit below.  Please review this information with your primary care provider.  ______________________________________________________________________    My recommendations are summarized as follows:    I sent a prescription for Aciphex (Rabeprazole). Hopefully, your insurance will cover this one.    2. I also placed orders for famotidine, Carafate, and cholestyramine. Place continue without changes.    3. Plan for a follow up upper endoscopy in 2 years unless your symptoms become uncontrolled.    4. Avoid drinking milk to suppress your acid reflux as it can make it worse.    Return to GI Clinic in 8 months  to review your progress.    ______________________________________________________________________    Gastroesophageal reflux  Gastroesophageal reflux, also called \"acid reflux,\" occurs when the stomach contents back up into the esophagus and/or mouth. Occasional reflux is normal and can happen in healthy infants, children, and adults, most often after eating a large meal. Most episodes are brief and do not cause bothersome symptoms or complications.   By contrast, people with gastroesophageal reflux disease (GERD) experience bothersome symptoms or damage to the esophagus as a result of acid reflux. Symptoms of GERD can include heartburn, regurgitation, and difficulty or pain with swallowing.  The main cause of GERD is a transient relaxation or weakening of the lower esophageal sphincter (LES) which allows regurgitation of gastric acid and other gastric contents, including bile, back into the esophagus. The esophageal lining is susceptible to irritation by acid because it does not have the thick mucus protection of the stomach. Some people with GERD do not experience heartburn but may have burning sensation in the mouth, a feeling that food is stuck at any level of the esophagus, or hoarseness " in the morning.  There are a number of predisposing factors associated with GERD, including a hiatal hernia, cigarette smoking, alcohol use, being overweight or obese, and pregnancy. Foods such as citrus fruits, chocolate, caffeinated drinks, fried foods, garlic, onions, spicy foods, and tomato-based foods, such as chili, pizza, and spaghetti sauce, are associated with heartburn symptoms. Consumption of large high-fat meals requires prolonged gastric passage times and the increased stomach pressure may lead to movement of hydrochloric acid from the stomach into the esophagus. Additionally, lying prone after a meal promotes backflow of stomach contents and the development of symptoms.      Lifestyle modifications for gastroesophageal reflux disease (GERD).   1. Change your eating habits.  -- It's best to eat several small meals instead of two or three large meals.  -- After you eat, wait 2 to 3 hours before you lie down. Late-night snacks aren't a good idea.   -- Chocolate, mint, and alcohol can make GERD worse. They relax the valve between the esophagus and the stomach.  -- Spicy foods, foods that have a lot of acid (like tomatoes and oranges), and coffee can make GERD symptoms worse in some people. If your symptoms are worse after you eat a certain     2. Do not smoke or chew tobacco. Saliva helps to neutralize refluxed acid, and smoking reduces the amount of saliva in the mouth and throat. Smoking also lowers the pressure in the lower esophageal sphincter and provokes coughing, causing frequent episodes of acid reflux in the esophagus.     3. If you have GERD symptoms at night, raise the head of your bed 6 in. (15 cm) to 8 in. (20 cm) by putting the frame on blocks or placing a foam wedge under the head of your mattress. (Adding extra pillows does not work.)  4. Avoid or reduce pressure on your stomach. Don't wear tight clothing around your middle.  5. Lose weight if you need to. Losing just 5 to 10 pounds can  help.         Bile salt induced diarrhea  When the gallbladder is removed, bile is no longer stored; it is instead released into the small intestine in a steady flow. This interrupts the normal loop by which bile acids are meant to move from the liver to the small intestine and then reabsorbed back into the blood and delivered to the liver. Because of this, the intestines are overloaded with bile that cannot be properly reabsorbed.  Excess bile, in turn, draws abnormally high levels of water and salts from the bloodstream into the intestine, causing diarrhea.  The interruption of the loop impacts the speed by which digestion normally occurs- the time it takes for food to move through the gut and exit the body is accelerated, leading to watery and poorly formed stools.  If you have frequent loose stools, you may experience pain, discomfort, or burning in and around the anus. Diarrhea contains both bile and stomach acid, both of which are very irritating to the skin.   Baby wipes are a great way to gently clean without causing more irritation. You can always put them in the refrigerator for extra soothing.       ______________________________________________________________________    Who do I call with any questions after my visit?  Please be in touch if there are any further questions that arise following today's visit.  There are multiple ways to contact your gastroenterology care team.      During business hours, you may reach a Gastroenterology nurse at [phone], option 3.     To schedule or reschedule an appointment, please call [phone].   To schedule your imaging studies (CT, MRI, ultrasound)  call [phone] (or toll-free # [phone])  To schedule your lab work at [facility], please call [phone]    You can always send a secure message through iWeeb[employer].  [employer]Weebo messages are answered by your nurse or doctor typically within 24 hours.  Please allow extra time on  weekends and holidays.      For urgent/emergent questions after business hours, you may reach the on-call GI Fellow by contacting the [facility]  at [phone].    In order for your refill to be processed in a timely fashion, it is your responsibility to ensure you follow the recommendations from your provider regarding your laboratory studies and follow up appointments.       How will I get the results of any tests ordered?    You will receive all of your results.  If you have signed up for [employer], any tests ordered at your visit will be available to you after your physician reviews them.  Typically this takes 1-2 weeks.  If there are urgent results that require a change in your care plan, your physician or nurse will call you to discuss the next steps.   What is [employer]?  [employer] is a secure way for you to access all of your healthcare records from the [facility].  It is a web based computer program, so you can sign on to it from any location.  It also allows you to send secure messages to your care team.  I recommend signing up for [employer] access if you have not already done so and are comfortable with using a computer.    How to I schedule a follow-up visit?  If you did not schedule a follow-up visit today, please call [phone] to schedule a follow-up office visit.      Sincerely,  COURTNEY Hanks,  [facility],  Division of Gastroenterology   ([facility])

## 2023-10-08 ENCOUNTER — HEALTH MAINTENANCE LETTER (OUTPATIENT)
Age: 48
End: 2023-10-08

## 2023-10-09 ASSESSMENT — PATIENT HEALTH QUESTIONNAIRE - PHQ9
SUM OF ALL RESPONSES TO PHQ QUESTIONS 1-9: 7
SUM OF ALL RESPONSES TO PHQ QUESTIONS 1-9: 7
10. IF YOU CHECKED OFF ANY PROBLEMS, HOW DIFFICULT HAVE THESE PROBLEMS MADE IT FOR YOU TO DO YOUR WORK, TAKE CARE OF THINGS AT HOME, OR GET ALONG WITH OTHER PEOPLE: SOMEWHAT DIFFICULT

## 2023-10-10 ENCOUNTER — VIRTUAL VISIT (OUTPATIENT)
Dept: PSYCHIATRY | Facility: CLINIC | Age: 48
End: 2023-10-10
Payer: COMMERCIAL

## 2023-10-10 ENCOUNTER — VIRTUAL VISIT (OUTPATIENT)
Dept: BEHAVIORAL HEALTH | Facility: CLINIC | Age: 48
End: 2023-10-10
Payer: COMMERCIAL

## 2023-10-10 DIAGNOSIS — F41.0 PANIC ATTACK: ICD-10-CM

## 2023-10-10 DIAGNOSIS — F33.1 MODERATE EPISODE OF RECURRENT MAJOR DEPRESSIVE DISORDER (H): ICD-10-CM

## 2023-10-10 DIAGNOSIS — F43.10 PTSD (POST-TRAUMATIC STRESS DISORDER): ICD-10-CM

## 2023-10-10 DIAGNOSIS — F41.1 GENERALIZED ANXIETY DISORDER: Primary | ICD-10-CM

## 2023-10-10 DIAGNOSIS — F41.1 GAD (GENERALIZED ANXIETY DISORDER): Primary | ICD-10-CM

## 2023-10-10 DIAGNOSIS — F33.1 MAJOR DEPRESSIVE DISORDER, RECURRENT EPISODE, MODERATE (H): ICD-10-CM

## 2023-10-10 DIAGNOSIS — F41.1 GAD (GENERALIZED ANXIETY DISORDER): ICD-10-CM

## 2023-10-10 DIAGNOSIS — Z86.59 HISTORY OF POSTTRAUMATIC STRESS DISORDER (PTSD): ICD-10-CM

## 2023-10-10 PROCEDURE — 90832 PSYTX W PT 30 MINUTES: CPT | Mod: 95 | Performed by: SOCIAL WORKER

## 2023-10-10 PROCEDURE — 99214 OFFICE O/P EST MOD 30 MIN: CPT | Mod: VID | Performed by: NURSE PRACTITIONER

## 2023-10-10 RX ORDER — BUSPIRONE HYDROCHLORIDE 30 MG/1
30 TABLET ORAL 2 TIMES DAILY
Qty: 180 TABLET | Refills: 0 | Status: SHIPPED | OUTPATIENT
Start: 2023-10-10 | End: 2023-12-05

## 2023-10-10 RX ORDER — SERTRALINE HYDROCHLORIDE 25 MG/1
25 TABLET, FILM COATED ORAL DAILY
Qty: 30 TABLET | Refills: 0 | Status: SHIPPED | OUTPATIENT
Start: 2023-10-10 | End: 2023-11-07 | Stop reason: DRUGHIGH

## 2023-10-10 NOTE — PATIENT INSTRUCTIONS
**For crisis resources, please see the information at the end of this document**     Thank you for coming to the [facility] [place] [facility].    TREATMENT PLAN:  Medications:   INCREASE TO sertraline 75 mg every day. Script sent for #30 each of 50 mg + 25 mg.  CONTINUE buspirone 30 mg twice a day. Script sent for #180 (3 months).  CONTINUE hydroxyzine 50 mg (1-2 tablets) as needed for panic. No script needed.  Continue all other medications per primary care provider.     Referrals / Consults:  Continue therapy with STACY Bravo, Behavioral Health Consultant.   Complete appointment with sleep medicine as planned.    Follow-up:   Schedule an appointment with me in 4 weeks or sooner as needed. Call [facility] at [phone] to schedule.  Follow up with primary care provider as planned or for acute medical concerns.  Call the psychiatric nurse line with medication questions or concerns at [phone].  Gynzyt may be used to communicate with your provider, but this is not intended to be used for emergencies.    Psychoeducation:  Patient to monitor for signs and symptoms of serotonin syndrome/serotonin toxicity (eg, hyperreflexia, clonus, hyperthermia, diaphoresis, tremor, autonomic instability, mental status changes) when these drugs are combined.    Discussed patient's past thrush that was attributed to alternative SSRIs and risk with same drug class. She has not experienced with all SSRI trials, and we will monitor closely for symptoms.         Financial Assistance [phone]  [facility] Billing [phone]  Central Billing Office, [facility]: [phone]  [facility] Billing [phone]  Medical Records [phone]  [facility] Patient Bill of Rights [email or web address]       MENTAL HEALTH CRISIS RESOURCES:  For a emergency help, please call 911 or go to the nearest Emergency Department.     Emergency  Walk-In Options:   EmPATH Unit @ [facility] ([place]): [phone] - Specialized mental health emergency area designed to be calming  [facility] ([place]): [phone]  [facility] ([place]): [phone]  [facility] ([place]): [phone]    [facility] Crisis Information:   [place]: [phone]  [place]: [phone]  [place] (COPE) - Adult: [phone]     Child: [phone]  [place] - Adult: [phone]     Child: [phone]  [place]: [phone]  List of all [facility] resources:   https://[place].gov/dhs/people-we-serve/adults/health-care/mental-health/resources/crisis-contacts.jsp    National Crisis Information:   National Suicide & Crisis Lifeline: Call 278        For online chat options, visit https://suicidepreventionlifeline.org/chat/  Poison Control Center: 9-653-534-8471  Poison Control Center: 2-265-284-6578  Trans Lifeline: 1-710.297.6267 - Hotline for transgender people of all ages  [employer]: 1-746-157-3343 - Hotline for LGBT youth     For Non-Emergency Support:   Fast Tracker: Mental Health & Substance Use Disorder Resources -   https://www.fasttrackermn.org/       Again thank you for choosing [facility] M[facility] and please let us know how we can best partner with you to improve you and your family's health.    You may be receiving a survey regarding this appointment. We would love to have your feedback, both positive and negative. The survey is done by an external company, so your answers are anonymous.        Patient Education   Collaborative Care Psychiatry Service  What to Expect  Here's what to expect from your Collaborative Care Psychiatry Service (CCPS).   About CCPS  CCPS means 2 people work together to help you get better. You'll meet with a behavioral health clinician and a psychiatric doctor. A behavioral health clinician helps people with mental health problems by talking  "with them. A psychiatric doctor helps people by giving them medicine.  How it works  At every visit, you'll see the behavioral health clinician (BHC) first. They'll talk with you about how you're doing and teach you how to feel better.   Then you'll see the psychiatric doctor. This doctor can help you deal with troubling thoughts and feelings by giving you medicine. They'll make sure you know the plan for your care.   CCPS usually takes 3 to 6 visits. If you need more visits, we may have you start seeing a different psychiatric doctor for ongoing care.  If you have any questions or concerns, we'll be glad to talk with you.  About visits  Be open  At your visits, please talk openly about your problems. It may feel hard, but it's the best way for us to help you.  Cancelling visits  If you can't come to your visit, please call us right away at [phone]. If you don't cancel at least 24 hours (1 full day) before your visit, that's \"late cancellation.\"  Being late to visits  Being very late is the same as not showing up. You will be a \"no show\" if:  Your appointment starts with a BHC, and you're more than 15 minutes late for a 30-minute (half hour) visit. This will also cancel your appointment with the psychiatric doctor.  Your appointment is with a psychiatric doctor only, and you're more than 15 minutes late for a 30-minute (half hour) visit.  Your appointment is with a psychiatric doctor only, and you're more than 30 minutes late for a 60-minute (full hour) visit.  If you cancel late or don't show up 2 times within 6 months, we may end your care.   Getting help between visits  If you need help between visits, you can call us Monday to Friday from 8 a.m. to 4:30 p.m. at [phone].  Emergency care  Call 911 or go to the nearest emergency department if your life or someone else's life is in danger.  Call 958 anytime to reach the national Suicide and Crisis hotline.  Medicine refills  To refill your medicine, " call your pharmacy. You can also call [facility]'s Behavioral Access at [phone], Monday to Friday, 8 a.m. to 4:30 p.m. It can take 1 to 3 business days to get a refill.   Forms, letters, and tests  You may have papers to fill out, like FMLA, short-term disability, and workability. We can help you with these forms at your visits, but you must have an appointment. You may need more than 1 visit for this, to be in an intensive therapy program, or both.  Before we can give you medicine for ADHD, we may refer you to get tested for it or confirm it another way.  We may not be able to give you an emotional support animal letter.  We don't do mental health checks ordered by the court.   We don't do mental health testing, but we can refer you to get tested.   Thank you for choosing us for your care.  For informational purposes only. Not to replace the advice of your health care provider. Copyright   2022 [facility]. All rights reserved. [employer] [id number] - 12/22.

## 2023-10-10 NOTE — PROGRESS NOTES
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 information for patients.  It is run by the [employer] and it contains information about all disorders, diseases and all medications.      Community Resources:    National Suicide Prevention Lifeline: 865.136.9648 (TTY: 797.567.7069). Call anytime for help.  (www.suicidepreventionlifeline.org)  National [place] on Mental Illness (www.[place].org): 639.119.8171 or 448-162-7299.   Mental Health Association (www.mentalhealth.org): 507.790.7200 or 559-628-1396.  [place] Crisis Text Line: Text [place] to 258657  Suicide LifeLine Chat: suicideOffersBy.Me.org/chat    Administrative Billing:     Level of Medical Decision Making:   - At least 1 chronic problem that is not stable  - Engaged in prescription drug management during visit (discussed any medication benefits, side effects, alternatives, etc.)             Patient Status:  [facility] MD/DO/NP/PA providers offer care a specialty service for Primary Care Providers in the [facility] that seek to optimize psychotropic medications for unstable patients.  Once medications have been optimized, our providers discharge the patient back to the referring Primary Care Provider for ongoing medication management.  This type of system allows our providers to serve a high volume of patients.   Patient will continue to be seen for ongoing consultation and  stabilization.    Signed:   Susi Carter, MSN, APRN, PMHNP-BC  [facility] ([facility])  [facility] [place] [facility]

## 2023-10-10 NOTE — NURSING NOTE
Is the patient currently in the state of [place]? YES    Visit mode:VIDEO    If the visit is dropped, the patient can be reconnected by: VIDEO VISIT: Text to cell phone:   Telephone Information:   Mobile [phone]   Mobile Not on file.       Will anyone else be joining the visit? NO  (If patient encounters technical issues they should call [phone]    How would you like to obtain your AVS? MyChart    Are changes needed to the allergy or medication list? Pt stated no changes to allergies and Pt stated no med changes    Reason for visit: RECHTANISHA VAZQUEZF

## 2023-10-20 DIAGNOSIS — G62.89 OTHER POLYNEUROPATHY: ICD-10-CM

## 2023-10-23 RX ORDER — ZOLPIDEM TARTRATE 10 MG/1
TABLET ORAL
Qty: 30 TABLET | Refills: 5 | Status: SHIPPED | OUTPATIENT
Start: 2023-10-23 | End: 2024-02-27

## 2023-11-03 ENCOUNTER — OFFICE VISIT (OUTPATIENT)
Dept: NEUROLOGY | Facility: CLINIC | Age: 48
End: 2023-11-03
Attending: NURSE PRACTITIONER
Payer: COMMERCIAL

## 2023-11-03 VITALS
BODY MASS INDEX: 35.67 KG/M2 | WEIGHT: 195 LBS | RESPIRATION RATE: 18 BRPM | DIASTOLIC BLOOD PRESSURE: 67 MMHG | SYSTOLIC BLOOD PRESSURE: 100 MMHG | HEART RATE: 84 BPM

## 2023-11-03 DIAGNOSIS — G43.719 INTRACTABLE CHRONIC MIGRAINE WITHOUT AURA AND WITHOUT STATUS MIGRAINOSUS: Primary | ICD-10-CM

## 2023-11-03 PROCEDURE — 99215 OFFICE O/P EST HI 40 MIN: CPT | Performed by: PSYCHIATRY & NEUROLOGY

## 2023-11-03 RX ORDER — TOPIRAMATE 25 MG/1
TABLET, FILM COATED ORAL
Qty: 120 TABLET | Refills: 1 | Status: SHIPPED | OUTPATIENT
Start: 2023-11-03 | End: 2024-01-11

## 2023-11-03 RX ORDER — RIZATRIPTAN BENZOATE 10 MG/1
10 TABLET ORAL
Qty: 18 TABLET | Refills: 1 | Status: SHIPPED | OUTPATIENT
Start: 2023-11-03

## 2023-11-03 NOTE — PROGRESS NOTES
NEUROLOGY OUTPATIENT CONSULT NOTE   Nov 3, 2023     CHIEF COMPLAINT/REASON FOR VISIT/REASON FOR CONSULT  Patient presents with:  Headache    REASON FOR CONSULTATION- Headaches    REFERRAL SOURCE  Dr. Angelic More  CC Dr. Angelic More    HISTORY OF PRESENT ILLNESS  Apoorva Roberts is a 47 year old female seen today for evaluation of headaches.  She reports that she has had a headache since age 17.  They have been every day though more recently with use of Fioricet they have cut down to 2-3 times a.    Headaches are generally on the left side.  This started in the occipital region and then radiated to the front.  They can be throbbing in nature.  There is some photophobia and phonophobia.  No visual auras.  Denies any provoking factors that she is identified.    In terms of medications she has tried Fioricet.  Ibuprofen and Tylenol only take the edge away.  She might have tried other medications in [place] though does not remember their names.  Does not think that she has been on a preventative every day medication.    Previous history is reviewed and this is unchanged.    PAST MEDICAL/SURGICAL HISTORY  Past Medical History:   Diagnosis Date    Cervical high risk HPV (human papillomavirus) test positive 08/27/2021    Community acquired pneumonia 12/05/2017 12/5/2017 chest radiograph: RLL infiltrate. Influenza negative. procalcitonin 0.23.    Depressive disorder     Gastroesophageal reflux disease     Hiatal hernia     Hypertension     Other chronic pain     Walking troubles      Patient Active Problem List   Diagnosis    Gastroesophageal reflux disease    Chronic daily headache    Hidradenitis suppurativa    Benign essential hypertension    Major depressive disorder, recurrent episode, moderate (H)    Peripheral neuropathy    Marijuana use, continuous    Tobacco use disorder    Migraine without aura and without status migrainosus, not intractable    Cervical high risk HPV (human papillomavirus) test  positive    PTSD (post-traumatic stress disorder)    GEOR[place]IANA (generalized anxiety disorder)    Vitamin B12 deficiency (non anemic)    Restless leg syndrome    Elevated C-reactive protein (CRP)    Occipital headache   Significant for high blood pressure, migraines, depression, sleep disorder    FAMILY HISTORY  Family History   Problem Relation Age of Onset    Diabetes Mother     Hypertension Mother     Hyperlipidemia Mother     Depression Mother     Substance Abuse Mother     Hypertension Father     Hyperlipidemia Father     Kidney Cancer Father     Other Cancer Father     Diabetes Maternal Grandmother     Depression Sister     Breast Cancer No family hx of    Positive for migraines in multiple family members.    SOCIAL HISTORY  Social History     Tobacco Use    Smoking status: Every Day     Packs/day: 1.00     Years: 30.00     Additional pack years: 0.00     Total pack years: 30.00     Types: Cigarettes     Passive exposure: Past    Smokeless tobacco: Never   Vaping Use    Vaping Use: Never used   Substance Use Topics    Alcohol use: Not Currently     Comment: 1-2 drinks a week    Drug use: Yes     Types: Marijuana     Comment: cbd       SYSTEMS REVIEW  Twelve-system ROS was done and other than the HPI this was negative/positive for neck pain, back pain, joint pain, numbness and tingling/neuropathy related to hernia surgery, sleeping problems, headaches, anxiety, depression    MEDICATIONS  acetaminophen (TYLENOL) 325 MG tablet, Take 650 mg by mouth every 6 hours as needed for mild pain  albuterol (PROAIR HFA/PROVENTIL HFA/VENTOLIN HFA) 108 (90 Base) MCG/ACT inhaler, Inhale 2 puffs into the lungs every 4 hours as needed for shortness of breath or wheezing  baclofen (LIORESAL) 10 MG tablet, Take 1 tablet (10 mg) by mouth At Bedtime  busPIRone HCl (BUSPAR) 30 MG tablet, Take 1 tablet (30 mg) by mouth 2 times daily  butalbital-acetaminophen-caffeine (ESGIC) -40 MG tablet, TAKE 1 TABLET BY MOUTH DAILY AS NEEDED FOR  HEADACHE. NO MORE THAN 2 DAYS EVERY WEEK  cholestyramine (QUESTRAN) 4 GM/DOSE powder, Take 4 g by mouth 2 times daily (with meals) Start with 4 gram at supper. Increase the dose to twice a day if needed.  famotidine (PEPCID) 40 MG tablet, Take 1 tablet (40 mg) by mouth 2 times daily  gabapentin (NEURONTIN) 400 MG capsule, TAKE 3 CAPSULES(1200 MG) BY MOUTH THREE TIMES DAILY  hydrochlorothiazide (HYDRODIURIL) 25 MG tablet, TAKE 1 TABLET BY MOUTH DAILY  hydrOXYzine (ATARAX) 50 MG tablet, TAKE 1 TABLET(50 MG) BY MOUTH EVERY 6 HOURS AS NEEDED FOR ANXIETY  IBUPROFEN PO,   ipratropium - albuterol 0.5 mg/2.5 mg/3 mL (DUONEB) 0.5-2.5 (3) MG/3ML neb solution, Take 1 vial (3 mLs) by nebulization every 4 hours as needed for shortness of breath, wheezing or cough  lisinopril (ZESTRIL) 5 MG tablet, Take 1 tablet (5 mg) by mouth daily  Loratadine-Pseudoephedrine (CLARITIN-D 24 HOUR PO),   RABEprazole (ACIPHEX) 20 MG EC tablet, Take 1 tablet (20 mg) by mouth daily  sertraline (ZOLOFT) 25 MG tablet, Take 1 tablet (25 mg) by mouth daily with sertraline 50 mg for 75 mg total.  sertraline (ZOLOFT) 50 MG tablet, Take 1 tablet (50 mg) by mouth daily with sertraline 25 mg for 75 mg total.  sucralfate (CARAFATE) 1 GM tablet, Take 1 tablet (1 g) by mouth 3 times daily Take between meals and one dose before bed. Dissolve in a small amount of water. Do not eat, smoke, or drink for 30 min after the medication  tiZANidine (ZANAFLEX) 2 MG tablet, TAKE 1 TABLET BY MOUTH THREE TIMES DAILY AS NEEDED FOR MUSCLE SPASMS  zolpidem (AMBIEN) 10 MG tablet, TAKE 1/2 TO 1 TABLET(5 TO 10 MG) BY MOUTH EVERY NIGHT AS NEEDED FOR SLEEP  cyanocobalamin (VITAMIN B-12) 1000 MCG tablet, Take 1 tablet (1,000 mcg) by mouth daily (Patient not taking: Reported on 10/4/2023)  omeprazole (PRILOSEC) 40 MG DR capsule, Take 1 capsule (40 mg) by mouth daily (Patient not taking: Reported on 10/4/2023)    No current facility-administered medications on file prior to visit.        PHYSICAL EXAMINATION  VITALS: /67   Pulse 84   Resp 18   Wt 88.5 kg (195 lb)   BMI 35.67 kg/m    GENERAL: Healthy appearing, alert, no acute distress, normal habitus.  CARDIOVASCULAR: Extremities warm and well perfused. Pulses present.   NEUROLOGICAL:  Patient is awake and oriented to self, place and time.  Attention span is normal.  Memory is grossly intact.  Language is fluent and follows commands appropriately.  Appropriate fund of knowledge. Cranial nerves 2-12 are intact. There is no pronator drift.  Motor exam shows 5/5 strength in all extremities.  Tone is symmetric bilaterally in upper and lower extremities.  Reflexes are symmetric and 2+ in upper extremities and lower extremities. Sensory exam is grossly intact to light touch, pin prick and vibration.  Finger to nose and heel to shin is without dysmetria.  Romberg is negative.  Gait is normal and the patient is able to do tandem walk and walk on toes and heels.      DIAGNOSTICS  MRI- 1998  IMP:  Normal MRI of the head.       RELEVANT LABS  Component      Latest Ref Rng 8/2/2023  10:21 AM   Vitamin B12      232 - 1,245 pg/mL 642    CRP Inflammation      <5.00 mg/L <3.00    Sed Rate      0 - 20 mm/hr 29 (H)       Component      Latest Ref Rng 1/3/2023  10:45 AM   WBC      4.0 - 11.0 10e3/uL 12.6 (H)    RBC Count      3.80 - 5.20 10e6/uL 4.07    Hemoglobin      11.7 - 15.7 g/dL 13.5    Hematocrit      35.0 - 47.0 % 40.0    MCV      78 - 100 fL 98    MCH      26.5 - 33.0 pg 33.2 (H)    MCHC      31.5 - 36.5 g/dL 33.8    RDW      10.0 - 15.0 % 13.2    Platelet Count      150 - 450 10e3/uL 393    % Neutrophils      % 81    % Lymphocytes      % 12    % Monocytes      % 6    % Eosinophils      % 0    % Basophils      % 0    % Immature Granulocytes      % 1    NRBCs per 100 WBC      <1 /100 0    Absolute Neutrophils      1.6 - 8.3 10e3/uL 10.3 (H)    Absolute Lymphocytes      0.8 - 5.3 10e3/uL 1.5    Absolute Monocytes      0.0 - 1.3 10e3/uL 0.7     Absolute Eosinophils      0.0 - 0.7 10e3/uL 0.0    Absolute Basophils      0.0 - 0.2 10e3/uL 0.1    Absolute Immature Granulocytes      <=0.4 10e3/uL 0.1    Absolute NRBCs      10e3/uL 0.0    Sodium      136 - 145 mmol/L 138    Potassium      3.4 - 5.3 mmol/L 3.7    Chloride      98 - 107 mmol/L 101    Carbon Dioxide (CO2)      22 - 29 mmol/L 23    Anion Gap      7 - 15 mmol/L 14    Urea Nitrogen      6.0 - 20.0 mg/dL 11.6    Creatinine      0.51 - 0.95 mg/dL 0.76    Calcium      8.6 - 10.0 mg/dL 9.2    Glucose      70 - 99 mg/dL 111 (H)    Alkaline Phosphatase      35 - 104 U/L 75    AST      10 - 35 U/L 27    ALT      10 - 35 U/L 15    Protein Total      6.4 - 8.3 g/dL 7.4    Albumin      3.5 - 5.2 g/dL 4.1    Bilirubin Total      <=1.2 mg/dL 0.6    GFR Estimate      >60 mL/min/1.73m2 >90       Legend:  (H) High    Legend:  (H) High  OUTSIDE RECORDS  Outside referral notes and chart notes were reviewed and pertinent information has been summarized (in addition to the HPI):-No relevant notes available.    IMPRESSION/REPORT/PLAN  Intractable chronic migraine without aura and without status migrainosus  Obesity  Depression with multiple antidepressant use    This is a 47 year old female with headache suggestive of chronic migraines.  Exam today is noncontributory.  MRI brain in 1998 was negative for any structural lesions.  Blood work through primary care has been negative.  We will hold off on repeating the imaging given negative exam and given chronicity of headaches.    She is never tried any preventive medications for headaches and was started on Topamax.  Discussed side effects.  There is no history of kidney stones.  She cannot be on antidepressants as she is already on multiple antidepressants.  No major insomnia complicating the headaches.    For abortive therapy would recommend not using the Fioricet.  We will try Maxalt.  Can use the Maxalt with the tizanidine prescribed through primary care.    Discussed  triggers/lifestyle changes for headaches.  Encouraged her to keep a log to identify patterns.    I can see her back in 3 months.    -     topiramate (TOPAMAX) 25 MG tablet; Start with 1 tab/night and increase by 1 tablet/week as needed and tolerated to a max of 4 tabs/night.  -     rizatriptan (MAXALT) 10 MG tablet; Take 1 tablet (10 mg) by mouth at onset of headache for migraine May repeat in 2 hours.    Return in about 3 months (around 2/3/2024) for In-Clinic Visit (must), or next available.    Over 50 minutes were spent coordinating the care for the patient on the day of the encounter.  This includes previsit, during visit and post visit activities as documented above.  Counseling patient.  Reviewing chart/testing.  Prescription management.  Reviewing previous testing.  (Activities include but not inclusive of reviewing chart, reviewing outside records, reviewing labs and imaging study results as well as the images, patient visit time including getting history and exam,  use if applicable, review of test results with the patient and coming up with a plan in a shared model, counseling patient and family, education and answering patient questions, EMR , EMR diagnosis entry and problem list management, medication reconciliation and prescription management if applicable, paperwork if applicable, printing documents and documentation of the visit activities.)        Michael Nava MD  Neurologist  [facility]  Tel:- [phone]    This note was dictated using voice recognition software.  Any grammatical or context distortions are unintentional and inherent to the software.

## 2023-11-03 NOTE — LETTER
11/3/2023         RE: Apoorva Roberts  [id number] [facility] [id number]        Dear Colleague,    Thank you for referring your patient, Apoorva Roberts, to the [facility] [place]. Please see a copy of my visit note below.    NEUROLOGY OUTPATIENT CONSULT NOTE   Nov 3, 2023     CHIEF COMPLAINT/REASON FOR VISIT/REASON FOR CONSULT  Patient presents with:  Headache    REASON FOR CONSULTATION- Headaches    REFERRAL SOURCE  Dr. Angelic More  CC Dr. Angelic More    HISTORY OF PRESENT ILLNESS  Apoorva Roberts is a 47 year old female seen today for evaluation of headaches.  She reports that she has had a headache since age 17.  They have been every day though more recently with use of Fioricet they have cut down to 2-3 times a.    Headaches are generally on the left side.  This started in the occipital region and then radiated to the front.  They can be throbbing in nature.  There is some photophobia and phonophobia.  No visual auras.  Denies any provoking factors that she is identified.    In terms of medications she has tried Fioricet.  Ibuprofen and Tylenol only take the edge away.  She might have tried other medications in [place] though does not remember their names.  Does not think that she has been on a preventative every day medication.    Previous history is reviewed and this is unchanged.    PAST MEDICAL/SURGICAL HISTORY  Past Medical History:   Diagnosis Date     Cervical high risk HPV (human papillomavirus) test positive 08/27/2021     Community acquired pneumonia 12/05/2017 12/5/2017 chest radiograph: RLL infiltrate. Influenza negative. procalcitonin 0.23.     Depressive disorder      Gastroesophageal reflux disease      Hiatal hernia      Hypertension      Other chronic pain      Walking troubles      Patient Active Problem List   Diagnosis     Gastroesophageal reflux disease     Chronic daily headache     Hidradenitis suppurativa     Benign essential hypertension      Major depressive disorder, recurrent episode, moderate (H)     Peripheral neuropathy     Marijuana use, continuous     Tobacco use disorder     Migraine without aura and without status migrainosus, not intractable     Cervical high risk HPV (human papillomavirus) test positive     PTSD (post-traumatic stress disorder)     GEOR[place]IANA (generalized anxiety disorder)     Vitamin B12 deficiency (non anemic)     Restless leg syndrome     Elevated C-reactive protein (CRP)     Occipital headache   Significant for high blood pressure, migraines, depression, sleep disorder    FAMILY HISTORY  Family History   Problem Relation Age of Onset     Diabetes Mother      Hypertension Mother      Hyperlipidemia Mother      Depression Mother      Substance Abuse Mother      Hypertension Father      Hyperlipidemia Father      Kidney Cancer Father      Other Cancer Father      Diabetes Maternal Grandmother      Depression Sister      Breast Cancer No family hx of    Positive for migraines in multiple family members.    SOCIAL HISTORY  Social History     Tobacco Use     Smoking status: Every Day     Packs/day: 1.00     Years: 30.00     Additional pack years: 0.00     Total pack years: 30.00     Types: Cigarettes     Passive exposure: Past     Smokeless tobacco: Never   Vaping Use     Vaping Use: Never used   Substance Use Topics     Alcohol use: Not Currently     Comment: 1-2 drinks a week     Drug use: Yes     Types: Marijuana     Comment: cbd       SYSTEMS REVIEW  Twelve-system ROS was done and other than the HPI this was negative/positive for neck pain, back pain, joint pain, numbness and tingling/neuropathy related to hernia surgery, sleeping problems, headaches, anxiety, depression    MEDICATIONS  acetaminophen (TYLENOL) 325 MG tablet, Take 650 mg by mouth every 6 hours as needed for mild pain  albuterol (PROAIR HFA/PROVENTIL HFA/VENTOLIN HFA) 108 (90 Base) MCG/ACT inhaler, Inhale 2 puffs into the lungs every 4 hours as needed for shortness  of breath or wheezing  baclofen (LIORESAL) 10 MG tablet, Take 1 tablet (10 mg) by mouth At Bedtime  busPIRone HCl (BUSPAR) 30 MG tablet, Take 1 tablet (30 mg) by mouth 2 times daily  butalbital-acetaminophen-caffeine (ESGIC) -40 MG tablet, TAKE 1 TABLET BY MOUTH DAILY AS NEEDED FOR HEADACHE. NO MORE THAN 2 DAYS EVERY WEEK  cholestyramine (QUESTRAN) 4 GM/DOSE powder, Take 4 g by mouth 2 times daily (with meals) Start with 4 gram at supper. Increase the dose to twice a day if needed.  famotidine (PEPCID) 40 MG tablet, Take 1 tablet (40 mg) by mouth 2 times daily  gabapentin (NEURONTIN) 400 MG capsule, TAKE 3 CAPSULES(1200 MG) BY MOUTH THREE TIMES DAILY  hydrochlorothiazide (HYDRODIURIL) 25 MG tablet, TAKE 1 TABLET BY MOUTH DAILY  hydrOXYzine (ATARAX) 50 MG tablet, TAKE 1 TABLET(50 MG) BY MOUTH EVERY 6 HOURS AS NEEDED FOR ANXIETY  IBUPROFEN PO,   ipratropium - albuterol 0.5 mg/2.5 mg/3 mL (DUONEB) 0.5-2.5 (3) MG/3ML neb solution, Take 1 vial (3 mLs) by nebulization every 4 hours as needed for shortness of breath, wheezing or cough  lisinopril (ZESTRIL) 5 MG tablet, Take 1 tablet (5 mg) by mouth daily  Loratadine-Pseudoephedrine (CLARITIN-D 24 HOUR PO),   RABEprazole (ACIPHEX) 20 MG EC tablet, Take 1 tablet (20 mg) by mouth daily  sertraline (ZOLOFT) 25 MG tablet, Take 1 tablet (25 mg) by mouth daily with sertraline 50 mg for 75 mg total.  sertraline (ZOLOFT) 50 MG tablet, Take 1 tablet (50 mg) by mouth daily with sertraline 25 mg for 75 mg total.  sucralfate (CARAFATE) 1 GM tablet, Take 1 tablet (1 g) by mouth 3 times daily Take between meals and one dose before bed. Dissolve in a small amount of water. Do not eat, smoke, or drink for 30 min after the medication  tiZANidine (ZANAFLEX) 2 MG tablet, TAKE 1 TABLET BY MOUTH THREE TIMES DAILY AS NEEDED FOR MUSCLE SPASMS  zolpidem (AMBIEN) 10 MG tablet, TAKE 1/2 TO 1 TABLET(5 TO 10 MG) BY MOUTH EVERY NIGHT AS NEEDED FOR SLEEP  cyanocobalamin (VITAMIN B-12) 1000 MCG  tablet, Take 1 tablet (1,000 mcg) by mouth daily (Patient not taking: Reported on 10/4/2023)  omeprazole (PRILOSEC) 40 MG DR capsule, Take 1 capsule (40 mg) by mouth daily (Patient not taking: Reported on 10/4/2023)    No current facility-administered medications on file prior to visit.       PHYSICAL EXAMINATION  VITALS: /67   Pulse 84   Resp 18   Wt 88.5 kg (195 lb)   BMI 35.67 kg/m    GENERAL: Healthy appearing, alert, no acute distress, normal habitus.  CARDIOVASCULAR: Extremities warm and well perfused. Pulses present.   NEUROLOGICAL:  Patient is awake and oriented to self, place and time.  Attention span is normal.  Memory is grossly intact.  Language is fluent and follows commands appropriately.  Appropriate fund of knowledge. Cranial nerves 2-12 are intact. There is no pronator drift.  Motor exam shows 5/5 strength in all extremities.  Tone is symmetric bilaterally in upper and lower extremities.  Reflexes are symmetric and 2+ in upper extremities and lower extremities. Sensory exam is grossly intact to light touch, pin prick and vibration.  Finger to nose and heel to shin is without dysmetria.  Romberg is negative.  Gait is normal and the patient is able to do tandem walk and walk on toes and heels.      DIAGNOSTICS  MRI- 1998  IMP:  Normal MRI of the head.       RELEVANT LABS  Component      Latest Ref Rng 8/2/2023  10:21 AM   Vitamin B12      232 - 1,245 pg/mL 642    CRP Inflammation      <5.00 mg/L <3.00    Sed Rate      0 - 20 mm/hr 29 (H)       Component      Latest Ref Rng 1/3/2023  10:45 AM   WBC      4.0 - 11.0 10e3/uL 12.6 (H)    RBC Count      3.80 - 5.20 10e6/uL 4.07    Hemoglobin      11.7 - 15.7 g/dL 13.5    Hematocrit      35.0 - 47.0 % 40.0    MCV      78 - 100 fL 98    MCH      26.5 - 33.0 pg 33.2 (H)    MCHC      31.5 - 36.5 g/dL 33.8    RDW      10.0 - 15.0 % 13.2    Platelet Count      150 - 450 10e3/uL 393    % Neutrophils      % 81    % Lymphocytes      % 12    % Monocytes       % 6    % Eosinophils      % 0    % Basophils      % 0    % Immature Granulocytes      % 1    NRBCs per 100 WBC      <1 /100 0    Absolute Neutrophils      1.6 - 8.3 10e3/uL 10.3 (H)    Absolute Lymphocytes      0.8 - 5.3 10e3/uL 1.5    Absolute Monocytes      0.0 - 1.3 10e3/uL 0.7    Absolute Eosinophils      0.0 - 0.7 10e3/uL 0.0    Absolute Basophils      0.0 - 0.2 10e3/uL 0.1    Absolute Immature Granulocytes      <=0.4 10e3/uL 0.1    Absolute NRBCs      10e3/uL 0.0    Sodium      136 - 145 mmol/L 138    Potassium      3.4 - 5.3 mmol/L 3.7    Chloride      98 - 107 mmol/L 101    Carbon Dioxide (CO2)      22 - 29 mmol/L 23    Anion Gap      7 - 15 mmol/L 14    Urea Nitrogen      6.0 - 20.0 mg/dL 11.6    Creatinine      0.51 - 0.95 mg/dL 0.76    Calcium      8.6 - 10.0 mg/dL 9.2    Glucose      70 - 99 mg/dL 111 (H)    Alkaline Phosphatase      35 - 104 U/L 75    AST      10 - 35 U/L 27    ALT      10 - 35 U/L 15    Protein Total      6.4 - 8.3 g/dL 7.4    Albumin      3.5 - 5.2 g/dL 4.1    Bilirubin Total      <=1.2 mg/dL 0.6    GFR Estimate      >60 mL/min/1.73m2 >90       Legend:  (H) High    Legend:  (H) High  OUTSIDE RECORDS  Outside referral notes and chart notes were reviewed and pertinent information has been summarized (in addition to the HPI):-No relevant notes available.    IMPRESSION/REPORT/PLAN  Intractable chronic migraine without aura and without status migrainosus  Obesity  Depression with multiple antidepressant use    This is a 47 year old female with headache suggestive of chronic migraines.  Exam today is noncontributory.  MRI brain in 1998 was negative for any structural lesions.  Blood work through primary care has been negative.  We will hold off on repeating the imaging given negative exam and given chronicity of headaches.    She is never tried any preventive medications for headaches and was started on Topamax.  Discussed side effects.  There is no history of kidney stones.  She cannot be  on antidepressants as she is already on multiple antidepressants.  No major insomnia complicating the headaches.    For abortive therapy would recommend not using the Fioricet.  We will try Maxalt.  Can use the Maxalt with the tizanidine prescribed through primary care.    Discussed triggers/lifestyle changes for headaches.  Encouraged her to keep a log to identify patterns.    I can see her back in 3 months.    -     topiramate (TOPAMAX) 25 MG tablet; Start with 1 tab/night and increase by 1 tablet/week as needed and tolerated to a max of 4 tabs/night.  -     rizatriptan (MAXALT) 10 MG tablet; Take 1 tablet (10 mg) by mouth at onset of headache for migraine May repeat in 2 hours.    Return in about 3 months (around 2/3/2024) for In-Clinic Visit (must), or next available.    Over 50 minutes were spent coordinating the care for the patient on the day of the encounter.  This includes previsit, during visit and post visit activities as documented above.  Counseling patient.  Reviewing chart/testing.  Prescription management.  Reviewing previous testing.  (Activities include but not inclusive of reviewing chart, reviewing outside records, reviewing labs and imaging study results as well as the images, patient visit time including getting history and exam,  use if applicable, review of test results with the patient and coming up with a plan in a shared model, counseling patient and family, education and answering patient questions, EMR , EMR diagnosis entry and problem list management, medication reconciliation and prescription management if applicable, paperwork if applicable, printing documents and documentation of the visit activities.)        Michael Nava MD  Neurologist  [facility]  Tel:- [phone]    This note was dictated using voice recognition software.  Any grammatical or context distortions are unintentional and inherent to the  software.      Again, thank you for allowing me to participate in the care of your patient.        Sincerely,        Michael Nava MD

## 2023-11-06 NOTE — PROGRESS NOTES
[facility] Psychiatry Services [facility]  November 7, 2023      Behavioral Health Clinician Progress Note     Patient Name: Apoorva Roberts           Service Type:  Individual      Service Location:   MyChart / Email (patient reached)     Session Start Time: 2:30 pm  Session End Time: 2:52 pm      Session Length: 16 - 37      Attendees: Patient     Service Modality:  Video Visit:      Provider verified identity through the following two step process.  Patient provided:  Patient is known previously to provider    Telemedicine Visit: The patient's condition can be safely assessed and treated via synchronous audio and visual telemedicine encounter.      Reason for Telemedicine Visit: Services only offered telehealth    Originating Site (Patient Location): Patient's home    Distant Site (Provider Location): [facility]    Consent:  The patient/guardian has verbally consented to: the potential risks and benefits of telemedicine (video visit) versus in person care; bill my insurance or make self-payment for services provided; and responsibility for payment of non-covered services.     Patient would like the video invitation sent by:  My Chart    Mode of Communication:  Video Conference via [facility]    Distant Location (Provider):  On-site    As the provider I attest to compliance with applicable laws and regulations related to telemedicine.    Visit Activities (Refresh list every visit): [facility] Only    Diagnostic Assessment Date: 8/24/23  Treatment Plan Review Date: 12/14/23  See Flowsheets for today's PHQ-9 and GEORGIANA-7 results  Previous PHQ-9:       7/12/2023     2:41 PM 8/23/2023    10:42 AM 10/9/2023    11:51 AM   PHQ-9 SCORE   PHQ-9 Total Score MyChart 7 (Mild depression) 7 (Mild depression) 7 (Mild depression)   PHQ-9 Total Score 7 7    7 7     Previous GEORGIANA-7:       9/3/2020    11:55 AM 8/27/2021     5:41 PM 10/5/2021     9:49 AM   GEORGIANA-7 SCORE   Total Score 7 11 10       SORAYA  LEVEL:       No data to display                DATA  Extended Session (60+ minutes): No  Interactive Complexity: No  Crisis: No  [facility] Patient: No    Treatment Objective(s) Addressed in This Session:  Target Behavior(s):  anxiety and depression    Depressed Mood: Increase interest, engagement, and pleasure in doing things  Decrease frequency and intensity of feeling down, depressed, hopeless  Improve quantity and quality of night time sleep / decrease daytime naps  Feel less tired and more energy during the day   Improve diet, appetite, mindful eating, and / or meal planning  Identify negative self-talk and behaviors: challenge core beliefs, myths, and actions  Improve concentration, focus, and mindfulness in daily activities   Feel less fidgety, restless or slow in daily activities / interpersonal interactions  Anxiety: will experience a reduction in anxiety, will develop more effective coping skills to manage anxiety symptoms, will develop healthy cognitive patterns and beliefs, and will increase ability to function adaptively    Current Stressors / Issues:   update: Reports feeling about the same from last visit. States little change in anxiety, noted fewer panic attacks (1per week). Continues to fight urge to isolate. She has been feeling more engaged in conversation with people. Sometimes she feels she is just in the room with others but not present. Does feel activities are more enjoyable. Discussed self care. She has been engaging more in things she likes to help relax in the evenings. Feeling a bit of a struggle getting enough time alone with . Frustrated with neurology appt feeling she did not get any answers as to why she is having headaches. Has not started the medications yet for headaches.   Stresses: extended family staying with her. Work has been busy making the day go by quickly.   Appetite: unremarkable, some difficulty with not being hungry due to stress.  Sleep: unremarkable,  notes  she has been sleeping better but she has not noticed it yet. Not feeling as tired during the day time.   Therapy: has not reached out to therapist yet. Still has desire to reach out. Continues to struggle with some time off of work in order to do therapy.   CARLITOS: na  Preg: na  Most important: not noticing much of a change since increase in anti depressant.     10/10/23   update: Continues to struggle with consistent anxiety. Reports some panic attacks about 2 a week, which has decreased from last visit. Continues to have urge to isolate. Has been pushing self more to not isolate. Struggles with engaging socially when around others. Continues with self care and doing activities she enjoys.    Stresses: consistent if not worse. Having some in laws staying in their home. Very strained relationship with son, has not spoken with him in about a year. Tends to make her feel more down, not seeing grandson.    Appetite: unremarkable, about the same.   Sleep: unremarkable, dx of insomnia, best 6 hrs.   Therapy: has not reached out to therapist yet. Still has desire to reach out. Continues to struggle with some time off of work in order to do therapy.   CARLITOS: na  Preg: na  Most important: minimal change in anxiety. No side effects of increased medications.    9/14/23  MH update: Reports little difference since medication changes.Continues to have panic attacks several times a week. Utilizing coping skills learned in therapy. Continues to have high general anxiety and increased isolation.    Stresses: Step mom dx with terminal cancer. Deaths in the family. Relationship with son.   Appetite: some difficulty with not being hungry due to stress.   Sleep: unremarkable, dx of insomnia, best 6 hrs.   Therapy: has resources for therapist that work with her schedule. Has had a really good relationship with previous therapist. Considering reaching out to therapist and making it work with her schedule. Discussed skills for dealing with  "isolation and depression. Discussed hating feeling she is having to \"force myself to do daily activities\". Discussed feeling emotional, physical and mental drained. Reviewed skills for panic attacks. Discussed staying in the present, focusing less on past and future events. Validated pts frustrations. Discussed self care acts she can do to help herself, ie reading and watching tv.     CARLITOS: na  Preg: na  Most important: minimal change since increase medication. Would like to make medication changes a bit quicker.     8/24/23 (DA)  The reason for seeking services at this time is: \"Anxiety and depression\".  The problem(s) began 05/01/05.     First appointment with patient in [facility] and was advised of the short-term, team based structure of the model including role of C and provider. Patient indicated understanding of the model and agreed to proceed with services as described.     Met with pt for initial assessment. States she has had a hx of anxiety, depression and PTSD for many years. She was dx with insomnia as a teen. In the past year sx have become increasingly worse with depression and anxiety. Reports she has been struggling this past year with 4 deaths in the family and step mom has terminal cancer. Her relationship with son is very poor currently. She has begun isolating more from friends and family. States she is having difficulty with concentration and focus. Really enjoys reading but has not been able to do this well lately. Reports long hx of panic attacks but had been having them about once a month and was able to manage them with skills learned in therapy. Now panic attacks happen several times a week. She has also noted some of her PTSD sx increasing more as time has been progressing.      Interested in therapy, saw therapist at the end of last year for 6 months. This became very difficult to manage with work schedule. [facility] provided resources for therapist that might be able to provide more convenient " hours for her.      Patient has attempted to resolve these concerns in the past through medications and therapy .      Progress on Treatment Objective(s) / Homework:  Satisfactory progress - PRECONTEMPLATION (Not seeing need for change); Intervened by educating the patient about the effects of current behavior on health.  Evoked information about reasons to continue behavior, express concern / recommendations, and explored any change talk    Motivational Interviewing    MI Intervention: Expressed Empathy/Understanding, Supported Autonomy, Collaboration, Evocation, Permission to raise concern or advise, Open-ended questions, Reflections: simple and complex, Change talk (evoked), and Reframe     Change Talk Expressed by the Patient: Desire to change Ability to change Reasons to change Need to change    Provider Response to Change Talk: E - Evoked more info from patient about behavior change, A - Affirmed patient's thoughts, decisions, or attempts at behavior change, R - Reflected patient's change talk, and S - Summarized patient's change talk statements    Assessments completed prior to visit:  The following assessments were completed by patient for this visit:  PROMIS 10-Global Health (all questions and answers displayed):       8/17/2023    10:10 AM   PROMIS 10   In general, would you say your health is: Good   In general, would you say your quality of life is: Good   In general, how would you rate your physical health? Good   In general, how would you rate your mental health, including your mood and your ability to think? Fair   In general, how would you rate your satisfaction with your social activities and relationships? Good   In general, please rate how well you carry out your usual social activities and roles Fair   To what extent are you able to carry out your everyday physical activities such as walking, climbing stairs, carrying groceries, or moving a chair? Moderately   In the past 7 days, how often have  you been bothered by emotional problems such as feeling anxious, depressed, or irritable? Sometimes   In the past 7 days, how would you rate your fatigue on average? Moderate   In the past 7 days, how would you rate your pain on average, where 0 means no pain, and 10 means worst imaginable pain? 7   In general, would you say your health is: 3    3   In general, would you say your quality of life is: 3    3   In general, how would you rate your physical health? 3    3   In general, how would you rate your mental health, including your mood and your ability to think? 2    2   In general, how would you rate your satisfaction with your social activities and relationships? 3    3   In general, please rate how well you carry out your usual social activities and roles. (This includes activities at home, at work and in your community, and responsibilities as a parent, child, spouse, employee, friend, etc.) 2    2   To what extent are you able to carry out your everyday physical activities such as walking, climbing stairs, carrying groceries, or moving a chair? 3    3   In the past 7 days, how often have you been bothered by emotional problems such as feeling anxious, depressed, or irritable? 3    3   In the past 7 days, how would you rate your fatigue on average? 3    3   In the past 7 days, how would you rate your pain on average, where 0 means no pain, and 10 means worst imaginable pain? 7    7   Global Mental Health Score 11    11   Global Physical Health Score 11    11   PROMIS TOTAL - SUBSCORES 22    22       Care Plan review completed: Yes    Medication Review:  No changes to current psychiatric medication(s)    Medication Compliance:  Yes    Changes in Health Issues:   None reported    Chemical Use Review:   Substance Use: Chemical use reviewed, no active concerns identified      Tobacco Use: No current tobacco use.      Assessment: Current Emotional / Mental Status (status of significant symptoms):  Risk status  (Self / Other harm or suicidal ideation)  Patient denies a history of suicidal ideation, suicide attempts, self-injurious behavior, homicidal ideation, homicidal behavior, and and other safety concerns  Patient denies current fears or concerns for personal safety.  Patient denies current or recent suicidal ideation or behaviors.  Patient denies current or recent homicidal ideation or behaviors.  Patient denies current or recent self injurious behavior or ideation.  Patient denies other safety concerns.  A safety and risk management plan has not been developed at this time, however patient was encouraged to call [place] [phone] should there be a change in any of these risk factors.    Appearance:   Appropriate   Eye Contact:   Good   Psychomotor Behavior: Normal   Attitude:   Cooperative   Orientation:   All  Speech   Rate / Production: Normal    Volume:  Normal   Mood:    Anxious  Depressed   Affect:    Appropriate   Thought Content:  Clear   Thought Form:  Coherent  Logical   Insight:    Fair     Diagnoses:  1. Generalized anxiety disorder    2. Panic attack    3. Moderate episode of recurrent major depressive disorder (H)    4. History of posttraumatic stress disorder (PTSD)            Collateral Reports Completed:  Communicated with: Susi GRAY CNP    Plan: (Homework, other):  Patient was given information about behavioral services and encouraged to schedule a follow up appointment with the clinic [facility]  tbd .  She was also given information about mental health symptoms and treatment options .  CD Recommendations: No indications of CD issues. STACY Bravo    ______________________________________________________________________    Integrated Primary Care Behavioral Health Treatment Plan    Patient's Name: Apoorva Roberts  YOB: 1975    Date of Creation: 9/14/23  Date Treatment Plan Last Reviewed/Revised: 9/14/23    DSM5 Diagnoses: 296.32 (F33.1) Major Depressive Disorder, Recurrent  Episode, Moderate _ or 300.02 (F41.1) Generalized Anxiety Disorder  Psychosocial / Contextual Factors:  Contextual influences on patient's health include: Contextual Factors: Individual Factors management of MH and Family Factors several deaths in the family and strained relationships    PROMIS (reviewed every 90 days):     Referral / Collaboration:  Referral to another professional/service is not indicated at this time..    Anticipated number of session for this episode of care: 4-6 sessions  Anticipation frequency of session:  tbd  Anticipated Duration of each session: 16-37 minutes  Treatment plan will be reviewed in 90 days or when goals have been changed.       MeasurableTreatment Goal(s) related to diagnosis / functional impairment(s)  Goal 1: Patient will work with providers to manage symptoms     I will know I've met my goal when less anxious and depressed.      Objective #A (Patient Action)    Patient will  attend all appointments, take medication as prescribed .  Status: New - Date: 9/14/23      Intervention(s)  Therapist will  Monitor and assist in overcoming barriers to treatment adherence .    Objective #B  Patient will  consider all recommendations offered .  Status: New - Date: 9/14/23      Intervention(s)  Therapist will  educate patient on treatment options, clarify concerns, work with pt to overcome any resistance to compliance. .          Patient has reviewed and agreed to the above plan.      Rufina Farnsworth [facility]  November 7, 2023  Answers submitted by the patient for this visit:  Patient Health Questionnaire (Submitted on 10/9/2023)  If you checked off any problems, how difficult have these problems made it for you to do your work, take care of things at home, or get along with other people?: Somewhat difficult  PHQ9 TOTAL SCORE: 7

## 2023-11-07 ENCOUNTER — VIRTUAL VISIT (OUTPATIENT)
Dept: PSYCHIATRY | Facility: CLINIC | Age: 48
End: 2023-11-07
Payer: COMMERCIAL

## 2023-11-07 ENCOUNTER — VIRTUAL VISIT (OUTPATIENT)
Dept: BEHAVIORAL HEALTH | Facility: CLINIC | Age: 48
End: 2023-11-07
Payer: COMMERCIAL

## 2023-11-07 DIAGNOSIS — F41.1 GENERALIZED ANXIETY DISORDER: Primary | ICD-10-CM

## 2023-11-07 DIAGNOSIS — F43.10 PTSD (POST-TRAUMATIC STRESS DISORDER): ICD-10-CM

## 2023-11-07 DIAGNOSIS — F33.1 MODERATE EPISODE OF RECURRENT MAJOR DEPRESSIVE DISORDER (H): ICD-10-CM

## 2023-11-07 DIAGNOSIS — F41.0 PANIC ATTACK: ICD-10-CM

## 2023-11-07 DIAGNOSIS — Z86.59 HISTORY OF POSTTRAUMATIC STRESS DISORDER (PTSD): ICD-10-CM

## 2023-11-07 DIAGNOSIS — F33.1 MAJOR DEPRESSIVE DISORDER, RECURRENT EPISODE, MODERATE (H): ICD-10-CM

## 2023-11-07 DIAGNOSIS — F41.1 GAD (GENERALIZED ANXIETY DISORDER): ICD-10-CM

## 2023-11-07 PROCEDURE — 90832 PSYTX W PT 30 MINUTES: CPT | Mod: 95 | Performed by: SOCIAL WORKER

## 2023-11-07 PROCEDURE — 99214 OFFICE O/P EST MOD 30 MIN: CPT | Mod: VID | Performed by: NURSE PRACTITIONER

## 2023-11-07 RX ORDER — SERTRALINE HYDROCHLORIDE 100 MG/1
100 TABLET, FILM COATED ORAL DAILY
Qty: 30 TABLET | Refills: 0 | Status: SHIPPED | OUTPATIENT
Start: 2023-11-07 | End: 2023-12-05 | Stop reason: DRUGHIGH

## 2023-11-07 ASSESSMENT — PAIN SCALES - GENERAL: PAINLEVEL: NO PAIN (0)

## 2023-11-07 NOTE — PROGRESS NOTES
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 alternatives reviewed. Health promotion activities recommended and reviewed today. All questions addressed. Education and counseling completed regarding risks and benefits of medications and psychotherapy options.  Consent provided by patient/guardian  Call the psychiatric nurse line with medication questions or concerns at [phone].  MyChart may be used to communicate with your provider, but this is not intended to be used for emergencies.  SEROTONIN SYNDROME:  Discussed risks of Serotonin syndrome (ie, serotonin toxicity) which is a potentially life-threatening condition associated with increased serotonergic activity in the central nervous system (CNS). It is seen with therapeutic medication use, inadvertent interactions between drugs, and intentional self-poisoning. Serotonin syndrome may involve a spectrum of clinical findings, which often include mental status changes, autonomic hyperactivity, and neuromuscular abnormalities.    Medlineplus.gov is information for patients.  It is run by the [employer] and it contains information about all disorders, diseases and all medications.      Community Resources:    National Suicide Prevention Lifeline: 839.386.8979 (TTY: 565.372.8161). Call anytime for help.   (www.suicidepreventionlifeline.org)  National [place] on Mental Illness (www.oumar.org): 729.818.3868 or 026-868-5208.   Mental Health Association (www.mentalhealth.org): 261.701.2612 or 537-526-0580.  [place] Crisis Text Line: Text [place] to 271035  Suicide LifeLine Chat: suicideAkebia Therapeutics.org/chat    Administrative Billing:     Level of Medical Decision Making:   - At least 1 chronic problem that is not stable  - Engaged in prescription drug management during visit (discussed any medication benefits, side effects, alternatives, etc.)             Patient Status:  CCPS MD/DO/NP/PA providers offer care a specialty service for Primary Care Providers in the [facility] that seek to optimize psychotropic medications for unstable patients.  Once medications have been optimized, our providers discharge the patient back to the referring Primary Care Provider for ongoing medication management.  This type of system allows our providers to serve a high volume of patients.   Patient will continue to be seen for ongoing consultation and stabilization.    Signed:   Susi Carter, MSN, APRN, PMHNP-BC  Collaborative Care Psychiatry Service (CCPS)  [facility]    Chart documentation done in part with Dragon Voice Recognition software.  Although reviewed after completion, some word and grammatical errors may remain.

## 2023-11-07 NOTE — PROGRESS NOTES
Virtual Visit Details    Type of service:  Video Visit   Video Start Time:  3:01 PM  Video End Time: 3:24 PM    Originating Location (pt. Location): Other patient's private vehicle    Distant Location (provider location):  On-site  Platform used for Video Visit: Komal

## 2023-11-07 NOTE — NURSING NOTE
Is the patient currently in the state of [place]? YES    Visit mode:VIDEO    If the visit is dropped, the patient can be reconnected by: VIDEO VISIT: Text to cell phone:   Telephone Information:   Mobile [phone]   Mobile Not on file.       Will anyone else be joining the visit? NO  (If patient encounters technical issues they should call [phone]    How would you like to obtain your AVS? MyChart    Are changes needed to the allergy or medication list? No    Reason for visit: RECHECK    Manjeet GONZALES

## 2023-11-07 NOTE — PATIENT INSTRUCTIONS
**For crisis resources, please see the information at the end of this document**     Thank you for coming to the [facility] [place] [facility].    TREATMENT PLAN:  Medications:   INCREASE TO sertraline 100 mg every day. Script sent for #30.  CONTINUE buspirone 30 mg twice a day. Script sent previously for 3 months.  CONTINUE hydroxyzine 50 mg (1-2 tablets) as needed for panic. No script needed.  Continue all other medications per primary care provider.     Referrals / Consults:  Continue therapy with STACY Bravo, Behavioral Health Consultant.   Complete appointment with sleep medicine as planned.    Follow-up:   Schedule an appointment with me in 4 weeks or sooner as needed. Call [facility] at [phone] to schedule.  Follow up with primary care provider as planned or for acute medical concerns.  Call the psychiatric nurse line with medication questions or concerns at [phone].  I[employer] Network[employer] may be used to communicate with your provider, but this is not intended to be used for emergencies.    Psychoeducation:  Patient to monitor for signs and symptoms of serotonin syndrome/serotonin toxicity (eg, hyperreflexia, clonus, hyperthermia, diaphoresis, tremor, autonomic instability, mental status changes) when these drugs are combined.    Discussed patient's past thrush that was attributed to alternative SSRIs and risk with same drug class. She has not experienced with all SSRI trials, and we will monitor closely for symptoms.         Financial Assistance [phone]  [facility] [phone]  Central Billing Office, ealth: [phone]  [facility] [phone]  Medical Records [phone]  [facility] Patient Bill of Rights [email or web address]       MENTAL HEALTH CRISIS RESOURCES:  For a emergency help, please call 911 or go to the nearest Emergency Department.     Emergency Walk-In Options:    EmPATH Unit @ [facility] ([place]): [phone] - Specialized mental health emergency area designed to be calming  [facility] ([place]): [phone]  [facility] ([place]): [phone]  [facility] ([place]): [phone]    County Crisis Information:   [place]: [phone]  [place]: [phone]  [place] ([place]) - Adult: [phone]     Child: [phone]  [place] - Adult: [phone]     Child: [phone]  [place]: [phone]  List of all [facility] resources:   https://[place].gov/dhs/people-we-serve/adults/health-care/mental-health/resources/crisis-contacts.jsp    National Crisis Information:   National Suicide & Crisis Lifeline: Call 198        For online chat options, visit https://suicidepreventionlifeline.org/chat/  Poison Control Center: 0-424-518-2571  Poison Control Center: 1-122-627-4918  Trans Lifeline: 4-274-192-2269 - Hotline for transgender people of all ages  The [employer] Project: 7-002-687-8244 - Hotline for LGBT youth     For Non-Emergency Support:   Fast Tracker: Mental Health & Substance Use Disorder Resources -   https://www.fasttrackermn.org/       Again thank you for choosing [facility] MENTAL HEALTH & ADDICTION [facility] and please let us know how we can best partner with you to improve you and your family's health.    You may be receiving a survey regarding this appointment. We would love to have your feedback, both positive and negative. The survey is done by an external company, so your answers are anonymous.        Patient Education   Collaborative Care Psychiatry Service  What to Expect  Here's what to expect from your Collaborative Care Psychiatry Service (CCPS).   About CCPS  CCPS means 2 people work together to help you get better. You'll meet with a behavioral health clinician and a psychiatric doctor. A behavioral health clinician helps people with mental health problems by talking with them. A  "psychiatric doctor helps people by giving them medicine.  How it works  At every visit, you'll see the behavioral health clinician (BHC) first. They'll talk with you about how you're doing and teach you how to feel better.   Then you'll see the psychiatric doctor. This doctor can help you deal with troubling thoughts and feelings by giving you medicine. They'll make sure you know the plan for your care.   CCPS usually takes 3 to 6 visits. If you need more visits, we may have you start seeing a different psychiatric doctor for ongoing care.  If you have any questions or concerns, we'll be glad to talk with you.  About visits  Be open  At your visits, please talk openly about your problems. It may feel hard, but it's the best way for us to help you.  Cancelling visits  If you can't come to your visit, please call us right away at [phone]. If you don't cancel at least 24 hours (1 full day) before your visit, that's \"late cancellation.\"  Being late to visits  Being very late is the same as not showing up. You will be a \"no show\" if:  Your appointment starts with a BHC, and you're more than 15 minutes late for a 30-minute (half hour) visit. This will also cancel your appointment with the psychiatric doctor.  Your appointment is with a psychiatric doctor only, and you're more than 15 minutes late for a 30-minute (half hour) visit.  Your appointment is with a psychiatric doctor only, and you're more than 30 minutes late for a 60-minute (full hour) visit.  If you cancel late or don't show up 2 times within 6 months, we may end your care.   Getting help between visits  If you need help between visits, you can call us Monday to Friday from 8 a.m. to 4:30 p.m. at [phone].  Emergency care  Call 911 or go to the nearest emergency department if your life or someone else's life is in danger.  Call 448 anytime to reach the national Suicide and Crisis hotline.  Medicine refills  To refill your medicine, call your " pharmacy. You can also call [facility]'s Behavioral Access at [phone], Monday to Friday, 8 a.m. to 4:30 p.m. It can take 1 to 3 business days to get a refill.   Forms, letters, and tests  You may have papers to fill out, like FMLA, short-term disability, and workability. We can help you with these forms at your visits, but you must have an appointment. You may need more than 1 visit for this, to be in an intensive therapy program, or both.  Before we can give you medicine for ADHD, we may refer you to get tested for it or confirm it another way.  We may not be able to give you an emotional support animal letter.  We don't do mental health checks ordered by the court.   We don't do mental health testing, but we can refer you to get tested.   Thank you for choosing us for your care.  For informational purposes only. Not to replace the advice of your health care provider. Copyright   2022 [facility]. All rights reserved. [employer] [id number] - 12/22.

## 2023-11-09 ENCOUNTER — E-VISIT (OUTPATIENT)
Dept: FAMILY MEDICINE | Facility: CLINIC | Age: 48
End: 2023-11-09
Payer: COMMERCIAL

## 2023-11-09 ENCOUNTER — HOSPITAL ENCOUNTER (EMERGENCY)
Facility: CLINIC | Age: 48
Discharge: HOME | End: 2023-11-09
Attending: EMERGENCY MEDICINE | Admitting: EMERGENCY MEDICINE
Payer: COMMERCIAL

## 2023-11-09 VITALS
RESPIRATION RATE: 16 BRPM | HEIGHT: 62 IN | WEIGHT: 190 LBS | DIASTOLIC BLOOD PRESSURE: 92 MMHG | HEART RATE: 101 BPM | TEMPERATURE: 98.3 F | BODY MASS INDEX: 34.96 KG/M2 | OXYGEN SATURATION: 98 % | SYSTOLIC BLOOD PRESSURE: 153 MMHG

## 2023-11-09 DIAGNOSIS — B02.9 HERPES ZOSTER WITHOUT COMPLICATION: ICD-10-CM

## 2023-11-09 DIAGNOSIS — B02.9 HERPES ZOSTER WITHOUT COMPLICATION: Primary | ICD-10-CM

## 2023-11-09 PROCEDURE — 99284 EMERGENCY DEPT VISIT MOD MDM: CPT | Performed by: EMERGENCY MEDICINE

## 2023-11-09 PROCEDURE — 99422 OL DIG E/M SVC 11-20 MIN: CPT | Performed by: NURSE PRACTITIONER

## 2023-11-09 PROCEDURE — 99283 EMERGENCY DEPT VISIT LOW MDM: CPT | Performed by: EMERGENCY MEDICINE

## 2023-11-09 RX ORDER — VALACYCLOVIR HYDROCHLORIDE 1 G/1
1000 TABLET, FILM COATED ORAL 3 TIMES DAILY
Qty: 21 TABLET | Refills: 0 | Status: SHIPPED | OUTPATIENT
Start: 2023-11-09 | End: 2024-02-09

## 2023-11-09 RX ORDER — VALACYCLOVIR HYDROCHLORIDE 1 G/1
1000 TABLET, FILM COATED ORAL 3 TIMES DAILY
Qty: 21 TABLET | Refills: 0 | Status: SHIPPED | OUTPATIENT
Start: 2023-11-09 | End: 2023-11-09

## 2023-11-09 RX ORDER — OXYCODONE HYDROCHLORIDE 5 MG/1
5 TABLET ORAL EVERY 6 HOURS PRN
Qty: 12 TABLET | Refills: 0 | Status: SHIPPED | OUTPATIENT
Start: 2023-11-09 | End: 2023-11-12

## 2023-11-09 RX ORDER — TRIAMCINOLONE ACETONIDE 1 MG/G
CREAM TOPICAL 2 TIMES DAILY
Qty: 30 G | Refills: 0 | Status: SHIPPED | OUTPATIENT
Start: 2023-11-09 | End: 2024-02-09

## 2023-11-09 NOTE — ED TRIAGE NOTES
Pt here with a painful, burning rash on her neck, left shoulder and left upper arm that started about a week ago. Denied fevers.      Triage Assessment (Adult)       Row Name 11/09/23 1059          Triage Assessment    Airway WDL WDL        Respiratory WDL    Respiratory WDL WDL        Skin Circulation/Temperature WDL    Skin Circulation/Temperature WDL X        Cardiac WDL    Cardiac WDL WDL        Peripheral/Neurovascular WDL    Peripheral Neurovascular WDL WDL        Cognitive/Neuro/Behavioral WDL    Cognitive/Neuro/Behavioral WDL WDL

## 2023-11-09 NOTE — PATIENT INSTRUCTIONS
Dear Apoorva Roberts    The symptoms are consistent with shingles. It's hard for me to see the rash exactly from the picture. I sent a antiviral (oral) and a steroid cream to the pharmacy to start. If not improving in a week please follow up in clinic so we can get a better look.    Thanks for choosing us as your health care partner,    ISAAC Dodson CNP

## 2023-11-09 NOTE — DISCHARGE INSTRUCTIONS
Follow-up with your primary clinic as needed.    Valacyclovir as directed.    Oxycodone, instructed, if needed for more severe pain.  Otherwise use Tylenol and/or ibuprofen.    Return to the emergency department for any problems.

## 2023-11-10 ENCOUNTER — MYC MEDICAL ADVICE (OUTPATIENT)
Dept: PSYCHIATRY | Facility: CLINIC | Age: 48
End: 2023-11-10

## 2023-11-12 NOTE — ED PROVIDER NOTES
ED Provider Note  [facility]      History     Chief Complaint   Patient presents with    Rash     HPI  Apoorva Roberts is a 47 year old female who presents to the emergency department reporting a painful, burning rash that she has noted on the left side of her neck and left shoulder area.  She states that she began to have symptoms about 1 week ago.  She denies fevers.  She has no other complaints.    Past Medical History  Past Medical History:   Diagnosis Date    Cervical high risk HPV (human papillomavirus) test positive 08/27/2021    Community acquired pneumonia 12/05/2017 12/5/2017 chest radiograph: RLL infiltrate. Influenza negative. procalcitonin 0.23.    Depressive disorder     Gastroesophageal reflux disease     Hiatal hernia     Hypertension     Other chronic pain     Walking troubles      Past Surgical History:   Procedure Laterality Date    ABDOMEN SURGERY      APPENDECTOMY      CHOLECYSTECTOMY      ESOPHAGOSCOPY, GASTROSCOPY, DUODENOSCOPY (EGD), COMBINED N/A 10/8/2021    Procedure: ESOPHAGOGASTRODUODENOSCOPY, WITH BIOPSY;  Surgeon: Slim Randle DO;  Location: [facility]    ESOPHAGOSCOPY, GASTROSCOPY, DUODENOSCOPY (EGD), COMBINED N/A 5/8/2023    Procedure: ESOPHAGOGASTRODUODENOSCOPY, WITH BIOPSY;  Surgeon: Clemente Strong MD;  Location: [facility]    GYN SURGERY      HERNIA REPAIR      HYSTERECTOMY, PAP STILL INDICATED      Cervix still present     oxyCODONE (ROXICODONE) 5 MG tablet  valACYclovir (VALTREX) 1000 mg tablet  acetaminophen (TYLENOL) 325 MG tablet  albuterol (PROAIR HFA/PROVENTIL HFA/VENTOLIN HFA) 108 (90 Base) MCG/ACT inhaler  baclofen (LIORESAL) 10 MG tablet  busPIRone HCl (BUSPAR) 30 MG tablet  butalbital-acetaminophen-caffeine (ESGIC) -40 MG tablet  cholestyramine (QUESTRAN) 4 GM/DOSE powder  cyanocobalamin (VITAMIN B-12) 1000 MCG tablet  famotidine (PEPCID) 40 MG tablet  gabapentin (NEURONTIN) 400 MG capsule  hydrochlorothiazide  (HYDRODIURIL) 25 MG tablet  hydrOXYzine (ATARAX) 50 MG tablet  IBUPROFEN PO  ipratropium - albuterol 0.5 mg/2.5 mg/3 mL (DUONEB) 0.5-2.5 (3) MG/3ML neb solution  lisinopril (ZESTRIL) 5 MG tablet  Loratadine-Pseudoephedrine (CLARITIN-D 24 HOUR PO)  omeprazole (PRILOSEC) 40 MG DR capsule  RABEprazole (ACIPHEX) 20 MG EC tablet  rizatriptan (MAXALT) 10 MG tablet  sertraline (ZOLOFT) 100 MG tablet  sucralfate (CARAFATE) 1 GM tablet  tiZANidine (ZANAFLEX) 2 MG tablet  topiramate (TOPAMAX) 25 MG tablet  triamcinolone (KENALOG) 0.1 % external cream  zolpidem (AMBIEN) 10 MG tablet      Allergies   Allergen Reactions    Indomethacin      Other reaction(s): GI Upset    Fluoxetine      Other reaction(s): Thrush    Paroxetine      Other reaction(s): Thrush     Family History  Family History   Problem Relation Age of Onset    Diabetes Mother     Hypertension Mother     Hyperlipidemia Mother     Depression Mother     Substance Abuse Mother     Hypertension Father     Hyperlipidemia Father     Kidney Cancer Father     Other Cancer Father     Diabetes Maternal Grandmother     Depression Sister     Breast Cancer No family hx of      Social History   Social History     Tobacco Use    Smoking status: Every Day     Packs/day: 1.00     Years: 30.00     Additional pack years: 0.00     Total pack years: 30.00     Types: Cigarettes     Passive exposure: Past    Smokeless tobacco: Never   Vaping Use    Vaping Use: Never used   Substance Use Topics    Alcohol use: Not Currently     Comment: 1-2 drinks a week    Drug use: Yes     Types: Marijuana     Comment: cbd      Past medical history, past surgical history, medications, allergies, family history, and social history were reviewed with the patient. No additional pertinent items.      A medically appropriate review of systems was performed with pertinent positives and negatives noted in the HPI, and all other systems negative.    Physical Exam   BP: (!) 153/92  Pulse: 101  Temp: 98.3  F  "(36.8  C)  Resp: 16  Height: 157.5 cm (5' 2\")  Weight: 86.2 kg (190 lb)  SpO2: 98 %  Physical Exam  Vitals and nursing note reviewed.   Constitutional:       General: She is not in acute distress.     Appearance: She is well-developed. She is not ill-appearing or toxic-appearing.   HENT:      Head: Normocephalic and atraumatic.   Eyes:      General: No scleral icterus.     Pupils: Pupils are equal, round, and reactive to light.   Cardiovascular:      Rate and Rhythm: Normal rate.   Pulmonary:      Effort: Pulmonary effort is normal. No respiratory distress.   Musculoskeletal:      Cervical back: Normal range of motion and neck supple.   Skin:     General: Skin is warm and dry.      Coloration: Skin is not pale.      Findings: Rash present.      Comments: Clustered vesicular eruption on erythematous base noted in a dermatomal distribution on patient's left neck and shoulder area.   Neurological:      Mental Status: She is alert and oriented to person, place, and time.      Sensory: No sensory deficit.   Psychiatric:         Behavior: Behavior normal.           ED Course, Procedures, & Data      Procedures                      No results found for any visits on 11/09/23.  Medications - No data to display  Labs Ordered and Resulted from Time of ED Arrival to Time of ED Departure - No data to display  No orders to display              Assessment & Plan      This patient presented to the emergency department complaining of rash.  This seems consistent with shingles.  Patient will be placed on valacyclovir.  She is on what seems to be a maximal dose of Neurontin already.  She was given a small prescription for oxycodone for more severe pain.  She was discharged with instructions for care and follow-up in good condition.    I have reviewed the nursing notes. I have reviewed the findings, diagnosis, plan and need for follow up with the patient.          Final diagnoses:   Herpes zoster without complication     aHrvey" Shahab Cuevas MD    [facility] EMERGENCY DEPT  11/9/2023     Harvey Cuevas MD  11/12/23 1132

## 2023-11-13 NOTE — TELEPHONE ENCOUNTER
[facility] for provider's review.       RN attempted to call the patient to gather more information. LVM to return a call to the clinic.     Patient reports that she was diagnosed with shingles on 11/9/23. She is not sure if it is related to her medication or not, but wanted to let you know. Increased zoloft on 11/7/23. Evaluated in the ED on 11/9/23 and diagnosed with Shingles    Impression:  Apoorva Roberts is a 47 year old White, female who presents for return visit with  Collaborative Care Psychiatry Service (CCPS) for medication management. At last appointment, we increased to sertraline 75 mg every day, and continued buspirone 30 mg twice a day. Additionally on gabapentin 800 twice a day and 1200 mg at bedtime per PCP. Patient reports tolerating the medications well, no notable side effects. Additionally, notes she has continued to reduce the gabapentin as her goal is to be off it completely. Currently taking as 400 mg BID, with 800 mg QHS. She denies any worsening of anxiety with the lower dose, and instead reports her anxiety does seem to be mildly improving over the last several weeks since starting the sertraline. She reports less frequent panic attacks (~1 / week now). She has not used the hydroxyzine PRN as was unsure if it would work quickly enough, but will plan to try it again. Mild increase in depressive symptoms related to significant family relationship issues. She denies any SI/SIB/HI. She does report notable improvement in her sleep and energy. Denies psychosis, no jostin. Recommending small increase to sertraline 100 mg today, continue all other medication. Continue in therapy. Follow-up with this provider in 4 weeks. Discussed upcoming medical leave for this provider. If at that appointment, symptoms and medication are stable will plan to return care to PCP. Otherwise will plan for [facility]. Patient agreeable to plan.      Medication side effects and alternatives were reviewed. Health promotion activities recommended and reviewed today. All questions addressed. Education and counseling completed regarding risks and benefits of medications and psychotherapy options. Recommend therapy for additional support.         Treatment Plan:  INCREASE TO sertraline 100 mg every day. Script sent for #30.  CONTINUE buspirone 30 mg twice a day. Script sent previously for 3 months.  CONTINUE hydroxyzine 50 mg (1-2 tablets) as needed for panic. No script needed.  Continue all other medications per primary care provider.   Continue therapy with STACY Bravo, Behavioral Health Consultant.   Complete appointment with sleep medicine as planned.  Safety plan reviewed. To the Emergency Department as needed or call after hours crisis line at [phone] or [phone]. [place] Crisis Text Line. Text [place] to 099564 or Suicide LifeLine Chat: suicidepreventionlifeline.org/chat  Schedule an appointment with me in 4 weeks or sooner as needed. Call [facility] at [phone] to schedule.

## 2023-11-14 RX ORDER — OXYCODONE HYDROCHLORIDE 5 MG/1
5 TABLET ORAL EVERY 6 HOURS PRN
Qty: 12 TABLET | Refills: 0 | Status: SHIPPED | OUTPATIENT
Start: 2023-11-14 | End: 2023-11-17

## 2023-11-29 DIAGNOSIS — G62.89 OTHER POLYNEUROPATHY: ICD-10-CM

## 2023-11-29 RX ORDER — GABAPENTIN 400 MG/1
CAPSULE ORAL
Qty: 270 CAPSULE | Refills: 3 | Status: SHIPPED | OUTPATIENT
Start: 2023-11-29 | End: 2024-04-15

## 2023-12-01 NOTE — PROGRESS NOTES
[facility]  December 5, 2023      Behavioral Health Clinician Progress Note     Patient Name: Apoorva Roberts           Service Type:  Individual      Service Location:   MyChart / Email (patient reached)     Session Start Time: 2:30 pm  Session End Time: 3:00 pm      Session Length: 16 - 37      Attendees: Patient     Service Modality:  Video Visit:      Provider verified identity through the following two step process.  Patient provided:  Patient is known previously to provider    Telemedicine Visit: The patient's condition can be safely assessed and treated via synchronous audio and visual telemedicine encounter.      Reason for Telemedicine Visit: Services only offered telehealth    Originating Site (Patient Location): Patient's home    Distant Site (Provider Location): [facility]    Consent:  The patient/guardian has verbally consented to: the potential risks and benefits of telemedicine (video visit) versus in person care; bill my insurance or make self-payment for services provided; and responsibility for payment of non-covered services.     Patient would like the video invitation sent by:  My Chart    Mode of Communication:  Video Conference via [facility]    Distant Location (Provider):  On-site    As the provider I attest to compliance with applicable laws and regulations related to telemedicine.    Visit Activities (Refresh list every visit): [facility] Only    Diagnostic Assessment Date: 8/24/23  Treatment Plan Review Date: 12/14/23  See Flowsheets for today's PHQ-9 and GEORGIANA-7 results  Previous PHQ-9:       7/12/2023     2:41 PM 8/23/2023    10:42 AM 10/9/2023    11:51 AM   PHQ-9 SCORE   PHQ-9 Total Score MyChart 7 (Mild depression) 7 (Mild depression) 7 (Mild depression)   PHQ-9 Total Score 7 7    7 7     Previous GEORGIANA-7:       9/3/2020    11:55 AM 8/27/2021     5:41 PM 10/5/2021     9:49 AM   GEORGIANA-7 SCORE   Total Score 7 11 10       SORAYA  LEVEL:       No data to display                DATA  Extended Session (60+ minutes): No  Interactive Complexity: No  Crisis: No  H Patient: No    Treatment Objective(s) Addressed in This Session:  Target Behavior(s):  anxiety and depression    Depressed Mood: Increase interest, engagement, and pleasure in doing things  Decrease frequency and intensity of feeling down, depressed, hopeless  Improve quantity and quality of night time sleep / decrease daytime naps  Feel less tired and more energy during the day   Improve diet, appetite, mindful eating, and / or meal planning  Identify negative self-talk and behaviors: challenge core beliefs, myths, and actions  Improve concentration, focus, and mindfulness in daily activities   Feel less fidgety, restless or slow in daily activities / interpersonal interactions  Anxiety: will experience a reduction in anxiety, will develop more effective coping skills to manage anxiety symptoms, will develop healthy cognitive patterns and beliefs, and will increase ability to function adaptively    Current Stressors / Issues:  MH update: Reports she has shingles which has been really difficult for her for about 3 weeks. She has been more isolated as a result, not due to  her mental health but just being uncomfortable physically. Reports   Stresses: medical concerns. Family stressors have been higher but with isolation she has been more distant from it. Discussed boundary setting with family in managing stress. Discussed carrying resentment with in laws decisions/behaviors.   Appetite: unremarkable  Sleep: unremarkable  Therapy: will meet bi-weekly  CARLITOS: na  Preg: na  Most important: went up on sertraline and then had several days of sleep difficulty. Went down to 50 mg and sleep then improved.     11/5/23  MH update: Reports feeling about the same from last visit. States little change in anxiety, noted fewer panic attacks (1per week). Continues to fight urge to isolate. She has been  feeling more engaged in conversation with people. Sometimes she feels she is just in the room with others but not present. Does feel activities are more enjoyable. Discussed self care. She has been engaging more in things she likes to help relax in the evenings. Feeling a bit of a struggle getting enough time alone with . Frustrated with neurology appt feeling she did not get any answers as to why she is having headaches. Has not started the medications yet for headaches.   Stresses: extended family staying with her. Work has been busy making the day go by quickly.   Appetite: unremarkable, some difficulty with not being hungry due to stress.  Sleep: unremarkable,  notes she has been sleeping better but she has not noticed it yet. Not feeling as tired during the day time.   Therapy: has not reached out to therapist yet. Still has desire to reach out. Continues to struggle with some time off of work in order to do therapy.   CARLITOS: na  Preg: na  Most important: not noticing much of a change since increase in anti depressant.     10/10/23   update: Continues to struggle with consistent anxiety. Reports some panic attacks about 2 a week, which has decreased from last visit. Continues to have urge to isolate. Has been pushing self more to not isolate. Struggles with engaging socially when around others. Continues with self care and doing activities she enjoys.    Stresses: consistent if not worse. Having some in laws staying in their home. Very strained relationship with son, has not spoken with him in about a year. Tends to make her feel more down, not seeing grandson.    Appetite: unremarkable, about the same.   Sleep: unremarkable, dx of insomnia, best 6 hrs.   Therapy: has not reached out to therapist yet. Still has desire to reach out. Continues to struggle with some time off of work in order to do therapy.   CARLITOS: na  Preg: na  Most important: minimal change in anxiety. No side effects of increased  "medications.    9/14/23   update: Reports little difference since medication changes.Continues to have panic attacks several times a week. Utilizing coping skills learned in therapy. Continues to have high general anxiety and increased isolation.    Stresses: Step mom dx with terminal cancer. Deaths in the family. Relationship with son.   Appetite: some difficulty with not being hungry due to stress.   Sleep: unremarkable, dx of insomnia, best 6 hrs.   Therapy: has resources for therapist that work with her schedule. Has had a really good relationship with previous therapist. Considering reaching out to therapist and making it work with her schedule. Discussed skills for dealing with isolation and depression. Discussed hating feeling she is having to \"force myself to do daily activities\". Discussed feeling emotional, physical and mental drained. Reviewed skills for panic attacks. Discussed staying in the present, focusing less on past and future events. Validated pts frustrations. Discussed self care acts she can do to help herself, ie reading and watching tv.     CARLITOS: na  Preg: na  Most important: minimal change since increase medication. Would like to make medication changes a bit quicker.     8/24/23 (DA)  The reason for seeking services at this time is: \"Anxiety and depression\".  The problem(s) began 05/01/05.     First appointment with patient in [facility] and was advised of the short-term, team based structure of the model including role of [facility] and provider. Patient indicated understanding of the model and agreed to proceed with services as described.     Met with pt for initial assessment. States she has had a hx of anxiety, depression and PTSD for many years. She was dx with insomnia as a teen. In the past year sx have become increasingly worse with depression and anxiety. Reports she has been struggling this past year with 4 deaths in the family and step mom has terminal cancer. Her relationship with son is " very poor currently. She has begun isolating more from friends and family. States she is having difficulty with concentration and focus. Really enjoys reading but has not been able to do this well lately. Reports long hx of panic attacks but had been having them about once a month and was able to manage them with skills learned in therapy. Now panic attacks happen several times a week. She has also noted some of her PTSD sx increasing more as time has been progressing.      Interested in therapy, saw therapist at the end of last year for 6 months. This became very difficult to manage with work schedule. [facility] provided resources for therapist that might be able to provide more convenient hours for her.      Patient has attempted to resolve these concerns in the past through medications and therapy .      Progress on Treatment Objective(s) / Homework:  Satisfactory progress - PRECONTEMPLATION (Not seeing need for change); Intervened by educating the patient about the effects of current behavior on health.  Evoked information about reasons to continue behavior, express concern / recommendations, and explored any change talk    Motivational Interviewing    MI Intervention: Expressed Empathy/Understanding, Supported Autonomy, Collaboration, Evocation, Permission to raise concern or advise, Open-ended questions, Reflections: simple and complex, Change talk (evoked), and Reframe     Change Talk Expressed by the Patient: Desire to change Ability to change Reasons to change Need to change    Provider Response to Change Talk: E - Evoked more info from patient about behavior change, A - Affirmed patient's thoughts, decisions, or attempts at behavior change, R - Reflected patient's change talk, and S - Summarized patient's change talk statements    Assessments completed prior to visit:  The following assessments were completed by patient for this visit:  PROMIS 10-Global Health (all questions and answers displayed):        8/17/2023    10:10 AM 12/1/2023    12:00 PM   PROMIS 10   In general, would you say your health is: Good Good   In general, would you say your quality of life is: Good Good   In general, how would you rate your physical health? Good Good   In general, how would you rate your mental health, including your mood and your ability to think? Fair Good   In general, how would you rate your satisfaction with your social activities and relationships? Good Good   In general, please rate how well you carry out your usual social activities and roles Fair Good   To what extent are you able to carry out your everyday physical activities such as walking, climbing stairs, carrying groceries, or moving a chair? Moderately Moderately   In the past 7 days, how often have you been bothered by emotional problems such as feeling anxious, depressed, or irritable? Sometimes Sometimes   In the past 7 days, how would you rate your fatigue on average? Moderate Moderate   In the past 7 days, how would you rate your pain on average, where 0 means no pain, and 10 means worst imaginable pain? 7 6   In general, would you say your health is: 3    3 3    3   In general, would you say your quality of life is: 3    3 3    3   In general, how would you rate your physical health? 3    3 3    3   In general, how would you rate your mental health, including your mood and your ability to think? 2    2 3    3   In general, how would you rate your satisfaction with your social activities and relationships? 3    3 3    3   In general, please rate how well you carry out your usual social activities and roles. (This includes activities at home, at work and in your community, and responsibilities as a parent, child, spouse, employee, friend, etc.) 2    2 3    3   To what extent are you able to carry out your everyday physical activities such as walking, climbing stairs, carrying groceries, or moving a chair? 3    3 3    3   In the past 7 days, how often have you  been bothered by emotional problems such as feeling anxious, depressed, or irritable? 3    3 3    3   In the past 7 days, how would you rate your fatigue on average? 3    3 3    3   In the past 7 days, how would you rate your pain on average, where 0 means no pain, and 10 means worst imaginable pain? 7    7 6    6   Global Mental Health Score 11    11 12    12   Global Physical Health Score 11    11 12    12   PROMIS TOTAL - SUBSCORES 22    22 24    24       Care Plan review completed: Yes    Medication Review:  No changes to current psychiatric medication(s)    Medication Compliance:  Yes    Changes in Health Issues:   None reported    Chemical Use Review:   Substance Use: Chemical use reviewed, no active concerns identified      Tobacco Use: No current tobacco use.      Assessment: Current Emotional / Mental Status (status of significant symptoms):  Risk status (Self / Other harm or suicidal ideation)  Patient denies a history of suicidal ideation, suicide attempts, self-injurious behavior, homicidal ideation, homicidal behavior, and and other safety concerns  Patient denies current fears or concerns for personal safety.  Patient denies current or recent suicidal ideation or behaviors.  Patient denies current or recent homicidal ideation or behaviors.  Patient denies current or recent self injurious behavior or ideation.  Patient denies other safety concerns.  A safety and risk management plan has not been developed at this time, however patient was encouraged to call [place] [phone] should there be a change in any of these risk factors.    Appearance:   Appropriate   Eye Contact:   Good   Psychomotor Behavior: Normal   Attitude:   Cooperative   Orientation:   All  Speech   Rate / Production: Normal    Volume:  Normal   Mood:    Anxious  Depressed   Affect:    Appropriate   Thought Content:  Clear   Thought Form:  Coherent  Logical   Insight:    Fair     Diagnoses:  1. Generalized anxiety disorder    2. Panic  attack    3. Moderate episode of recurrent major depressive disorder (H)    4. History of posttraumatic stress disorder (PTSD)              Collateral Reports Completed:  Communicated with: Susi GRAY CNP    Plan: (Homework, other):  Patient was given information about behavioral services and encouraged to schedule a follow up appointment with the clinic [facility]  tbd .  She was also given information about mental health symptoms and treatment options .  CD Recommendations: No indications of CD issues. Rufina Farnsworth, LICSW    ______________________________________________________________________    Integrated Primary Care Behavioral Health Treatment Plan    Patient's Name: Apoorva Roberts  YOB: 1975    Date of Creation: 9/14/23  Date Treatment Plan Last Reviewed/Revised: 9/14/23    DSM5 Diagnoses: 296.32 (F33.1) Major Depressive Disorder, Recurrent Episode, Moderate _ or 300.02 (F41.1) Generalized Anxiety Disorder  Psychosocial / Contextual Factors:  Contextual influences on patient's health include: Contextual Factors: Individual Factors management of MH and Family Factors several deaths in the family and strained relationships    PROMIS (reviewed every 90 days):     Referral / Collaboration:  Referral to another professional/service is not indicated at this time..    Anticipated number of session for this episode of care: 4-6 sessions  Anticipation frequency of session:  tbd  Anticipated Duration of each session: 16-37 minutes  Treatment plan will be reviewed in 90 days or when goals have been changed.       MeasurableTreatment Goal(s) related to diagnosis / functional impairment(s)  Goal 1: Patient will work with providers to manage symptoms     I will know I've met my goal when less anxious and depressed.      Objective #A (Patient Action)    Patient will  attend all appointments, take medication as prescribed .  Status: New - Date: 9/14/23      Intervention(s)  Therapist will  Monitor and assist  in overcoming barriers to treatment adherence .    Objective #B  Patient will  consider all recommendations offered .  Status: New - Date: 9/14/23      Intervention(s)  Therapist will  educate patient on treatment options, clarify concerns, work with pt to overcome any resistance to compliance. .          Patient has reviewed and agreed to the above plan.      STACY Bravo  December 5, 2023  Answers submitted by the patient for this visit:  Patient Health Questionnaire (Submitted on 10/9/2023)  If you checked off any problems, how difficult have these problems made it for you to do your work, take care of things at home, or get along with other people?: Somewhat difficult  PHQ9 TOTAL SCORE: 7    Answers submitted by the patient for this visit:  Patient Health Questionnaire (Submitted on 12/4/2023)  If you checked off any problems, how difficult have these problems made it for you to do your work, take care of things at home, or get along with other people?: Somewhat difficult  PHQ9 TOTAL SCORE: 7

## 2023-12-04 ENCOUNTER — MYC REFILL (OUTPATIENT)
Dept: GASTROENTEROLOGY | Facility: CLINIC | Age: 48
End: 2023-12-04

## 2023-12-04 ENCOUNTER — MYC MEDICAL ADVICE (OUTPATIENT)
Dept: GASTROENTEROLOGY | Facility: CLINIC | Age: 48
End: 2023-12-04

## 2023-12-04 DIAGNOSIS — K90.9 DIARRHEA DUE TO MALABSORPTION: ICD-10-CM

## 2023-12-04 DIAGNOSIS — Z87.11 HISTORY OF PEPTIC ULCER: ICD-10-CM

## 2023-12-04 DIAGNOSIS — K29.30 CHRONIC SUPERFICIAL GASTRITIS WITHOUT BLEEDING: ICD-10-CM

## 2023-12-04 DIAGNOSIS — R19.7 DIARRHEA DUE TO MALABSORPTION: ICD-10-CM

## 2023-12-04 DIAGNOSIS — K44.9 HIATAL HERNIA: ICD-10-CM

## 2023-12-04 ASSESSMENT — PATIENT HEALTH QUESTIONNAIRE - PHQ9
SUM OF ALL RESPONSES TO PHQ QUESTIONS 1-9: 7
10. IF YOU CHECKED OFF ANY PROBLEMS, HOW DIFFICULT HAVE THESE PROBLEMS MADE IT FOR YOU TO DO YOUR WORK, TAKE CARE OF THINGS AT HOME, OR GET ALONG WITH OTHER PEOPLE: SOMEWHAT DIFFICULT
SUM OF ALL RESPONSES TO PHQ QUESTIONS 1-9: 7

## 2023-12-05 ENCOUNTER — VIRTUAL VISIT (OUTPATIENT)
Dept: PSYCHIATRY | Facility: CLINIC | Age: 48
End: 2023-12-05
Payer: COMMERCIAL

## 2023-12-05 ENCOUNTER — VIRTUAL VISIT (OUTPATIENT)
Dept: BEHAVIORAL HEALTH | Facility: CLINIC | Age: 48
End: 2023-12-05
Payer: COMMERCIAL

## 2023-12-05 DIAGNOSIS — Z86.59 HISTORY OF POSTTRAUMATIC STRESS DISORDER (PTSD): ICD-10-CM

## 2023-12-05 DIAGNOSIS — F33.1 MAJOR DEPRESSIVE DISORDER, RECURRENT EPISODE, MODERATE (H): ICD-10-CM

## 2023-12-05 DIAGNOSIS — F33.1 MODERATE EPISODE OF RECURRENT MAJOR DEPRESSIVE DISORDER (H): ICD-10-CM

## 2023-12-05 DIAGNOSIS — F41.1 GAD (GENERALIZED ANXIETY DISORDER): Primary | ICD-10-CM

## 2023-12-05 DIAGNOSIS — F43.10 PTSD (POST-TRAUMATIC STRESS DISORDER): ICD-10-CM

## 2023-12-05 DIAGNOSIS — F41.1 GENERALIZED ANXIETY DISORDER: Primary | ICD-10-CM

## 2023-12-05 DIAGNOSIS — F41.0 PANIC ATTACK: ICD-10-CM

## 2023-12-05 PROCEDURE — 99214 OFFICE O/P EST MOD 30 MIN: CPT | Mod: VID | Performed by: NURSE PRACTITIONER

## 2023-12-05 PROCEDURE — 90832 PSYTX W PT 30 MINUTES: CPT | Mod: 95 | Performed by: SOCIAL WORKER

## 2023-12-05 RX ORDER — BUSPIRONE HYDROCHLORIDE 30 MG/1
30 TABLET ORAL 2 TIMES DAILY
Qty: 180 TABLET | Refills: 0 | Status: SHIPPED | OUTPATIENT
Start: 2023-12-05 | End: 2024-02-26

## 2023-12-05 RX ORDER — SERTRALINE HYDROCHLORIDE 25 MG/1
25 TABLET, FILM COATED ORAL DAILY
Qty: 90 TABLET | Refills: 0 | Status: SHIPPED | OUTPATIENT
Start: 2023-12-05 | End: 2024-02-27

## 2023-12-05 RX ORDER — RABEPRAZOLE SODIUM 20 MG/1
20 TABLET, DELAYED RELEASE ORAL DAILY
Qty: 30 TABLET | Refills: 5 | Status: SHIPPED | OUTPATIENT
Start: 2023-12-05 | End: 2024-02-27

## 2023-12-05 NOTE — NURSING NOTE
Is the patient currently in the state of [place]? YES    Visit mode:VIDEO    If the visit is dropped, the patient can be reconnected by: VIDEO VISIT: Text to cell phone:   Telephone Information:   Mobile [phone]   Mobile Not on file.       Will anyone else be joining the visit? NO  (If patient encounters technical issues they should call [phone]    How would you like to obtain your AVS? MyChart    Are changes needed to the allergy or medication list? No    Reason for visit: RECHECK    Steffanie GONZALES

## 2023-12-05 NOTE — PROGRESS NOTES
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 office should be calling to schedule, or call [facility] at [phone] to schedule.  Follow up with primary care provider as planned or for acute medical concerns.  Call the psychiatric nurse line with medication questions or concerns at [phone].  MyChart may be used to communicate with your provider, but this is not intended to be used for emergencies.    Patient Education:  Medication side effects and alternatives reviewed. Health promotion activities recommended and reviewed today. All questions addressed. Education and counseling completed regarding risks and benefits of medications and psychotherapy options.  Consent provided by patient/guardian  Call the psychiatric nurse line with medication questions or concerns at [phone].  MyChart may be used to communicate with your provider, but this is not intended to be used for emergencies.  SEROTONIN SYNDROME:  Discussed risks of Serotonin syndrome (ie, serotonin toxicity) which is a potentially life-threatening condition associated with increased serotonergic activity in the central nervous system (CNS). It is seen with therapeutic medication use, inadvertent interactions between drugs, and intentional self-poisoning. Serotonin syndrome may involve a spectrum of clinical findings, which  often include mental status changes, autonomic hyperactivity, and neuromuscular abnormalities.    HYPNOTIC USE: Hypnotic use, risk for CNS depression, sleep-walking, not to mix with ETOH or other CNS depressant, need for six hours of sleep, stop if change in mood.  This is a controlled substance with risk for abuse, need to keep in a safe keep place and cannot replace lost scripts.  Medlineplus.gov is information for patients.  It is run by the [employer] and it contains information about all disorders, diseases and all medications.      Discussed patient's past thrush that was attributed to alternative SSRIs and risk with same drug class. She has not experienced with all SSRI trials, and we will monitor closely for symptoms. She denies with current sertraline.    Community Resources:    National Suicide Prevention Lifeline: 688.293.6356 (TTY: 158.557.4829). Call anytime for help.  (www.suicidepreventionlifeline.org)  National [place] on Mental Illness (www.oumar.org): 178.100.9982 or 904-782-8868.   Mental Health Association (www.mentalhealth.org): 430.477.6121 or 466-648-8047.  [place] Crisis Text Line: Text [place] to 937321  Suicide LifeLine Chat: suicideAvantBio.org/chat    Administrative Billing:     Level of Medical Decision Making:   - At least 1 chronic problem that is not stable  - Engaged in prescription drug management during visit (discussed any medication benefits, side effects, alternatives, etc.)             Patient Status:  CCPS MD/DO/NP/PA providers offer care a specialty service for Primary Care Providers in the [facility] that seek to optimize psychotropic medications for unstable patients.  Once medications have been optimized, our providers discharge the patient back to the referring Primary Care Provider for ongoing medication management.  This type of system allows our providers to serve a high volume of patients.   Patient will continue to be seen for ongoing  consultation and stabilization.    Signed:   Susi Carter, MSN, APRN, PMHNP-BC  Collaborative Care Psychiatry Service (CCPS)  [facility]    Chart documentation done in part with Dragon Voice Recognition software.  Although reviewed after completion, some word and grammatical errors may remain.

## 2023-12-05 NOTE — PATIENT INSTRUCTIONS
**For crisis resources, please see the information at the end of this document**     Thank you for coming to the [facility] [place] [facility].    TREATMENT PLAN:  Medications:   INCREASE TO sertraline 75 mg (50 mg + 25 mg) every day. Scripts sent for #90.  CONTINUE buspirone 30 mg twice a day. Script sent for #180.  CONTINUE hydroxyzine 50 mg (1-2 tablets) as needed for panic. No script needed.  Continue all other medications per primary care provider.     Referrals / Consults:  Continue therapy with STACY Bravo, Behavioral Health Consultant.   Complete appointment with sleep medicine as planned.    Follow-up:   Schedule an appointment with CCPS in 6 weeks or sooner as needed. The office should be calling to schedule, or call [facility] at [phone] to schedule.  Follow up with primary care provider as planned or for acute medical concerns.  Call the psychiatric nurse line with medication questions or concerns at [phone].  0xdatahart may be used to communicate with your provider, but this is not intended to be used for emergencies.    Psychoeducation:  Patient to monitor for signs and symptoms of serotonin syndrome/serotonin toxicity (eg, hyperreflexia, clonus, hyperthermia, diaphoresis, tremor, autonomic instability, mental status changes) when these drugs are combined.        Financial Assistance [phone]  [facility] Billing [phone]  Central Billing Office, ealth: [phone]  [facility] Billing [phone]  Medical Records [phone]  [facility] Patient Bill of Rights [email or web address]       MENTAL HEALTH CRISIS RESOURCES:  For a emergency help, please call 911 or go to the nearest Emergency Department.     Emergency Walk-In Options:   [facility] @ [facility] ([place]): [phone] - Specialized mental health emergency area designed to be calming  [facility]  [place] ([place]): [phone]  [facility] ([place]): [phone]  [facility] ([facility]): [phone]    [facility] Crisis Information:   Jorge: [phone]  Jose: [phone]  Christy ([place]) - Adult: [phone]     Child: [phone]  Ty - Adult: [phone]     Child: [phone]  [place]: [phone]  List of all [facility] resources:   https://[place].gov/dhs/people-we-serve/adults/health-care/mental-health/resources/crisis-contacts.jsp    National Crisis Information:   National Suicide & Crisis Lifeline: Call 988        For online chat options, visit https://suicidepreventionlifeline.org/chat/  Poison Control Center: 5-750-997-1696  Poison Control Center: 0-190-355-6059  Trans Lifeline: 4-032-217-8831 - Hotline for transgender people of all ages  The Steven Project: 8-718-334-8251 - Hotline for LGBT youth     For Non-Emergency Support:   Fast Tracker: Mental Health & Substance Use Disorder Resources -   https://www.fasttrackermn.org/       Again thank you for choosing [facility] [place] [facility] and please let us know how we can best partner with you to improve you and your family's health.    You may be receiving a survey regarding this appointment. We would love to have your feedback, both positive and negative. The survey is done by an external company, so your answers are anonymous.        Patient Education   Collaborative Care Psychiatry Service  What to Expect  Here's what to expect from your Collaborative Care Psychiatry Service (CCPS).   About CCPS  CCPS means 2 people work together to help you get better. You'll meet with a behavioral health clinician and a psychiatric doctor. A behavioral health clinician helps people with mental health problems by talking with them. A psychiatric doctor helps people by giving them medicine.  How it works  At every visit, you'll see the behavioral health clinician (BHC) first. They'll  "talk with you about how you're doing and teach you how to feel better.   Then you'll see the psychiatric doctor. This doctor can help you deal with troubling thoughts and feelings by giving you medicine. They'll make sure you know the plan for your care.   CCPS usually takes 3 to 6 visits. If you need more visits, we may have you start seeing a different psychiatric doctor for ongoing care.  If you have any questions or concerns, we'll be glad to talk with you.  About visits  Be open  At your visits, please talk openly about your problems. It may feel hard, but it's the best way for us to help you.  Cancelling visits  If you can't come to your visit, please call us right away at [phone]. If you don't cancel at least 24 hours (1 full day) before your visit, that's \"late cancellation.\"  Being late to visits  Being very late is the same as not showing up. You will be a \"no show\" if:  Your appointment starts with a [facility], and you're more than 15 minutes late for a 30-minute (half hour) visit. This will also cancel your appointment with the psychiatric doctor.  Your appointment is with a psychiatric doctor only, and you're more than 15 minutes late for a 30-minute (half hour) visit.  Your appointment is with a psychiatric doctor only, and you're more than 30 minutes late for a 60-minute (full hour) visit.  If you cancel late or don't show up 2 times within 6 months, we may end your care.   Getting help between visits  If you need help between visits, you can call us Monday to Friday from 8 a.m. to 4:30 p.m. at [phone].  Emergency care  Call 911 or go to the nearest emergency department if your life or someone else's life is in danger.  Call 378 anytime to reach the national Suicide and Crisis hotline.  Medicine refills  To refill your medicine, call your pharmacy. You can also call [facility]'s Behavioral Access at [phone], Monday to Friday, 8 a.m. to 4:30 p.m. It can take 1 to 3 business days " to get a refill.   Forms, letters, and tests  You may have papers to fill out, like FMLA, short-term disability, and workability. We can help you with these forms at your visits, but you must have an appointment. You may need more than 1 visit for this, to be in an intensive therapy program, or both.  Before we can give you medicine for ADHD, we may refer you to get tested for it or confirm it another way.  We may not be able to give you an emotional support animal letter.  We don't do mental health checks ordered by the court.   We don't do mental health testing, but we can refer you to get tested.   Thank you for choosing us for your care.  For informational purposes only. Not to replace the advice of your health care provider. Copyright   2022 [employer]. All rights reserved. [employer] 971157 - 12/22.

## 2023-12-29 DIAGNOSIS — R51.9 OCCIPITAL HEADACHE: ICD-10-CM

## 2023-12-29 RX ORDER — BUTALBITAL, ACETAMINOPHEN AND CAFFEINE 50; 325; 40 MG/1; MG/1; MG/1
TABLET ORAL
Qty: 20 TABLET | Refills: 3 | Status: SHIPPED | OUTPATIENT
Start: 2023-12-29 | End: 2024-05-06

## 2024-01-11 DIAGNOSIS — G43.719 INTRACTABLE CHRONIC MIGRAINE WITHOUT AURA AND WITHOUT STATUS MIGRAINOSUS: ICD-10-CM

## 2024-01-11 RX ORDER — TOPIRAMATE 25 MG/1
TABLET, FILM COATED ORAL
Qty: 120 TABLET | Refills: 0 | Status: SHIPPED | OUTPATIENT
Start: 2024-01-11 | End: 2024-02-27

## 2024-02-08 ENCOUNTER — E-VISIT (OUTPATIENT)
Dept: FAMILY MEDICINE | Facility: CLINIC | Age: 49
End: 2024-02-08
Payer: COMMERCIAL

## 2024-02-08 DIAGNOSIS — B37.0 THRUSH: Primary | ICD-10-CM

## 2024-02-08 PROCEDURE — 99421 OL DIG E/M SVC 5-10 MIN: CPT | Performed by: NURSE PRACTITIONER

## 2024-02-09 RX ORDER — FLUCONAZOLE 100 MG/1
100 TABLET ORAL DAILY
Qty: 15 TABLET | Refills: 0 | Status: SHIPPED | OUTPATIENT
Start: 2024-02-09 | End: 2024-02-24

## 2024-02-09 NOTE — PATIENT INSTRUCTIONS
Thank you for choosing us for your care. I have placed an order for a prescription (Diflucan) so that you can start treatment. View your full visit summary for details by clicking on the link below. Your pharmacist will able to address any questions you may have about the medication.     If you're not feeling better within 5-7 days, please schedule an appointment.  You can schedule an appointment right here in [facility], or call [phone]  If the visit is for the same symptoms as your eVisit, we'll refund the cost of your eVisit if seen within seven days.

## 2024-02-22 SDOH — HEALTH STABILITY: PHYSICAL HEALTH: ON AVERAGE, HOW MANY MINUTES DO YOU ENGAGE IN EXERCISE AT THIS LEVEL?: 20 MIN

## 2024-02-22 SDOH — HEALTH STABILITY: PHYSICAL HEALTH: ON AVERAGE, HOW MANY DAYS PER WEEK DO YOU ENGAGE IN MODERATE TO STRENUOUS EXERCISE (LIKE A BRISK WALK)?: 3 DAYS

## 2024-02-22 ASSESSMENT — ENCOUNTER SYMPTOMS
FEVER: 0
JOINT SWELLING: 0
DIZZINESS: 0
DIARRHEA: 0
CONSTIPATION: 0
COUGH: 1
NERVOUS/ANXIOUS: 1
MYALGIAS: 1
HEADACHES: 1
EYE PAIN: 0
SORE THROAT: 0
DYSURIA: 0
WEAKNESS: 0
HEMATOCHEZIA: 0
PARESTHESIAS: 0
PALPITATIONS: 0
SHORTNESS OF BREATH: 0
HEMATURIA: 0
HEARTBURN: 1
ABDOMINAL PAIN: 0
CHILLS: 0
NAUSEA: 0
ARTHRALGIAS: 1
BREAST MASS: 0
FREQUENCY: 0

## 2024-02-22 ASSESSMENT — SOCIAL DETERMINANTS OF HEALTH (SDOH): HOW OFTEN DO YOU GET TOGETHER WITH FRIENDS OR RELATIVES?: TWICE A WEEK

## 2024-02-22 NOTE — COMMUNITY RESOURCES LIST (ENGLISH)
02/22/2024   [facility]  N/A  For additional resource needs, please contact your health insurance member services or your primary care team.  Phone: [phone]   Email: N/A   Address: [address] [place] [address]   Hours: N/A        Food and Nutrition       Food pantry  1  [facility] Distance: 6.43 miles      Delivery, Pickup   [address] [place] [address]  Language: English  Hours: Mon 9:00 AM - 5:00 PM , Tue 11:00 AM - 5:00 PM , Thu 9:00 AM - 5:00 PM  Fees: Free   Phone: (300) 482-1645 Email: mail@Netrounds.Indotrading Website: https://www.[facility] Center[facility].com.Indotrading/our-work/food-access-and-equity/     2  [facility] Distance: 7.62 miles      Delivery, Pickup   [address] [place] [address]  Language: English  Hours: Mon - Thu 8:00 AM - 3:00 PM  Fees: Free   Phone: (214) 197-8699 Website: http://www.[facility].org/     SNAP application assistance  3  [facility] Economic Assistance Department Distance: 21.42 miles      Phone/Virtual   [address] [address]  Language: English  Hours: Mon - Fri 8:15 AM - 4:00 PM  Fees: Free   Phone: (936) 995-6577 Email: paperwork@[facility]. Website: http://www.[facility]./193/Economic-Assistance     Soup kitchen or free meals  4  [facility] Family Table Meals - Family Table Meal Distance: 21.04 miles      Pickup   [address] [place] [address]  Language: English  Hours: Thu 5:00 PM - 6:30 PM  Fees: Free   Phone: (197) 476-7444 Email: sebastián@newBrandAnalytics.Indotrading Website: http://www.[employer].DOCUSYS.org     5  [facility] - Family Table Meal Distance: 21.4 miles      In-Person, Pickup   [address] [place] [address]  Language: English  Hours: u 5:30 PM - 6:30 PM  Fees: Free   Phone: (610) 614-2118 Email: office@[facility].org Website:  http://www.faithlutherancr.org          Important Numbers & Websites       [facility]   211 211unitedway.org  Poison Control   (326) 502-3776 [place]poison.[place]  Suicide and Crisis Lifeline   989 98[facility].org  Childhelp [place] Child Abuse Hotline   287.308.6479 Childhelphotline.org  [place] Sexual Assault Hotline   (182) 225-4260 (HOPE) [facility] Runaway Safeline   (487) 706-5185 (RUNAWAY) [facility] Cent[facility]unaway.org  Pregnancy & Postpartum Support [place]   Call/text 180-888-8152 Ppsupportmn.org  Substance Abuse National Helpline ([facility]   958-207-HELP (9295) Findtreatment.gov  Emergency Services   913

## 2024-02-23 NOTE — COMMUNITY RESOURCES LIST (ENGLISH)
02/23/2024   [facility]  N/A  For questions about this resource list or additional care needs, please contact your primary care clinic or care manager.  Phone: [phone]   Email: N/A   Address: [address] [place] [address]   Hours: N/A        Food and Nutrition       Food pantry  1  [facility] Distance: 6.43 miles      Delivery, Pickup   [address] [place] [address]  Language: English  Hours: Mon 9:00 AM - 5:00 PM , Tue 11:00 AM - 5:00 PM , Thu 9:00 AM - 5:00 PM  Fees: Free   Phone: (141) 848-1236 Email: mail@Arrogene.Recoup Website: https://www.Arrogene.Recoup/our-work/food-access-and-equity/     2  [facility] Distance: 7.62 miles      Delivery, Pickup   [address] [place] [address]  Language: English  Hours: Mon - Thu 8:00 AM - 3:00 PM  Fees: Free   Phone: (774) 265-7862 Website: http://www.[facility].org/     SNAP application assistance  3  [facility] Economic Assistance Department Distance: 21.42 miles      Phone/Virtual   [address] [address]  Language: English  Hours: Mon - Fri 8:15 AM - 4:00 PM  Fees: Free   Phone: (924) 147-5265 Email: paperwork@[place].mn. Website: http://www.[facility]./193/Economic-Assistance     Soup kitchen or free meals  4  [facility] Family Table Meals - Family Table Meal Distance: 21.04 miles      Pickup   [address] [place] [address]  Language: English  Hours: Thu 5:00 PM - 6:30 PM  Fees: Free   Phone: (292) 250-9256 Email: sebastián@[facility].Recoup Website: http://www.[facility]Warp 9[facility].org     5  [facility] - Family Table Meal Distance: 21.4 miles      In-Person, Pickup   [address] [place] [address]  Language: English  Hours: u 5:30 PM - 6:30 PM  Fees: Free   Phone: (745) 697-2825 Email: office@[facility] [place] [facility] Website:  http://www.Waldolutherancr.org          Important Numbers & Websites       Emergency Services   911  [facility] Services   311  Poison Control   (826) 279-5793  Suicide Prevention Lifeline   (553) 985-4354 (TALK)  Child Abuse Hotline   (228) 627-9487 (4-A-Child)  Sexual Assault Hotline   (962) 487-7662 (HOPE)  National Runaway Safeline   (192) 668-7275 (RUNAWAY)  All-Options Talkline   (232) 768-1634  Substance Abuse Referral   (983) 123-3855 (HELP)

## 2024-02-26 DIAGNOSIS — F41.1 GAD (GENERALIZED ANXIETY DISORDER): ICD-10-CM

## 2024-02-26 RX ORDER — BUSPIRONE HYDROCHLORIDE 30 MG/1
30 TABLET ORAL 2 TIMES DAILY
Qty: 180 TABLET | Refills: 0 | Status: SHIPPED | OUTPATIENT
Start: 2024-02-26 | End: 2024-05-17

## 2024-02-26 NOTE — TELEPHONE ENCOUNTER
BHA please call patient to schedule a follow up appointment with provider.  Date of Last Office Visit: 12/05/2023  Date of Next Office Visit: none  No shows since last visit: 0  Cancellations since last visit: 0    Medication requested: busPIRone HCl (BUSPAR) 30 MG tablet  Date last ordered: 12/05/2023 Qty: 180 Refills: 0     Review of [place] ?: N/A  Lapse in medication adherence greater than 5 days?: yes  Medication refill request verified as identical to current order?: Yes  Result of Last DAM, VPA, Li+ Level, CBC, or Carbamazepine Level (at or since last visit): N/A    Last visit treatment plan:     Return in about 6 weeks (around 1/16/2024) for Follow up.    Medications:   INCREASE TO sertraline 75 mg (50 mg + 25 mg) every day. Scripts sent for #90.  CONTINUE buspirone 30 mg twice a day. Script sent for #180.  CONTINUE hydroxyzine 50 mg (1-2 tablets) as needed for panic. No script needed.  Continue all other medications per primary care provider.     []Medication refilled per  Medication Refill in Ambulatory Care  policy.  []Medication unable to be refilled by RN due to criteria not met as indicated below:    []Eligibility - not seen in the last year   [x]Supervision - no future appointment   []Compliance - no shows, cancellations or lapse in therapy   []Verification - order discrepancy   []Controlled medication   [x]Medication not included in policy   []90-day supply request   [x]Other

## 2024-02-27 ENCOUNTER — OFFICE VISIT (OUTPATIENT)
Dept: FAMILY MEDICINE | Facility: CLINIC | Age: 49
End: 2024-02-27
Payer: COMMERCIAL

## 2024-02-27 ENCOUNTER — LAB (OUTPATIENT)
Dept: LAB | Facility: CLINIC | Age: 49
End: 2024-02-27
Payer: COMMERCIAL

## 2024-02-27 VITALS
WEIGHT: 191.8 LBS | RESPIRATION RATE: 16 BRPM | HEART RATE: 93 BPM | OXYGEN SATURATION: 99 % | TEMPERATURE: 98.4 F | HEIGHT: 62 IN | BODY MASS INDEX: 35.3 KG/M2 | DIASTOLIC BLOOD PRESSURE: 80 MMHG | SYSTOLIC BLOOD PRESSURE: 150 MMHG

## 2024-02-27 DIAGNOSIS — G62.89 OTHER POLYNEUROPATHY: ICD-10-CM

## 2024-02-27 DIAGNOSIS — N89.8 VAGINAL DRYNESS: ICD-10-CM

## 2024-02-27 DIAGNOSIS — E78.5 HYPERLIPIDEMIA LDL GOAL <130: ICD-10-CM

## 2024-02-27 DIAGNOSIS — I10 BENIGN ESSENTIAL HYPERTENSION: ICD-10-CM

## 2024-02-27 DIAGNOSIS — B37.31 CANDIDIASIS OF VAGINA: ICD-10-CM

## 2024-02-27 DIAGNOSIS — K29.30 CHRONIC SUPERFICIAL GASTRITIS WITHOUT BLEEDING: ICD-10-CM

## 2024-02-27 DIAGNOSIS — F41.1 GAD (GENERALIZED ANXIETY DISORDER): ICD-10-CM

## 2024-02-27 DIAGNOSIS — Z87.11 HISTORY OF PEPTIC ULCER: ICD-10-CM

## 2024-02-27 DIAGNOSIS — Z00.00 ROUTINE PHYSICAL EXAMINATION: Primary | ICD-10-CM

## 2024-02-27 DIAGNOSIS — R87.810 CERVICAL HIGH RISK HPV (HUMAN PAPILLOMAVIRUS) TEST POSITIVE: ICD-10-CM

## 2024-02-27 DIAGNOSIS — K44.9 HIATAL HERNIA: ICD-10-CM

## 2024-02-27 DIAGNOSIS — F33.1 MAJOR DEPRESSIVE DISORDER, RECURRENT EPISODE, MODERATE (H): ICD-10-CM

## 2024-02-27 PROBLEM — R19.7 DIARRHEA DUE TO MALABSORPTION: Status: ACTIVE | Noted: 2024-02-27

## 2024-02-27 PROBLEM — K90.9 DIARRHEA DUE TO MALABSORPTION: Status: ACTIVE | Noted: 2024-02-27

## 2024-02-27 PROBLEM — G43.719 INTRACTABLE CHRONIC MIGRAINE WITHOUT AURA AND WITHOUT STATUS MIGRAINOSUS: Status: RESOLVED | Noted: 2024-02-27 | Resolved: 2024-02-27

## 2024-02-27 PROBLEM — G43.719 INTRACTABLE CHRONIC MIGRAINE WITHOUT AURA AND WITHOUT STATUS MIGRAINOSUS: Status: ACTIVE | Noted: 2024-02-27

## 2024-02-27 LAB
ANION GAP SERPL CALCULATED.3IONS-SCNC: 13 MMOL/L (ref 7–15)
BUN SERPL-MCNC: 10.4 MG/DL (ref 6–20)
CALCIUM SERPL-MCNC: 9.1 MG/DL (ref 8.6–10)
CHLORIDE SERPL-SCNC: 108 MMOL/L (ref 98–107)
CHOLEST SERPL-MCNC: 270 MG/DL
CREAT SERPL-MCNC: 1.29 MG/DL (ref 0.51–0.95)
DEPRECATED HCO3 PLAS-SCNC: 19 MMOL/L (ref 22–29)
EGFRCR SERPLBLD CKD-EPI 2021: 52 ML/MIN/1.73M2
FASTING STATUS PATIENT QL REPORTED: YES
FSH SERPL IRP2-ACNC: 13.5 MIU/ML
GLUCOSE SERPL-MCNC: 92 MG/DL (ref 70–99)
HDLC SERPL-MCNC: 33 MG/DL
LDLC SERPL CALC-MCNC: ABNORMAL MG/DL
LDLC SERPL DIRECT ASSAY-MCNC: 138 MG/DL
NONHDLC SERPL-MCNC: 237 MG/DL
POTASSIUM SERPL-SCNC: 3.8 MMOL/L (ref 3.4–5.3)
SODIUM SERPL-SCNC: 140 MMOL/L (ref 135–145)
TRIGL SERPL-MCNC: 454 MG/DL

## 2024-02-27 PROCEDURE — 83721 ASSAY OF BLOOD LIPOPROTEIN: CPT | Mod: 59

## 2024-02-27 PROCEDURE — 36415 COLL VENOUS BLD VENIPUNCTURE: CPT

## 2024-02-27 PROCEDURE — 99214 OFFICE O/P EST MOD 30 MIN: CPT | Mod: 25 | Performed by: NURSE PRACTITIONER

## 2024-02-27 PROCEDURE — 87624 HPV HI-RISK TYP POOLED RSLT: CPT | Performed by: NURSE PRACTITIONER

## 2024-02-27 PROCEDURE — 83001 ASSAY OF GONADOTROPIN (FSH): CPT

## 2024-02-27 PROCEDURE — G0145 SCR C/V CYTO,THINLAYER,RESCR: HCPCS | Performed by: NURSE PRACTITIONER

## 2024-02-27 PROCEDURE — 99396 PREV VISIT EST AGE 40-64: CPT | Performed by: NURSE PRACTITIONER

## 2024-02-27 PROCEDURE — 80048 BASIC METABOLIC PNL TOTAL CA: CPT

## 2024-02-27 PROCEDURE — 80061 LIPID PANEL: CPT

## 2024-02-27 RX ORDER — RABEPRAZOLE SODIUM 20 MG/1
20 TABLET, DELAYED RELEASE ORAL DAILY
Qty: 30 TABLET | Refills: 11 | Status: SHIPPED | OUTPATIENT
Start: 2024-02-27

## 2024-02-27 RX ORDER — SERTRALINE HYDROCHLORIDE 25 MG/1
25 TABLET, FILM COATED ORAL DAILY
Qty: 90 TABLET | Refills: 3 | Status: SHIPPED | OUTPATIENT
Start: 2024-02-27

## 2024-02-27 RX ORDER — LISINOPRIL 10 MG/1
10 TABLET ORAL DAILY
Qty: 90 TABLET | Refills: 3 | Status: SHIPPED | OUTPATIENT
Start: 2024-02-27

## 2024-02-27 RX ORDER — TOPIRAMATE 25 MG/1
TABLET, FILM COATED ORAL
Qty: 120 TABLET | Refills: 0 | Status: CANCELLED | OUTPATIENT
Start: 2024-02-27

## 2024-02-27 RX ORDER — FLUCONAZOLE 150 MG/1
150 TABLET ORAL ONCE
Qty: 1 TABLET | Refills: 0 | Status: SHIPPED | OUTPATIENT
Start: 2024-02-27 | End: 2024-02-27

## 2024-02-27 RX ORDER — ZOLPIDEM TARTRATE 10 MG/1
TABLET ORAL
Qty: 30 TABLET | Refills: 5 | Status: SHIPPED | OUTPATIENT
Start: 2024-02-27 | End: 2024-08-06

## 2024-02-27 NOTE — PROGRESS NOTES
Preventive Care Visit  [facility]  ISAAC Dodson CNP, Family Medicine  Feb 27, 2024    Assessment & Plan     Routine physical examination      Benign essential hypertension  Uncontrolled, increase dose of Lisinopril, return in 2-3 weeks for RN BP check  - BASIC METABOLIC PANEL; Future  - lisinopril (ZESTRIL) 10 MG tablet; Take 1 tablet (10 mg) by mouth daily    Hyperlipidemia LDL goal <130    - Lipid panel reflex to direct LDL Fasting; Future    Chronic superficial gastritis without bleeding    - RABEprazole (ACIPHEX) 20 MG EC tablet; Take 1 tablet (20 mg) by mouth daily    Hiatal hernia    - RABEprazole (ACIPHEX) 20 MG EC tablet; Take 1 tablet (20 mg) by mouth daily    History of peptic ulcer    - RABEprazole (ACIPHEX) 20 MG EC tablet; Take 1 tablet (20 mg) by mouth daily    GEORGIANA (generalized anxiety disorder) with panic attacks    - sertraline (ZOLOFT) 25 MG tablet; Take 1 tablet (25 mg) by mouth daily with sertraline 50 mg for 75 mg total daily.  - sertraline (ZOLOFT) 50 MG tablet; Take 1 tablet (50 mg) by mouth daily with sertraline 25 mg for 75 mg total daily.    Major depressive disorder, recurrent episode, moderate (H)  Stable  - sertraline (ZOLOFT) 25 MG tablet; Take 1 tablet (25 mg) by mouth daily with sertraline 50 mg for 75 mg total daily.  - sertraline (ZOLOFT) 50 MG tablet; Take 1 tablet (50 mg) by mouth daily with sertraline 25 mg for 75 mg total daily.    Other polyneuropathy    - zolpidem (AMBIEN) 10 MG tablet; TAKE 1/2 TO 1 TABLET(5 TO 10 MG) BY MOUTH EVERY NIGHT AS NEEDED FOR SLEEP    Vaginal dryness    - Follicle stimulating hormone; Future    Candidiasis of vagina    - fluconazole (DIFLUCAN) 150 MG tablet; Take 1 tablet (150 mg) by mouth once for 1 dose    Cervical high risk HPV (human papillomavirus) test positive    - Pap Screen with HPV - recommended age 30 - 65 years        BMI  Estimated body mass index is 34.63 kg/m  as calculated from the following:     "Height as of this encounter: 1.585 m (5' 2.4\").    Weight as of this encounter: 87 kg (191 lb 12.8 oz).   Weight management plan: Discussed healthy diet and exercise guidelines    Counseling  Appropriate preventive services were discussed with this patient, including applicable screening as appropriate for fall prevention, nutrition, physical activity, Tobacco-use cessation, weight loss and cognition.  Checklist reviewing preventive services available has been given to the patient.  Reviewed patient's diet, addressing concerns and/or questions.   She is at risk for lack of exercise and has been provided with information to increase physical activity for the benefit of her well-being.   The patient was instructed to see the dentist every 6 months.   The patient's PHQ-9 score is consistent with mild depression. She was provided with information regarding depression.       FUTURE APPOINTMENTS:       - Make appointment with nurse to check blood pressure in 2-3 weeks       - Follow-up for annual visit or as needed    See Patient Instructions    Euncie Guerin is a 48 year old, presenting for the following:  Physical        2/27/2024     9:02 AM   Additional Questions   Roomed by Alla WARD        Health Care Directive  Patient does not have a Health Care Directive or Living Will: Patient states has Advance Directive and will bring in a copy to clinic.    Healthy Habits:     Getting at least 3 servings of Calcium per day:  NO    Bi-annual eye exam:  Yes    Dental care twice a year:  NO    Sleep apnea or symptoms of sleep apnea:  None    Diet:  Regular (no restrictions)    Frequency of exercise:  2-3 days/week    Duration of exercise:  15-30 minutes    Taking medications regularly:  Yes    Medication side effects:  None    Additional concerns today:  No      Hypertension Follow-up    Do you check your blood pressure regularly outside of the clinic? No   Are you following a low salt diet? No  Are your blood pressures ever " more than 140 on the top number (systolic) OR more   than 90 on the bottom number (diastolic), for example 140/90? Yes    Depression and Anxiety Follow-Up  How are you doing with your depression since your last visit? No change  How are you doing with your anxiety since your last visit?  No change  Are you having other symptoms that might be associated with depression or anxiety? No  Have you had a significant life event? Job Concerns   Do you have any concerns with your use of alcohol or other drugs? No    Social History     Tobacco Use    Smoking status: Every Day     Packs/day: 1.00     Years: 30.00     Additional pack years: 0.00     Total pack years: 30.00     Types: Cigarettes     Passive exposure: Past    Smokeless tobacco: Never   Vaping Use    Vaping Use: Never used   Substance Use Topics    Alcohol use: Not Currently     Comment: 1 or 2 a month    Drug use: Yes     Types: Marijuana     Comment: cbd         10/9/2023    11:51 AM 12/4/2023     2:41 PM 2/27/2024     8:57 AM   PHQ   PHQ-9 Total Score 7 7 7   Q9: Thoughts of better off dead/self-harm past 2 weeks Not at all Not at all Not at all         9/3/2020    11:55 AM 8/27/2021     5:41 PM 10/5/2021     9:49 AM   GEORGIANA-7 SCORE   Total Score 7 11 10         Suicide Assessment Five-step Evaluation and Treatment (SAFE-T)        Medication Followup of Aciphex  Taking Medication as prescribed: yes  Side Effects:  None  Medication Helping Symptoms:  yes      Medication Followup of Gabapentin  Taking Medication as prescribed: yes  Side Effects:  None  Medication Helping Symptoms:  yes      2/22/2024   General Health   How would you rate your overall physical health? (!) FAIR   Feel stress (tense, anxious, or unable to sleep) Rather much   (!) STRESS CONCERN      2/22/2024   Nutrition   Three or more servings of calcium each day? Yes   Diet: Regular (no restrictions)   How many servings of fruit and vegetables per day? (!) 2-3   How many sweetened beverages each  day? (!) 3         2/22/2024   Exercise   Days per week of moderate/strenous exercise 3 days   Average minutes spent exercising at this level 20 min         2/22/2024   Social Factors   Frequency of gathering with friends or relatives Twice a week   Worry food won't last until get money to buy more Yes   Food not last or not have enough money for food? Yes   Do you have housing?  Yes   Are you worried about losing your housing? No   Lack of transportation? No   Unable to get utilities (heat,electricity)? No   (!) FOOD SECURITY CONCERN PRESENT      2/22/2024   Dental   Dentist two times every year? (!) NO         2/22/2024   TB Screening   Were you born outside of [place]?  No       Today's PHQ-9 Score:       2/27/2024     8:57 AM   PHQ-9 SCORE   PHQ-9 Total Score MyChart 7 (Mild depression)   PHQ-9 Total Score 7         2/22/2024   Substance Use   If I could quit smoking, I would Neutral   I want to quit somking, worry about health affects Somewhat agree   Willing to make a plan to quit smoking Neutral   Willing to cut down before quitting Somewhat agree   Alcohol more than 3/day or more than 7/wk No   Do you use any other substances recreationally? (!) CANNABIS PRODUCTS     Social History     Tobacco Use    Smoking status: Every Day     Packs/day: 1.00     Years: 30.00     Additional pack years: 0.00     Total pack years: 30.00     Types: Cigarettes     Passive exposure: Past    Smokeless tobacco: Never   Vaping Use    Vaping Use: Never used   Substance Use Topics    Alcohol use: Not Currently     Comment: 1 or 2 a month    Drug use: Yes     Types: Marijuana     Comment: cbd             2/22/2024   Breast Cancer Screening   Family history of breast, colon, or ovarian cancer? No / Unknown      Mammogram Screening - Mammogram every 1-2 years updated in Health Maintenance based on mutual decision making        2/22/2024   STI Screening   New sexual partner(s) since last STI/HIV test? No     History of abnormal Pap smear:  "YES - updated in Problem List and Health Maintenance accordingly        Latest Ref Rng & Units 8/4/2022     8:59 AM 8/27/2021     5:26 PM   PAP / HPV   PAP  Low-grade squamous intraepithelial lesion (LSIL) encompassing HPV/mild dysplasia/CIN1  Negative for Intraepithelial Lesion or Malignancy (NILM)    HPV 16 DNA Negative Negative  Negative    HPV 18 DNA Negative Negative  Negative    Other HR HPV Negative Positive  Positive      ASCVD Risk   The 10-year ASCVD risk score (Basilio SARAVIA, et al., 2019) is: 7.9%    Values used to calculate the score:      Age: 48 years      Sex: Female      Is Non- : No      Diabetic: No      Tobacco smoker: Yes      Systolic Blood Pressure: 150 mmHg      Is BP treated: Yes      HDL Cholesterol: 45 mg/dL      Total Cholesterol: 213 mg/dL       Reviewed and updated as needed this visit by Provider                      Review of Systems  Constitutional, HEENT, cardiovascular, pulmonary, gi and gu systems are negative, except as otherwise noted.     Objective    Exam  BP (!) 150/80   Pulse 93   Temp 98.4  F (36.9  C) (Tympanic)   Resp 16   Ht 1.585 m (5' 2.4\")   Wt 87 kg (191 lb 12.8 oz)   SpO2 99%   BMI 34.63 kg/m     Estimated body mass index is 34.63 kg/m  as calculated from the following:    Height as of this encounter: 1.585 m (5' 2.4\").    Weight as of this encounter: 87 kg (191 lb 12.8 oz).    Physical Exam  GENERAL: alert and no distress  EYES: Eyes grossly normal to inspection and conjunctivae and sclerae normal  HENT: normal cephalic/atraumatic, ear canals and TM's normal, oropharynx clear, and oral mucous membranes moist  NECK: no adenopathy, no asymmetry, masses, or scars  RESP: lungs clear to auscultation - no rales, rhonchi or wheezes  CV: regular rates and rhythm, normal S1 S2, no S3 or S4, no murmur, click or rub, and no peripheral edema  ABDOMEN: soft, nontender and no palpable or pulsatile masses   (female): normal female external " genitalia, normal urethral meatus , normal vaginal mucosa, vaginal discharge - curd-like, and normal cervix, adnexae, and uterus without masses.  MS: no gross musculoskeletal defects noted, no edema  SKIN: no suspicious lesions or rashes  NEURO: Normal strength and tone, mentation intact and speech normal  PSYCH: mentation appears normal, affect normal/bright      Signed Electronically by: ISAAC Dodson CNP

## 2024-02-29 RX ORDER — ROSUVASTATIN CALCIUM 20 MG/1
20 TABLET, COATED ORAL DAILY
Qty: 90 TABLET | Refills: 3 | Status: SHIPPED | OUTPATIENT
Start: 2024-02-29

## 2024-02-29 NOTE — RESULT ENCOUNTER NOTE
Nolan Guerin    Your cholesterol levels are above goal. Cholesterol lowering medications are recommended at a 10 year cardiac risk score of 7.5% or higher. Your cardiac risk score is:    The 10-year ASCVD risk score (Basilio SARAVIA, et al., 2019) is: 17.5%    Values used to calculate the score:      Age: 48 years      Sex: Female      Is Non- : No      Diabetic: No      Tobacco smoker: Yes      Systolic Blood Pressure: 150 mmHg      Is BP treated: Yes      HDL Cholesterol: 33 mg/dL      Total Cholesterol: 270 mg/dL    A statin medication is recommended to lower your risk of heart attack and stroke. I will send this to the pharmacy for you. The triglycerides are high, when the level is near 500 you are at greater risk for pancreatitis. Your kidney function is down. Make sure you are drinking plenty of water and avoid taking too much Ibuprofen. Recommend no more than 600 mg/day and no more that 3 days/week. Tylenol is ok. Schedule a lab draw to recheck this in a few weeks. You are not postmenopausal.     Please let us know if you have any questions.     Take care,    ISAAC Dean CNP    TEST DESCRIPTIONS   CBC (Complete Blood Count) includes hemoglobin, hematocrit, white blood cells, etc. This test can be used to detect anemia, infection, and abnormalities in blood cells.   Cholesterol is one of the blood fats (lipids) and is the building block used by the body for cell wall and hormone production. Increased levels of cholesterol have been proven to directly contribute to heart disease and strokes.   Triglycerides are one of the blood fats (lipids) and are thought to be associated with heart disease. If you have had anything to eat or drink other than water for 12 hours before the test and your level is high, you should have the test repeated after a 12 hour fast. Abnormally high results may also be associated with diabetes, kidney and liver diseases.   LDL is the low density lipoprotein  "component of the cholesterol. It is the harmful substance that deposits cholesterol on the artery walls contributing to heart attacks and strokes. A high LDL level is associated with higher risk of coronary heart disease.   HDL stands for high density lipoprotein and refers to the so-called \"good\" cholesterol. HDL picks up excess cholesterol in the bloodstream and carries it back to the liver for disposal. Individuals with higher than average HDL seem to have a lower risk of coronary disease. Vigorous exercise will help increase the blood levels of HDL.   Risk Factor (Total cholesterol/HDL) is a commonly used ratio for cardiac risk assessment. Less than 5.0 for men and 4.4 for women is ideal.   Sodium is an electrolyte useful in diagnosis of dehydration, diabetes, hypertension, or other diseases involving electrolyte imbalance. It also preserves the balance between calcium and potassium to maintain normal heart action and equilibrium of the body.   Potassium is also an electrolyte that works with sodium to regulate the body's water balance and normalize heart rhythm.   Glucose is a measure of blood sugar and is one of the tests for diabetes. If you have not been fasting, your level will often be high. A low glucose level may be a cause of weakness or dizziness. Blood sugar ranks with cholesterol as a causative factor in arteriosclerosis and heart attacks.   BUN & Creatinine are waste products excreted by the kidneys. A high BUN can be related to a high protein diet, heavy exercise, fever and infections, dehydration, kidney stones, and kidney disease. Creatinine elevation is less dependent on diet or exercise and better represents kidney impairment. Low values are not generally significant.   Calcium is a mineral in the blood controlled by the parathyroid glands and kidneys. It is important in the formation of bone, in muscle and nerve function, and in blood clotting. Disease of the parathyroid gland, diseased bones " or kidneys, or defective absorption of calcium may cause abnormal levels from the intestine.   ALT, AST, Alk Phos are liver tests. Enzymes found in the liver as well as skeletal and cardiac muscle. Elevations can often be seen in alcoholism, liver or heart disease. Slightly abnormal values are not considered significant.   TSH stands for Thyroid Stimulating Hormone. A sensitive test used to determine how the thyroid gland is functioning. The thyroid gland produces hormones that control the body's metabolism. When too few hormones are produced (increased TSH), hypothyroidism occurs which can cause fatigue, sensitivity to cold, and weight gain - an overall slowing down of bodily functions. When too many thyroid hormones are produces (or decreased TSH), it creates a condition call hyperthyroidism (such as Graves' disease) which causes rapid heartbeat, weight loss, and dizziness, among other symptoms.   PSA stands for Prostate Specific Antigen. Elevated levels of PSA can increase with trauma, infection, inflammation, or disease processes in the prostate such as BPH (Benigh Prostatic Hypertrophy) or cancer.   Glycohemoglobin or HgBA1C is a 3-month average of blood sugar

## 2024-03-03 LAB
BKR LAB AP GYN ADEQUACY: NORMAL
BKR LAB AP GYN INTERPRETATION: NORMAL
BKR LAB AP HPV REFLEX: NORMAL
BKR LAB AP PREVIOUS ABNL DX: NORMAL
BKR LAB AP PREVIOUS ABNORMAL: NORMAL
PATH REPORT.COMMENTS IMP SPEC: NORMAL
PATH REPORT.COMMENTS IMP SPEC: NORMAL
PATH REPORT.RELEVANT HX SPEC: NORMAL

## 2024-03-05 ENCOUNTER — PATIENT OUTREACH (OUTPATIENT)
Dept: FAMILY MEDICINE | Facility: CLINIC | Age: 49
End: 2024-03-05
Payer: COMMERCIAL

## 2024-03-05 DIAGNOSIS — R87.810 CERVICAL HIGH RISK HPV (HUMAN PAPILLOMAVIRUS) TEST POSITIVE: Primary | ICD-10-CM

## 2024-03-11 ENCOUNTER — VIRTUAL VISIT (OUTPATIENT)
Dept: PSYCHIATRY | Facility: CLINIC | Age: 49
End: 2024-03-11
Payer: COMMERCIAL

## 2024-03-11 DIAGNOSIS — F43.10 PTSD (POST-TRAUMATIC STRESS DISORDER): ICD-10-CM

## 2024-03-11 DIAGNOSIS — F41.1 GAD (GENERALIZED ANXIETY DISORDER): Primary | ICD-10-CM

## 2024-03-11 DIAGNOSIS — F33.1 MAJOR DEPRESSIVE DISORDER, RECURRENT EPISODE, MODERATE (H): ICD-10-CM

## 2024-03-11 PROCEDURE — 99214 OFFICE O/P EST MOD 30 MIN: CPT | Mod: 95 | Performed by: NURSE PRACTITIONER

## 2024-03-11 NOTE — Clinical Note
Pt is returning to PCP for care. Please dismiss from my panel.  ISAAC Rebolledo, [id number] Collaborative Care Psychiatry Service (CCPS) [facility]

## 2024-03-11 NOTE — PATIENT INSTRUCTIONS
**For crisis resources, please see the information at the end of this document**     Thank you for coming to the [facility] [place] [facility].    Thank you for our work together in the Psychiatry Collaborative Care Model at [facility]. This is our last visit and I am returning your care back to your Primary Care Provider. You should schedule an appointment to check in with them within 90 days of this transition. You may begin requesting refills from your PCP. If you are not doing well, please contact your Primary Care Provider's office.      TREATMENT PLAN:  Medications:   CONTINUE sertraline 75 mg (50 mg + 25 mg) every day. Scripts sent by PCP.  CONTINUE buspirone 30 mg twice a day. Script sent for #180 on 2/26/24.  CONTINUE hydroxyzine 50 mg (1-2 tablets) as needed for panic. No script needed.  Continue all other medications per primary care provider.     Referrals / Consults:  Continue therapy with STACY Bravo, Behavioral Health Consultant.   Complete appointment with sleep medicine as planned.    Follow-up:   No follow-up planned with CCPS.  Follow up with primary care provider as planned or for acute medical concerns and in 3 months for mental health check in.  Call the psychiatric nurse line with medication questions or concerns at [phone].  [employer] may be used to communicate with your provider, but this is not intended to be used for emergencies.    Psychoeducation:  Patient to monitor for signs and symptoms of serotonin syndrome/serotonin toxicity (eg, hyperreflexia, clonus, hyperthermia, diaphoresis, tremor, autonomic instability, mental status changes) when these drugs are combined.        Financial Assistance [phone]  [facility] Billing [phone]  Central Billing Office, [facility]: [phone]  [facility] Billing [phone]  Medical Records [phone]  [facility] Patient Bill of Rights  [email or web address]       MENTAL HEALTH CRISIS RESOURCES:  For a emergency help, please call 911 or go to the nearest Emergency Department.     Emergency Walk-In Options:   EmPATH Unit @ [facility] ([place]): [phone] - Specialized mental health emergency area designed to be calming  [facility] ([place]): [phone]  [facility] ([place]): [phone]  [facility]): [phone]    [facility] Crisis Information:   [place]: [phone]  [place]: [phone]  [place] ([place]) - Adult: [phone]     Child: [phone]  [place] - Adult: [phone]     Child: [phone]  [place]: [phone]  List of all [facility] resources:   https://[place].gov/dhs/people-we-serve/adults/health-care/mental-health/resources/crisis-contacts.jsp    National Crisis Information:   National Suicide & Crisis Lifeline: Call 139        For online chat options, visit https://suicidepreventionlifeline.org/chat/  Poison Control Center: 4-858-497-3454  Poison Control Center: 3-011-812-2930  Trans Lifeline: 3-760-317-4286 - Hotline for transgender people of all ages  [employer]: 8-267-281-4020 - Hotline for LGBT youth     For Non-Emergency Support:   Fast Tracker: Mental Health & Substance Use Disorder Resources -   https://www.fasttrackermn.org/       Again thank you for choosing [facility] [place] [facility] and please let us know how we can best partner with you to improve you and your family's health.    You may be receiving a survey regarding this appointment. We would love to have your feedback, both positive and negative. The survey is done by an external company, so your answers are anonymous.        Patient Education   Collaborative Care Psychiatry Service  What to Expect  Here's what to expect from your Collaborative Care Psychiatry Service (CCPS).   About  "CCPS  CCPS means 2 people work together to help you get better. You'll meet with a behavioral health clinician and a psychiatric doctor. A behavioral health clinician helps people with mental health problems by talking with them. A psychiatric doctor helps people by giving them medicine.  How it works  At every visit, you'll see the behavioral health clinician (BHC) first. They'll talk with you about how you're doing and teach you how to feel better.   Then you'll see the psychiatric doctor. This doctor can help you deal with troubling thoughts and feelings by giving you medicine. They'll make sure you know the plan for your care.   CCPS usually takes 3 to 6 visits. If you need more visits, we may have you start seeing a different psychiatric doctor for ongoing care.  If you have any questions or concerns, we'll be glad to talk with you.  About visits  Be open  At your visits, please talk openly about your problems. It may feel hard, but it's the best way for us to help you.  Cancelling visits  If you can't come to your visit, please call us right away at [phone]. If you don't cancel at least 24 hours (1 full day) before your visit, that's \"late cancellation.\"  Being late to visits  Being very late is the same as not showing up. You will be a \"no show\" if:  Your appointment starts with a BHC, and you're more than 15 minutes late for a 30-minute (half hour) visit. This will also cancel your appointment with the psychiatric doctor.  Your appointment is with a psychiatric doctor only, and you're more than 15 minutes late for a 30-minute (half hour) visit.  Your appointment is with a psychiatric doctor only, and you're more than 30 minutes late for a 60-minute (full hour) visit.  If you cancel late or don't show up 2 times within 6 months, we may end your care.   Getting help between visits  If you need help between visits, you can call us Monday to Friday from 8 a.m. to 4:30 p.m. at [phone].  Emergency " care  Call 911 or go to the nearest emergency department if your life or someone else's life is in danger.  Call 988 anytime to reach the national Suicide and Crisis hotline.  Medicine refills  To refill your medicine, call your pharmacy. You can also call [facility]'s Behavioral Access at [phone], Monday to Friday, 8 a.m. to 4:30 p.m. It can take 1 to 3 business days to get a refill.   Forms, letters, and tests  You may have papers to fill out, like FMLA, short-term disability, and workability. We can help you with these forms at your visits, but you must have an appointment. You may need more than 1 visit for this, to be in an intensive therapy program, or both.  Before we can give you medicine for ADHD, we may refer you to get tested for it or confirm it another way.  We may not be able to give you an emotional support animal letter.  We don't do mental health checks ordered by the court.   We don't do mental health testing, but we can refer you to get tested.   Thank you for choosing us for your care.  For informational purposes only. Not to replace the advice of your health care provider. Copyright   2022 [facility]. All rights reserved. [employer] [id number] - 12/22.

## 2024-03-11 NOTE — NURSING NOTE
Is the patient currently in the state of [place]? YES    Visit mode:VIDEO    If the visit is dropped, the patient can be reconnected by: VIDEO VISIT: Text to cell phone:   Telephone Information:   Mobile [phone]   Mobile Not on file.       Will anyone else be joining the visit? NO  (If patient encounters technical issues they should call [phone]    How would you like to obtain your AVS? MyChart    Are changes needed to the allergy or medication list? No    Reason for visit: RECHECK    Steffanie GONZALES

## 2024-03-11 NOTE — PROGRESS NOTES
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 risks and benefits of medications and psychotherapy options.  Consent provided by patient/guardian  Call the psychiatric nurse line with medication questions or concerns at [phone].  MyChart may be used to communicate with your provider, but this is not intended to be used for emergencies.  [place] is information for patients.  It is run by the [employer] and it contains information about all disorders, diseases and all medications.      Community Resources:    National Suicide Prevention Lifeline: 223.955.3933 (TTY: 779.551.3635). Call anytime for help.  (www.suicidepreventionlifeline.org)  National [place] on Mental Illness (www.[place].org): 683.808.4151 or 420-335-5207.   Mental Health Association (www.mentalhealth.org): 758.962.4804 or 031-776-4818.  [place] Crisis Text Line: Text [place] to 651605  Suicide LifeLine Chat: suicidePressy.org/chat    Administrative Billing:     Level of Medical Decision Making:   - At least 2 stable chronic problems  - Engaged in prescription drug management during visit (discussed any medication benefits, side effects, alternatives, etc.)             Patient Status:  CCPS MD/DO/NP/PA providers offer care a specialty service for Primary Care Providers in the [facility] that  seek to optimize psychotropic medications for unstable patients.  Once medications have been optimized, our providers discharge the patient back to the referring Primary Care Provider for ongoing medication management.  This type of system allows our providers to serve a high volume of patients.   The patient is being returned to the referring provider for ongoing care and medication prescribing.  The patient can be referred back to this service for further consultation as needed.    Signed:   Susi Carter, MSN, APRN, PMHNP-BC  Collaborative Care Psychiatry Service (CCPS)  [facility]    Chart documentation done in part with Dragon Voice Recognition software.  Although reviewed after completion, some word and grammatical errors may remain.

## 2024-03-11 NOTE — Clinical Note
Psychiatry Update/Consult. I am returning Apoorva Roberts's psychiatric care back to you for ongoing management and medication prescribing.  Patient has graduated from [facility] due to ongoing stability and readiness to return to primary care provider. Future medication refills will come from you (and you actually just filled most of them at her recent visit). I recommended that the patient follow up with you in about 3 months for a mental health visit. More details about treatment plan are in my note. If you have any questions or concerns, please don't hesitate to reach out.   Thanks for the referral! It was a pleasure working with Apoorva Roberts.   ISAAC Rebolledo, PMSUKHIP-BC Collaborative Care Psychiatry Service (CCPS) [facility]

## 2024-04-14 DIAGNOSIS — G62.89 OTHER POLYNEUROPATHY: ICD-10-CM

## 2024-04-15 RX ORDER — GABAPENTIN 400 MG/1
CAPSULE ORAL
Qty: 270 CAPSULE | Refills: 3 | Status: SHIPPED | OUTPATIENT
Start: 2024-04-15 | End: 2024-05-17

## 2024-04-22 ENCOUNTER — ANCILLARY PROCEDURE (OUTPATIENT)
Dept: GENERAL RADIOLOGY | Facility: CLINIC | Age: 49
End: 2024-04-22
Attending: NURSE PRACTITIONER
Payer: COMMERCIAL

## 2024-04-22 ENCOUNTER — OFFICE VISIT (OUTPATIENT)
Dept: FAMILY MEDICINE | Facility: CLINIC | Age: 49
End: 2024-04-22
Payer: COMMERCIAL

## 2024-04-22 VITALS
TEMPERATURE: 98.8 F | HEIGHT: 62 IN | HEART RATE: 73 BPM | RESPIRATION RATE: 16 BRPM | OXYGEN SATURATION: 99 % | DIASTOLIC BLOOD PRESSURE: 56 MMHG | SYSTOLIC BLOOD PRESSURE: 92 MMHG | WEIGHT: 192.2 LBS | BODY MASS INDEX: 35.37 KG/M2

## 2024-04-22 DIAGNOSIS — Z12.31 VISIT FOR SCREENING MAMMOGRAM: ICD-10-CM

## 2024-04-22 DIAGNOSIS — M25.511 ACUTE PAIN OF RIGHT SHOULDER: Primary | ICD-10-CM

## 2024-04-22 DIAGNOSIS — I10 BENIGN ESSENTIAL HYPERTENSION: ICD-10-CM

## 2024-04-22 DIAGNOSIS — B36.0 TINEA VERSICOLOR: ICD-10-CM

## 2024-04-22 DIAGNOSIS — M25.511 ACUTE PAIN OF RIGHT SHOULDER: ICD-10-CM

## 2024-04-22 PROCEDURE — 99214 OFFICE O/P EST MOD 30 MIN: CPT | Performed by: NURSE PRACTITIONER

## 2024-04-22 PROCEDURE — 72040 X-RAY EXAM NECK SPINE 2-3 VW: CPT | Mod: TC | Performed by: RADIOLOGY

## 2024-04-22 PROCEDURE — 73030 X-RAY EXAM OF SHOULDER: CPT | Mod: TC | Performed by: RADIOLOGY

## 2024-04-22 RX ORDER — CLOTRIMAZOLE 1 %
CREAM (GRAM) TOPICAL 2 TIMES DAILY
Qty: 85 G | Refills: 2 | Status: SHIPPED | OUTPATIENT
Start: 2024-04-22

## 2024-04-22 RX ORDER — HYDROCHLOROTHIAZIDE 25 MG/1
12.5 TABLET ORAL DAILY
Status: SHIPPED
Start: 2024-04-22

## 2024-04-22 RX ORDER — OXYCODONE HYDROCHLORIDE 5 MG/1
5 TABLET ORAL EVERY 6 HOURS PRN
Qty: 12 TABLET | Refills: 0 | Status: SHIPPED | OUTPATIENT
Start: 2024-04-22 | End: 2024-04-22

## 2024-04-22 RX ORDER — OXYCODONE HYDROCHLORIDE 5 MG/1
5 TABLET ORAL EVERY 6 HOURS PRN
Qty: 12 TABLET | Refills: 0 | Status: SHIPPED | OUTPATIENT
Start: 2024-04-22 | End: 2024-04-25

## 2024-04-22 NOTE — PROGRESS NOTES
Assessment & Plan     Acute pain of right shoulder  Consistent with trapezius sprain- see ORTHO for rec's/ confirmation  - XR Cervical Spine 2/3 Views; Future  - Orthopedic  Referral; Future  - XR Shoulder Right G/E 3 Views; Future  - oxyCODONE (ROXICODONE) 5 MG tablet; Take 1 tablet (5 mg) by mouth every 6 hours as needed for pain    Benign essential hypertension  Low today and creatinine elevated on last labs, trial lower dose  - hydrochlorothiazide (HYDRODIURIL) 25 MG tablet; Take 0.5 tablets (12.5 mg) by mouth daily    Tinea versicolor    - clotrimazole (LOTRIMIN) 1 % external cream; Apply topically 2 times daily    Visit for screening mammogram    - *MA Screening Digital Bilateral; Future        CONSULTATION/REFERRAL to ORTHO    FUTURE APPOINTMENTS:       - Follow-up visit in 6 months sooner prn       - schedule non-fasting labs in a few weeks.     See Patient Instructions    Time spent in chart review in preparation to see patient, time with patient for interview/exam, ordering medications/tests/and/or procedures, and time spent in charting and coordinating care: 30 minutes.       Eunice Guerin is a 48 year old, presenting for the following health issues:  Musculoskeletal Problem        4/22/2024     3:31 PM   Additional Questions   Roomed by Alla WARD     History of Present Illness       Reason for visit:  I injured my trapezoid muscle, the right side, a week or 2 ago. Its getting worse, not better. It burns when i move my shoulder and when it burns, it feels like a hot poker stabbing deep into the muscle  Symptom onset:  1-2 weeks ago  Symptom intensity:  Moderate  Symptom progression:  Worsening  Had these symptoms before:  No  What makes it worse:  Moving my shoulder or carrying things  What makes it better:  Ice sometimes dulls it a little, ibuprofen and tylenol dont touch the pain and my muscle relaxers dont help either.    She eats 0-1 servings of fruits and vegetables daily.She consumes 2  "sweetened beverage(s) daily.She exercises with enough effort to increase her heart rate 10 to 19 minutes per day.  She exercises with enough effort to increase her heart rate 4 days per week. She is missing 2 dose(s) of medications per week.  She is not taking prescribed medications regularly due to remembering to take.         Hypertension Follow-up    Do you check your blood pressure regularly outside of the clinic? No   Are you following a low salt diet? No  Are your blood pressures ever more than 140 on the top number (systolic) OR more   than 90 on the bottom number (diastolic), for example 140/90? No    Rash  Onset/Duration: 2 years  Description  Location: left middle back, bra line  Character: round  Itching: moderate  Intensity:  moderate  Progression of Symptoms:  worsening  Accompanying signs and symptoms:   Fever: No  Body aches or joint pain: No  Sore throat symptoms: No  Recent cold symptoms: No  History:           Previous episodes of similar rash: None  New exposures:  None  Recent travel: No  Exposure to similar rash: No  Precipitating or alleviating factors: n/a  Therapies tried and outcome: none        Review of Systems  Constitutional, HEENT, cardiovascular, pulmonary, gi and gu systems are negative, except as otherwise noted.      Objective    BP 92/56   Pulse 73   Temp 98.8  F (37.1  C) (Tympanic)   Resp 16   Ht 1.575 m (5' 2\")   Wt 87.2 kg (192 lb 3.2 oz)   SpO2 99%   BMI 35.15 kg/m    Body mass index is 35.15 kg/m .  Physical Exam   GENERAL: alert and no distress  MS: RUE exam shows normal strength and muscle mass, no evidence of joint effusion, and limited abduction due to pain, no tenderness to palpation at clavicle, ac joint, or scapula.   SKIN: round tan patch to left middle back            Signed Electronically by: ISAAC Dodson CNP    "

## 2024-04-22 NOTE — RESULT ENCOUNTER NOTE
Nolan Guerin    Your shoulder has mild arthritis in the joint. Otherwise, unremarkable. Please let us know if you have any questions.     Take care,    ISAAC Dean CNP

## 2024-04-23 NOTE — RESULT ENCOUNTER NOTE
Nolan Guerin    Your neck xray shows some arthritis in your lower neck that is likely contributing to your pain. Follow up with ORTHO as planned. Please let us know if you have any questions.     Take care,    ISAAC Dean CNP

## 2024-04-25 ENCOUNTER — E-VISIT (OUTPATIENT)
Dept: FAMILY MEDICINE | Facility: CLINIC | Age: 49
End: 2024-04-25
Payer: COMMERCIAL

## 2024-04-25 DIAGNOSIS — M25.511 ACUTE PAIN OF RIGHT SHOULDER: ICD-10-CM

## 2024-04-25 PROCEDURE — 99207 PR NON-BILLABLE SERV PER CHARTING: CPT | Performed by: NURSE PRACTITIONER

## 2024-04-25 RX ORDER — OXYCODONE HYDROCHLORIDE 5 MG/1
5 TABLET ORAL EVERY 6 HOURS PRN
Qty: 28 TABLET | Refills: 0 | Status: SHIPPED | OUTPATIENT
Start: 2024-04-25 | End: 2024-05-14

## 2024-05-06 DIAGNOSIS — R51.9 OCCIPITAL HEADACHE: ICD-10-CM

## 2024-05-06 RX ORDER — BUTALBITAL, ACETAMINOPHEN AND CAFFEINE 50; 325; 40 MG/1; MG/1; MG/1
TABLET ORAL
Qty: 20 TABLET | Refills: 2 | Status: SHIPPED | OUTPATIENT
Start: 2024-05-06 | End: 2024-07-05

## 2024-05-14 ENCOUNTER — E-VISIT (OUTPATIENT)
Dept: FAMILY MEDICINE | Facility: CLINIC | Age: 49
End: 2024-05-14
Payer: COMMERCIAL

## 2024-05-14 DIAGNOSIS — M25.511 ACUTE PAIN OF RIGHT SHOULDER: ICD-10-CM

## 2024-05-14 DIAGNOSIS — F41.1 GAD (GENERALIZED ANXIETY DISORDER): ICD-10-CM

## 2024-05-14 DIAGNOSIS — G62.89 OTHER POLYNEUROPATHY: ICD-10-CM

## 2024-05-14 PROCEDURE — 99421 OL DIG E/M SVC 5-10 MIN: CPT | Performed by: NURSE PRACTITIONER

## 2024-05-14 RX ORDER — OXYCODONE HYDROCHLORIDE 5 MG/1
5 TABLET ORAL EVERY 6 HOURS PRN
Qty: 28 TABLET | Refills: 0 | Status: SHIPPED | OUTPATIENT
Start: 2024-05-14 | End: 2024-05-23

## 2024-05-14 NOTE — PATIENT INSTRUCTIONS
Thank you for choosing us for your care. I have placed an order for a prescription so that you can start treatment. View your full visit summary for details by clicking on the link below. Your pharmacist will able to address any questions you may have about the medication.     If you're not feeling better within 5-7 days, please schedule an appointment.  You can schedule an appointment right here in [facility], or call [phone]  If the visit is for the same symptoms as your eVisit, we'll refund the cost of your eVisit if seen within seven days.

## 2024-05-17 RX ORDER — GABAPENTIN 400 MG/1
1200 CAPSULE ORAL 3 TIMES DAILY
Qty: 270 CAPSULE | Refills: 3 | Status: SHIPPED | OUTPATIENT
Start: 2024-05-17 | End: 2024-07-11

## 2024-05-17 RX ORDER — BUSPIRONE HYDROCHLORIDE 30 MG/1
30 TABLET ORAL 2 TIMES DAILY
Qty: 180 TABLET | Refills: 3 | Status: SHIPPED | OUTPATIENT
Start: 2024-05-17

## 2024-05-22 ENCOUNTER — OFFICE VISIT (OUTPATIENT)
Dept: ORTHOPEDICS | Facility: CLINIC | Age: 49
End: 2024-05-22
Attending: NURSE PRACTITIONER
Payer: COMMERCIAL

## 2024-05-22 DIAGNOSIS — M25.511 ACUTE PAIN OF RIGHT SHOULDER: ICD-10-CM

## 2024-05-22 PROCEDURE — 99244 OFF/OP CNSLTJ NEW/EST MOD 40: CPT | Performed by: PEDIATRICS

## 2024-05-22 RX ORDER — METHYLPREDNISOLONE 4 MG
TABLET, DOSE PACK ORAL
Qty: 21 TABLET | Refills: 0 | Status: SHIPPED | OUTPATIENT
Start: 2024-05-22

## 2024-05-22 RX ORDER — METHOCARBAMOL 750 MG/1
750 TABLET, FILM COATED ORAL 4 TIMES DAILY PRN
Qty: 120 TABLET | Refills: 4 | Status: SHIPPED | OUTPATIENT
Start: 2024-05-22

## 2024-05-22 NOTE — PATIENT INSTRUCTIONS
We discussed these other possible diagnosis: Right shoulder injury, more consistent with rotator cuff injury, will obtain MRI. Less likely cervical etiology given current exam.    Plan:  - Today's Plan of Care:  MRI of the Right Shoulder - Call [phone] to schedule MRI     Start with Home Exercise Program  Referral to Physical Therapy    Discussed activity considerations and other supportive care including Ice/Heat, OTC and other topical medications as needed.  Discussed Voltaren gel, lidocaine patches    Medrol dose pack (I reviewed the mechanism of action as well as risk/benefit profile of medications. Questions answered.)    -We also discussed other future treatment options:  Referral to Orthopedic Surgery  Consideration of injections  Referral to Spine    Follow Up: In clinic with Dr. Harris after MRI (wait at least 1-2 days)    If you have any further questions for your physician or physician s care team you can call [phone] and use option 3 to leave a voice message.

## 2024-05-22 NOTE — PROGRESS NOTES
ASSESSMENT & PLAN    Apoorva was seen today for injury, injury and neck pain.    Diagnoses and all orders for this visit:    Acute pain of right shoulder  -     Orthopedic  Referral  -     MR Shoulder Right w/o Contrast; Future  -     Physical Therapy  Referral; Future  -     methylPREDNISolone (MEDROL DOSEPAK) 4 MG tablet therapy pack; Follow Package Directions      This issue is acute and Unchanged.      ICD-10-CM    1. Acute pain of right shoulder  M25.511 Orthopedic  Referral     MR Shoulder Right w/o Contrast     Physical Therapy  Referral     methylPREDNISolone (MEDROL DOSEPAK) 4 MG tablet therapy pack        Patient Instructions   We discussed these other possible diagnosis: Right shoulder injury, more consistent with rotator cuff injury, will obtain MRI. Less likely cervical etiology given current exam.    Plan:  - Today's Plan of Care:  MRI of the Right Shoulder - Call [phone] to schedule MRI     Start with Home Exercise Program  Referral to Physical Therapy    Discussed activity considerations and other supportive care including Ice/Heat, OTC and other topical medications as needed.  Discussed Voltaren gel, lidocaine patches    Medrol dose pack (I reviewed the mechanism of action as well as risk/benefit profile of medications. Questions answered.)    -We also discussed other future treatment options:  Referral to Orthopedic Surgery  Consideration of injections  Referral to Spine    Follow Up: In clinic with Dr. Harris after MRI (wait at least 1-2 days)    Concerning signs and symptoms were reviewed and all questions were answered at this time.    Thanks for the opportunity to participate in the care of this patient, I will keep you updated on their progress.    CC: Angelic Harris MD [facility]  Sports Medicine Physician  [facility]    -----  Chief Complaint   Patient presents with    Right Shoulder - Injury    Neck - Injury,  "Neck Pain       SUBJECTIVE  Apoorva Roberts is a/an 48 year old female who is seen in consultation at the request of  Angelic More C.N.P. for evaluation of right shoulder.     The patient is seen by themselves.  The patient is Right handed    Onset: 2 month(s) ago. Patient describes injury as carrying a box, the box fell, she caught it with her right arm and felt \"something\" tear/pull, it was hot after that.    Location of Pain: right shoulder, upper trapezius, can radiate up the cervical, can go down the spine, radiates also to the left side of the cervical spine, nothing down the arm.    Worsened by: abduction, any movement above shoulder height, flexion, extension, internal rotation, overhead movements, Soreness with neck movements.   Better with: oxycodone,   Treatments tried: ice, Tylenol, ibuprofen, other medications: Oxycodone, and previous imaging (xray of the neck and the right shoulder, 4/22/24)  Associated symptoms: swelling, weakness of right shoulder, decreased/limited ROM, feeling of instability, Hot stabbing pains, numbness on the superior of the shoulder     Orthopedic/Surgical history: NO  Social History/Occupation: working at a  shop, cleaning, desk work       REVIEW OF SYSTEMS:  Review of Systems    OBJECTIVE:  There were no vitals taken for this visit.   General: healthy, alert and in no distress  Skin: no suspicious lesions or rash.  CV: distal perfusion intact   Resp: normal respiratory effort without conversational dyspnea   Psych: normal mood and affect  Gait: NORMAL  Neuro: Normal light sensory exam of upper extremity    Cervical Spine Exam    Inspection:       No visible deformity        normal lordotic curvature maintained    Posture:      rounded shoulders and upper back    Tender:      upper border of trapezius    Non-Tender:      remainder of cervical spine area    Range of Motion:       Full active and passive ROM forward flexion, extension, lateral rotation, lateral " flexion.    Painful Motions:      none    Strength:     C4 (shoulder shrug)  symmetric 5/5       C5 (shoulder abduction) symmetric 5/5       C6 (elbow flexion) symmetric 5/5       C7 (elbow extension) symmetric 5/5       C8 (finger abduction, thumb flexion) symmetric 5/5    Sensation:     grossly intact througout bilateral upper extremities    Special Tests:      neg (-) Spurling    Skin:     well perfused       capillary refill brisk    Lymphatics:      no edema noted in the upper extremities     Bilateral Shoulder exam    Inspection and Posture:       rounded shoulders and upper back    Skin:        no visible deformities    Tender:        subacromial space right    Non Tender:       remainder of shoulder bilateral    ROM:        forward flexion 90  right       abduction 90 right       internal rotation gluteal region right       external rotation 35 right       asymmetric scapular motion    Painful motions:       flexion right       elevation right    Strength:        abduction 3/5 right       flexion 3/5 right       internal rotation 5/5 right       external rotation 5/5 right    Impingement testing:       positive (+) Neer right       positive (+) Treadwell right       positive (+) crossover right    Sensation:        normal sensation over shoulder and upper extremity       RADIOLOGY:  Final results and radiologist's interpretation, available in the [facility] health record.  Images were reviewed with the patient in the office today.  My personal interpretation of the performed imaging:     Reviewed x-rays of the right shoulder 4/22/2024-mild AC joint arthritis, no acute abnormality    Reviewed cervical spine x-ray from 4/22/2024-straightening of the cervical curvature, multilevel degenerative changes worse at C5-C6 and C6-C7    Results for orders placed or performed in visit on 04/22/24   XR Shoulder Right G/E 3 Views    Narrative    XR SHOULDER RIGHT G/E 3 VIEWS   4/22/2024 4:10 PM     HISTORY:  Acute pain of right  shoulder    Comparison: None.      Impression    IMPRESSION: Mild osteoarthrosis of the AC joint. The glenohumeral  joint appears normal. There is no evidence of an acute or subacute  fracture. Old healed rib fractures.    SHARI PETERSON MD         SYSTEM ID:  [id number]     XR CERVICAL SPINE 2/3 VIEWS 4/22/2024 4:07 PM   HISTORY: Acute pain of right shoulder  COMPARISON: None.                                                    IMPRESSION: Vertebral body heights are maintained. Straightening of  the normal cervical curvature. Multilevel loss of disc height most  notably at C5-6 and C6-7. Mild facet disease. Lung apices  unremarkable. No prevertebral edema.   MAKEDA SANCHEZ MD      Reviewed PCP notes  Review of the result(s) of each unique test - XRs

## 2024-05-22 NOTE — LETTER
5/22/2024         RE: Apoorva Roberts  [address] [id number]        Dear Colleague,    Thank you for referring your patient, Apoorva Roberts, to the [facility] [place]. Please see a copy of my visit note below.    ASSESSMENT & PLAN    Apoorva was seen today for injury, injury and neck pain.    Diagnoses and all orders for this visit:    Acute pain of right shoulder  -     Orthopedic  Referral  -     MR Shoulder Right w/o Contrast; Future  -     Physical Therapy  Referral; Future  -     methylPREDNISolone (MEDROL DOSEPAK) 4 MG tablet therapy pack; Follow Package Directions      This issue is acute and Unchanged.      ICD-10-CM    1. Acute pain of right shoulder  M25.511 Orthopedic  Referral     MR Shoulder Right w/o Contrast     Physical Therapy  Referral     methylPREDNISolone (MEDROL DOSEPAK) 4 MG tablet therapy pack        Patient Instructions   We discussed these other possible diagnosis: Right shoulder injury, more consistent with rotator cuff injury, will obtain MRI. Less likely cervical etiology given current exam.    Plan:  - Today's Plan of Care:  MRI of the Right Shoulder - Call [phone] to schedule MRI     Start with Home Exercise Program  Referral to Physical Therapy    Discussed activity considerations and other supportive care including Ice/Heat, OTC and other topical medications as needed.  Discussed Voltaren gel, lidocaine patches    Medrol dose pack (I reviewed the mechanism of action as well as risk/benefit profile of medications. Questions answered.)    -We also discussed other future treatment options:  Referral to Orthopedic Surgery  Consideration of injections  Referral to Spine    Follow Up: In clinic with Dr. Harris after MRI (wait at least 1-2 days)    Concerning signs and symptoms were reviewed and all questions were answered at this time.    Thanks for the opportunity to participate in the care of this patient,  "I will keep you updated on their progress.    CC: Angelic Harris MD [facility]  Sports Medicine Physician  [facility]    -----  Chief Complaint   Patient presents with     Right Shoulder - Injury     Neck - Injury, Neck Pain       SUBJECTIVE  Apoorva Roberts is a/an 48 year old female who is seen in consultation at the request of  Angelic More C.N.P. for evaluation of right shoulder.     The patient is seen by themselves.  The patient is Right handed    Onset: 2 month(s) ago. Patient describes injury as carrying a box, the box fell, she caught it with her right arm and felt \"something\" tear/pull, it was hot after that.    Location of Pain: right shoulder, upper trapezius, can radiate up the cervical, can go down the spine, radiates also to the left side of the cervical spine, nothing down the arm.    Worsened by: abduction, any movement above shoulder height, flexion, extension, internal rotation, overhead movements, Soreness with neck movements.   Better with: oxycodone,   Treatments tried: ice, Tylenol, ibuprofen, other medications: Oxycodone, and previous imaging (xray of the neck and the right shoulder, 4/22/24)  Associated symptoms: swelling, weakness of right shoulder, decreased/limited ROM, feeling of instability, Hot stabbing pains, numbness on the superior of the shoulder     Orthopedic/Surgical history: NO  Social History/Occupation: working at a  shop, cleaning, desk work       REVIEW OF SYSTEMS:  Review of Systems    OBJECTIVE:  There were no vitals taken for this visit.   General: healthy, alert and in no distress  Skin: no suspicious lesions or rash.  CV: distal perfusion intact   Resp: normal respiratory effort without conversational dyspnea   Psych: normal mood and affect  Gait: NORMAL  Neuro: Normal light sensory exam of upper extremity    Cervical Spine Exam    Inspection:       No visible deformity        normal lordotic curvature " maintained    Posture:      rounded shoulders and upper back    Tender:      upper border of trapezius    Non-Tender:      remainder of cervical spine area    Range of Motion:       Full active and passive ROM forward flexion, extension, lateral rotation, lateral flexion.    Painful Motions:      none    Strength:     C4 (shoulder shrug)  symmetric 5/5       C5 (shoulder abduction) symmetric 5/5       C6 (elbow flexion) symmetric 5/5       C7 (elbow extension) symmetric 5/5       C8 (finger abduction, thumb flexion) symmetric 5/5    Sensation:     grossly intact througout bilateral upper extremities    Special Tests:      neg (-) Spurling    Skin:     well perfused       capillary refill brisk    Lymphatics:      no edema noted in the upper extremities     Bilateral Shoulder exam    Inspection and Posture:       rounded shoulders and upper back    Skin:        no visible deformities    Tender:        subacromial space right    Non Tender:       remainder of shoulder bilateral    ROM:        forward flexion 90  right       abduction 90 right       internal rotation gluteal region right       external rotation 35 right       asymmetric scapular motion    Painful motions:       flexion right       elevation right    Strength:        abduction 3/5 right       flexion 3/5 right       internal rotation 5/5 right       external rotation 5/5 right    Impingement testing:       positive (+) Neer right       positive (+) Treadwell right       positive (+) crossover right    Sensation:        normal sensation over shoulder and upper extremity       RADIOLOGY:  Final results and radiologist's interpretation, available in the [facility] health record.  Images were reviewed with the patient in the office today.  My personal interpretation of the performed imaging:     Reviewed x-rays of the right shoulder 4/22/2024-mild AC joint arthritis, no acute abnormality    Reviewed cervical spine x-ray from 4/22/2024-straightening of the cervical  curvature, multilevel degenerative changes worse at C5-C6 and C6-C7    Results for orders placed or performed in visit on 04/22/24   XR Shoulder Right G/E 3 Views    Narrative    XR SHOULDER RIGHT G/E 3 VIEWS   4/22/2024 4:10 PM     HISTORY:  Acute pain of right shoulder    Comparison: None.      Impression    IMPRESSION: Mild osteoarthrosis of the AC joint. The glenohumeral  joint appears normal. There is no evidence of an acute or subacute  fracture. Old healed rib fractures.    SHARI PETERSON MD         SYSTEM ID:  [id number]     XR CERVICAL SPINE 2/3 VIEWS 4/22/2024 4:07 PM   HISTORY: Acute pain of right shoulder  COMPARISON: None.                                                    IMPRESSION: Vertebral body heights are maintained. Straightening of  the normal cervical curvature. Multilevel loss of disc height most  notably at C5-6 and C6-7. Mild facet disease. Lung apices  unremarkable. No prevertebral edema.   MAKEDA SANCHEZ MD      Reviewed PCP notes  Review of the result(s) of each unique test - XRs             Again, thank you for allowing me to participate in the care of your patient.        Sincerely,        Chloe Harris MD

## 2024-05-22 NOTE — Clinical Note
Will trial medrol dose pack, I think it's more shoulder related. Recommended she reach out to you for more oxycodone. She is already on tizanidine which isn't helping. Would you switch muscle relaxers? She is on a lot of medications so I didn't want to add another. Let me know your thoughts. Hopefully prednisone helps. Thanks! Chloe

## 2024-05-23 ENCOUNTER — MYC MEDICAL ADVICE (OUTPATIENT)
Dept: FAMILY MEDICINE | Facility: CLINIC | Age: 49
End: 2024-05-23
Payer: COMMERCIAL

## 2024-05-23 DIAGNOSIS — M25.511 ACUTE PAIN OF RIGHT SHOULDER: ICD-10-CM

## 2024-05-23 RX ORDER — OXYCODONE HYDROCHLORIDE 5 MG/1
5 TABLET ORAL EVERY 6 HOURS PRN
Qty: 28 TABLET | Refills: 0 | Status: SHIPPED | OUTPATIENT
Start: 2024-05-23 | End: 2024-06-10

## 2024-05-23 NOTE — TELEPHONE ENCOUNTER
Please see my chart messages    Requesting muscle relaxer and oxycodone    Robaxin shows on the dispense report from 5/22 however patient wrote in my chart that the pharmacy doesn't have it filled. Not pended.     Kavya Santos RN on 5/23/2024 at 11:37 AM

## 2024-05-30 NOTE — PROGRESS NOTES
GASTROENTEROLOGY RETURN PATIENT VIRTUAL VISIT      How would you like to obtain your AVS? MyChart  If the video visit is dropped, the invitation should be resent by: Text to cell phone: [phone]  Will anyone else be joining your video visit? No    Video-Visit Details     Type of service:  Video Visit     Video Start Time (time video started): 1027     Video End Time (time video stopped): 1036     Physician has received verbal consent for a Video Visit from the patient? Yes     Originating Location (pt. Location): Other in a parked vehicle    Distant Location (provider location):  Off-site    Platform used for Video Visit: [facility]    GASTROENTEROLOGY RETURN PATIENT VIDEO VISIT    CC/REFERRING MD:    Angelic More    REASON FOR CONSULTATION:   Referred for Follow Up (Chronic superficial gastritis without bleeding/Hiatal hernia/Polyp of duodenum/Gastroesophageal reflux disease with esophagitis without hemorrhage/Diarrhea due to malabsorption/PUD (peptic ulcer disease)/History of peptic ulcer /)      HISTORY OF PRESENT ILLNESS:  Apoorva Roberts is 48 year old female who presents to GI clinic for a follow up. Patient was seen for GERD and chronic diarrhea. Diarrhea had resolved with cholestyramine. Patient stopped the medication. Stated that she may have a few loose stool a week, but relates it to her diet.   Stated that Rabeprazole works great for her acid reflux symptoms. Does not recall when was her last episode of acid reflux. She is wandering if a new PA should be placed to continue the medication. She stopped sucralfate and famotidine. Complains of a new symptom of morning nausea, about 2-3 times a week. Improves with consumption of food. No emesis. No associated abdominal pain. No diet change. Stated that she injured her shoulder and has been taking pain medications and muscle relaxers. No other new medications or supplements.  There were finding of gastritis, hiatal hernia, and duodenal polyps on EGD in  "May 2023. There were white plaques on esophageal mucosa, EoE was suspected, but not confirmed by histology report. Plan for a follow up EGD in 2 years unless symptoms become worse.  Denies hematochezia or melena.  No weight loss.    PREVIOUS ENDOSCOPY:  5/8/2023  Findings:         -  A few small flat polyps with no bleeding were found in the duodenal bulb.         -  The gastroesophageal flap valve was visualized endoscopically and classified as Hill Grade III (minimal fold, loose to endoscope, hiatal hernia likely).          -  Patchy mild inflammation characterized by erythema was found in the prepyloric region of the stomach. Esophagogastric landmarks were identified: the Z-line was found at 37 cm,  the gastroesophageal junction was found at 37 cm and the site of hiatal narrowing was found at 37 cm from the incisors.          -  Multiple small plaques were found in the middle third of the esophagus.              A few duodenal polyps.  Biopsied.     DIAGNOSIS:   A) DUODENUM, \"POLYP\", BIOPSY:        - BENIGN POLYPOID DUODENAL MUCOSA WITH PROMINENT BRUNNER GLANDS,   MILD                   NON-SPECIFIC CHRONIC INFLAMMATION, AND REACTIVE CHANGES   (SEE COMMENT)        - NO EVIDENCE OF CELIAC DISEASE        - NO EVIDENCE OF DYSPLASIA OR NEOPLASIA     B) ESOPHAGUS, BIOPSY:        - CHRONIC ESOPHAGITIS WITH FOCAL REACTIVE CHANGES, WITHOUT   INTRAEPITHELIAL    EOSINOPHILIA   - NO GLANDULAR TYPE EPITHELIUM IDENTIFIED IN SECTIONS           10/8/2021  Findings:        The examined duodenum was normal.        Scattered moderate inflammation characterized by adherent blood,        erythema and shallow ulcerations was found in the gastric body, in the        gastric antrum and in the prepyloric region of the stomach. Biopsies        were taken with a cold forceps for Helicobacter pylori testing.        A 1 cm hiatal hernia was present.   Final Diagnosis   A(1).  Stomach, antrum, biopsy:  -Transitional type gastric mucosa with " no significant pathological changes.  - Negative for H. Pylori organisms on routine stains.  - Negative for intestinal metaplasia.   -Negative for dysplasia or malignancy     PERTINENT IMAGING STUDIES WERE REVIEWED IN EMR    HISTORY:   has a past medical history of Cervical high risk HPV (human papillomavirus) test positive (08/27/2021), Community acquired pneumonia (12/05/2017), Depressive disorder, Gastroesophageal reflux disease, Hiatal hernia, Hypertension, Other chronic pain, and Walking troubles.     has a past surgical history that includes Abdomen surgery; appendectomy; Cholecystectomy; GYN surgery; hernia repair; hysterectomy, pap still indicated; Esophagoscopy, gastroscopy, duodenoscopy (EGD), combined (N/A, 10/8/2021); and Esophagoscopy, gastroscopy, duodenoscopy (EGD), combined (N/A, 5/8/2023).     reports that she has been smoking cigarettes. She has a 30 pack-year smoking history. She has been exposed to tobacco smoke. She has never used smokeless tobacco. She reports that she does not currently use alcohol. She reports current drug use. Drug: Marijuana.    family history includes Depression in her mother and sister; Diabetes in her maternal grandmother and mother; Hyperlipidemia in her father and mother; Hypertension in her father and mother; Kidney Cancer in her father; Other Cancer in her father; Substance Abuse in her father and mother.    ALLERGIES:     Allergies   Allergen Reactions    Indomethacin      Other reaction(s): GI Upset    Fluoxetine      Other reaction(s): Thrush    Paroxetine      Other reaction(s): Thrush       PERTINENT MEDICATIONS:    Current Outpatient Medications:     acetaminophen (TYLENOL) 325 MG tablet, Take 650 mg by mouth every 6 hours as needed for mild pain, Disp: , Rfl:     albuterol (PROAIR HFA/PROVENTIL HFA/VENTOLIN HFA) 108 (90 Base) MCG/ACT inhaler, Inhale 2 puffs into the lungs every 4 hours as needed for shortness of breath or wheezing (Patient not taking: Reported  on 2/27/2024), Disp: 8.5 g, Rfl: 0    busPIRone HCl (BUSPAR) 30 MG tablet, Take 1 tablet (30 mg) by mouth 2 times daily, Disp: 180 tablet, Rfl: 3    butalbital-acetaminophen-caffeine (ESGIC) -40 MG tablet, TAKE 1 TABLET BY MOUTH DAILY AS NEEDED FOR HEADACHE. NO MORE THAN 2 DAYS EVERY WEEK, Disp: 20 tablet, Rfl: 2    cholestyramine (QUESTRAN) 4 GM/DOSE powder, Take 4 g by mouth 2 times daily (with meals) Start with 4 gram at supper. Increase the dose to twice a day if needed., Disp: 348 g, Rfl: 3    clotrimazole (LOTRIMIN) 1 % external cream, Apply topically 2 times daily, Disp: 85 g, Rfl: 2    cyanocobalamin (VITAMIN B-12) 1000 MCG tablet, Take 1 tablet (1,000 mcg) by mouth daily (Patient not taking: Reported on 10/4/2023), Disp: 90 tablet, Rfl: 3    famotidine (PEPCID) 40 MG tablet, Take 1 tablet (40 mg) by mouth 2 times daily (Patient not taking: Reported on 4/22/2024), Disp: 60 tablet, Rfl: 3    gabapentin (NEURONTIN) 400 MG capsule, Take 3 capsules (1,200 mg) by mouth 3 times daily, Disp: 270 capsule, Rfl: 3    hydrochlorothiazide (HYDRODIURIL) 25 MG tablet, Take 0.5 tablets (12.5 mg) by mouth daily, Disp: , Rfl:     hydrOXYzine (ATARAX) 50 MG tablet, TAKE 1 TABLET(50 MG) BY MOUTH EVERY 6 HOURS AS NEEDED FOR ANXIETY (Patient not taking: Reported on 4/22/2024), Disp: 90 tablet, Rfl: 0    IBUPROFEN PO, , Disp: , Rfl:     ipratropium - albuterol 0.5 mg/2.5 mg/3 mL (DUONEB) 0.5-2.5 (3) MG/3ML neb solution, Take 1 vial (3 mLs) by nebulization every 4 hours as needed for shortness of breath, wheezing or cough (Patient not taking: Reported on 2/27/2024), Disp: 90 mL, Rfl: 3    lisinopril (ZESTRIL) 10 MG tablet, Take 1 tablet (10 mg) by mouth daily, Disp: 90 tablet, Rfl: 3    Loratadine-Pseudoephedrine (CLARITIN-D 24 HOUR PO), , Disp: , Rfl:     methocarbamol (ROBAXIN) 750 MG tablet, Take 1 tablet (750 mg) by mouth 4 times daily as needed for muscle spasms, Disp: 120 tablet, Rfl: 4    methylPREDNISolone (MEDROL  DOSEPAK) 4 MG tablet therapy pack, Follow Package Directions, Disp: 21 tablet, Rfl: 0    oxyCODONE (ROXICODONE) 5 MG tablet, Take 1 tablet (5 mg) by mouth every 6 hours as needed for pain, Disp: 28 tablet, Rfl: 0    RABEprazole (ACIPHEX) 20 MG EC tablet, Take 1 tablet (20 mg) by mouth daily, Disp: 30 tablet, Rfl: 11    rizatriptan (MAXALT) 10 MG tablet, Take 1 tablet (10 mg) by mouth at onset of headache for migraine May repeat in 2 hours. (Patient not taking: Reported on 2/27/2024), Disp: 18 tablet, Rfl: 1    rosuvastatin (CRESTOR) 20 MG tablet, Take 1 tablet (20 mg) by mouth daily, Disp: 90 tablet, Rfl: 3    sertraline (ZOLOFT) 25 MG tablet, Take 1 tablet (25 mg) by mouth daily with sertraline 50 mg for 75 mg total daily., Disp: 90 tablet, Rfl: 3    sertraline (ZOLOFT) 50 MG tablet, Take 1 tablet (50 mg) by mouth daily with sertraline 25 mg for 75 mg total daily., Disp: 90 tablet, Rfl: 3    sucralfate (CARAFATE) 1 GM tablet, Take 1 tablet (1 g) by mouth 3 times daily Take between meals and one dose before bed. Dissolve in a small amount of water. Do not eat, smoke, or drink for 30 min after the medication (Patient not taking: Reported on 2/27/2024), Disp: 90 tablet, Rfl: 3    zolpidem (AMBIEN) 10 MG tablet, TAKE 1/2 TO 1 TABLET(5 TO 10 MG) BY MOUTH EVERY NIGHT AS NEEDED FOR SLEEP, Disp: 30 tablet, Rfl: 5      ROS: 10pt ROS performed and otherwise negative.    PHYSICAL EXAMINATION:  Wt:   Wt Readings from Last 2 Encounters:   04/22/24 87.2 kg (192 lb 3.2 oz)   02/27/24 87 kg (191 lb 12.8 oz)      Physical Exam  Vitals reviewed: There were no vitals taken for this visit.   Constitutional: aaox3, cooperative, pleasant, not dyspneic/diaphoretic, no acute distress  Eyes: Sclera anicteric/injected  Respiratory: Unlabored breathing, speaking in full sentences.   Psych: Normal affect, speech is clear and appropriate.Neatly groomed    RECENT LABS:   Recent Labs   Lab Test 01/03/23  1045 01/02/23  1402   WBC 12.6* 10.8    HGB 13.5 13.2   HCT 40.0 39.3    369     Recent Labs   Lab Test 01/03/23  1045 11/05/22  1030   ALT 15 19   AST 27 27     Recent Labs   Lab Test 02/27/24  0954 01/03/23  1045   CR 1.29* 0.76     TSH   Date Value Ref Range Status   01/02/2023 1.28 0.30 - 4.20 uIU/mL Final   08/04/2022 0.75 0.40 - 4.00 mU/L Final         ASSESSMENT/PLAN:    ICD-10-CM    1. Gastroesophageal reflux disease with esophagitis without hemorrhage  K21.00 ondansetron (ZOFRAN ODT) 4 MG ODT tab      2. Diarrhea due to malabsorption  K90.9     R19.7       3. Hiatal hernia  K44.9       4. Tobacco use disorder  F17.200       5. Nausea  R11.0 ondansetron (ZOFRAN ODT) 4 MG ODT tab           Apoorva Roberts is a 48 year old female who presents to GI clinic for a follow up.  The patient was seen for chronic diarrhea and GERD symptoms.  She underwent upper GI endoscopy, which showed chronic esophagitis and mild superficial gastritis.  There were multiple plaques in the middle third of esophagus suspicious for EoE, but EoE was not confirmed by histology.  Patient used to take omeprazole, but her insurance covers only 3-month supply of omeprazole.  Prior authorization was filled for rabeprazole.  I provided a short course of famotidine before bed and sucralfate before between meals.  Patient has been taking the medications faithfully and reported adequate management of her acid reflux.  At this time, she takes Aciphex only.  Her PA is active until October 2024.  Patient reported good control of diarrhea after she completed a course of cholestyramine.  Currently, she does not take any medications for diarrhea.  Has been having soft formed stools every day with rare bouts of loose stools.  Patient complains of a new symptom of morning nausea for a few weeks.  No emesis.  Cannot identify any preceding or contributing events.  Nausea is improved by food intake.  Discussed possible etiologies including side effect of opiates or muscle relaxers.  Will  provide a prescription for ondansetron to take 4 mg in the morning.  Patient can repeat the dose 3-4 hours later if necessary.  Educated not to take any of her medications on empty stomach, except Rabeprazole.  Do not skip meals.  If symptoms persist or if patient starts throwing up, may consider referral to upper GI endoscopy.  Patient knows to contact our office if any changes occur.  Patient verbalized understanding and appreciation of care provided. Stated that all of the questions were answered to her/his satisfaction.  Follow up in 1 year  This note was created with Dragon voice recognition software. Inadvertent minor typographic errors may occur in transcription. Feel free to contact the provider if you have any questions.  I sincerely appreciate an opportunity to provide consultation for this pleasant patient.    COURTNEY Hanks  [facility],  Gastroenterology,  [place], [place]

## 2024-06-05 ENCOUNTER — VIRTUAL VISIT (OUTPATIENT)
Dept: GASTROENTEROLOGY | Facility: CLINIC | Age: 49
End: 2024-06-05
Attending: NURSE PRACTITIONER
Payer: COMMERCIAL

## 2024-06-05 DIAGNOSIS — F17.200 TOBACCO USE DISORDER: ICD-10-CM

## 2024-06-05 DIAGNOSIS — K21.00 GASTROESOPHAGEAL REFLUX DISEASE WITH ESOPHAGITIS WITHOUT HEMORRHAGE: Primary | ICD-10-CM

## 2024-06-05 DIAGNOSIS — K90.9 DIARRHEA DUE TO MALABSORPTION: ICD-10-CM

## 2024-06-05 DIAGNOSIS — R11.0 NAUSEA: ICD-10-CM

## 2024-06-05 DIAGNOSIS — K44.9 HIATAL HERNIA: ICD-10-CM

## 2024-06-05 DIAGNOSIS — R19.7 DIARRHEA DUE TO MALABSORPTION: ICD-10-CM

## 2024-06-05 PROCEDURE — 99214 OFFICE O/P EST MOD 30 MIN: CPT | Mod: 95 | Performed by: NURSE PRACTITIONER

## 2024-06-05 RX ORDER — ONDANSETRON 4 MG/1
4 TABLET, ORALLY DISINTEGRATING ORAL EVERY 8 HOURS PRN
Qty: 60 TABLET | Refills: 1 | Status: SHIPPED | OUTPATIENT
Start: 2024-06-05

## 2024-06-05 NOTE — PATIENT INSTRUCTIONS
"It was a pleasure taking care of you today.  I've included a brief summary of our discussion and care plan from today's visit below.  Please review this information with your primary care provider.  ______________________________________________________________________    My recommendations are summarized as follows:    Continue Rabeprazole at the current dose. Your prior authorization is good until October 2024. Then, a new one will be placed.     2. Try taking ondansetron (Zofran) first thing in the morning for nausea. Use additional tablet later if needed. If symptoms persist, or if you start throwing up, we may need to proceed with upper GI endoscopy. Try having a small snack upon awakening and do not take your pills (other than Rabeprazole) on empty stomach.    3. Use cholestyramine as needed for 3 or more loose stools or one watery stool a day.    Return to GI Clinic in one year to review your progress.    ______________________________________________________________________    Gastroesophageal reflux  Gastroesophageal reflux, also called \"acid reflux,\" occurs when the stomach contents back up into the esophagus and/or mouth. Occasional reflux is normal and can happen in healthy infants, children, and adults, most often after eating a large meal. Most episodes are brief and do not cause bothersome symptoms or complications.   By contrast, people with gastroesophageal reflux disease (GERD) experience bothersome symptoms or damage to the esophagus as a result of acid reflux. Symptoms of GERD can include heartburn, regurgitation, and difficulty or pain with swallowing.  The main cause of GERD is a transient relaxation or weakening of the lower esophageal sphincter (LES) which allows regurgitation of gastric acid and other gastric contents, including bile, back into the esophagus. The esophageal lining is susceptible to irritation by acid because it does not have the thick mucus protection of the stomach. Some " people with GERD do not experience heartburn but may have burning sensation in the mouth, a feeling that food is stuck at any level of the esophagus, or hoarseness in the morning.  There are a number of predisposing factors associated with GERD, including a hiatal hernia, cigarette smoking, alcohol use, being overweight or obese, and pregnancy. Foods such as citrus fruits, chocolate, caffeinated drinks, fried foods, garlic, onions, spicy foods, and tomato-based foods, such as chili, pizza, and spaghetti sauce, are associated with heartburn symptoms. Consumption of large high-fat meals requires prolonged gastric passage times and the increased stomach pressure may lead to movement of hydrochloric acid from the stomach into the esophagus. Additionally, lying prone after a meal promotes backflow of stomach contents and the development of symptoms.      Lifestyle modifications for gastroesophageal reflux disease (GERD).   1. Change your eating habits.  -- It's best to eat several small meals instead of two or three large meals.  -- After you eat, wait 2 to 3 hours before you lie down. Late-night snacks aren't a good idea.   -- Chocolate, mint, and alcohol can make GERD worse. They relax the valve between the esophagus and the stomach.  -- Spicy foods, foods that have a lot of acid (like tomatoes and oranges), and coffee can make GERD symptoms worse in some people. If your symptoms are worse after you eat a certain     2. Do not smoke or chew tobacco. Saliva helps to neutralize refluxed acid, and smoking reduces the amount of saliva in the mouth and throat. Smoking also lowers the pressure in the lower esophageal sphincter and provokes coughing, causing frequent episodes of acid reflux in the esophagus.     3. If you have GERD symptoms at night, raise the head of your bed 6 in. (15 cm) to 8 in. (20 cm) by putting the frame on blocks or placing a foam wedge under the head of your mattress. (Adding extra pillows does not  work.)  4. Avoid or reduce pressure on your stomach. Don't wear tight clothing around your middle.  5. Lose weight if you need to. Losing just 5 to 10 pounds can help.      ______________________________________________________________________    Who do I call with any questions after my visit?  Please be in touch if there are any further questions that arise following today's visit.  There are multiple ways to contact your gastroenterology care team.      During business hours, you may reach a Gastroenterology nurse at [phone], option 3.     To schedule or reschedule an appointment, please call [phone].   To schedule your imaging studies (CT, MRI, ultrasound)  call [phone] (or toll-free # [phone])  To schedule your lab work at [facility], please call [phone]    You can always send a secure message through [employer].  [employer] messages are answered by your nurse or doctor typically within 24 hours.  Please allow extra time on weekends and holidays.      For urgent/emergent questions after business hours, you may reach the on-call GI Fellow by contacting the [facility]  at [phone].    In order for your refill to be processed in a timely fashion, it is your responsibility to ensure you follow the recommendations from your provider regarding your laboratory studies and follow up appointments.       How will I get the results of any tests ordered?    You will receive all of your results.  If you have signed up for [employer], any tests ordered at your visit will be available to you after your physician reviews them.  Typically this takes 1-2 weeks.  If there are urgent results that require a change in your care plan, your physician or nurse will call you to discuss the next steps.   What is [employer]?  [employer] is a secure way for you to access all of your healthcare records from the [facility].  It is a web based computer program, so you  can sign on to it from any location.  It also allows you to send secure messages to your care team.  I recommend signing up for [facility] [employer] access if you have not already done so and are comfortable with using a computer.    How to I schedule a follow-up visit?  If you did not schedule a follow-up visit today, please call [phone] to schedule a follow-up office visit.      Sincerely,  COURTNEY Hanks,  [facility],  Division of Gastroenterology   ([facility])

## 2024-06-07 ENCOUNTER — HOSPITAL ENCOUNTER (OUTPATIENT)
Dept: MRI IMAGING | Facility: CLINIC | Age: 49
Discharge: HOME | End: 2024-06-07
Attending: PEDIATRICS | Admitting: PEDIATRICS
Payer: COMMERCIAL

## 2024-06-07 DIAGNOSIS — M25.511 ACUTE PAIN OF RIGHT SHOULDER: ICD-10-CM

## 2024-06-07 PROCEDURE — 73221 MRI JOINT UPR EXTREM W/O DYE: CPT | Mod: 26 | Performed by: RADIOLOGY

## 2024-06-07 PROCEDURE — 73221 MRI JOINT UPR EXTREM W/O DYE: CPT | Mod: RT

## 2024-06-10 ENCOUNTER — E-VISIT (OUTPATIENT)
Dept: FAMILY MEDICINE | Facility: CLINIC | Age: 49
End: 2024-06-10
Payer: COMMERCIAL

## 2024-06-10 DIAGNOSIS — M25.511 ACUTE PAIN OF RIGHT SHOULDER: ICD-10-CM

## 2024-06-10 PROCEDURE — 99207 PR NON-BILLABLE SERV PER CHARTING: CPT | Performed by: NURSE PRACTITIONER

## 2024-06-10 RX ORDER — OXYCODONE HYDROCHLORIDE 5 MG/1
5 TABLET ORAL EVERY 6 HOURS PRN
Qty: 28 TABLET | Refills: 0 | Status: SHIPPED | OUTPATIENT
Start: 2024-06-10 | End: 2024-06-24

## 2024-06-13 NOTE — RESULT ENCOUNTER NOTE
Reviewed and notified of results via [employer].  Zoltan Guerin,  Please see the results of your MRI. We will review this in more detail at your follow-up appointment. Let us know if you have questions.    Follow up with Primary care given the call of prominent red marrow.    Chloe Harris MD, CAQ  Primary Care Sports Medicine  [facility]

## 2024-06-24 ENCOUNTER — MYC REFILL (OUTPATIENT)
Dept: FAMILY MEDICINE | Facility: CLINIC | Age: 49
End: 2024-06-24
Payer: COMMERCIAL

## 2024-06-24 DIAGNOSIS — M25.511 ACUTE PAIN OF RIGHT SHOULDER: ICD-10-CM

## 2024-06-24 RX ORDER — OXYCODONE HYDROCHLORIDE 5 MG/1
5 TABLET ORAL EVERY 6 HOURS PRN
Qty: 28 TABLET | Refills: 0 | Status: SHIPPED | OUTPATIENT
Start: 2024-06-24 | End: 2024-07-03

## 2024-07-03 ENCOUNTER — MYC REFILL (OUTPATIENT)
Dept: FAMILY MEDICINE | Facility: CLINIC | Age: 49
End: 2024-07-03

## 2024-07-03 ENCOUNTER — OFFICE VISIT (OUTPATIENT)
Dept: ORTHOPEDICS | Facility: CLINIC | Age: 49
End: 2024-07-03
Payer: COMMERCIAL

## 2024-07-03 DIAGNOSIS — M25.511 ACUTE PAIN OF RIGHT SHOULDER: ICD-10-CM

## 2024-07-03 DIAGNOSIS — M19.011 ARTHRITIS OF RIGHT ACROMIOCLAVICULAR JOINT: ICD-10-CM

## 2024-07-03 DIAGNOSIS — R51.9 OCCIPITAL HEADACHE: ICD-10-CM

## 2024-07-03 DIAGNOSIS — M75.111 INCOMPLETE TEAR OF RIGHT ROTATOR CUFF, UNSPECIFIED WHETHER TRAUMATIC: ICD-10-CM

## 2024-07-03 DIAGNOSIS — M25.511 ACUTE PAIN OF RIGHT SHOULDER: Primary | ICD-10-CM

## 2024-07-03 PROCEDURE — 99213 OFFICE O/P EST LOW 20 MIN: CPT | Mod: 25 | Performed by: PEDIATRICS

## 2024-07-03 PROCEDURE — 20610 DRAIN/INJ JOINT/BURSA W/O US: CPT | Mod: RT | Performed by: PEDIATRICS

## 2024-07-03 RX ORDER — LIDOCAINE HYDROCHLORIDE 10 MG/ML
2 INJECTION, SOLUTION INFILTRATION; PERINEURAL
Status: SHIPPED | OUTPATIENT
Start: 2024-07-03

## 2024-07-03 RX ORDER — TRIAMCINOLONE ACETONIDE 40 MG/ML
40 INJECTION, SUSPENSION INTRA-ARTICULAR; INTRAMUSCULAR
Status: SHIPPED | OUTPATIENT
Start: 2024-07-03

## 2024-07-03 RX ADMIN — TRIAMCINOLONE ACETONIDE 40 MG: 40 INJECTION, SUSPENSION INTRA-ARTICULAR; INTRAMUSCULAR at 10:56

## 2024-07-03 RX ADMIN — LIDOCAINE HYDROCHLORIDE 2 ML: 10 INJECTION, SOLUTION INFILTRATION; PERINEURAL at 10:56

## 2024-07-03 NOTE — LETTER
7/3/2024      Apoorva Roberts  [address] [address]      Dear Colleague,    Thank you for referring your patient, Apoorva Roberts, to the [facility] [place]. Please see a copy of my visit note below.    ASSESSMENT & PLAN    Apoorva was seen today for pain, follow up and mri transcription.    Diagnoses and all orders for this visit:    Acute pain of right shoulder  -     Physical Therapy  Referral; Future    Incomplete tear of right rotator cuff, unspecified whether traumatic  -     Physical Therapy  Referral; Future    Arthritis of right acromioclavicular joint  -     Physical Therapy  Referral; Future    Other orders  -     Large Joint Injection/Arthocentesis: R subacromial bursa      This issue is chronic and Unchanged.      ICD-10-CM    1. Acute pain of right shoulder  M25.511 Physical Therapy  Referral      2. Incomplete tear of right rotator cuff, unspecified whether traumatic  M75.111 Physical Therapy  Referral      3. Arthritis of right acromioclavicular joint  M19.011 Physical Therapy  Referral        Patient Instructions   We discussed the following treatment options: symptom treatment, activity modification/rest, imaging, rehab and referral. Following discussion, plan: will refer to physical therapy and trial injection today.    Plan:  - Today's Plan of Care:  Rehab: [facility] - [phone]  Steroid injection of the right shoulder: subacromial space was performed today in clinic  Icing for the next 1-2 days may be helpful for pain. Injection may take 10-14 days to see the full effect.    Discussed activity considerations and other supportive care including Ice/Heat, OTC and other topical medications as needed.    -We also discussed other future treatment options:  Referral to Orthopedic Surgery    Follow Up: 6 - 8 weeks  - follow up with PCP given bone marrow signal, likely related to  "smoking    Concerning signs and symptoms were reviewed and all questions were answered at this time.    Chloe Harris MD [facility]  Sports Medicine Physician  [facility]      SUBJECTIVE- Interim History July 3, 2024    Chief Complaint   Patient presents with     Right Shoulder - Pain, Follow Up, MRI Transcription       Apoorva Roberts is a 48 year old female who is seen in f/u up for    Acute pain of right shoulder  Incomplete tear of right rotator cuff, unspecified whether traumatic  Arthritis of right acromioclavicular joint.  Since last visit on 5/22/24, patient has been feeling the same.  Depending on what she is going; the more she using the shoulder, the worse. Nights are still bad. Here to review MRI.    -Onset: 3.5 month(s) ago. Patient describes injury as carrying a box, the box fell, she caught it with her right arm and felt \"something\" tear/pull, it was hot after that.  Location of Pain: right shoulder, upper trapezius, can radiate up the cervical, can go down the spine, radiates also to the left side of the cervical spine, nothing down the arm.  Worsened by: abduction, any movement above shoulder height, flexion, extension, internal rotation, overhead movements, Soreness with neck movements.   Better with: oxycodone,   Treatments tried: ice, Tylenol, ibuprofen, other medications: Oxycodone, and previous imaging (xray of the neck and the right shoulder, 4/22/24, MRI 6/7/24)  Associated symptoms: swelling, weakness of right shoulder, decreased/limited ROM, feeling of instability, Hot stabbing pains, numbness on the superior of the shoulder      Orthopedic/Surgical history: NO  Social History/Occupation: working at a  shop, cleaning, desk work          REVIEW OF SYSTEMS:  Review of Systems    OBJECTIVE:  There were no vitals taken for this visit.   General: healthy, alert and in no distress  Skin: no suspicious lesions or rash.  CV: distal perfusion intact   Resp: normal respiratory " effort without conversational dyspnea   Psych: normal mood and affect  Gait: NORMAL  Neuro: Normal light sensory exam of upper extremity     Bilateral Shoulder exam  Inspection and Posture:       rounded shoulders and upper back     Skin:        no visible deformities     Tender:        subacromial space right     Non Tender:       remainder of shoulder bilateral     ROM:        forward flexion 160  right       abduction 160 right       internal rotation gluteal region right       external rotation 35 right       asymmetric scapular motion     Painful motions:       flexion right       elevation right     Strength:        abduction 4/5 right       flexion 4/5 right       internal rotation 5/5 right       external rotation 5/5 right     Impingement testing:       positive (+) Neer right       positive (+) Treadwell right       positive (+) crossover right     Sensation:        normal sensation over shoulder and upper extremity        RADIOLOGY:  Final results and radiologist's interpretation, available in the [facility] health record.  Images were reviewed with the patient in the office today.  My personal interpretation of the performed imaging:    MR Right Shoulder 6/7/2024 -moderate AC joint degenerative changes, low-grade bursal sided rotator cuff tear    Results for orders placed or performed during the hospital encounter of 06/07/24   MR Shoulder Right w/o Contrast    Narrative    EXAM: MR right shoulder without  contrast 6/7/2024 4:10 PM    TECHNIQUE: Multiplanar, multisequence imaging of the right shoulder  were obtained without administration of intravenous or intra-articular  gadolinium contrast using routine protocol.    History: eval RC tear; Acute pain of right shoulder    Comparison: 4/22/2004    Findings:    ROTATOR CUFF and ASSOCIATED STRUCTURES  Rotator cuff: Supraspinatus, infraspinatus and subscapularis  tendinosis. Very low-grade bursal sided tear  supraspinatus/infraspinous junctional fibers near the  footprint. Teres  minor tendon is intact.    Bursa: No subacromial or subdeltoid bursal fluid.    Musculature: Muscle bulk of rotator cuff is preserved.  Deltoid muscle  bulk is also preserved.  No muscle edema.    Acromioclavicular joint  There are moderate degenerative changes of the acromioclavicular  joint. Acromion is type 1 in sagittal morphology.  Coracoacromial  ligament is not thickened.    OSSEOUS STRUCTURES  No fracture, marrow contusion or marrow infiltration. Diffuse mild  marrow signal alteration including humeral epiphysis, consistent with  red marrow conversion.    LONG BICIPITAL TENDON  The long head of the biceps tendon is normally situated within the  bicipital groove. No complete or partial biceps tendon tear is  present.    GLENOHUMERAL JOINT  Joint fluid: Physiologic amount of joint fluid is  present.    Cartilage and subarticular bone:  No focal hyaline cartilage defects  are noted. No Hill-Sachs, reverse Hill-Sachs, or bony Bankart lesions  are seen.    Labrum: Limited assessment on this study with relative lack of joint  distention shows apparent superior, posterosuperior labral tear.    ANCILLARY FINDINGS:      Impression    Impression:  1. Superior, posterosuperior labral tear.  2. Moderate acromioclavicular joint degenerative change.  3. Very low-grade bursal sided tear supraspinatus/infraspinous  junctional fibers near the footprint. No high-grade tear or muscle  atrophy.  4. Relatively prominent for age red marrow reconversion, nonspecific  but can be seen in the setting of anemia, smoking, obesity and other  etiologies.    [email or web address]         SYSTEM ID:  [id number]         Review of the result(s) of each unique test - MRI       Large Joint Injection/Arthocentesis: R subacromial bursa    Date/Time: 7/3/2024 10:56 AM    Performed by: Chloe Harris MD  Authorized by: Chloe Harris MD    Indications:  Pain  Needle Size:  25 G  Guidance: landmark guided    Approach:   Posterior  Location:  Shoulder      Site:  R subacromial bursa  Medications:  2 mL lidocaine 1 %; 40 mg triamcinolone 40 MG/ML  Outcome:  Tolerated well, no immediate complications  Procedure discussed: discussed risks, benefits, and alternatives    Consent Given by:  Patient  Timeout: timeout called immediately prior to procedure    Prep: patient was prepped and draped in usual sterile fashion     The risks, benefits and complications of steroid injection were discussed with the patient (including but not limited to: bleeding, infection, pain, scar, damage to adjacent structures, atrophy or necrosis of soft tissue, skin blanching, failure to relieve symptoms, worsening of symptoms, allergic reaction). After this discussion all questions were addressed and answered and the patient elected to proceed. The patient tolerated the procedure well without complications.  Also discussed that if diabetic, recommend close monitoring of blood sugars over the next week as cortisone injections can temporarily elevate blood sugars.        Again, thank you for allowing me to participate in the care of your patient.        Sincerely,        Chloe Harris MD

## 2024-07-03 NOTE — PROGRESS NOTES
ASSESSMENT & PLAN    Apoorva was seen today for pain, follow up and mri transcription.    Diagnoses and all orders for this visit:    Acute pain of right shoulder  -     Physical Therapy  Referral; Future    Incomplete tear of right rotator cuff, unspecified whether traumatic  -     Physical Therapy  Referral; Future    Arthritis of right acromioclavicular joint  -     Physical Therapy  Referral; Future    Other orders  -     Large Joint Injection/Arthocentesis: R subacromial bursa      This issue is chronic and Unchanged.      ICD-10-CM    1. Acute pain of right shoulder  M25.511 Physical Therapy  Referral      2. Incomplete tear of right rotator cuff, unspecified whether traumatic  M75.111 Physical Therapy  Referral      3. Arthritis of right acromioclavicular joint  M19.011 Physical Therapy  Referral        Patient Instructions   We discussed the following treatment options: symptom treatment, activity modification/rest, imaging, rehab and referral. Following discussion, plan: will refer to physical therapy and trial injection today.    Plan:  - Today's Plan of Care:  Rehab: [facility]: [facility] - [phone]  Steroid injection of the right shoulder: subacromial space was performed today in clinic  Icing for the next 1-2 days may be helpful for pain. Injection may take 10-14 days to see the full effect.    Discussed activity considerations and other supportive care including Ice/Heat, OTC and other topical medications as needed.    -We also discussed other future treatment options:  Referral to Orthopedic Surgery    Follow Up: 6 - 8 weeks  - follow up with PCP given bone marrow signal, likely related to smoking    Concerning signs and symptoms were reviewed and all questions were answered at this time.    Chloe Harris MD [facility]  Sports Medicine Physician  [facility]      SUBJECTIVE- Interim History July 3, 2024    Chief  "Complaint   Patient presents with    Right Shoulder - Pain, Follow Up, MRI Transcription       Apoorva Roberts is a 48 year old female who is seen in f/u up for    Acute pain of right shoulder  Incomplete tear of right rotator cuff, unspecified whether traumatic  Arthritis of right acromioclavicular joint.  Since last visit on 5/22/24, patient has been feeling the same.  Depending on what she is going; the more she using the shoulder, the worse. Nights are still bad. Here to review MRI.    -Onset: 3.5 month(s) ago. Patient describes injury as carrying a box, the box fell, she caught it with her right arm and felt \"something\" tear/pull, it was hot after that.  Location of Pain: right shoulder, upper trapezius, can radiate up the cervical, can go down the spine, radiates also to the left side of the cervical spine, nothing down the arm.  Worsened by: abduction, any movement above shoulder height, flexion, extension, internal rotation, overhead movements, Soreness with neck movements.   Better with: oxycodone,   Treatments tried: ice, Tylenol, ibuprofen, other medications: Oxycodone, and previous imaging (xray of the neck and the right shoulder, 4/22/24, MRI 6/7/24)  Associated symptoms: swelling, weakness of right shoulder, decreased/limited ROM, feeling of instability, Hot stabbing pains, numbness on the superior of the shoulder      Orthopedic/Surgical history: NO  Social History/Occupation: working at a  shop, cleaning, desk work          REVIEW OF SYSTEMS:  Review of Systems    OBJECTIVE:  There were no vitals taken for this visit.   General: healthy, alert and in no distress  Skin: no suspicious lesions or rash.  CV: distal perfusion intact   Resp: normal respiratory effort without conversational dyspnea   Psych: normal mood and affect  Gait: NORMAL  Neuro: Normal light sensory exam of upper extremity     Bilateral Shoulder exam  Inspection and Posture:       rounded shoulders and upper back     Skin:       "  no visible deformities     Tender:        subacromial space right     Non Tender:       remainder of shoulder bilateral     ROM:        forward flexion 160  right       abduction 160 right       internal rotation gluteal region right       external rotation 35 right       asymmetric scapular motion     Painful motions:       flexion right       elevation right     Strength:        abduction 4/5 right       flexion 4/5 right       internal rotation 5/5 right       external rotation 5/5 right     Impingement testing:       positive (+) Neer right       positive (+) Treadwell right       positive (+) crossover right     Sensation:        normal sensation over shoulder and upper extremity        RADIOLOGY:  Final results and radiologist's interpretation, available in the [facility] health record.  Images were reviewed with the patient in the office today.  My personal interpretation of the performed imaging:    MR Right Shoulder 6/7/2024 -moderate AC joint degenerative changes, low-grade bursal sided rotator cuff tear    Results for orders placed or performed during the hospital encounter of 06/07/24   MR Shoulder Right w/o Contrast    Narrative    EXAM: MR right shoulder without  contrast 6/7/2024 4:10 PM    TECHNIQUE: Multiplanar, multisequence imaging of the right shoulder  were obtained without administration of intravenous or intra-articular  gadolinium contrast using routine protocol.    History: eval RC tear; Acute pain of right shoulder    Comparison: 4/22/2004    Findings:    ROTATOR CUFF and ASSOCIATED STRUCTURES  Rotator cuff: Supraspinatus, infraspinatus and subscapularis  tendinosis. Very low-grade bursal sided tear  supraspinatus/infraspinous junctional fibers near the footprint. Teres  minor tendon is intact.    Bursa: No subacromial or subdeltoid bursal fluid.    Musculature: Muscle bulk of rotator cuff is preserved.  Deltoid muscle  bulk is also preserved.  No muscle edema.    Acromioclavicular joint  There  are moderate degenerative changes of the acromioclavicular  joint. Acromion is type 1 in sagittal morphology.  Coracoacromial  ligament is not thickened.    OSSEOUS STRUCTURES  No fracture, marrow contusion or marrow infiltration. Diffuse mild  marrow signal alteration including humeral epiphysis, consistent with  red marrow conversion.    LONG BICIPITAL TENDON  The long head of the biceps tendon is normally situated within the  bicipital groove. No complete or partial biceps tendon tear is  present.    GLENOHUMERAL JOINT  Joint fluid: Physiologic amount of joint fluid is  present.    Cartilage and subarticular bone:  No focal hyaline cartilage defects  are noted. No Hill-Sachs, reverse Hill-Sachs, or bony Bankart lesions  are seen.    Labrum: Limited assessment on this study with relative lack of joint  distention shows apparent superior, posterosuperior labral tear.    ANCILLARY FINDINGS:      Impression    Impression:  1. Superior, posterosuperior labral tear.  2. Moderate acromioclavicular joint degenerative change.  3. Very low-grade bursal sided tear supraspinatus/infraspinous  junctional fibers near the footprint. No high-grade tear or muscle  atrophy.  4. Relatively prominent for age red marrow reconversion, nonspecific  but can be seen in the setting of anemia, smoking, obesity and other  etiologies.    [employer]         SYSTEM ID:  [id number]         Review of the result(s) of each unique test - MRI       Large Joint Injection/Arthocentesis: R subacromial bursa    Date/Time: 7/3/2024 10:56 AM    Performed by: Chloe Harris MD  Authorized by: Chloe Harris MD    Indications:  Pain  Needle Size:  25 G  Guidance: landmark guided    Approach:  Posterior  Location:  Shoulder      Site:  R subacromial bursa  Medications:  2 mL lidocaine 1 %; 40 mg triamcinolone 40 MG/ML  Outcome:  Tolerated well, no immediate complications  Procedure discussed: discussed risks, benefits, and alternatives    Consent  Given by:  Patient  Timeout: timeout called immediately prior to procedure    Prep: patient was prepped and draped in usual sterile fashion     The risks, benefits and complications of steroid injection were discussed with the patient (including but not limited to: bleeding, infection, pain, scar, damage to adjacent structures, atrophy or necrosis of soft tissue, skin blanching, failure to relieve symptoms, worsening of symptoms, allergic reaction). After this discussion all questions were addressed and answered and the patient elected to proceed. The patient tolerated the procedure well without complications.  Also discussed that if diabetic, recommend close monitoring of blood sugars over the next week as cortisone injections can temporarily elevate blood sugars.

## 2024-07-03 NOTE — PATIENT INSTRUCTIONS
We discussed the following treatment options: symptom treatment, activity modification/rest, imaging, rehab and referral. Following discussion, plan: will refer to physical therapy and trial injection today.    Plan:  - Today's Plan of Care:  Rehab: Physical Therapy: [facility] - [phone]  Steroid injection of the right shoulder: subacromial space was performed today in clinic  Icing for the next 1-2 days may be helpful for pain. Injection may take 10-14 days to see the full effect.    Discussed activity considerations and other supportive care including Ice/Heat, OTC and other topical medications as needed.    -We also discussed other future treatment options:  Referral to Orthopedic Surgery    Follow Up: 6 - 8 weeks  - follow up with PCP given bone marrow signal, likely related to smoking    If you have any further questions for your physician or physician s care team you can call [phone].     After the Injection     After the injection, strenuous and repetitive activity should be minimized for approximately 48 hours.   Ice should be applied to the injected area at least for the next 48 hours.   Apply ice to the injected area at least 3 - 4 times a day for 20 minutes each time for the next 48 hours. This can reduce the painful  flare  reaction that can follow an injection the next day. This reaction can cause the area that was injected to hurt more the next day just from the injection. This will resolve within a day if it does occur.     Use over-the-counter pain medications such as Tylenol to help with the pain if necessary.     After 48 hours, icing the area may be continued if you find it beneficial.     The lidocaine or marcaine (commonly called Novocain) is an anesthetic agent that is injected with the steroid will typically relieve your pain for a few hours following the injection. If the  Novocain  and steroid are injected near a nerve, you may experience local numbness or weakness from  the nerve block until it wears off. After this wears off your pain may return until the steroid takes effect.   The steroid may be effective immediately after the injection. Do not be concerned if the injection is not effective in relieving your symptoms immediately. In some cases, it may take up to two weeks for the steroid to work.   If you are diabetic, the corticosteroid may cause your blood sugar to become elevated for several days following the injection. This response usually lasts about 2-4 days before it returns to your normal level.   You should report any adverse reaction to you doctor. Call if there are any questions.

## 2024-07-05 RX ORDER — BUTALBITAL, ACETAMINOPHEN AND CAFFEINE 50; 325; 40 MG/1; MG/1; MG/1
TABLET ORAL
Qty: 20 TABLET | Refills: 1 | Status: SHIPPED | OUTPATIENT
Start: 2024-07-05 | End: 2024-09-05

## 2024-07-05 RX ORDER — OXYCODONE HYDROCHLORIDE 5 MG/1
5 TABLET ORAL EVERY 6 HOURS PRN
Qty: 28 TABLET | Refills: 0 | Status: SHIPPED | OUTPATIENT
Start: 2024-07-05 | End: 2024-09-30

## 2024-07-11 DIAGNOSIS — G62.89 OTHER POLYNEUROPATHY: ICD-10-CM

## 2024-07-11 RX ORDER — GABAPENTIN 400 MG/1
CAPSULE ORAL
Qty: 270 CAPSULE | Refills: 5 | Status: SHIPPED | OUTPATIENT
Start: 2024-07-11

## 2024-07-19 ENCOUNTER — MYC MEDICAL ADVICE (OUTPATIENT)
Dept: GASTROENTEROLOGY | Facility: CLINIC | Age: 49
End: 2024-07-19
Payer: COMMERCIAL

## 2024-07-19 DIAGNOSIS — K21.00 GASTROESOPHAGEAL REFLUX DISEASE WITH ESOPHAGITIS WITHOUT HEMORRHAGE: ICD-10-CM

## 2024-07-19 DIAGNOSIS — K44.9 HIATAL HERNIA: Primary | ICD-10-CM

## 2024-07-19 DIAGNOSIS — Z87.11 HISTORY OF PEPTIC ULCER: ICD-10-CM

## 2024-08-06 ENCOUNTER — MYC REFILL (OUTPATIENT)
Dept: FAMILY MEDICINE | Facility: CLINIC | Age: 49
End: 2024-08-06
Payer: COMMERCIAL

## 2024-08-06 DIAGNOSIS — G62.89 OTHER POLYNEUROPATHY: ICD-10-CM

## 2024-08-06 RX ORDER — ZOLPIDEM TARTRATE 10 MG/1
TABLET ORAL
Qty: 30 TABLET | Refills: 5 | Status: SHIPPED | OUTPATIENT
Start: 2024-08-06

## 2024-08-06 NOTE — TELEPHONE ENCOUNTER
Requested Prescriptions   Pending Prescriptions Disp Refills    zolpidem (AMBIEN) 10 MG tablet 30 tablet 5     Sig: TAKE 1/2 TO 1 TABLET(5 TO 10 MG) BY MOUTH EVERY NIGHT AS NEEDED FOR SLEEP       There is no refill protocol information for this order        Routing refill request to provider for review/approval because:  Drug not on the [facility] refill protocol     Paulina Love, RN on 8/6/2024 at 12:06 PM

## 2024-09-04 DIAGNOSIS — R51.9 OCCIPITAL HEADACHE: ICD-10-CM

## 2024-09-05 RX ORDER — BUTALBITAL, ACETAMINOPHEN AND CAFFEINE 50; 325; 40 MG/1; MG/1; MG/1
TABLET ORAL
Qty: 20 TABLET | Refills: 3 | Status: SHIPPED | OUTPATIENT
Start: 2024-09-05

## 2024-09-11 NOTE — TELEPHONE ENCOUNTER
FYI to provider - Patient is lost to pap tracking follow-up. Attempts to contact pt have been made per reminder process and there has been no reply and/or no appt scheduled. Contact hx listed below.     8/27/21 NIL Pap, + HR HPV (neg 16/18). Plan cotest in 1 year.   8/4/22 LSIL, +HR HPV (not 16/18). Plan: colposcopy due before 11/4/22 2/14/23 Lost to follow-up for pap tracking   2/27/24 NIL pap, +HR HPV (not 16/18). Plan: colp due before 5/27/24  3/5/24 left message / mychart sent / mychart read  4/29/24 Reminder mychart  5/28/24 [place] not done. Tracking updated for 6 mo colp/pap due 8/27/24.    8/6/24 Reminder mychart -- read  9/11/24 Lost to follow-up for pap tracking     Mckenzie Dominguez RN BSN, Pap Tracking

## 2024-09-18 ENCOUNTER — HOSPITAL ENCOUNTER (EMERGENCY)
Facility: CLINIC | Age: 49
Discharge: HOME | End: 2024-09-18
Attending: EMERGENCY MEDICINE | Admitting: EMERGENCY MEDICINE
Payer: COMMERCIAL

## 2024-09-18 VITALS
TEMPERATURE: 98.1 F | SYSTOLIC BLOOD PRESSURE: 111 MMHG | OXYGEN SATURATION: 99 % | RESPIRATION RATE: 18 BRPM | WEIGHT: 190 LBS | HEART RATE: 92 BPM | HEIGHT: 62 IN | BODY MASS INDEX: 34.96 KG/M2 | DIASTOLIC BLOOD PRESSURE: 78 MMHG

## 2024-09-18 DIAGNOSIS — M25.511 ACUTE PAIN OF RIGHT SHOULDER: ICD-10-CM

## 2024-09-18 PROCEDURE — 99283 EMERGENCY DEPT VISIT LOW MDM: CPT | Performed by: EMERGENCY MEDICINE

## 2024-09-18 ASSESSMENT — COLUMBIA-SUICIDE SEVERITY RATING SCALE - C-SSRS
6. HAVE YOU EVER DONE ANYTHING, STARTED TO DO ANYTHING, OR PREPARED TO DO ANYTHING TO END YOUR LIFE?: NO
1. IN THE PAST MONTH, HAVE YOU WISHED YOU WERE DEAD OR WISHED YOU COULD GO TO SLEEP AND NOT WAKE UP?: NO
2. HAVE YOU ACTUALLY HAD ANY THOUGHTS OF KILLING YOURSELF IN THE PAST MONTH?: NO

## 2024-09-18 ASSESSMENT — ACTIVITIES OF DAILY LIVING (ADL): ADLS_ACUITY_SCORE: 33

## 2024-09-18 NOTE — ED NOTES
Pt sitting on cot, appears to be in acute pain.  R shoulder is much lower then L at rest.  Pt reports injury in April of this year, didn't go to PCP until July: MRI shows 2 tears in rotator cuff..  Pt state she thought it was getting better, but she is compensation and R side shoulder and back are painful and tight, and on Saturday pt stated having severe pain and burning in her R scapular area, shooting down her back.  Pt with good grasp,denies numbness or tingling.  Pt able to adduct and abduct, but reports increased pain in her scapular area.  No pain in shoulder joint.  Pt has tried many over the counter options, ice and heat and message.  Last dose of Tylenol and Ibuprofen was at 1430 today.     PLAN:  Will await MD assessment and orders.

## 2024-09-18 NOTE — ED PROVIDER NOTES
History     Chief Complaint   Patient presents with    Shoulder Pain     HPI  Apoorva Roberts is a 48 year old female with history of chronic headaches, hypertension, rotator cuff injury, labral tear, with several days of right shoulder pain/back pain. No new injury reported. No strain. Onset not acute. Pain in back of shoulder medially. No neck pain. Pain does not radiate down into her arm. Started PT but not able to complete. No numbness or weakness. Has been using ibuprofen, tylenol, ice and head w/out relief.     The patient's PMHx, Surgical Hx, Allergies, and Medications were all reviewed with the patient.    Allergies:  Allergies   Allergen Reactions    Indomethacin      Other reaction(s): GI Upset    Fluoxetine      Other reaction(s): Thrush    Paroxetine      Other reaction(s): Thrush       Problem List:    Patient Active Problem List    Diagnosis Date Noted    Hyperlipidemia LDL goal <130 02/27/2024     Priority: Medium    Chronic superficial gastritis without bleeding 02/27/2024     Priority: Medium    Hiatal hernia 02/27/2024     Priority: Medium    Diarrhea due to malabsorption 02/27/2024     Priority: Medium    History of peptic ulcer 02/27/2024     Priority: Medium    Occipital headache 03/30/2023     Priority: Medium    Vitamin B12 deficiency (non anemic) 01/03/2023     Priority: Medium    Restless leg syndrome 01/03/2023     Priority: Medium    Elevated C-reactive protein (CRP) 01/03/2023     Priority: Medium    PTSD (post-traumatic stress disorder) 08/04/2022     Priority: Medium    GEORGIANA (generalized anxiety disorder) 08/04/2022     Priority: Medium    Cervical high risk HPV (human papillomavirus) test positive 09/02/2021     Priority: Medium     8/27/21 NIL Pap, + HR HPV (neg 16/18). Plan cotest in 1 year.   8/4/22 LSIL, +HR HPV (not 16/18). Plan: colposcopy due before 11/4/22 2/14/23 Lost to follow-up for pap tracking   2/27/24 NIL pap, +HR HPV (not 16/18). Plan: colp due before 5/27/24  3/5/24  "left message / mychart sent / mychart read  4/29/24 Reminder mychart  5/28/24 [place] not done. Tracking updated for 6 mo colp/pap due 8/27/24.    8/6/24 Reminder mychart -- read  9/11/24 Lost to follow-up for pap tracking       Marijuana use, continuous 09/17/2020     Priority: Medium    Tobacco use disorder 09/17/2020     Priority: Medium    Migraine without aura and without status migrainosus, not intractable 09/17/2020     Priority: Medium    Chronic daily headache 11/06/2019     Priority: Medium     Improved on daily Tizanidine- has seen Neurology      Peripheral neuropathy 11/06/2019     Priority: Medium     After hernia surgery in 2014- was told the surgeon \"nicked\" a couple nerves on the left      Gastroesophageal reflux disease 12/05/2017     Priority: Medium    Benign essential hypertension 12/05/2017     Priority: Medium    Major depressive disorder, recurrent episode, moderate (H) 12/05/2017     Priority: Medium    Hidradenitis suppurativa 09/11/2017     Priority: Medium        Past Medical History:    Past Medical History:   Diagnosis Date    Cervical high risk HPV (human papillomavirus) test positive 08/27/2021    Community acquired pneumonia 12/05/2017    Depressive disorder     Gastroesophageal reflux disease     Hiatal hernia     Hypertension     Other chronic pain     Walking troubles        Past Surgical History:    Past Surgical History:   Procedure Laterality Date    ABDOMEN SURGERY      APPENDECTOMY      CHOLECYSTECTOMY      ESOPHAGOSCOPY, GASTROSCOPY, DUODENOSCOPY (EGD), COMBINED N/A 10/8/2021    Procedure: ESOPHAGOGASTRODUODENOSCOPY, WITH BIOPSY;  Surgeon: Slim Randle DO;  Location: [facility]    ESOPHAGOSCOPY, GASTROSCOPY, DUODENOSCOPY (EGD), COMBINED N/A 5/8/2023    Procedure: ESOPHAGOGASTRODUODENOSCOPY, WITH BIOPSY;  Surgeon: Clemente Strong MD;  Location: [facility]    GYN SURGERY      HERNIA REPAIR      HYSTERECTOMY, PAP STILL INDICATED      Cervix still present       Family " History:    Family History   Problem Relation Age of Onset    Diabetes Mother     Hypertension Mother     Hyperlipidemia Mother     Depression Mother     Substance Abuse Mother     Hypertension Father     Hyperlipidemia Father     Kidney Cancer Father     Other Cancer Father     Substance Abuse Father     Diabetes Maternal Grandmother     Depression Sister     Breast Cancer No family hx of        Social History:  Marital Status:   [2]  Social History     Tobacco Use    Smoking status: Every Day     Current packs/day: 1.00     Average packs/day: 1 pack/day for 30.0 years (30.0 ttl pk-yrs)     Types: Cigarettes     Passive exposure: Past    Smokeless tobacco: Never   Vaping Use    Vaping status: Never Used   Substance Use Topics    Alcohol use: Not Currently     Comment: 1 or 2 a month    Drug use: Yes     Types: Marijuana     Comment: cbd        Medications:    acetaminophen (TYLENOL) 325 MG tablet  albuterol (PROAIR HFA/PROVENTIL HFA/VENTOLIN HFA) 108 (90 Base) MCG/ACT inhaler  busPIRone HCl (BUSPAR) 30 MG tablet  butalbital-acetaminophen-caffeine (ESGIC) -40 MG tablet  cholestyramine (QUESTRAN) 4 GM/DOSE powder  clotrimazole (LOTRIMIN) 1 % external cream  cyanocobalamin (VITAMIN B-12) 1000 MCG tablet  gabapentin (NEURONTIN) 400 MG capsule  hydrochlorothiazide (HYDRODIURIL) 25 MG tablet  IBUPROFEN PO  ipratropium - albuterol 0.5 mg/2.5 mg/3 mL (DUONEB) 0.5-2.5 (3) MG/3ML neb solution  lisinopril (ZESTRIL) 10 MG tablet  Loratadine-Pseudoephedrine (CLARITIN-D 24 HOUR PO)  methocarbamol (ROBAXIN) 750 MG tablet  methylPREDNISolone (MEDROL DOSEPAK) 4 MG tablet therapy pack  ondansetron (ZOFRAN ODT) 4 MG ODT tab  oxyCODONE (ROXICODONE) 5 MG tablet  RABEprazole (ACIPHEX) 20 MG EC tablet  rizatriptan (MAXALT) 10 MG tablet  rosuvastatin (CRESTOR) 20 MG tablet  sertraline (ZOLOFT) 25 MG tablet  sertraline (ZOLOFT) 50 MG tablet  zolpidem (AMBIEN) 10 MG tablet          Review of Systems  Pertinent positives and  "negatives mentioned in HPI    Physical Exam   BP: 111/78  Pulse: 92  Temp: 98.1  F (36.7  C)  Resp: 18  Height: 157.5 cm (5' 2\")  Weight: 86.2 kg (190 lb)  SpO2: 99 %    GEN: Appears distressed 2/2 pain. Non toxic appearance.   NECK: Symmetric, freely mobile. No midline spinal tenderness.   CV : Extremities warm and well perfused.  PULM: Normal effort. Speaking in full sentences.  NEURO: symmetric strength and sensation in upper extremities. Steady gait.   EXT: No gross deformity. No anterior or lateral shoulder tenderness. Tenderness of medial scapula. Weakness with lift off from low back. No significant discomfort with cross arm or empty can.   INT: Warm. No diaphoresis. Normal color.     ED Course        Procedures                 Critical Care time:  none               No results found for this or any previous visit (from the past 24 hour(s)).    Medications - No data to display    Assessments & Plan (with Medical Decision Making)   48 year old female with past medical history significant of labral tear, rotator cuff tear, with several days of pain in her right shoulder detailed above.  I suspicion is that there is a rotator cuff injury could be supraspinatus or infraspinatus.  I do not see winging of scapula.  There is no weakness in her arm.  Pain not worsened with neck movement.  Had seen sports medicine previous and I think this would be good for her to follow-up.  Continue OTC pain control and sling given for comfort.  We did discuss range of motion exercises to help prevent frozen shoulder.           I have reviewed the nursing notes.         New Prescriptions    No medications on file       Final diagnoses:   Acute pain of right shoulder     Mayank De MD        9/18/2024   [facility] EMERGENCY DEPT    Disclaimer: This note consists of words and symbols derived from keyboarding and dictation using voice recognition software.  As a result, there may be errors that have gone " undetected.  Please consider this when interpreting information found in this note.               Mayank De MD  09/20/24 3245

## 2024-09-18 NOTE — ED TRIAGE NOTES
Right upper scapula pain radiating down back started on Saturday     Triage Assessment (Adult)       Row Name 09/18/24 1717          Triage Assessment    Airway WDL WDL        Respiratory WDL    Respiratory WDL WDL

## 2024-09-19 NOTE — DISCHARGE INSTRUCTIONS
I suspect that your pain is caused by a rotator cuff injury.     Use arm sling for comfort and be sure to do mobility movements that we discussed. 600 mg of ibuprofen and 1000 mg of acetaminophen every 8 hours for pain control. May also use lidoderm patches for pain. These can be purchased at local pharmacy and follow instructions on packaging.     A referral for orthopedic follow up has been placed.

## 2024-09-25 ENCOUNTER — HOSPITAL ENCOUNTER (EMERGENCY)
Facility: CLINIC | Age: 49
Discharge: HOME | End: 2024-09-25
Payer: COMMERCIAL

## 2024-09-25 VITALS
HEART RATE: 88 BPM | SYSTOLIC BLOOD PRESSURE: 160 MMHG | RESPIRATION RATE: 18 BRPM | TEMPERATURE: 99.1 F | OXYGEN SATURATION: 99 % | BODY MASS INDEX: 34.75 KG/M2 | WEIGHT: 190 LBS | DIASTOLIC BLOOD PRESSURE: 100 MMHG

## 2024-09-25 DIAGNOSIS — L03.811 CELLULITIS OF HEAD EXCEPT FACE: ICD-10-CM

## 2024-09-25 PROCEDURE — 99283 EMERGENCY DEPT VISIT LOW MDM: CPT

## 2024-09-25 PROCEDURE — 250N000013 HC RX MED GY IP 250 OP 250 PS 637

## 2024-09-25 PROCEDURE — 99284 EMERGENCY DEPT VISIT MOD MDM: CPT

## 2024-09-25 RX ORDER — OXYCODONE HYDROCHLORIDE 5 MG/1
5 TABLET ORAL EVERY 6 HOURS PRN
Qty: 10 TABLET | Refills: 0 | Status: SHIPPED | OUTPATIENT
Start: 2024-09-25 | End: 2024-09-26

## 2024-09-25 RX ORDER — ACETAMINOPHEN 325 MG/1
975 TABLET ORAL ONCE
Status: COMPLETED | OUTPATIENT
Start: 2024-09-25 | End: 2024-09-25

## 2024-09-25 RX ORDER — CEPHALEXIN 500 MG/1
500 CAPSULE ORAL 4 TIMES DAILY
Qty: 40 CAPSULE | Refills: 0 | Status: SHIPPED | OUTPATIENT
Start: 2024-09-25 | End: 2024-10-05

## 2024-09-25 RX ORDER — CEPHALEXIN 500 MG/1
500 CAPSULE ORAL ONCE
Status: COMPLETED | OUTPATIENT
Start: 2024-09-25 | End: 2024-09-25

## 2024-09-25 RX ORDER — OXYCODONE HYDROCHLORIDE 5 MG/1
10 TABLET ORAL ONCE
Status: COMPLETED | OUTPATIENT
Start: 2024-09-25 | End: 2024-09-25

## 2024-09-25 RX ADMIN — OXYCODONE HYDROCHLORIDE 10 MG: 5 TABLET ORAL at 20:16

## 2024-09-25 RX ADMIN — ACETAMINOPHEN 975 MG: 325 TABLET ORAL at 20:15

## 2024-09-25 RX ADMIN — CEPHALEXIN 500 MG: 500 CAPSULE ORAL at 20:17

## 2024-09-25 ASSESSMENT — COLUMBIA-SUICIDE SEVERITY RATING SCALE - C-SSRS
1. IN THE PAST MONTH, HAVE YOU WISHED YOU WERE DEAD OR WISHED YOU COULD GO TO SLEEP AND NOT WAKE UP?: NO
6. HAVE YOU EVER DONE ANYTHING, STARTED TO DO ANYTHING, OR PREPARED TO DO ANYTHING TO END YOUR LIFE?: NO
2. HAVE YOU ACTUALLY HAD ANY THOUGHTS OF KILLING YOURSELF IN THE PAST MONTH?: NO

## 2024-09-25 ASSESSMENT — ACTIVITIES OF DAILY LIVING (ADL): ADLS_ACUITY_SCORE: 33

## 2024-09-25 NOTE — Clinical Note
Apoorva Roberts was seen and treated in our emergency department on 9/25/2024.  She may return to work on 09/28/2024.  Apoorva Roberts cannot return to work until Saturday 9/28/24 due to an acute illness.     If you have any questions or concerns, please don't hesitate to call.      Hyacinth Carlson PA-C

## 2024-09-25 NOTE — ED TRIAGE NOTES
Pt has had cyst in right ear for 6-8 months and over the last week became more red and painful and yesterday at work pt picked up the phone to answer it and when she put it to her ear she felt it pop and has significant pain ever since.      Triage Assessment (Adult)       Row Name 09/25/24 8332          Triage Assessment    Airway WDL WDL        Respiratory WDL    Respiratory WDL WDL        Skin Circulation/Temperature WDL    Skin Circulation/Temperature WDL X;temperature     Skin Temperature warm        Cardiac WDL    Cardiac WDL WDL        Peripheral/Neurovascular WDL    Peripheral Neurovascular WDL WDL        Cognitive/Neuro/Behavioral WDL    Cognitive/Neuro/Behavioral WDL WDL

## 2024-09-26 ENCOUNTER — E-VISIT (OUTPATIENT)
Dept: FAMILY MEDICINE | Facility: CLINIC | Age: 49
End: 2024-09-26
Payer: COMMERCIAL

## 2024-09-26 DIAGNOSIS — H60.10: ICD-10-CM

## 2024-09-26 DIAGNOSIS — H60.10: Primary | ICD-10-CM

## 2024-09-26 PROCEDURE — 99207 PR NO BILLABLE SERVICE THIS VISIT: CPT | Performed by: NURSE PRACTITIONER

## 2024-09-26 RX ORDER — OXYCODONE HYDROCHLORIDE 5 MG/1
5 TABLET ORAL EVERY 8 HOURS PRN
Qty: 20 TABLET | Refills: 0 | Status: SHIPPED | OUTPATIENT
Start: 2024-09-26

## 2024-09-26 NOTE — DISCHARGE INSTRUCTIONS
Take Keflex 4 times a day for a total of 10 days to treat the infection in your skin.  You received your first dose of the antibiotic tonight at 8:20 PM.    For pain I recommend first taking ibuprofen and Tylenol every 6 hours.  If you continue to have severe pain despite both ibuprofen and Tylenol, you can take 1 tablet of oxycodone every 6 hours.    Closely monitor your symptoms.  Antibiotics usually take about 24-48 hours to start working.  Return to the ER immediately if you develop severe facial swelling, difficulty opening your jaw, difficulty breathing, difficulty swallowing, severe headache, or if other concerning symptoms develop.  Return to the ER in 48 hours for reevaluation if you continue to have spreading redness, swelling, worsening pain.

## 2024-09-26 NOTE — PATIENT INSTRUCTIONS
Sorry to hear you have an infection. I have refilled your medication:  Orders Placed This Encounter   Medications     oxyCODONE (ROXICODONE) 5 MG tablet     Sig: Take 1 tablet (5 mg) by mouth every 8 hours as needed for severe pain.     Dispense:  20 tablet     Refill:  0        View your full visit summary for details by clicking on the link below. Your pharmacist will be able to address any questions you may have about the medication.      Thank you for choosing us for your care.

## 2024-09-26 NOTE — ED PROVIDER NOTES
History     Chief Complaint   Patient presents with    Wound Infection              Apoorva Roberts is a 48 year old female with significant pmhx of GERD, migraine headache, HLD, hidradenitis, suppurativa, depression, GEORGIANA, PTSD who presents for evaluation of cyst/infection.  Patient states she has had a hard lump/cyst in her right earlobe for the last 6-8 months.  For the most part the cyst has been nontender, no swelling, no drainage.  However, about 1 week ago she started noticing it was becoming firmer and tender to palpation.  She was at work yesterday morning she went to put the phone up to her ear when she felt a pop in the area of the cyst, but she states there was no drainage.  Since feeling this pop sensation, she has developed worsening swelling, pain, and redness of the ear.  She states she felt tactilely febrile this morning, but did not measure her temperature.  The pain extends below the ear and slightly into the TMJ area.  She denies any difficulty opening her jaw all the way.  She denies associated headache, vision changes, intraoral swelling, difficulty swallowing, difficulty breathing, neck rigidity, nausea/vomiting.  She does endorse some decreased appetite.  She does have a history of hidradenitis but states that she usually has issues in the groin area.    Allergies:  Allergies   Allergen Reactions    Indomethacin      Other reaction(s): GI Upset    Fluoxetine      Other reaction(s): Thrush    Paroxetine      Other reaction(s): Thrush       Problem List:    Patient Active Problem List    Diagnosis Date Noted    Hyperlipidemia LDL goal <130 02/27/2024     Priority: Medium    Chronic superficial gastritis without bleeding 02/27/2024     Priority: Medium    Hiatal hernia 02/27/2024     Priority: Medium    Diarrhea due to malabsorption 02/27/2024     Priority: Medium    History of peptic ulcer 02/27/2024     Priority: Medium    Occipital headache 03/30/2023     Priority: Medium    Vitamin B12  "deficiency (non anemic) 01/03/2023     Priority: Medium    Restless leg syndrome 01/03/2023     Priority: Medium    Elevated C-reactive protein (CRP) 01/03/2023     Priority: Medium    PTSD (post-traumatic stress disorder) 08/04/2022     Priority: Medium    GEORGIANA (generalized anxiety disorder) 08/04/2022     Priority: Medium    Cervical high risk HPV (human papillomavirus) test positive 09/02/2021     Priority: Medium     8/27/21 NIL Pap, + HR HPV (neg 16/18). Plan cotest in 1 year.   8/4/22 LSIL, +HR HPV (not 16/18). Plan: colposcopy due before 11/4/22 2/14/23 Lost to follow-up for pap tracking   2/27/24 NIL pap, +HR HPV (not 16/18). Plan: colp due before 5/27/24  3/5/24 left message / mychart sent / mychart read  4/29/24 Reminder mychart  5/28/24 [place] not done. Tracking updated for 6 mo colp/pap due 8/27/24.    8/6/24 Reminder mychart -- read  9/11/24 Lost to follow-up for pap tracking       Marijuana use, continuous 09/17/2020     Priority: Medium    Tobacco use disorder 09/17/2020     Priority: Medium    Migraine without aura and without status migrainosus, not intractable 09/17/2020     Priority: Medium    Chronic daily headache 11/06/2019     Priority: Medium     Improved on daily Tizanidine- has seen Neurology      Peripheral neuropathy 11/06/2019     Priority: Medium     After hernia surgery in 2014- was told the surgeon \"nicked\" a couple nerves on the left      Gastroesophageal reflux disease 12/05/2017     Priority: Medium    Benign essential hypertension 12/05/2017     Priority: Medium    Major depressive disorder, recurrent episode, moderate (H) 12/05/2017     Priority: Medium    Hidradenitis suppurativa 09/11/2017     Priority: Medium        Past Medical History:    Past Medical History:   Diagnosis Date    Cervical high risk HPV (human papillomavirus) test positive 08/27/2021    Community acquired pneumonia 12/05/2017    Depressive disorder     Gastroesophageal reflux disease     Hiatal hernia     " Hypertension     Other chronic pain     Walking troubles        Past Surgical History:    Past Surgical History:   Procedure Laterality Date    ABDOMEN SURGERY      APPENDECTOMY      CHOLECYSTECTOMY      ESOPHAGOSCOPY, GASTROSCOPY, DUODENOSCOPY (EGD), COMBINED N/A 10/8/2021    Procedure: ESOPHAGOGASTRODUODENOSCOPY, WITH BIOPSY;  Surgeon: Slim Randle DO;  Location: [facility]    ESOPHAGOSCOPY, GASTROSCOPY, DUODENOSCOPY (EGD), COMBINED N/A 5/8/2023    Procedure: ESOPHAGOGASTRODUODENOSCOPY, WITH BIOPSY;  Surgeon: Clemente Strong MD;  Location: [facility]    GYN SURGERY      HERNIA REPAIR      HYSTERECTOMY, PAP STILL INDICATED      Cervix still present       Family History:    Family History   Problem Relation Age of Onset    Diabetes Mother     Hypertension Mother     Hyperlipidemia Mother     Depression Mother     Substance Abuse Mother     Hypertension Father     Hyperlipidemia Father     Kidney Cancer Father     Other Cancer Father     Substance Abuse Father     Diabetes Maternal Grandmother     Depression Sister     Breast Cancer No family hx of        Social History:  Marital Status:  Single [1]  Social History     Tobacco Use    Smoking status: Every Day     Current packs/day: 1.00     Average packs/day: 1 pack/day for 30.0 years (30.0 ttl pk-yrs)     Types: Cigarettes     Passive exposure: Past    Smokeless tobacco: Never   Vaping Use    Vaping status: Never Used   Substance Use Topics    Alcohol use: Not Currently     Comment: 1 or 2 a month    Drug use: Yes     Types: Marijuana     Comment: cbd        Medications:    cephALEXin (KEFLEX) 500 MG capsule  oxyCODONE (ROXICODONE) 5 MG tablet  acetaminophen (TYLENOL) 325 MG tablet  albuterol (PROAIR HFA/PROVENTIL HFA/VENTOLIN HFA) 108 (90 Base) MCG/ACT inhaler  busPIRone HCl (BUSPAR) 30 MG tablet  butalbital-acetaminophen-caffeine (ESGIC) -40 MG tablet  cholestyramine (QUESTRAN) 4 GM/DOSE powder  clotrimazole (LOTRIMIN) 1 % external  cream  cyanocobalamin (VITAMIN B-12) 1000 MCG tablet  gabapentin (NEURONTIN) 400 MG capsule  hydrochlorothiazide (HYDRODIURIL) 25 MG tablet  IBUPROFEN PO  ipratropium - albuterol 0.5 mg/2.5 mg/3 mL (DUONEB) 0.5-2.5 (3) MG/3ML neb solution  lisinopril (ZESTRIL) 10 MG tablet  Loratadine-Pseudoephedrine (CLARITIN-D 24 HOUR PO)  methocarbamol (ROBAXIN) 750 MG tablet  methylPREDNISolone (MEDROL DOSEPAK) 4 MG tablet therapy pack  ondansetron (ZOFRAN ODT) 4 MG ODT tab  oxyCODONE (ROXICODONE) 5 MG tablet  RABEprazole (ACIPHEX) 20 MG EC tablet  rizatriptan (MAXALT) 10 MG tablet  rosuvastatin (CRESTOR) 20 MG tablet  sertraline (ZOLOFT) 25 MG tablet  sertraline (ZOLOFT) 50 MG tablet  zolpidem (AMBIEN) 10 MG tablet          Physical Exam   BP: (!) 160/100  Pulse: 88  Temp: 99.1  F (37.3  C)  Resp: 18  Weight: 86.2 kg (190 lb)  SpO2: 99 %      Physical Exam  Vitals and nursing note reviewed.   Constitutional:       General: She is not in acute distress.     Appearance: She is not toxic-appearing or diaphoretic.   HENT:      Head: Normocephalic and atraumatic.      Jaw: There is normal jaw occlusion. Tenderness (over R TMJ area) present. No trismus.      Right Ear: Tympanic membrane and ear canal normal. Swelling and tenderness present. No drainage.      Left Ear: Tympanic membrane and ear canal normal.      Ears:      Comments: Swelling and induration involving the R ear lob, primarily posterior. Mild surrounding erythema just posterior and inferior to the external ear. No mastoid tenderness or swelling.      Nose: Nose normal.      Mouth/Throat:      Mouth: Mucous membranes are moist.      Pharynx: Oropharynx is clear. No oropharyngeal exudate or posterior oropharyngeal erythema.   Eyes:      Extraocular Movements: Extraocular movements intact.      Conjunctiva/sclera: Conjunctivae normal.      Pupils: Pupils are equal, round, and reactive to light.   Cardiovascular:      Rate and Rhythm: Normal rate and regular rhythm.       Pulses: Normal pulses.   Pulmonary:      Effort: Pulmonary effort is normal.      Breath sounds: No stridor.   Musculoskeletal:         General: Normal range of motion.      Cervical back: Normal range of motion. No rigidity.   Neurological:      General: No focal deficit present.      Mental Status: She is alert and oriented to person, place, and time.   Psychiatric:         Mood and Affect: Mood normal.         Behavior: Behavior normal.         ED Course        Procedures                    No results found for this or any previous visit (from the past 24 hour(s)).    Medications   acetaminophen (TYLENOL) tablet 975 mg (975 mg Oral $Given 9/25/24 2015)   oxyCODONE (ROXICODONE) tablet 10 mg (10 mg Oral $Given 9/25/24 2016)   cephALEXin (KEFLEX) capsule 500 mg (500 mg Oral $Given 9/25/24 2017)       Assessments & Plan (with Medical Decision Making)     I have reviewed the nursing notes.    I have reviewed the findings, diagnosis, plan and need for follow up with the patient.    Medical Decision Making  Apoorva Roberts is a 48 year old female with significant pmhx of GERD, migraine headache, HLD, hidradenitis, suppurativa, depression, GEORG[place]NA, PTSD who presents for evaluation of cyst/infection.  Differential diagnoses include abscess, cellulitis, erysipelas, external otitis, mastoiditis, other deep space soft tissue infection.  Vital signs grossly unremarkable.  Patient is mildly hypertensive at 160/100 (patient denies headache, dizziness, blurry vision, chest pain).  She is afebrile, she is not tachycardic, and she is satting 99% on room air with a regular respiratory rate and effort.    On examination patient is uncomfortable appearing, but nontoxic, alert and oriented x 3.  She has mild swelling of the right earlobe with erythema and induration along the posterior aspect.  There is small amount of surrounding erythema inferior and posterior to the ear.  Mild tenderness just anterior to the ear.  The ear canal is  unremarkable without swelling, pain, drainage.  Eardrum negative for signs of infection.  No inflammation, pain, swelling of the auricle.  Left ear is unremarkable.  Patient has full range of motion with jaw opening, no trismus.  No intraoral swelling, erythema, lesions.  Neck with full range of motion, no rigidity.  Patient denies severe headache, vision changes, difficulty swallowing, difficulty breathing which would be concerning signs for other deep space soft tissue infection which would warrant further imaging or blood tests.  No palpable fluctuance or drainable abscess on exam.    Patient's examination is concerning for skin infection/cellulitis.  She was given a dose of Keflex here in the emergency department, as well as oxycodone and Tylenol for pain control.  Plan to complete a course of Keflex for presumed skin infection.  Patient was given very strict return precautions should she develop severe headache, severe swelling, difficulty breathing, difficulty swallowing, rapidly spreading redness, difficulty opening her jaw, or if her symptoms continue to worsen after 48 hours of antibiotic therapy.  Patient voiced understanding of the plan and had no further questions.    Discharge Medication List as of 9/25/2024  8:27 PM        START taking these medications    Details   cephALEXin (KEFLEX) 500 MG capsule Take 1 capsule (500 mg) by mouth 4 times daily for 10 days., Disp-40 capsule, R-0, E-Prescribe      !! oxyCODONE (ROXICODONE) 5 MG tablet Take 1 tablet (5 mg) by mouth every 6 hours as needed for severe pain., Disp-10 tablet, R-0, E-Prescribe       !! - Potential duplicate medications found. Please discuss with provider.          Final diagnoses:   Cellulitis of head except face       Hyacinth Carlson PA-C  6/8/2024   [facility] EMERGENCY DEPT     Hyacinth Carlson PA-C  09/25/24 2036

## 2024-09-27 ENCOUNTER — PATIENT OUTREACH (OUTPATIENT)
Dept: CARE COORDINATION | Facility: CLINIC | Age: 49
End: 2024-09-27
Payer: COMMERCIAL

## 2024-09-27 NOTE — LETTER
Apoorva Roberts  [address] [facility] [address] [address]    Dear Apoorva Roberts,      I am a team member within the Connected Care Resource Center with [facility]. I recently tried to reach you to ensure you were doing well following a recent visit within our health system. I also wanted to take this chance to introduce Clinic Care Coordination.     Below is a description of Clinic Care Coordination and how this team can further assist you:       The Clinic Care Coordination team is made up of a Registered Nurse, , Financial Resource Worker, and a Community Health Worker who understand and can help navigate the health care system. The goal of clinic care coordination is to help you manage your health, improve access to care, and achieve optimal health outcomes. They work alongside your provider to assist you in determining your health and social needs, obtain health care and community resources, and provide you with necessary information and education. Clinic Care Coordination can work with you through any barriers and develop a care plan that helps coordinate and strengthen the relationship between you and your care team.    If you wish to connect with the Clinic Care Coordination Team, please let your [facility] Primary Care Provider or Clinic Care Team know and they can place a referral. The Clinic Care Coordination team will then reach out by phone to further support you.    We are focused on providing you with the highest-quality healthcare experience possible.    Sincerely,   Brigitte CEBALLOS  Community Health Worker    Connected Care Resources   [facility]

## 2024-09-29 ENCOUNTER — APPOINTMENT (OUTPATIENT)
Dept: CT IMAGING | Facility: CLINIC | Age: 49
End: 2024-09-29
Attending: FAMILY MEDICINE
Payer: COMMERCIAL

## 2024-09-29 ENCOUNTER — HOSPITAL ENCOUNTER (EMERGENCY)
Facility: CLINIC | Age: 49
Discharge: HOME | End: 2024-09-29
Attending: FAMILY MEDICINE | Admitting: FAMILY MEDICINE
Payer: COMMERCIAL

## 2024-09-29 VITALS
DIASTOLIC BLOOD PRESSURE: 69 MMHG | HEART RATE: 110 BPM | TEMPERATURE: 98.5 F | BODY MASS INDEX: 35.88 KG/M2 | OXYGEN SATURATION: 96 % | RESPIRATION RATE: 22 BRPM | SYSTOLIC BLOOD PRESSURE: 123 MMHG | WEIGHT: 195 LBS | HEIGHT: 62 IN

## 2024-09-29 DIAGNOSIS — L03.211 FACIAL CELLULITIS: ICD-10-CM

## 2024-09-29 LAB
ANION GAP SERPL CALCULATED.3IONS-SCNC: 11 MMOL/L (ref 7–15)
BASOPHILS # BLD AUTO: 0.1 10E3/UL (ref 0–0.2)
BASOPHILS NFR BLD AUTO: 1 %
BUN SERPL-MCNC: 15 MG/DL (ref 6–20)
CALCIUM SERPL-MCNC: 9 MG/DL (ref 8.8–10.4)
CHLORIDE SERPL-SCNC: 102 MMOL/L (ref 98–107)
CREAT SERPL-MCNC: 1 MG/DL (ref 0.51–0.95)
CRP SERPL-MCNC: 10.89 MG/L
EGFRCR SERPLBLD CKD-EPI 2021: 70 ML/MIN/1.73M2
EOSINOPHIL # BLD AUTO: 0.1 10E3/UL (ref 0–0.7)
EOSINOPHIL NFR BLD AUTO: 1 %
ERYTHROCYTE [DISTWIDTH] IN BLOOD BY AUTOMATED COUNT: 13.5 % (ref 10–15)
GLUCOSE SERPL-MCNC: 108 MG/DL (ref 70–99)
HCO3 SERPL-SCNC: 22 MMOL/L (ref 22–29)
HCT VFR BLD AUTO: 37.2 % (ref 35–47)
HGB BLD-MCNC: 12.5 G/DL (ref 11.7–15.7)
HOLD SPECIMEN: NORMAL
HOLD SPECIMEN: NORMAL
IMM GRANULOCYTES # BLD: 0 10E3/UL
IMM GRANULOCYTES NFR BLD: 0 %
LYMPHOCYTES # BLD AUTO: 2.5 10E3/UL (ref 0.8–5.3)
LYMPHOCYTES NFR BLD AUTO: 26 %
MCH RBC QN AUTO: 34.2 PG (ref 26.5–33)
MCHC RBC AUTO-ENTMCNC: 33.6 G/DL (ref 31.5–36.5)
MCV RBC AUTO: 102 FL (ref 78–100)
MONOCYTES # BLD AUTO: 0.5 10E3/UL (ref 0–1.3)
MONOCYTES NFR BLD AUTO: 6 %
NEUTROPHILS # BLD AUTO: 6.3 10E3/UL (ref 1.6–8.3)
NEUTROPHILS NFR BLD AUTO: 66 %
NRBC # BLD AUTO: 0 10E3/UL
NRBC BLD AUTO-RTO: 0 /100
PLATELET # BLD AUTO: 390 10E3/UL (ref 150–450)
POTASSIUM SERPL-SCNC: 3.7 MMOL/L (ref 3.4–5.3)
RBC # BLD AUTO: 3.66 10E6/UL (ref 3.8–5.2)
SODIUM SERPL-SCNC: 135 MMOL/L (ref 135–145)
WBC # BLD AUTO: 9.5 10E3/UL (ref 4–11)

## 2024-09-29 PROCEDURE — 85025 COMPLETE CBC W/AUTO DIFF WBC: CPT | Performed by: FAMILY MEDICINE

## 2024-09-29 PROCEDURE — 96375 TX/PRO/DX INJ NEW DRUG ADDON: CPT | Mod: 59 | Performed by: FAMILY MEDICINE

## 2024-09-29 PROCEDURE — 250N000011 HC RX IP 250 OP 636: Performed by: FAMILY MEDICINE

## 2024-09-29 PROCEDURE — 36415 COLL VENOUS BLD VENIPUNCTURE: CPT | Performed by: FAMILY MEDICINE

## 2024-09-29 PROCEDURE — 96376 TX/PRO/DX INJ SAME DRUG ADON: CPT | Mod: 59 | Performed by: FAMILY MEDICINE

## 2024-09-29 PROCEDURE — 250N000009 HC RX 250: Performed by: FAMILY MEDICINE

## 2024-09-29 PROCEDURE — 80048 BASIC METABOLIC PNL TOTAL CA: CPT | Performed by: FAMILY MEDICINE

## 2024-09-29 PROCEDURE — 99285 EMERGENCY DEPT VISIT HI MDM: CPT | Mod: 25 | Performed by: FAMILY MEDICINE

## 2024-09-29 PROCEDURE — 99284 EMERGENCY DEPT VISIT MOD MDM: CPT | Performed by: FAMILY MEDICINE

## 2024-09-29 PROCEDURE — 96365 THER/PROPH/DIAG IV INF INIT: CPT | Mod: 59 | Performed by: FAMILY MEDICINE

## 2024-09-29 PROCEDURE — 250N000013 HC RX MED GY IP 250 OP 250 PS 637: Performed by: FAMILY MEDICINE

## 2024-09-29 PROCEDURE — 70487 CT MAXILLOFACIAL W/DYE: CPT

## 2024-09-29 PROCEDURE — 86140 C-REACTIVE PROTEIN: CPT | Performed by: FAMILY MEDICINE

## 2024-09-29 PROCEDURE — 250N000012 HC RX MED GY IP 250 OP 636 PS 637: Performed by: FAMILY MEDICINE

## 2024-09-29 RX ORDER — HYDROMORPHONE HYDROCHLORIDE 1 MG/ML
0.5 INJECTION, SOLUTION INTRAMUSCULAR; INTRAVENOUS; SUBCUTANEOUS EVERY 30 MIN PRN
Status: DISCONTINUED | OUTPATIENT
Start: 2024-09-29 | End: 2024-09-29 | Stop reason: HOSPADM

## 2024-09-29 RX ORDER — PREDNISONE 20 MG/1
20 TABLET ORAL 2 TIMES DAILY
Qty: 10 TABLET | Refills: 0 | Status: SHIPPED | OUTPATIENT
Start: 2024-09-29 | End: 2024-10-04

## 2024-09-29 RX ORDER — CIPROFLOXACIN 500 MG/1
500 TABLET, FILM COATED ORAL 2 TIMES DAILY
Qty: 20 TABLET | Refills: 0 | Status: SHIPPED | OUTPATIENT
Start: 2024-09-29 | End: 2024-10-09

## 2024-09-29 RX ORDER — KETOROLAC TROMETHAMINE 15 MG/ML
15 INJECTION, SOLUTION INTRAMUSCULAR; INTRAVENOUS ONCE
Status: COMPLETED | OUTPATIENT
Start: 2024-09-29 | End: 2024-09-29

## 2024-09-29 RX ORDER — PREDNISONE 20 MG/1
40 TABLET ORAL ONCE
Status: COMPLETED | OUTPATIENT
Start: 2024-09-29 | End: 2024-09-29

## 2024-09-29 RX ORDER — AMPICILLIN AND SULBACTAM 2; 1 G/1; G/1
3 INJECTION, POWDER, FOR SOLUTION INTRAMUSCULAR; INTRAVENOUS EVERY 6 HOURS
Status: DISCONTINUED | OUTPATIENT
Start: 2024-09-29 | End: 2024-09-29 | Stop reason: HOSPADM

## 2024-09-29 RX ORDER — IOPAMIDOL 755 MG/ML
67 INJECTION, SOLUTION INTRAVASCULAR ONCE
Status: COMPLETED | OUTPATIENT
Start: 2024-09-29 | End: 2024-09-29

## 2024-09-29 RX ORDER — CIPROFLOXACIN 500 MG/1
500 TABLET, FILM COATED ORAL ONCE
Status: COMPLETED | OUTPATIENT
Start: 2024-09-29 | End: 2024-09-29

## 2024-09-29 RX ADMIN — HYDROMORPHONE HYDROCHLORIDE 0.5 MG: 1 INJECTION, SOLUTION INTRAMUSCULAR; INTRAVENOUS; SUBCUTANEOUS at 13:40

## 2024-09-29 RX ADMIN — PREDNISONE 40 MG: 20 TABLET ORAL at 13:53

## 2024-09-29 RX ADMIN — HYDROMORPHONE HYDROCHLORIDE 1 MG: 1 INJECTION, SOLUTION INTRAMUSCULAR; INTRAVENOUS; SUBCUTANEOUS at 12:26

## 2024-09-29 RX ADMIN — AMPICILLIN SODIUM AND SULBACTAM SODIUM 3 G: 2; 1 INJECTION, POWDER, FOR SOLUTION INTRAMUSCULAR; INTRAVENOUS at 12:57

## 2024-09-29 RX ADMIN — CIPROFLOXACIN HYDROCHLORIDE 500 MG: 500 TABLET, FILM COATED ORAL at 13:53

## 2024-09-29 RX ADMIN — IOPAMIDOL 67 ML: 755 INJECTION, SOLUTION INTRAVENOUS at 12:40

## 2024-09-29 RX ADMIN — SODIUM CHLORIDE 80 ML: 9 INJECTION, SOLUTION INTRAVENOUS at 12:41

## 2024-09-29 RX ADMIN — KETOROLAC TROMETHAMINE 15 MG: 15 INJECTION, SOLUTION INTRAMUSCULAR; INTRAVENOUS at 12:30

## 2024-09-29 ASSESSMENT — ACTIVITIES OF DAILY LIVING (ADL)
ADLS_ACUITY_SCORE: 35

## 2024-09-29 ASSESSMENT — COLUMBIA-SUICIDE SEVERITY RATING SCALE - C-SSRS
1. IN THE PAST MONTH, HAVE YOU WISHED YOU WERE DEAD OR WISHED YOU COULD GO TO SLEEP AND NOT WAKE UP?: NO
2. HAVE YOU ACTUALLY HAD ANY THOUGHTS OF KILLING YOURSELF IN THE PAST MONTH?: NO
3. HAVE YOU BEEN THINKING ABOUT HOW YOU MIGHT KILL YOURSELF?: NO

## 2024-09-29 NOTE — ED PROVIDER NOTES
"  History     Chief Complaint   Patient presents with    Headache     Patient states hx of migraines and she \"feels terrible states she was here last week for cellulitis, sent home on antibx and back today because she feels its worse     HPI    Apoorva Roberts is a 48 year old female who comes in with worsening pain in the right side of the face.  She was seen for this in the ED 4 days ago and diagnosed with cellulitis around her right tragus.  She was treated with cephalexin 500 mg 4 times daily.  She was given 10 tablets of oxycodone and refilled this 2 days ago with 20 tablets from her primary care physician.  Despite this she has had increased pain and swelling in the right side of her face that has been severe.  It has caused difficulty opening her mouth but not swallowing or breathing.  She has not had fevers or chills that she is aware of.  She is not having any dental pain but says she does not have many teeth.    Allergies:  Allergies   Allergen Reactions    Indomethacin      Other reaction(s): GI Upset    Fluoxetine      Other reaction(s): Thrush    Paroxetine      Other reaction(s): Thrush       Problem List:    Patient Active Problem List    Diagnosis Date Noted    Hyperlipidemia LDL goal <130 02/27/2024     Priority: Medium    Chronic superficial gastritis without bleeding 02/27/2024     Priority: Medium    Hiatal hernia 02/27/2024     Priority: Medium    Diarrhea due to malabsorption 02/27/2024     Priority: Medium    History of peptic ulcer 02/27/2024     Priority: Medium    Occipital headache 03/30/2023     Priority: Medium    Vitamin B12 deficiency (non anemic) 01/03/2023     Priority: Medium    Restless leg syndrome 01/03/2023     Priority: Medium    Elevated C-reactive protein (CRP) 01/03/2023     Priority: Medium    PTSD (post-traumatic stress disorder) 08/04/2022     Priority: Medium    GEORGIANA (generalized anxiety disorder) 08/04/2022     Priority: Medium    Cervical high risk HPV (human " "papillomavirus) test positive 09/02/2021     Priority: Medium     8/27/21 NIL Pap, + HR HPV (neg 16/18). Plan cotest in 1 year.   8/4/22 LSIL, +HR HPV (not 16/18). Plan: colposcopy due before 11/4/22 2/14/23 Lost to follow-up for pap tracking   2/27/24 NIL pap, +HR HPV (not 16/18). Plan: colp due before 5/27/24  3/5/24 left message / mychart sent / mychart read  4/29/24 Reminder mychart  5/28/24 [place] not done. Tracking updated for 6 mo colp/pap due 8/27/24.    8/6/24 Reminder mychart -- read  9/11/24 Lost to follow-up for pap tracking       Marijuana use, continuous 09/17/2020     Priority: Medium    Tobacco use disorder 09/17/2020     Priority: Medium    Migraine without aura and without status migrainosus, not intractable 09/17/2020     Priority: Medium    Chronic daily headache 11/06/2019     Priority: Medium     Improved on daily Tizanidine- has seen Neurology      Peripheral neuropathy 11/06/2019     Priority: Medium     After hernia surgery in 2014- was told the surgeon \"nicked\" a couple nerves on the left      Gastroesophageal reflux disease 12/05/2017     Priority: Medium    Benign essential hypertension 12/05/2017     Priority: Medium    Major depressive disorder, recurrent episode, moderate (H) 12/05/2017     Priority: Medium    Hidradenitis suppurativa 09/11/2017     Priority: Medium        Past Medical History:    Past Medical History:   Diagnosis Date    Cervical high risk HPV (human papillomavirus) test positive 08/27/2021    Community acquired pneumonia 12/05/2017    Depressive disorder     Gastroesophageal reflux disease     Hiatal hernia     Hypertension     Other chronic pain     Walking troubles        Past Surgical History:    Past Surgical History:   Procedure Laterality Date    ABDOMEN SURGERY      APPENDECTOMY      CHOLECYSTECTOMY      ESOPHAGOSCOPY, GASTROSCOPY, DUODENOSCOPY (EGD), COMBINED N/A 10/8/2021    Procedure: ESOPHAGOGASTRODUODENOSCOPY, WITH BIOPSY;  Surgeon: Slim Randle " DO Vince;  Location: [facility]    ESOPHAGOSCOPY, GASTROSCOPY, DUODENOSCOPY (EGD), COMBINED N/A 5/8/2023    Procedure: ESOPHAGOGASTRODUODENOSCOPY, WITH BIOPSY;  Surgeon: Clemente Strong MD;  Location: [facility]    GYN SURGERY      HERNIA REPAIR      HYSTERECTOMY, PAP STILL INDICATED      Cervix still present       Family History:    Family History   Problem Relation Age of Onset    Diabetes Mother     Hypertension Mother     Hyperlipidemia Mother     Depression Mother     Substance Abuse Mother     Hypertension Father     Hyperlipidemia Father     Kidney Cancer Father     Other Cancer Father     Substance Abuse Father     Diabetes Maternal Grandmother     Depression Sister     Breast Cancer No family hx of        Social History:  Marital Status:   [2]  Social History     Tobacco Use    Smoking status: Every Day     Current packs/day: 1.00     Average packs/day: 1 pack/day for 30.0 years (30.0 ttl pk-yrs)     Types: Cigarettes     Passive exposure: Past    Smokeless tobacco: Never   Vaping Use    Vaping status: Never Used   Substance Use Topics    Alcohol use: Not Currently     Comment: 1 or 2 a month    Drug use: Yes     Types: Marijuana     Comment: cbd        Medications:    ciprofloxacin (CIPRO) 500 MG tablet  predniSONE (DELTASONE) 20 MG tablet  acetaminophen (TYLENOL) 325 MG tablet  albuterol (PROAIR HFA/PROVENTIL HFA/VENTOLIN HFA) 108 (90 Base) MCG/ACT inhaler  busPIRone HCl (BUSPAR) 30 MG tablet  butalbital-acetaminophen-caffeine (ESGIC) -40 MG tablet  cephALEXin (KEFLEX) 500 MG capsule  cholestyramine (QUESTRAN) 4 GM/DOSE powder  clotrimazole (LOTRIMIN) 1 % external cream  cyanocobalamin (VITAMIN B-12) 1000 MCG tablet  gabapentin (NEURONTIN) 400 MG capsule  hydrochlorothiazide (HYDRODIURIL) 25 MG tablet  IBUPROFEN PO  ipratropium - albuterol 0.5 mg/2.5 mg/3 mL (DUONEB) 0.5-2.5 (3) MG/3ML neb solution  lisinopril (ZESTRIL) 10 MG tablet  Loratadine-Pseudoephedrine (CLARITIN-D 24 HOUR  "PO)  methocarbamol (ROBAXIN) 750 MG tablet  methylPREDNISolone (MEDROL DOSEPAK) 4 MG tablet therapy pack  ondansetron (ZOFRAN ODT) 4 MG ODT tab  oxyCODONE (ROXICODONE) 5 MG tablet  oxyCODONE (ROXICODONE) 5 MG tablet  RABEprazole (ACIPHEX) 20 MG EC tablet  rizatriptan (MAXALT) 10 MG tablet  rosuvastatin (CRESTOR) 20 MG tablet  sertraline (ZOLOFT) 25 MG tablet  sertraline (ZOLOFT) 50 MG tablet  zolpidem (AMBIEN) 10 MG tablet          Review of Systems  All other systems are reviewed and are negative    Physical Exam   BP: 136/87  Pulse: 110  Temp: 98.5  F (36.9  C) (took motrin 1 hour ago)  Resp: 22  Height: 157.5 cm (5' 2\")  Weight: 88.5 kg (195 lb)  SpO2: 98 %      Physical Exam    Nursing note and vitals were reviewed.  Constitutional: Awake and alert, adequately nourished and developed appearing 48-year-old in moderate to severe discomfort, who does not appear acutely ill, and who answers questions appropriately and cooperates with examination.  HEENT: She has difficulty opening her mouth and can open about 2 cm and then is limited by pain.  There is swelling over the right TMJ which is tender but not erythematous. The swelling extends up to the EAC. There is no erythema of the ear, face, neck.  There are no masses or tenderness in the submandibular space.  There is no cervical adenopathy present.  External auditory canal is slightly swollen but otherwise normal.  Swelling is extends up toward the ear but does not involve the pinna.  TM is normal.  PERRL.  EOMI.   Neck: Freely mobile.  No meningismus.  No masses.  No adenopathy.  Cardiovascular: Cardiac examination reveals tachycardic heart rate and regular rhythm without murmur.  Pulmonary/Chest: Breathing is unlabored.    Neurological: Alert, oriented, thought content logical, coherent   Skin: Warm, dry, no rashes.  Psychiatric: Affect congruent with acute pain.    ED Course        Procedures              Critical Care time:  none      Results for orders placed " or performed during the hospital encounter of 09/29/24 (from the past 24 hour(s))   [place] Draw    Narrative    The following orders were created for panel order [place] Draw.  Procedure                               Abnormality         Status                     ---------                               -----------         ------                     Extra Green Top (Lithium...[[id number]]                      Final result               Extra Purple Top Tube[[id number]]                            Final result                 Please view results for these tests on the individual orders.   Extra Green Top (Lithium Heparin) Tube   Result Value Ref Range    Hold Specimen JIC    Extra Purple Top Tube   Result Value Ref Range    Hold Specimen JIC    CBC with platelets differential    Narrative    The following orders were created for panel order CBC with platelets differential.  Procedure                               Abnormality         Status                     ---------                               -----------         ------                     CBC with platelets and d...[[id number]]  Abnormal            Final result                 Please view results for these tests on the individual orders.   Basic metabolic panel   Result Value Ref Range    Sodium 135 135 - 145 mmol/L    Potassium 3.7 3.4 - 5.3 mmol/L    Chloride 102 98 - 107 mmol/L    Carbon Dioxide (CO2) 22 22 - 29 mmol/L    Anion Gap 11 7 - 15 mmol/L    Urea Nitrogen 15.0 6.0 - 20.0 mg/dL    Creatinine 1.00 (H) 0.51 - 0.95 mg/dL    GFR Estimate 70 >60 mL/min/1.73m2    Calcium 9.0 8.8 - 10.4 mg/dL    Glucose 108 (H) 70 - 99 mg/dL   CRP inflammation   Result Value Ref Range    CRP Inflammation 10.89 (H) <5.00 mg/L   CBC with platelets and differential   Result Value Ref Range    WBC Count 9.5 4.0 - 11.0 10e3/uL    RBC Count 3.66 (L) 3.80 - 5.20 10e6/uL    Hemoglobin 12.5 11.7 - 15.7 g/dL    Hematocrit 37.2 35.0 - 47.0 %     (H) 78 - 100 fL    MCH 34.2 (H)  26.5 - 33.0 pg    MCHC 33.6 31.5 - 36.5 g/dL    RDW 13.5 10.0 - 15.0 %    Platelet Count 390 150 - 450 10e3/uL    % Neutrophils 66 %    % Lymphocytes 26 %    % Monocytes 6 %    % Eosinophils 1 %    % Basophils 1 %    % Immature Granulocytes 0 %    NRBCs per 100 WBC 0 <1 /100    Absolute Neutrophils 6.3 1.6 - 8.3 10e3/uL    Absolute Lymphocytes 2.5 0.8 - 5.3 10e3/uL    Absolute Monocytes 0.5 0.0 - 1.3 10e3/uL    Absolute Eosinophils 0.1 0.0 - 0.7 10e3/uL    Absolute Basophils 0.1 0.0 - 0.2 10e3/uL    Absolute Immature Granulocytes 0.0 <=0.4 10e3/uL    Absolute NRBCs 0.0 10e3/uL   CT Facial Bones with Contrast    Narrative    EXAM: CT FACIAL BONES WITH CONTRAST  LOCATION: [facility]  DATE: 9/29/2024    INDICATION: Pain and swelling angle of jaw and TMJ on right  COMPARISON: None.  CONTRAST: 67 mL Isovue 370  TECHNIQUE: Routine CT Maxillofacial with IV contrast. Multiplanar reformats. Dose reduction techniques were used.     FINDINGS:  OSSEOUS STRUCTURES/SOFT TISSUES:  Mild right periauricular soft tissue swelling/inflammation. No facial bone fracture or malalignment.  Numerous missing teeth. No TMJ arthropathy.    ORBITAL CONTENTS: No acute abnormality.    SINUSES: No paranasal sinus mucosal disease.    VISUALIZED INTRACRANIAL CONTENTS: No acute abnormality.       Impression    IMPRESSION:   1.  Mild right periauricular soft tissue inflammation. Correlate for otitis externa.  2.  No TMJ arthropathy.       Medications   ampicillin-sulbactam (UNASYN) 3 g vial to attach to  mL bag (3 g Intravenous $New Bag 9/29/24 1257)   HYDROmorphone (PF) (DILAUDID) injection 0.5 mg (0.5 mg Intravenous $Given 9/29/24 1340)   HYDROmorphone (DILAUDID) injection 1 mg (1 mg Intravenous $Given 9/29/24 1226)   ketorolac (TORADOL) injection 15 mg (15 mg Intravenous $Given 9/29/24 1230)   iopamidol (ISOVUE-370) solution 67 mL (67 mLs Intravenous $Given 9/29/24 1240)   sodium chloride 0.9 % bag 500mL for CT  scan flush use (80 mLs Intravenous $Given 9/29/24 1241)   ciprofloxacin (CIPRO) tablet 500 mg (500 mg Oral $Given 9/29/24 1359)   predniSONE (DELTASONE) tablet 40 mg (40 mg Oral $Given 9/29/24 7460)       Assessments & Plan (with Medical Decision Making)     48-year-old female presented with increased pain in the right TMJ and ear after visit 4 days ago where she was diagnosed with facial cellulitis involving the tragus.  On physical examination she had trismus with ability to open the mouth only about 2 cm but no signs of intraoral infection and few teeth.  External auditory canals without erythema or debris but slightly swollen.  She was tender around the EAC, tragus, mastoid, and maximally over the area of the TMJ.  Her CBC was normal.  Her CRP was only mildly elevated at 10.89.  CT scan of the face with contrast did not show evidence of an abscess or deep space neck infection or odontogenic infection.  It was most suggestive of otitis externa leading to cellulitis on the spectrum of malignant otitis externa.  Her physical examination is somewhat atypical for this but this could be due to partial treatment with the previous antibiotics.  She will need some change in antibiotics to cover for this possibility with ciprofloxacin given the possibility of Pseudomonas.  This may be the reason she has not improved.  We discussed that she should avoid putting anything in her ear such as Q-tips and water until she is healed.  She will discontinue cephalexin and begin ciprofloxacin.  She does not tolerate nonsteroidal anti-inflammatory drugs and has had a poor response to oxycodone with inadequate pain control and so she will take prednisone 20 mg twice daily in addition to her antibiotics and Tylenol and oxycodone for pain control.  She needs to return to be seen if she is not improved in 2 days or has new or worsening symptoms at any time.    I have reviewed the nursing notes.    I have reviewed the findings, diagnosis,  plan and need for follow up with the patient.         New Prescriptions    CIPROFLOXACIN (CIPRO) 500 MG TABLET    Take 1 tablet (500 mg) by mouth 2 times daily for 10 days.    PREDNISONE (DELTASONE) 20 MG TABLET    Take 1 tablet (20 mg) by mouth 2 times daily for 5 days.       Final diagnoses:   Facial cellulitis       9/29/2024   [facility] EMERGENCY DEPT       Thierry Her MD  09/29/24 5403

## 2024-09-29 NOTE — DISCHARGE INSTRUCTIONS
Stop cephalexin.  Take ciprofloxacin 500 mg twice daily for 10 days.  You can stop after 7 days if you are completely better.  Do not put any Q-tips or anything else in your ear canal and do not get water inside your ear canal.  Take prednisone, 20 mg, twice a day in the morning and in the afternoon with food for pain.  You may add acetaminophen 1000 mg 4 times per day if needed for pain.    You may add oxycodone 5 mg, 1-2 tablets up to every 6 hours if needed for pain.  Try to use this primarily only at night to help with sleep.    Do not use alcohol, operate machinery, drive, or climb on ladders, or perform other complex motor activity or make important decisions for 8 hours after taking oxycodone. Use docusate (100mg) 2 times a day to prevent constipation while on narcotics.  Return to be seen if you are not improved in 48 hours or if you have new or worsening symptoms at any time.

## 2024-09-29 NOTE — ED TRIAGE NOTES
C/o headache and worsening cellulitis     Triage Assessment (Adult)       Row Name 09/29/24 1154          Triage Assessment    Airway WDL WDL        Respiratory WDL    Respiratory WDL WDL        Skin Circulation/Temperature WDL    Skin Circulation/Temperature WDL X  c.o cellulitis behind right ear        Cardiac WDL    Cardiac WDL X;rhythm     Pulse Rate & Regularity tachycardic        Peripheral/Neurovascular WDL    Peripheral Neurovascular WDL WDL        Cognitive/Neuro/Behavioral WDL    Cognitive/Neuro/Behavioral WDL WDL

## 2024-09-30 ENCOUNTER — E-VISIT (OUTPATIENT)
Dept: FAMILY MEDICINE | Facility: CLINIC | Age: 49
End: 2024-09-30
Payer: COMMERCIAL

## 2024-09-30 DIAGNOSIS — M25.511 ACUTE PAIN OF RIGHT SHOULDER: ICD-10-CM

## 2024-09-30 RX ORDER — OXYCODONE HYDROCHLORIDE 5 MG/1
5 TABLET ORAL EVERY 6 HOURS PRN
Qty: 28 TABLET | Refills: 0 | Status: SHIPPED | OUTPATIENT
Start: 2024-09-30

## 2024-09-30 NOTE — PATIENT INSTRUCTIONS
Thank you for choosing us for your care. I have placed an order for a prescription so that you can start treatment. View your full visit summary for details by clicking on the link below. Your pharmacist will able to address any questions you may have about the medication.     If you're not feeling better within 5-7 days, please schedule an appointment.  You can schedule an appointment right here in [facility], or call [phone]  If the visit is for the same symptoms as your eVisit, we'll refund the cost of your eVisit if seen within seven days.      THI Guerin- I sent the oxycodone to your Pharmacy and I will send you a work note, I hope you feel better soon!

## 2024-09-30 NOTE — LETTER
September 30, 2024      Apoorva Roberts  [address] [address] [address]        To Whom It May Concern:    Apoorva Roberts  was seen on 9/29.  Please excuse her from missed work until she is feeling better due to illness.        Sincerely,          ISAAC Dodson CNP

## 2024-10-01 ENCOUNTER — PATIENT OUTREACH (OUTPATIENT)
Dept: CARE COORDINATION | Facility: CLINIC | Age: 49
End: 2024-10-01
Payer: COMMERCIAL

## 2024-10-01 NOTE — LETTER
Apoorva Roberts  [address] [address]    Dear Apoorva Roberts,      I am a team member within the Connected Care Resource Center with [facility]. I recently tried to reach you to ensure you were doing well following a recent visit within our health system. I also wanted to take this chance to introduce Clinic Care Coordination.     Below is a description of Clinic Care Coordination and how this team can further assist you:       The Clinic Care Coordination team is made up of a Registered Nurse, , Financial Resource Worker, and a Community Health Worker who understand and can help navigate the health care system. The goal of clinic care coordination is to help you manage your health, improve access to care, and achieve optimal health outcomes. They work alongside your provider to assist you in determining your health and social needs, obtain health care and community resources, and provide you with necessary information and education. Clinic Care Coordination can work with you through any barriers and develop a care plan that helps coordinate and strengthen the relationship between you and your care team.    If you wish to connect with the Clinic Care Coordination Team, please let your [facility] Primary Care Provider or Clinic Care Team know and they can place a referral. The Clinic Care Coordination team will then reach out by phone to further support you.    We are focused on providing you with the highest-quality healthcare experience possible.    Sincerely,   Brigitte CEBALLOS  Community Health Worker    Connected Care Resources   [facility]

## 2024-10-01 NOTE — PROGRESS NOTES
[facility] Care Resource Center Contact  [facility]/Voicemail     Clinical Data: Care Coordination ED-sourced Outreach-     Outreach attempted x 2.  Left message on patient's voicemail, providing [facility]'s 24/7 scheduling and nurse triage phone number [phone] ([phone]) for questions/concerns and/or to schedule an appt with an [facility] provider.      Care Coordination introduction letter with explanation of Clinic Care Coordination services sent to patient via [facility]. Clinic Care Coordination services remain available via referral if needed.    Plan: [facility] will do no further outreaches at this time.       RYLAND Mathew  [facility] Resource [place], [facility]    *Connected Care Resource Team does NOT follow patient ongoing. Referrals are identified based on internal discharge reports and the outreach is to ensure patient has an understanding of their discharge instructions.

## 2024-10-07 ASSESSMENT — PATIENT HEALTH QUESTIONNAIRE - PHQ9: SUM OF ALL RESPONSES TO PHQ QUESTIONS 1-9: 7

## 2024-10-08 ENCOUNTER — VIRTUAL VISIT (OUTPATIENT)
Dept: FAMILY MEDICINE | Facility: CLINIC | Age: 49
End: 2024-10-08
Payer: COMMERCIAL

## 2024-10-08 ENCOUNTER — MYC MEDICAL ADVICE (OUTPATIENT)
Dept: FAMILY MEDICINE | Facility: CLINIC | Age: 49
End: 2024-10-08

## 2024-10-08 DIAGNOSIS — L02.11 ABSCESS OF NECK: Primary | ICD-10-CM

## 2024-10-08 PROCEDURE — 99442 PR PHYSICIAN TELEPHONE EVALUATION 11-20 MIN: CPT | Mod: 93 | Performed by: NURSE PRACTITIONER

## 2024-10-08 RX ORDER — OXYCODONE HYDROCHLORIDE 5 MG/1
5-10 TABLET ORAL EVERY 8 HOURS PRN
Qty: 42 TABLET | Refills: 0 | Status: SHIPPED | OUTPATIENT
Start: 2024-10-08

## 2024-10-08 RX ORDER — SULFAMETHOXAZOLE AND TRIMETHOPRIM 800; 160 MG/1; MG/1
1 TABLET ORAL 2 TIMES DAILY
Qty: 20 TABLET | Refills: 0 | Status: SHIPPED | OUTPATIENT
Start: 2024-10-08 | End: 2024-10-18

## 2024-10-08 ASSESSMENT — PATIENT HEALTH QUESTIONNAIRE - PHQ9
SUM OF ALL RESPONSES TO PHQ QUESTIONS 1-9: 7
10. IF YOU CHECKED OFF ANY PROBLEMS, HOW DIFFICULT HAVE THESE PROBLEMS MADE IT FOR YOU TO DO YOUR WORK, TAKE CARE OF THINGS AT HOME, OR GET ALONG WITH OTHER PEOPLE: SOMEWHAT DIFFICULT

## 2024-10-08 NOTE — PROGRESS NOTES
"Apoorva is a 48 year old who is being evaluated via a billable telephone visit.    What phone number would you like to be contacted at? [phone]  How would you like to obtain your AVS? Freddyhart  Originating Location (pt. Location): Home    Distant Location (provider location):  On-site    Assessment & Plan     Abscess of neck    - sulfamethoxazole-trimethoprim (BACTRIM DS) 800-160 MG tablet; Take 1 tablet by mouth 2 times daily for 10 days.  - Adult Gen Surg  Referral; Future  - oxyCODONE (ROXICODONE) 5 MG tablet; Take 1-2 tablets (5-10 mg) by mouth every 8 hours as needed for pain.        MED REC REQUIRED  Post Medication Reconciliation Status: patient was not discharged from an inpatient facility or TCU  Nicotine/Tobacco Cessation  She reports that she has been smoking cigarettes. She has a 30 pack-year smoking history. She has been exposed to tobacco smoke. She has never used smokeless tobacco.  Nicotine/Tobacco Cessation Plan  Information offered: Patient not interested at this time      BMI  Estimated body mass index is 35.67 kg/m  as calculated from the following:    Height as of 9/29/24: 1.575 m (5' 2\").    Weight as of 9/29/24: 88.5 kg (195 lb).         CONSULTATION/REFERRAL to GEN SURG    FUTURE APPOINTMENTS:       - To ED for any new or worsening symptoms.     Subjective   Apoorva is a 48 year old, presenting for the following health issues:  ER F/U      10/8/2024     3:03 PM   Additional Questions   Roomed by Alla AAMYA     ED/UC Followup:    Facility:  [facility]   Date of visit: 9/29/24  Reason for visit: facial cellulitis  Current Status: improved slightly, still extremely painful. Lump behind ear is not as hard.  Right ear is still red and warm, sharp stabbing pain in her ear down into her jaw. Has been taking oxycodone for the pain.         Review of Systems  Constitutional, HEENT, cardiovascular, pulmonary, gi and gu systems are negative, except as otherwise noted.      Objective     "       Vitals:  No vitals were obtained today due to virtual visit.    Physical Exam   General: Alert and no distress //Respiratory: No audible wheeze, cough, or shortness of breath // Psychiatric:  Appropriate affect, tone, and pace of words      See pictures in [facility] med advice from today.      Phone call duration: 12 minutes  Signed Electronically by: ISAAC Dodson CNP

## 2024-10-09 ENCOUNTER — TELEPHONE (OUTPATIENT)
Dept: OTOLARYNGOLOGY | Facility: CLINIC | Age: 49
End: 2024-10-09
Payer: COMMERCIAL

## 2024-10-09 NOTE — TELEPHONE ENCOUNTER
Patient confirmed scheduled appointment:  Date: 2/11  Time: 8:20am  Visit type: New ENT   Provider: Bruna Calle  Location: [facility]  Testing/imaging:   Additional notes: '

## 2024-10-10 ENCOUNTER — OFFICE VISIT (OUTPATIENT)
Dept: OTOLARYNGOLOGY | Facility: CLINIC | Age: 49
End: 2024-10-10
Payer: COMMERCIAL

## 2024-10-10 VITALS
WEIGHT: 195 LBS | TEMPERATURE: 99.5 F | DIASTOLIC BLOOD PRESSURE: 83 MMHG | HEART RATE: 93 BPM | SYSTOLIC BLOOD PRESSURE: 129 MMHG | OXYGEN SATURATION: 96 % | BODY MASS INDEX: 35.67 KG/M2

## 2024-10-10 DIAGNOSIS — L72.0 EPIDERMOID CYST OF SKIN OF POSTAURICULAR REGION: Primary | ICD-10-CM

## 2024-10-10 PROBLEM — E66.812 CLASS 2 SEVERE OBESITY DUE TO EXCESS CALORIES WITH SERIOUS COMORBIDITY IN ADULT (H): Status: ACTIVE | Noted: 2024-10-10

## 2024-10-10 PROBLEM — E66.01 CLASS 2 SEVERE OBESITY DUE TO EXCESS CALORIES WITH SERIOUS COMORBIDITY IN ADULT (H): Status: ACTIVE | Noted: 2024-10-10

## 2024-10-10 PROCEDURE — 99203 OFFICE O/P NEW LOW 30 MIN: CPT | Performed by: OTOLARYNGOLOGY

## 2024-10-10 ASSESSMENT — PAIN SCALES - GENERAL: PAINLEVEL: MODERATE PAIN (4)

## 2024-10-10 NOTE — LETTER
10/10/2024      Apoorva Roberts  [id number] [facility] [address]      Dear Colleague,    Thank you for referring your patient, Apoorva Roberts, to the [facility]. Please see a copy of my visit note below.    Apoorva Roberts is a 48 year old female  Chief Complaint: Infected cyst, right ear lobule. Had this cyst for about 8 months. Got infected about 14 days ago. Finished Keflex, now on Bactrim. Infection nearly subsided  History of Present Illness  Location: Rt ear lobule  Quality:cyst  Severity:infected 5 mm  Duration: 2 weeks    Past Medical History -   Patient Active Problem List   Diagnosis     Gastroesophageal reflux disease     Chronic daily headache     Hidradenitis suppurativa     Benign essential hypertension     Major depressive disorder, recurrent episode, moderate (H)     Peripheral neuropathy     Marijuana use, continuous     Tobacco use disorder     Migraine without aura and without status migrainosus, not intractable     Cervical high risk HPV (human papillomavirus) test positive     PTSD (post-traumatic stress disorder)     GEORGIANA (generalized anxiety disorder)     Vitamin B12 deficiency (non anemic)     Restless leg syndrome     Elevated C-reactive protein (CRP)     Occipital headache     Hyperlipidemia LDL goal <130     Chronic superficial gastritis without bleeding     Hiatal hernia     Diarrhea due to malabsorption     History of peptic ulcer       Current Medications -   Current Outpatient Medications:      acetaminophen (TYLENOL) 325 MG tablet, Take 650 mg by mouth every 6 hours as needed for mild pain, Disp: , Rfl:      albuterol (PROAIR HFA/PROVENTIL HFA/VENTOLIN HFA) 108 (90 Base) MCG/ACT inhaler, Inhale 2 puffs into the lungs every 4 hours as needed for shortness of breath or wheezing, Disp: 8.5 g, Rfl: 0     busPIRone HCl (BUSPAR) 30 MG tablet, Take 1 tablet (30 mg) by mouth 2 times daily, Disp: 180 tablet, Rfl: 3     butalbital-acetaminophen-caffeine (ESGIC) -40 MG  tablet, TAKE 1 TABLET BY MOUTH DAILY AS NEEDED FOR HEADACHE. NO MORE THAN 2 DAYS EVERY WEEK, Disp: 20 tablet, Rfl: 3     cholestyramine (QUESTRAN) 4 GM/DOSE powder, Take 4 g by mouth 2 times daily (with meals) Start with 4 gram at supper. Increase the dose to twice a day if needed., Disp: 348 g, Rfl: 3     gabapentin (NEURONTIN) 400 MG capsule, TAKE 3 CAPSULES(1200 MG) BY MOUTH THREE TIMES DAILY, Disp: 270 capsule, Rfl: 5     hydrochlorothiazide (HYDRODIURIL) 25 MG tablet, Take 0.5 tablets (12.5 mg) by mouth daily, Disp: , Rfl:      IBUPROFEN PO, , Disp: , Rfl:      ipratropium - albuterol 0.5 mg/2.5 mg/3 mL (DUONEB) 0.5-2.5 (3) MG/3ML neb solution, Take 1 vial (3 mLs) by nebulization every 4 hours as needed for shortness of breath, wheezing or cough, Disp: 90 mL, Rfl: 3     lisinopril (ZESTRIL) 10 MG tablet, Take 1 tablet (10 mg) by mouth daily, Disp: 90 tablet, Rfl: 3     Loratadine-Pseudoephedrine (CLARITIN-D 24 HOUR PO), , Disp: , Rfl:      methocarbamol (ROBAXIN) 750 MG tablet, Take 1 tablet (750 mg) by mouth 4 times daily as needed for muscle spasms, Disp: 120 tablet, Rfl: 4     ondansetron (ZOFRAN ODT) 4 MG ODT tab, Take 1 tablet (4 mg) by mouth every 8 hours as needed for nausea (try taking one tablet upon awakening in the morning), Disp: 60 tablet, Rfl: 1     oxyCODONE (ROXICODONE) 5 MG tablet, Take 1-2 tablets (5-10 mg) by mouth every 8 hours as needed for pain., Disp: 42 tablet, Rfl: 0     oxyCODONE (ROXICODONE) 5 MG tablet, Take 1 tablet (5 mg) by mouth every 6 hours as needed for pain., Disp: 28 tablet, Rfl: 0     oxyCODONE (ROXICODONE) 5 MG tablet, Take 1 tablet (5 mg) by mouth every 8 hours as needed for severe pain., Disp: 20 tablet, Rfl: 0     RABEprazole (ACIPHEX) 20 MG EC tablet, Take 1 tablet (20 mg) by mouth daily, Disp: 30 tablet, Rfl: 11     rosuvastatin (CRESTOR) 20 MG tablet, Take 1 tablet (20 mg) by mouth daily, Disp: 90 tablet, Rfl: 3     sertraline (ZOLOFT) 25 MG tablet, Take 1 tablet (25  mg) by mouth daily with sertraline 50 mg for 75 mg total daily., Disp: 90 tablet, Rfl: 3     sertraline (ZOLOFT) 50 MG tablet, Take 1 tablet (50 mg) by mouth daily with sertraline 25 mg for 75 mg total daily., Disp: 90 tablet, Rfl: 3     sulfamethoxazole-trimethoprim (BACTRIM DS) 800-160 MG tablet, Take 1 tablet by mouth 2 times daily for 10 days., Disp: 20 tablet, Rfl: 0     zolpidem (AMBIEN) 10 MG tablet, TAKE 1/2 TO 1 TABLET(5 TO 10 MG) BY MOUTH EVERY NIGHT AS NEEDED FOR SLEEP, Disp: 30 tablet, Rfl: 5    Current Facility-Administered Medications:      lidocaine 1 % injection 2 mL, 2 mL, , , , 2 mL at 07/03/24 1056     triamcinolone (KENALOG-40) injection 40 mg, 40 mg, , , , 40 mg at 07/03/24 1056    Allergies -   Allergies   Allergen Reactions     Indomethacin      Other reaction(s): GI Upset     Fluoxetine      Other reaction(s): Thrush     Paroxetine      Other reaction(s): Thrush       Social History -   Social History     Socioeconomic History     Marital status:      Spouse name: None     Number of children: None     Years of education: None     Highest education level: None   Tobacco Use     Smoking status: Every Day     Current packs/day: 1.00     Average packs/day: 1 pack/day for 30.0 years (30.0 ttl pk-yrs)     Types: Cigarettes     Passive exposure: Past     Smokeless tobacco: Never   Vaping Use     Vaping status: Never Used   Substance and Sexual Activity     Alcohol use: Not Currently     Comment: 1 or 2 a month     Drug use: Yes     Types: Marijuana     Comment: cbd     Sexual activity: Yes     Partners: Male     Birth control/protection: None   Other Topics Concern     Parent/sibling w/ CABG, MI or angioplasty before 65F 55M? No     Social Determinants of Health     Financial Resource Strain: Low Risk  (2/22/2024)    Financial Resource Strain      Within the past 12 months, have you or your family members you live with been unable to get utilities (heat, electricity) when it was really  needed?: No   Food Insecurity: High Risk (2/22/2024)    Food Insecurity      Within the past 12 months, did you worry that your food would run out before you got money to buy more?: Yes      Within the past 12 months, did the food you bought just not last and you didn t have money to get more?: Yes   Transportation Needs: Low Risk  (2/22/2024)    Transportation Needs      Within the past 12 months, has lack of transportation kept you from medical appointments, getting your medicines, non-medical meetings or appointments, work, or from getting things that you need?: No   Physical Activity: Insufficiently Active (2/22/2024)    Exercise Vital Sign      Days of Exercise per Week: 3 days      Minutes of Exercise per Session: 20 min   Stress: Stress Concern Present (2/22/2024)    [employer] [place] - Occupational Stress Questionnaire      Feeling of Stress : Rather much   Social Connections: Unknown (2/22/2024)    Social Connection and Isolation Panel [NHANES]      Frequency of Social Gatherings with Friends and Family: Twice a week   Interpersonal Safety: Low Risk  (2/27/2024)    Interpersonal Safety      Do you feel physically and emotionally safe where you currently live?: Yes      Within the past 12 months, have you been hit, slapped, kicked or otherwise physically hurt by someone?: No      Within the past 12 months, have you been humiliated or emotionally abused in other ways by your partner or ex-partner?: No   Housing Stability: Low Risk  (2/22/2024)    Housing Stability      Do you have housing? : Yes      Are you worried about losing your housing?: No       Family History -   Family History   Problem Relation Age of Onset     Diabetes Mother      Hypertension Mother      Hyperlipidemia Mother      Depression Mother      Substance Abuse Mother      Hypertension Father      Hyperlipidemia Father      Kidney Cancer Father      Other Cancer Father      Substance Abuse Father      Diabetes  Maternal Grandmother      Depression Sister      Breast Cancer No family hx of        Review of Systems:   !.  Weight Loss: No   2. Difficulty Breathing: No   3. Difficulty Swallowing: No   4. Pain: No    Physical Exam  B/P: 129/83, T: 99.5, P: 93, R: Data Unavailable  Vitals: /83 (BP Location: Right arm, Patient Position: Sitting, Cuff Size: Adult Regular)   Pulse 93   Temp 99.5  F (37.5  C) (Tympanic)   Wt 88.5 kg (195 lb)   SpO2 96%   BMI 35.67 kg/m    BMI= Body mass index is 35.67 kg/m .    General  Appearance - Normal  Head/Face/Scalp:    Skin - Normal    Facial Palpation - Normal    Facial Strength - Normal  Ears:    Pinna - 5 mm  cyst on the right lobule, still mildly inflammed    Canal - Normal   Tympanic membrane - Normal  Nose:    External - Normal    Septum - Normal    Turbinates - Normal    Middle meatus - Normal  Oral Cavity:    Lips - Normal    Floor of Mouth - Normal    Gingiva - Normal    Tongue - Normal    Buccal - Normal    Palate - Normal  Nasopharynx:    Oropharynx:    Tonsils - Normal    Tongue base - Normal    Soft palate - Normal    Posterior pharyngeal wall - Normal  Hypopharynx:  Larynx:    Epiglottis -     Aryepiglottic folds -     Arytenoids -     False vocal cords -     True vocal cords -  Neck Masses - No  Neck lymphatics - no lymphadenopathy  Thyroid - Normal  Salivary glands - Normal    Audiogram - not applicable  Radiology - not applicable   Reports:   View films:  Procedures - not applicable  Patient Education:     A/P - Apoorva Roberts is a 48 year old female  Medical Decision Making 1. Infected postauricular cyst. Will complete antibiotics, then schedule excision      Again, thank you for allowing me to participate in the care of your patient.        Sincerely,        Misbah Iverson MD

## 2024-10-10 NOTE — PROGRESS NOTES
Apoorva Roberts is a 48 year old female  Chief Complaint: Infected cyst, right ear lobule. Had this cyst for about 8 months. Got infected about 14 days ago. Finished Keflex, now on Bactrim. Infection nearly subsided  History of Present Illness  Location: Rt ear lobule  Quality:cyst  Severity:infected 5 mm  Duration: 2 weeks    Past Medical History -   Patient Active Problem List   Diagnosis    Gastroesophageal reflux disease    Chronic daily headache    Hidradenitis suppurativa    Benign essential hypertension    Major depressive disorder, recurrent episode, moderate (H)    Peripheral neuropathy    Marijuana use, continuous    Tobacco use disorder    Migraine without aura and without status migrainosus, not intractable    Cervical high risk HPV (human papillomavirus) test positive    PTSD (post-traumatic stress disorder)    GEORGIANA (generalized anxiety disorder)    Vitamin B12 deficiency (non anemic)    Restless leg syndrome    Elevated C-reactive protein (CRP)    Occipital headache    Hyperlipidemia LDL goal <130    Chronic superficial gastritis without bleeding    Hiatal hernia    Diarrhea due to malabsorption    History of peptic ulcer       Current Medications -   Current Outpatient Medications:     acetaminophen (TYLENOL) 325 MG tablet, Take 650 mg by mouth every 6 hours as needed for mild pain, Disp: , Rfl:     albuterol (PROAIR HFA/PROVENTIL HFA/VENTOLIN HFA) 108 (90 Base) MCG/ACT inhaler, Inhale 2 puffs into the lungs every 4 hours as needed for shortness of breath or wheezing, Disp: 8.5 g, Rfl: 0    busPIRone HCl (BUSPAR) 30 MG tablet, Take 1 tablet (30 mg) by mouth 2 times daily, Disp: 180 tablet, Rfl: 3    butalbital-acetaminophen-caffeine (ESGIC) -40 MG tablet, TAKE 1 TABLET BY MOUTH DAILY AS NEEDED FOR HEADACHE. NO MORE THAN 2 DAYS EVERY WEEK, Disp: 20 tablet, Rfl: 3    cholestyramine (QUESTRAN) 4 GM/DOSE powder, Take 4 g by mouth 2 times daily (with meals) Start with 4 gram at supper. Increase the dose  to twice a day if needed., Disp: 348 g, Rfl: 3    gabapentin (NEURONTIN) 400 MG capsule, TAKE 3 CAPSULES(1200 MG) BY MOUTH THREE TIMES DAILY, Disp: 270 capsule, Rfl: 5    hydrochlorothiazide (HYDRODIURIL) 25 MG tablet, Take 0.5 tablets (12.5 mg) by mouth daily, Disp: , Rfl:     IBUPROFEN PO, , Disp: , Rfl:     ipratropium - albuterol 0.5 mg/2.5 mg/3 mL (DUONEB) 0.5-2.5 (3) MG/3ML neb solution, Take 1 vial (3 mLs) by nebulization every 4 hours as needed for shortness of breath, wheezing or cough, Disp: 90 mL, Rfl: 3    lisinopril (ZESTRIL) 10 MG tablet, Take 1 tablet (10 mg) by mouth daily, Disp: 90 tablet, Rfl: 3    Loratadine-Pseudoephedrine (CLARITIN-D 24 HOUR PO), , Disp: , Rfl:     methocarbamol (ROBAXIN) 750 MG tablet, Take 1 tablet (750 mg) by mouth 4 times daily as needed for muscle spasms, Disp: 120 tablet, Rfl: 4    ondansetron (ZOFRAN ODT) 4 MG ODT tab, Take 1 tablet (4 mg) by mouth every 8 hours as needed for nausea (try taking one tablet upon awakening in the morning), Disp: 60 tablet, Rfl: 1    oxyCODONE (ROXICODONE) 5 MG tablet, Take 1-2 tablets (5-10 mg) by mouth every 8 hours as needed for pain., Disp: 42 tablet, Rfl: 0    oxyCODONE (ROXICODONE) 5 MG tablet, Take 1 tablet (5 mg) by mouth every 6 hours as needed for pain., Disp: 28 tablet, Rfl: 0    oxyCODONE (ROXICODONE) 5 MG tablet, Take 1 tablet (5 mg) by mouth every 8 hours as needed for severe pain., Disp: 20 tablet, Rfl: 0    RABEprazole (ACIPHEX) 20 MG EC tablet, Take 1 tablet (20 mg) by mouth daily, Disp: 30 tablet, Rfl: 11    rosuvastatin (CRESTOR) 20 MG tablet, Take 1 tablet (20 mg) by mouth daily, Disp: 90 tablet, Rfl: 3    sertraline (ZOLOFT) 25 MG tablet, Take 1 tablet (25 mg) by mouth daily with sertraline 50 mg for 75 mg total daily., Disp: 90 tablet, Rfl: 3    sertraline (ZOLOFT) 50 MG tablet, Take 1 tablet (50 mg) by mouth daily with sertraline 25 mg for 75 mg total daily., Disp: 90 tablet, Rfl: 3    sulfamethoxazole-trimethoprim  (BACTRIM DS) 800-160 MG tablet, Take 1 tablet by mouth 2 times daily for 10 days., Disp: 20 tablet, Rfl: 0    zolpidem (AMBIEN) 10 MG tablet, TAKE 1/2 TO 1 TABLET(5 TO 10 MG) BY MOUTH EVERY NIGHT AS NEEDED FOR SLEEP, Disp: 30 tablet, Rfl: 5    Current Facility-Administered Medications:     lidocaine 1 % injection 2 mL, 2 mL, , , , 2 mL at 07/03/24 1056    triamcinolone (KENALOG-40) injection 40 mg, 40 mg, , , , 40 mg at 07/03/24 1056    Allergies -   Allergies   Allergen Reactions    Indomethacin      Other reaction(s): GI Upset    Fluoxetine      Other reaction(s): Thrush    Paroxetine      Other reaction(s): Thrush       Social History -   Social History     Socioeconomic History    Marital status:      Spouse name: None    Number of children: None    Years of education: None    Highest education level: None   Tobacco Use    Smoking status: Every Day     Current packs/day: 1.00     Average packs/day: 1 pack/day for 30.0 years (30.0 ttl pk-yrs)     Types: Cigarettes     Passive exposure: Past    Smokeless tobacco: Never   Vaping Use    Vaping status: Never Used   Substance and Sexual Activity    Alcohol use: Not Currently     Comment: 1 or 2 a month    Drug use: Yes     Types: Marijuana     Comment: cbd    Sexual activity: Yes     Partners: Male     Birth control/protection: None   Other Topics Concern    Parent/sibling w/ CABG, MI or angioplasty before 65F 55M? No     Social Determinants of Health     Financial Resource Strain: Low Risk  (2/22/2024)    Financial Resource Strain     Within the past 12 months, have you or your family members you live with been unable to get utilities (heat, electricity) when it was really needed?: No   Food Insecurity: High Risk (2/22/2024)    Food Insecurity     Within the past 12 months, did you worry that your food would run out before you got money to buy more?: Yes     Within the past 12 months, did the food you bought just not last and you didn t have money to get  more?: Yes   Transportation Needs: Low Risk  (2/22/2024)    Transportation Needs     Within the past 12 months, has lack of transportation kept you from medical appointments, getting your medicines, non-medical meetings or appointments, work, or from getting things that you need?: No   Physical Activity: Insufficiently Active (2/22/2024)    Exercise Vital Sign     Days of Exercise per Week: 3 days     Minutes of Exercise per Session: 20 min   Stress: Stress Concern Present (2/22/2024)    [employer] [place] [employer] - Occupational Stress Questionnaire     Feeling of Stress : Rather much   Social Connections: Unknown (2/22/2024)    Social Connection and Isolation Panel [NHANES]     Frequency of Social Gatherings with Friends and Family: Twice a week   Interpersonal Safety: Low Risk  (2/27/2024)    Interpersonal Safety     Do you feel physically and emotionally safe where you currently live?: Yes     Within the past 12 months, have you been hit, slapped, kicked or otherwise physically hurt by someone?: No     Within the past 12 months, have you been humiliated or emotionally abused in other ways by your partner or ex-partner?: No   Housing Stability: Low Risk  (2/22/2024)    Housing Stability     Do you have housing? : Yes     Are you worried about losing your housing?: No       Family History -   Family History   Problem Relation Age of Onset    Diabetes Mother     Hypertension Mother     Hyperlipidemia Mother     Depression Mother     Substance Abuse Mother     Hypertension Father     Hyperlipidemia Father     Kidney Cancer Father     Other Cancer Father     Substance Abuse Father     Diabetes Maternal Grandmother     Depression Sister     Breast Cancer No family hx of        Review of Systems:   !.  Weight Loss: No   2. Difficulty Breathing: No   3. Difficulty Swallowing: No   4. Pain: No    Physical Exam  B/P: 129/83, T: 99.5, P: 93, R: Data Unavailable  Vitals: /83 (BP Location: Right arm,  Patient Position: Sitting, Cuff Size: Adult Regular)   Pulse 93   Temp 99.5  F (37.5  C) (Tympanic)   Wt 88.5 kg (195 lb)   SpO2 96%   BMI 35.67 kg/m    BMI= Body mass index is 35.67 kg/m .    General  Appearance - Normal  Head/Face/Scalp:    Skin - Normal    Facial Palpation - Normal    Facial Strength - Normal  Ears:    Pinna - 5 mm  cyst on the right lobule, still mildly inflammed    Canal - Normal   Tympanic membrane - Normal  Nose:    External - Normal    Septum - Normal    Turbinates - Normal    Middle meatus - Normal  Oral Cavity:    Lips - Normal    Floor of Mouth - Normal    Gingiva - Normal    Tongue - Normal    Buccal - Normal    Palate - Normal  Nasopharynx:    Oropharynx:    Tonsils - Normal    Tongue base - Normal    Soft palate - Normal    Posterior pharyngeal wall - Normal  Hypopharynx:  Larynx:    Epiglottis -     Aryepiglottic folds -     Arytenoids -     False vocal cords -     True vocal cords -  Neck Masses - No  Neck lymphatics - no lymphadenopathy  Thyroid - Normal  Salivary glands - Normal    Audiogram - not applicable  Radiology - not applicable   Reports:   View films:  Procedures - not applicable  Patient Education:     A/P - Apoorva WARD Armando is a 48 year old female  Medical Decision Making 1. Infected postauricular cyst. Will complete antibiotics, then schedule excision

## 2024-10-10 NOTE — NURSING NOTE
"Initial /83 (BP Location: Right arm, Patient Position: Sitting, Cuff Size: Adult Regular)   Pulse 93   Temp 99.5  F (37.5  C) (Tympanic)   Wt 88.5 kg (195 lb)   SpO2 96%   BMI 35.67 kg/m   Estimated body mass index is 35.67 kg/m  as calculated from the following:    Height as of 9/29/24: 1.575 m (5' 2\").    Weight as of this encounter: 88.5 kg (195 lb). .Monserrat Pratt MA on 10/10/2024 at 9:39 AM    "

## 2024-10-16 ENCOUNTER — TELEPHONE (OUTPATIENT)
Dept: FAMILY MEDICINE | Facility: CLINIC | Age: 49
End: 2024-10-16
Payer: COMMERCIAL

## 2024-10-16 NOTE — TELEPHONE ENCOUNTER
Per fax received from [facility] - RABEprazole (ACIPHEX) 20 MG EC tablet  is not covered by patient's Insurance Company  ELIDIA More - Please choose:  1.  Change medication that is not covered to a different medication and send new prescription to patient's pharmacy?  2.  Patient will need to pay for the non-covered medication out-of-pocket?   3.  Try for Prior Authorization with Insurance Company to get medication covered?        Key# [id number]

## 2024-10-18 ENCOUNTER — TELEPHONE (OUTPATIENT)
Dept: GASTROENTEROLOGY | Facility: CLINIC | Age: 49
End: 2024-10-18
Payer: COMMERCIAL

## 2024-10-18 NOTE — TELEPHONE ENCOUNTER
Retail Pharmacy Prior Authorization Team   Phone: [phone]        PA Initiation    Medication: RABEPRAZOLE SODIUM 20 MG PO [facility]  Insurance Company: [employer] - Phone [phone] Fax [phone]  Pharmacy Filling the Rx: [id number] - [place], [place] - [address] [place] [address] AVE AT [facility] OF [address] [place] [address]  Filling Pharmacy Phone: [phone]  Filling Pharmacy Fax: [phone]  Start Date: 10/18/2024

## 2024-10-18 NOTE — TELEPHONE ENCOUNTER
Pt explained they wanted to go to [facility] for [facility] procedure. Relayed [facility] coordinator number to the pt.

## 2024-10-21 NOTE — TELEPHONE ENCOUNTER
Retail Pharmacy Prior Authorization Team   Phone: [phone]      Prior Authorization Approval    Medication: RABEPRAZOLE SODIUM 20 MG PO [facility]  Authorization Effective Date: 7/21/2024  Authorization Expiration Date: 10/19/2025  Approved Dose/Quantity: UP TO 60 TABLETS PER 30 DAYS  Reference #:     Insurance Company: [employer] - Phone [phone] Fax [phone]  Expected CoPay: $    CoPay Card Available: No    Financial Assistance Needed:   Which Pharmacy is filling the prescription: [facility] #[id number] [address] AT [address]  Pharmacy Notified: YES  Patient Notified: **Instructed pharmacy to notify patient when script is ready to /ship.**

## 2024-10-30 ENCOUNTER — ANCILLARY PROCEDURE (OUTPATIENT)
Dept: MAMMOGRAPHY | Facility: CLINIC | Age: 49
End: 2024-10-30
Attending: NURSE PRACTITIONER
Payer: COMMERCIAL

## 2024-10-30 DIAGNOSIS — Z12.31 VISIT FOR SCREENING MAMMOGRAM: ICD-10-CM

## 2024-11-10 NOTE — PROGRESS NOTES
Apoorva Roberts is a 48 year old female  Chief Complaint: follow up for Infected cyst, right ear lobule. Had this cyst for about 8 months. Got infected about 14 days ago. Finished Keflex, now on Bactrim. Now reinfection, swollen and tender  History of Present Illness  Location: Rt ear lobule  Quality:cyst  Severity:infected 5 mm  Duration: 2 weeks    Past Medical History -   Patient Active Problem List   Diagnosis    Gastroesophageal reflux disease    Chronic daily headache    Hidradenitis suppurativa    Benign essential hypertension    Major depressive disorder, recurrent episode, moderate (H)    Peripheral neuropathy    Marijuana use, continuous    Tobacco use disorder    Migraine without aura and without status migrainosus, not intractable    Cervical high risk HPV (human papillomavirus) test positive    PTSD (post-traumatic stress disorder)    GEORGIANA (generalized anxiety disorder)    Vitamin B12 deficiency (non anemic)    Restless leg syndrome    Elevated C-reactive protein (CRP)    Occipital headache    Hyperlipidemia LDL goal <130    Chronic superficial gastritis without bleeding    Hiatal hernia    Diarrhea due to malabsorption    History of peptic ulcer    Class 2 severe obesity due to excess calories with serious comorbidity in adult (H)       Current Medications -   Current Outpatient Medications:     acetaminophen (TYLENOL) 325 MG tablet, Take 650 mg by mouth every 6 hours as needed for mild pain, Disp: , Rfl:     albuterol (PROAIR HFA/PROVENTIL HFA/VENTOLIN HFA) 108 (90 Base) MCG/ACT inhaler, Inhale 2 puffs into the lungs every 4 hours as needed for shortness of breath or wheezing, Disp: 8.5 g, Rfl: 0    busPIRone HCl (BUSPAR) 30 MG tablet, Take 1 tablet (30 mg) by mouth 2 times daily, Disp: 180 tablet, Rfl: 3    butalbital-acetaminophen-caffeine (ESGIC) -40 MG tablet, TAKE 1 TABLET BY MOUTH DAILY AS NEEDED FOR HEADACHE. NO MORE THAN 2 DAYS EVERY WEEK, Disp: 20 tablet, Rfl: 3    cholestyramine  (QUESTRAN) 4 GM/DOSE powder, Take 4 g by mouth 2 times daily (with meals) Start with 4 gram at supper. Increase the dose to twice a day if needed., Disp: 348 g, Rfl: 3    gabapentin (NEURONTIN) 400 MG capsule, TAKE 3 CAPSULES(1200 MG) BY MOUTH THREE TIMES DAILY, Disp: 270 capsule, Rfl: 5    hydrochlorothiazide (HYDRODIURIL) 25 MG tablet, Take 0.5 tablets (12.5 mg) by mouth daily, Disp: , Rfl:     IBUPROFEN PO, , Disp: , Rfl:     ipratropium - albuterol 0.5 mg/2.5 mg/3 mL (DUONEB) 0.5-2.5 (3) MG/3ML neb solution, Take 1 vial (3 mLs) by nebulization every 4 hours as needed for shortness of breath, wheezing or cough, Disp: 90 mL, Rfl: 3    lisinopril (ZESTRIL) 10 MG tablet, Take 1 tablet (10 mg) by mouth daily, Disp: 90 tablet, Rfl: 3    Loratadine-Pseudoephedrine (CLARITIN-D 24 HOUR PO), , Disp: , Rfl:     methocarbamol (ROBAXIN) 750 MG tablet, Take 1 tablet (750 mg) by mouth 4 times daily as needed for muscle spasms, Disp: 120 tablet, Rfl: 4    ondansetron (ZOFRAN ODT) 4 MG ODT tab, Take 1 tablet (4 mg) by mouth every 8 hours as needed for nausea (try taking one tablet upon awakening in the morning), Disp: 60 tablet, Rfl: 1    oxyCODONE (ROXICODONE) 5 MG tablet, Take 1-2 tablets (5-10 mg) by mouth every 8 hours as needed for pain., Disp: 42 tablet, Rfl: 0    oxyCODONE (ROXICODONE) 5 MG tablet, Take 1 tablet (5 mg) by mouth every 6 hours as needed for pain., Disp: 28 tablet, Rfl: 0    oxyCODONE (ROXICODONE) 5 MG tablet, Take 1 tablet (5 mg) by mouth every 8 hours as needed for severe pain., Disp: 20 tablet, Rfl: 0    RABEprazole (ACIPHEX) 20 MG EC tablet, Take 1 tablet (20 mg) by mouth daily, Disp: 30 tablet, Rfl: 11    rosuvastatin (CRESTOR) 20 MG tablet, Take 1 tablet (20 mg) by mouth daily, Disp: 90 tablet, Rfl: 3    sertraline (ZOLOFT) 25 MG tablet, Take 1 tablet (25 mg) by mouth daily with sertraline 50 mg for 75 mg total daily., Disp: 90 tablet, Rfl: 3    sertraline (ZOLOFT) 50 MG tablet, Take 1 tablet (50 mg)  by mouth daily with sertraline 25 mg for 75 mg total daily., Disp: 90 tablet, Rfl: 3    zolpidem (AMBIEN) 10 MG tablet, TAKE 1/2 TO 1 TABLET(5 TO 10 MG) BY MOUTH EVERY NIGHT AS NEEDED FOR SLEEP, Disp: 30 tablet, Rfl: 5    Current Facility-Administered Medications:     lidocaine 1 % injection 2 mL, 2 mL, , , , 2 mL at 07/03/24 1056    triamcinolone (KENALOG-40) injection 40 mg, 40 mg, , , , 40 mg at 07/03/24 1056    Allergies -   Allergies   Allergen Reactions    Indomethacin      Other reaction(s): GI Upset    Fluoxetine      Other reaction(s): Thrush    Paroxetine      Other reaction(s): Thrush       Social History -   Social History     Socioeconomic History    Marital status:      Spouse name: None    Number of children: None    Years of education: None    Highest education level: None   Tobacco Use    Smoking status: Every Day     Current packs/day: 1.00     Average packs/day: 1 pack/day for 30.0 years (30.0 ttl pk-yrs)     Types: Cigarettes     Passive exposure: Past    Smokeless tobacco: Never   Vaping Use    Vaping status: Never Used   Substance and Sexual Activity    Alcohol use: Not Currently     Comment: 1 or 2 a month    Drug use: Yes     Types: Marijuana     Comment: cbd    Sexual activity: Yes     Partners: Male     Birth control/protection: None   Other Topics Concern    Parent/sibling w/ CABG, MI or angioplasty before 65F 55M? No     Social Determinants of Health     Financial Resource Strain: Low Risk  (2/22/2024)    Financial Resource Strain     Within the past 12 months, have you or your family members you live with been unable to get utilities (heat, electricity) when it was really needed?: No   Food Insecurity: High Risk (2/22/2024)    Food Insecurity     Within the past 12 months, did you worry that your food would run out before you got money to buy more?: Yes     Within the past 12 months, did the food you bought just not last and you didn t have money to get more?: Yes   Transportation  Needs: Low Risk  (2/22/2024)    Transportation Needs     Within the past 12 months, has lack of transportation kept you from medical appointments, getting your medicines, non-medical meetings or appointments, work, or from getting things that you need?: No   Physical Activity: Insufficiently Active (2/22/2024)    Exercise Vital Sign     Days of Exercise per Week: 3 days     Minutes of Exercise per Session: 20 min   Stress: Stress Concern Present (2/22/2024)    [employer] [place] [employer] - Occupational Stress Questionnaire     Feeling of Stress : Rather much   Social Connections: Unknown (2/22/2024)    Social Connection and Isolation Panel [NHANES]     Frequency of Social Gatherings with Friends and Family: Twice a week   Interpersonal Safety: Low Risk  (2/27/2024)    Interpersonal Safety     Do you feel physically and emotionally safe where you currently live?: Yes     Within the past 12 months, have you been hit, slapped, kicked or otherwise physically hurt by someone?: No     Within the past 12 months, have you been humiliated or emotionally abused in other ways by your partner or ex-partner?: No   Housing Stability: Low Risk  (2/22/2024)    Housing Stability     Do you have housing? : Yes     Are you worried about losing your housing?: No       Family History -   Family History   Problem Relation Age of Onset    Diabetes Mother     Hypertension Mother     Hyperlipidemia Mother     Depression Mother     Substance Abuse Mother     Hypertension Father     Hyperlipidemia Father     Kidney Cancer Father     Other Cancer Father     Substance Abuse Father     Diabetes Maternal Grandmother     Depression Sister     Breast Cancer No family hx of        Review of Systems:   !.  Weight Loss: No   2. Difficulty Breathing: No   3. Difficulty Swallowing: No   4. Pain: No    Physical Exam  B/P: 129/83, T: 99.5, P: 93, R: Data Unavailable  Vitals: There were no vitals taken for this visit.  BMI= There is no height  or weight on file to calculate BMI.    General  Appearance - Normal  Head/Face/Scalp:    Skin - Normal    Facial Palpation - Normal    Facial Strength - Normal  Ears:    Pinna - 5 mm  cyst on the right lobule, still mildly inflammed    Canal - Normal   Tympanic membrane - Normal  Nose:    External - Normal    Septum - Normal    Turbinates - Normal    Middle meatus - Normal  Oral Cavity:    Lips - Normal    Floor of Mouth - Normal    Gingiva - Normal    Tongue - Normal    Buccal - Normal    Palate - Normal  Nasopharynx:    Oropharynx:    Tonsils - Normal    Tongue base - Normal    Soft palate - Normal    Posterior pharyngeal wall - Normal  Hypopharynx:  Larynx:    Epiglottis -     Aryepiglottic folds -     Arytenoids -     False vocal cords -     True vocal cords -  Neck Masses - No  Neck lymphatics - no lymphadenopathy  Thyroid - Normal  Salivary glands - Normal    Audiogram - not applicable  Radiology - not applicable   Reports:   View films:  Procedures - Lidocaine injected for anesthesia. Incision was made with 11 blade through the wall of infected right postauricular cyst and about o.5 ml of pus evacuated  Patient Education:     A/P - Apoorva ED Roberts is a 48 year old female  Medical Decision Making 1. Infected postauricular cyst. - incision and drainage performed  Will reschedule excision

## 2024-11-11 ENCOUNTER — E-VISIT (OUTPATIENT)
Dept: FAMILY MEDICINE | Facility: CLINIC | Age: 49
End: 2024-11-11
Payer: COMMERCIAL

## 2024-11-11 DIAGNOSIS — L72.0 EPIDERMAL CYST OF EAR: Primary | ICD-10-CM

## 2024-11-11 DIAGNOSIS — M25.511 ACUTE PAIN OF RIGHT SHOULDER: ICD-10-CM

## 2024-11-11 RX ORDER — SULFAMETHOXAZOLE AND TRIMETHOPRIM 800; 160 MG/1; MG/1
1 TABLET ORAL 2 TIMES DAILY
Qty: 14 TABLET | Refills: 0 | Status: SHIPPED | OUTPATIENT
Start: 2024-11-11 | End: 2024-11-18

## 2024-11-11 RX ORDER — OXYCODONE HYDROCHLORIDE 5 MG/1
5 TABLET ORAL EVERY 6 HOURS PRN
Qty: 28 TABLET | Refills: 0 | Status: SHIPPED | OUTPATIENT
Start: 2024-11-11

## 2024-11-11 NOTE — PATIENT INSTRUCTIONS
I have refilled your medication:  Orders Placed This Encounter   Medications     sulfamethoxazole-trimethoprim (BACTRIM DS) 800-160 MG tablet     Sig: Take 1 tablet by mouth 2 times daily for 7 days.     Dispense:  14 tablet     Refill:  0     oxyCODONE (ROXICODONE) 5 MG tablet     Sig: Take 1 tablet (5 mg) by mouth every 6 hours as needed for pain.     Dispense:  28 tablet     Refill:  0        View your full visit summary for details by clicking on the link below. Your pharmacist will be able to address any questions you may have about the medication.      Thank you for choosing us for your care.

## 2024-11-13 ENCOUNTER — OFFICE VISIT (OUTPATIENT)
Dept: OTOLARYNGOLOGY | Facility: CLINIC | Age: 49
End: 2024-11-13
Payer: COMMERCIAL

## 2024-11-13 VITALS
WEIGHT: 180 LBS | HEART RATE: 98 BPM | TEMPERATURE: 98.2 F | DIASTOLIC BLOOD PRESSURE: 74 MMHG | SYSTOLIC BLOOD PRESSURE: 119 MMHG | BODY MASS INDEX: 32.92 KG/M2

## 2024-11-13 DIAGNOSIS — L72.0 INFECTED EPIDERMOID CYST: Primary | ICD-10-CM

## 2024-11-13 DIAGNOSIS — L08.9 INFECTED EPIDERMOID CYST: Primary | ICD-10-CM

## 2024-11-13 PROCEDURE — 10060 I&D ABSCESS SIMPLE/SINGLE: CPT | Performed by: OTOLARYNGOLOGY

## 2024-11-13 PROCEDURE — 99213 OFFICE O/P EST LOW 20 MIN: CPT | Mod: 25 | Performed by: OTOLARYNGOLOGY

## 2024-11-13 ASSESSMENT — PAIN SCALES - GENERAL: PAINLEVEL_OUTOF10: NO PAIN (0)

## 2024-11-13 NOTE — LETTER
11/13/2024      Apoorva Roberts  [address] [facility] [address]      Dear Colleague,    Thank you for referring your patient, Apoorva Roberts, to the [facility]. Please see a copy of my visit note below.    Apoorva Roberts is a 48 year old female  Chief Complaint: follow up for Infected cyst, right ear lobule. Had this cyst for about 8 months. Got infected about 14 days ago. Finished Keflex, now on Bactrim. Now reinfection, swollen and tender  History of Present Illness  Location: Rt ear lobule  Quality:cyst  Severity:infected 5 mm  Duration: 2 weeks    Past Medical History -   Patient Active Problem List   Diagnosis     Gastroesophageal reflux disease     Chronic daily headache     Hidradenitis suppurativa     Benign essential hypertension     Major depressive disorder, recurrent episode, moderate (H)     Peripheral neuropathy     Marijuana use, continuous     Tobacco use disorder     Migraine without aura and without status migrainosus, not intractable     Cervical high risk HPV (human papillomavirus) test positive     PTSD (post-traumatic stress disorder)     GEORGIANA (generalized anxiety disorder)     Vitamin B12 deficiency (non anemic)     Restless leg syndrome     Elevated C-reactive protein (CRP)     Occipital headache     Hyperlipidemia LDL goal <130     Chronic superficial gastritis without bleeding     Hiatal hernia     Diarrhea due to malabsorption     History of peptic ulcer     Class 2 severe obesity due to excess calories with serious comorbidity in adult (H)       Current Medications -   Current Outpatient Medications:      acetaminophen (TYLENOL) 325 MG tablet, Take 650 mg by mouth every 6 hours as needed for mild pain, Disp: , Rfl:      albuterol (PROAIR HFA/PROVENTIL HFA/VENTOLIN HFA) 108 (90 Base) MCG/ACT inhaler, Inhale 2 puffs into the lungs every 4 hours as needed for shortness of breath or wheezing, Disp: 8.5 g, Rfl: 0     busPIRone HCl (BUSPAR) 30 MG tablet, Take 1 tablet (30  mg) by mouth 2 times daily, Disp: 180 tablet, Rfl: 3     butalbital-acetaminophen-caffeine (ESGIC) -40 MG tablet, TAKE 1 TABLET BY MOUTH DAILY AS NEEDED FOR HEADACHE. NO MORE THAN 2 DAYS EVERY WEEK, Disp: 20 tablet, Rfl: 3     cholestyramine (QUESTRAN) 4 GM/DOSE powder, Take 4 g by mouth 2 times daily (with meals) Start with 4 gram at supper. Increase the dose to twice a day if needed., Disp: 348 g, Rfl: 3     gabapentin (NEURONTIN) 400 MG capsule, TAKE 3 CAPSULES(1200 MG) BY MOUTH THREE TIMES DAILY, Disp: 270 capsule, Rfl: 5     hydrochlorothiazide (HYDRODIURIL) 25 MG tablet, Take 0.5 tablets (12.5 mg) by mouth daily, Disp: , Rfl:      IBUPROFEN PO, , Disp: , Rfl:      ipratropium - albuterol 0.5 mg/2.5 mg/3 mL (DUONEB) 0.5-2.5 (3) MG/3ML neb solution, Take 1 vial (3 mLs) by nebulization every 4 hours as needed for shortness of breath, wheezing or cough, Disp: 90 mL, Rfl: 3     lisinopril (ZESTRIL) 10 MG tablet, Take 1 tablet (10 mg) by mouth daily, Disp: 90 tablet, Rfl: 3     Loratadine-Pseudoephedrine (CLARITIN-D 24 HOUR PO), , Disp: , Rfl:      methocarbamol (ROBAXIN) 750 MG tablet, Take 1 tablet (750 mg) by mouth 4 times daily as needed for muscle spasms, Disp: 120 tablet, Rfl: 4     ondansetron (ZOFRAN ODT) 4 MG ODT tab, Take 1 tablet (4 mg) by mouth every 8 hours as needed for nausea (try taking one tablet upon awakening in the morning), Disp: 60 tablet, Rfl: 1     oxyCODONE (ROXICODONE) 5 MG tablet, Take 1-2 tablets (5-10 mg) by mouth every 8 hours as needed for pain., Disp: 42 tablet, Rfl: 0     oxyCODONE (ROXICODONE) 5 MG tablet, Take 1 tablet (5 mg) by mouth every 6 hours as needed for pain., Disp: 28 tablet, Rfl: 0     oxyCODONE (ROXICODONE) 5 MG tablet, Take 1 tablet (5 mg) by mouth every 8 hours as needed for severe pain., Disp: 20 tablet, Rfl: 0     RABEprazole (ACIPHEX) 20 MG EC tablet, Take 1 tablet (20 mg) by mouth daily, Disp: 30 tablet, Rfl: 11     rosuvastatin (CRESTOR) 20 MG tablet, Take  1 tablet (20 mg) by mouth daily, Disp: 90 tablet, Rfl: 3     sertraline (ZOLOFT) 25 MG tablet, Take 1 tablet (25 mg) by mouth daily with sertraline 50 mg for 75 mg total daily., Disp: 90 tablet, Rfl: 3     sertraline (ZOLOFT) 50 MG tablet, Take 1 tablet (50 mg) by mouth daily with sertraline 25 mg for 75 mg total daily., Disp: 90 tablet, Rfl: 3     zolpidem (AMBIEN) 10 MG tablet, TAKE 1/2 TO 1 TABLET(5 TO 10 MG) BY MOUTH EVERY NIGHT AS NEEDED FOR SLEEP, Disp: 30 tablet, Rfl: 5    Current Facility-Administered Medications:      lidocaine 1 % injection 2 mL, 2 mL, , , , 2 mL at 07/03/24 1056     triamcinolone (KENALOG-40) injection 40 mg, 40 mg, , , , 40 mg at 07/03/24 1056    Allergies -   Allergies   Allergen Reactions     Indomethacin      Other reaction(s): GI Upset     Fluoxetine      Other reaction(s): Thrush     Paroxetine      Other reaction(s): Thrush       Social History -   Social History     Socioeconomic History     Marital status:      Spouse name: None     Number of children: None     Years of education: None     Highest education level: None   Tobacco Use     Smoking status: Every Day     Current packs/day: 1.00     Average packs/day: 1 pack/day for 30.0 years (30.0 ttl pk-yrs)     Types: Cigarettes     Passive exposure: Past     Smokeless tobacco: Never   Vaping Use     Vaping status: Never Used   Substance and Sexual Activity     Alcohol use: Not Currently     Comment: 1 or 2 a month     Drug use: Yes     Types: Marijuana     Comment: cbd     Sexual activity: Yes     Partners: Male     Birth control/protection: None   Other Topics Concern     Parent/sibling w/ CABG, MI or angioplasty before 65F 55M? No     Social Determinants of Health     Financial Resource Strain: Low Risk  (2/22/2024)    Financial Resource Strain      Within the past 12 months, have you or your family members you live with been unable to get utilities (heat, electricity) when it was really needed?: No   Food Insecurity:  High Risk (2/22/2024)    Food Insecurity      Within the past 12 months, did you worry that your food would run out before you got money to buy more?: Yes      Within the past 12 months, did the food you bought just not last and you didn t have money to get more?: Yes   Transportation Needs: Low Risk  (2/22/2024)    Transportation Needs      Within the past 12 months, has lack of transportation kept you from medical appointments, getting your medicines, non-medical meetings or appointments, work, or from getting things that you need?: No   Physical Activity: Insufficiently Active (2/22/2024)    Exercise Vital Sign      Days of Exercise per Week: 3 days      Minutes of Exercise per Session: 20 min   Stress: Stress Concern Present (2/22/2024)    [employer] [place] [employer] - Occupational Stress Questionnaire      Feeling of Stress : Rather much   Social Connections: Unknown (2/22/2024)    Social Connection and Isolation Panel [NHANES]      Frequency of Social Gatherings with Friends and Family: Twice a week   Interpersonal Safety: Low Risk  (2/27/2024)    Interpersonal Safety      Do you feel physically and emotionally safe where you currently live?: Yes      Within the past 12 months, have you been hit, slapped, kicked or otherwise physically hurt by someone?: No      Within the past 12 months, have you been humiliated or emotionally abused in other ways by your partner or ex-partner?: No   Housing Stability: Low Risk  (2/22/2024)    Housing Stability      Do you have housing? : Yes      Are you worried about losing your housing?: No       Family History -   Family History   Problem Relation Age of Onset     Diabetes Mother      Hypertension Mother      Hyperlipidemia Mother      Depression Mother      Substance Abuse Mother      Hypertension Father      Hyperlipidemia Father      Kidney Cancer Father      Other Cancer Father      Substance Abuse Father      Diabetes Maternal Grandmother      Depression  Sister      Breast Cancer No family hx of        Review of Systems:   !.  Weight Loss: No   2. Difficulty Breathing: No   3. Difficulty Swallowing: No   4. Pain: No    Physical Exam  B/P: 129/83, T: 99.5, P: 93, R: Data Unavailable  Vitals: There were no vitals taken for this visit.  BMI= There is no height or weight on file to calculate BMI.    General  Appearance - Normal  Head/Face/Scalp:    Skin - Normal    Facial Palpation - Normal    Facial Strength - Normal  Ears:    Pinna - 5 mm  cyst on the right lobule, still mildly inflammed    Canal - Normal   Tympanic membrane - Normal  Nose:    External - Normal    Septum - Normal    Turbinates - Normal    Middle meatus - Normal  Oral Cavity:    Lips - Normal    Floor of Mouth - Normal    Gingiva - Normal    Tongue - Normal    Buccal - Normal    Palate - Normal  Nasopharynx:    Oropharynx:    Tonsils - Normal    Tongue base - Normal    Soft palate - Normal    Posterior pharyngeal wall - Normal  Hypopharynx:  Larynx:    Epiglottis -     Aryepiglottic folds -     Arytenoids -     False vocal cords -     True vocal cords -  Neck Masses - No  Neck lymphatics - no lymphadenopathy  Thyroid - Normal  Salivary glands - Normal    Audiogram - not applicable  Radiology - not applicable   Reports:   View films:  Procedures - Lidocaine injected for anesthesia. Incision was made with 11 blade through the wall of infected right postauricular cyst and about o.5 ml of pus evacuated  Patient Education:     A/P - Apoorva Roberts is a 48 year old female  Medical Decision Making 1. Infected postauricular cyst. - incision and drainage performed  Will reschedule excision      Again, thank you for allowing me to participate in the care of your patient.        Sincerely,        Misbah Iverson MD

## 2024-11-17 NOTE — PROGRESS NOTES
Apoorva Roberts is a 48 year old female  Chief Complaint: Remove right ear cyst  History of Present Illness  Location:  Quality:  Severity:  Duration:    Past Medical History -   Patient Active Problem List   Diagnosis    Gastroesophageal reflux disease    Chronic daily headache    Hidradenitis suppurativa    Benign essential hypertension    Major depressive disorder, recurrent episode, moderate (H)    Peripheral neuropathy    Marijuana use, continuous    Tobacco use disorder    Migraine without aura and without status migrainosus, not intractable    Cervical high risk HPV (human papillomavirus) test positive    PTSD (post-traumatic stress disorder)    GEORGIANA (generalized anxiety disorder)    Vitamin B12 deficiency (non anemic)    Restless leg syndrome    Elevated C-reactive protein (CRP)    Occipital headache    Hyperlipidemia LDL goal <130    Chronic superficial gastritis without bleeding    Hiatal hernia    Diarrhea due to malabsorption    History of peptic ulcer    Class 2 severe obesity due to excess calories with serious comorbidity in adult (H)       Current Medications -   Current Outpatient Medications:     acetaminophen (TYLENOL) 325 MG tablet, Take 650 mg by mouth every 6 hours as needed for mild pain, Disp: , Rfl:     albuterol (PROAIR HFA/PROVENTIL HFA/VENTOLIN HFA) 108 (90 Base) MCG/ACT inhaler, Inhale 2 puffs into the lungs every 4 hours as needed for shortness of breath or wheezing, Disp: 8.5 g, Rfl: 0    busPIRone HCl (BUSPAR) 30 MG tablet, Take 1 tablet (30 mg) by mouth 2 times daily, Disp: 180 tablet, Rfl: 3    butalbital-acetaminophen-caffeine (ESGIC) -40 MG tablet, TAKE 1 TABLET BY MOUTH DAILY AS NEEDED FOR HEADACHE. NO MORE THAN 2 DAYS EVERY WEEK, Disp: 20 tablet, Rfl: 3    cholestyramine (QUESTRAN) 4 GM/DOSE powder, Take 4 g by mouth 2 times daily (with meals) Start with 4 gram at supper. Increase the dose to twice a day if needed., Disp: 348 g, Rfl: 3    gabapentin (NEURONTIN) 400 MG  capsule, TAKE 3 CAPSULES(1200 MG) BY MOUTH THREE TIMES DAILY, Disp: 270 capsule, Rfl: 5    hydrochlorothiazide (HYDRODIURIL) 25 MG tablet, Take 0.5 tablets (12.5 mg) by mouth daily, Disp: , Rfl:     IBUPROFEN PO, , Disp: , Rfl:     ipratropium - albuterol 0.5 mg/2.5 mg/3 mL (DUONEB) 0.5-2.5 (3) MG/3ML neb solution, Take 1 vial (3 mLs) by nebulization every 4 hours as needed for shortness of breath, wheezing or cough, Disp: 90 mL, Rfl: 3    lisinopril (ZESTRIL) 10 MG tablet, Take 1 tablet (10 mg) by mouth daily, Disp: 90 tablet, Rfl: 3    Loratadine-Pseudoephedrine (CLARITIN-D 24 HOUR PO), , Disp: , Rfl:     methocarbamol (ROBAXIN) 750 MG tablet, Take 1 tablet (750 mg) by mouth 4 times daily as needed for muscle spasms, Disp: 120 tablet, Rfl: 4    ondansetron (ZOFRAN ODT) 4 MG ODT tab, Take 1 tablet (4 mg) by mouth every 8 hours as needed for nausea (try taking one tablet upon awakening in the morning), Disp: 60 tablet, Rfl: 1    oxyCODONE (ROXICODONE) 5 MG tablet, Take 1 tablet (5 mg) by mouth every 6 hours as needed for pain., Disp: 28 tablet, Rfl: 0    oxyCODONE (ROXICODONE) 5 MG tablet, Take 1-2 tablets (5-10 mg) by mouth every 8 hours as needed for pain., Disp: 42 tablet, Rfl: 0    oxyCODONE (ROXICODONE) 5 MG tablet, Take 1 tablet (5 mg) by mouth every 8 hours as needed for severe pain., Disp: 20 tablet, Rfl: 0    RABEprazole (ACIPHEX) 20 MG EC tablet, Take 1 tablet (20 mg) by mouth daily, Disp: 30 tablet, Rfl: 11    rosuvastatin (CRESTOR) 20 MG tablet, Take 1 tablet (20 mg) by mouth daily, Disp: 90 tablet, Rfl: 3    sertraline (ZOLOFT) 25 MG tablet, Take 1 tablet (25 mg) by mouth daily with sertraline 50 mg for 75 mg total daily., Disp: 90 tablet, Rfl: 3    sertraline (ZOLOFT) 50 MG tablet, Take 1 tablet (50 mg) by mouth daily with sertraline 25 mg for 75 mg total daily., Disp: 90 tablet, Rfl: 3    sulfamethoxazole-trimethoprim (BACTRIM DS) 800-160 MG tablet, Take 1 tablet by mouth 2 times daily for 7 days.,  Disp: 14 tablet, Rfl: 0    zolpidem (AMBIEN) 10 MG tablet, TAKE 1/2 TO 1 TABLET(5 TO 10 MG) BY MOUTH EVERY NIGHT AS NEEDED FOR SLEEP, Disp: 30 tablet, Rfl: 5    Current Facility-Administered Medications:     lidocaine 1 % injection 2 mL, 2 mL, , , , 2 mL at 07/03/24 1056    triamcinolone (KENALOG-40) injection 40 mg, 40 mg, , , , 40 mg at 07/03/24 1056    Allergies -   Allergies   Allergen Reactions    Indomethacin      Other reaction(s): GI Upset    Fluoxetine      Other reaction(s): Thrush    Paroxetine      Other reaction(s): Thrush       Social History -   Social History     Socioeconomic History    Marital status:    Tobacco Use    Smoking status: Every Day     Current packs/day: 1.00     Average packs/day: 1 pack/day for 30.0 years (30.0 ttl pk-yrs)     Types: Cigarettes     Passive exposure: Past    Smokeless tobacco: Never   Vaping Use    Vaping status: Never Used   Substance and Sexual Activity    Alcohol use: Not Currently     Comment: 1 or 2 a month    Drug use: Yes     Types: Marijuana     Comment: cbd    Sexual activity: Yes     Partners: Male     Birth control/protection: None   Other Topics Concern    Parent/sibling w/ CABG, MI or angioplasty before 65F 55M? No     Social Drivers of Health     Financial Resource Strain: Low Risk  (2/22/2024)    Financial Resource Strain     Within the past 12 months, have you or your family members you live with been unable to get utilities (heat, electricity) when it was really needed?: No   Food Insecurity: High Risk (2/22/2024)    Food Insecurity     Within the past 12 months, did you worry that your food would run out before you got money to buy more?: Yes     Within the past 12 months, did the food you bought just not last and you didn t have money to get more?: Yes   Transportation Needs: Low Risk  (2/22/2024)    Transportation Needs     Within the past 12 months, has lack of transportation kept you from medical appointments, getting your medicines,  non-medical meetings or appointments, work, or from getting things that you need?: No   Physical Activity: Insufficiently Active (2/22/2024)    Exercise Vital Sign     Days of Exercise per Week: 3 days     Minutes of Exercise per Session: 20 min   Stress: Stress Concern Present (2/22/2024)    [employer] [place] [employer] - Occupational Stress Questionnaire     Feeling of Stress : Rather much   Social Connections: Unknown (2/22/2024)    Social Connection and Isolation Panel [NHANES]     Frequency of Social Gatherings with Friends and Family: Twice a week   Interpersonal Safety: Low Risk  (2/27/2024)    Interpersonal Safety     Do you feel physically and emotionally safe where you currently live?: Yes     Within the past 12 months, have you been hit, slapped, kicked or otherwise physically hurt by someone?: No     Within the past 12 months, have you been humiliated or emotionally abused in other ways by your partner or ex-partner?: No   Housing Stability: Low Risk  (2/22/2024)    Housing Stability     Do you have housing? : Yes     Are you worried about losing your housing?: No       Family History -   Family History   Problem Relation Age of Onset    Diabetes Mother     Hypertension Mother     Hyperlipidemia Mother     Depression Mother     Substance Abuse Mother     Hypertension Father     Hyperlipidemia Father     Kidney Cancer Father     Other Cancer Father     Substance Abuse Father     Diabetes Maternal Grandmother     Depression Sister     Breast Cancer No family hx of        Review of Systems:   !.  Weight Loss: No   2. Difficulty Breathing: No   3. Difficulty Swallowing: No   4. Pain: No    Physical Exam  B/P: Data Unavailable, T: Data Unavailable, P: Data Unavailable, R: Data Unavailable  Vitals: There were no vitals taken for this visit.  BMI= There is no height or weight on file to calculate BMI.    General  Appearance - Normal  Head/Face/Scalp:    Skin - Normal    Facial Palpation - Normal     Facial Strength - Normal  Ears:    Pinna - Normal    Canal - Normal   Tympanic membrane - Normal  Nose:    External - Normal    Septum - Normal    Turbinates - Normal    Middle meatus - Normal  Oral Cavity:    Lips - Normal    Floor of Mouth - Normal    Gingiva - Normal    Tongue - Normal    Buccal - Normal    Palate - Normal  Nasopharynx:    Oropharynx:    Tonsils - Normal    Tongue base - Normal    Soft palate - Normal    Posterior pharyngeal wall - Normal  Hypopharynx:  Larynx:    Epiglottis -     Aryepiglottic folds -     Arytenoids -     False vocal cords -     True vocal cords -  Neck Masses - No  Neck lymphatics - no lymphadenopathy  Thyroid - Normal  Salivary glands - Normal    Audiogram - not applicable  Radiology - not applicable   Reports:   View films:  Procedures - postauricular cyst excised, rightr ear. 1% lidocaine injected. 6 mm incision made around the cyst and the tissue removed with tissue scissors. $-O Monocryl used to close in a running fashion  Patient Education:     A/P - Apoorva Roberts is a 48 year old female  Medical Decision Making 1. Excision of right postauricular cyst

## 2024-11-20 ENCOUNTER — OFFICE VISIT (OUTPATIENT)
Dept: OTOLARYNGOLOGY | Facility: CLINIC | Age: 49
End: 2024-11-20
Payer: COMMERCIAL

## 2024-11-20 VITALS
TEMPERATURE: 97.8 F | BODY MASS INDEX: 32.92 KG/M2 | HEART RATE: 94 BPM | SYSTOLIC BLOOD PRESSURE: 120 MMHG | DIASTOLIC BLOOD PRESSURE: 76 MMHG | WEIGHT: 180 LBS

## 2024-11-20 DIAGNOSIS — Q18.1 EAR CYSTS: Primary | ICD-10-CM

## 2024-11-20 ASSESSMENT — PAIN SCALES - GENERAL: PAINLEVEL_OUTOF10: NO PAIN (0)

## 2024-11-20 NOTE — LETTER
11/20/2024      Apoorva Roberts  [id number] [address] [address]      Dear Colleague,    Thank you for referring your patient, Apoorva Roberts, to the [facility]. Please see a copy of my visit note below.    Apoorva Roberts is a 48 year old female  Chief Complaint: Remove right ear cyst  History of Present Illness  Location:  Quality:  Severity:  Duration:    Past Medical History -   Patient Active Problem List   Diagnosis     Gastroesophageal reflux disease     Chronic daily headache     Hidradenitis suppurativa     Benign essential hypertension     Major depressive disorder, recurrent episode, moderate (H)     Peripheral neuropathy     Marijuana use, continuous     Tobacco use disorder     Migraine without aura and without status migrainosus, not intractable     Cervical high risk HPV (human papillomavirus) test positive     PTSD (post-traumatic stress disorder)     GEORGIANA (generalized anxiety disorder)     Vitamin B12 deficiency (non anemic)     Restless leg syndrome     Elevated C-reactive protein (CRP)     Occipital headache     Hyperlipidemia LDL goal <130     Chronic superficial gastritis without bleeding     Hiatal hernia     Diarrhea due to malabsorption     History of peptic ulcer     Class 2 severe obesity due to excess calories with serious comorbidity in adult (H)       Current Medications -   Current Outpatient Medications:      acetaminophen (TYLENOL) 325 MG tablet, Take 650 mg by mouth every 6 hours as needed for mild pain, Disp: , Rfl:      albuterol (PROAIR HFA/PROVENTIL HFA/VENTOLIN HFA) 108 (90 Base) MCG/ACT inhaler, Inhale 2 puffs into the lungs every 4 hours as needed for shortness of breath or wheezing, Disp: 8.5 g, Rfl: 0     busPIRone HCl (BUSPAR) 30 MG tablet, Take 1 tablet (30 mg) by mouth 2 times daily, Disp: 180 tablet, Rfl: 3     butalbital-acetaminophen-caffeine (ESGIC) -40 MG tablet, TAKE 1 TABLET BY MOUTH DAILY AS NEEDED FOR HEADACHE. NO MORE THAN 2 DAYS EVERY  WEEK, Disp: 20 tablet, Rfl: 3     cholestyramine (QUESTRAN) 4 GM/DOSE powder, Take 4 g by mouth 2 times daily (with meals) Start with 4 gram at supper. Increase the dose to twice a day if needed., Disp: 348 g, Rfl: 3     gabapentin (NEURONTIN) 400 MG capsule, TAKE 3 CAPSULES(1200 MG) BY MOUTH THREE TIMES DAILY, Disp: 270 capsule, Rfl: 5     hydrochlorothiazide (HYDRODIURIL) 25 MG tablet, Take 0.5 tablets (12.5 mg) by mouth daily, Disp: , Rfl:      IBUPROFEN PO, , Disp: , Rfl:      ipratropium - albuterol 0.5 mg/2.5 mg/3 mL (DUONEB) 0.5-2.5 (3) MG/3ML neb solution, Take 1 vial (3 mLs) by nebulization every 4 hours as needed for shortness of breath, wheezing or cough, Disp: 90 mL, Rfl: 3     lisinopril (ZESTRIL) 10 MG tablet, Take 1 tablet (10 mg) by mouth daily, Disp: 90 tablet, Rfl: 3     Loratadine-Pseudoephedrine (CLARITIN-D 24 HOUR PO), , Disp: , Rfl:      methocarbamol (ROBAXIN) 750 MG tablet, Take 1 tablet (750 mg) by mouth 4 times daily as needed for muscle spasms, Disp: 120 tablet, Rfl: 4     ondansetron (ZOFRAN ODT) 4 MG ODT tab, Take 1 tablet (4 mg) by mouth every 8 hours as needed for nausea (try taking one tablet upon awakening in the morning), Disp: 60 tablet, Rfl: 1     oxyCODONE (ROXICODONE) 5 MG tablet, Take 1 tablet (5 mg) by mouth every 6 hours as needed for pain., Disp: 28 tablet, Rfl: 0     oxyCODONE (ROXICODONE) 5 MG tablet, Take 1-2 tablets (5-10 mg) by mouth every 8 hours as needed for pain., Disp: 42 tablet, Rfl: 0     oxyCODONE (ROXICODONE) 5 MG tablet, Take 1 tablet (5 mg) by mouth every 8 hours as needed for severe pain., Disp: 20 tablet, Rfl: 0     RABEprazole (ACIPHEX) 20 MG EC tablet, Take 1 tablet (20 mg) by mouth daily, Disp: 30 tablet, Rfl: 11     rosuvastatin (CRESTOR) 20 MG tablet, Take 1 tablet (20 mg) by mouth daily, Disp: 90 tablet, Rfl: 3     sertraline (ZOLOFT) 25 MG tablet, Take 1 tablet (25 mg) by mouth daily with sertraline 50 mg for 75 mg total daily., Disp: 90 tablet, Rfl:  3     sertraline (ZOLOFT) 50 MG tablet, Take 1 tablet (50 mg) by mouth daily with sertraline 25 mg for 75 mg total daily., Disp: 90 tablet, Rfl: 3     sulfamethoxazole-trimethoprim (BACTRIM DS) 800-160 MG tablet, Take 1 tablet by mouth 2 times daily for 7 days., Disp: 14 tablet, Rfl: 0     zolpidem (AMBIEN) 10 MG tablet, TAKE 1/2 TO 1 TABLET(5 TO 10 MG) BY MOUTH EVERY NIGHT AS NEEDED FOR SLEEP, Disp: 30 tablet, Rfl: 5    Current Facility-Administered Medications:      lidocaine 1 % injection 2 mL, 2 mL, , , , 2 mL at 07/03/24 1056     triamcinolone (KENALOG-40) injection 40 mg, 40 mg, , , , 40 mg at 07/03/24 1056    Allergies -   Allergies   Allergen Reactions     Indomethacin      Other reaction(s): GI Upset     Fluoxetine      Other reaction(s): Thrush     Paroxetine      Other reaction(s): Thrush       Social History -   Social History     Socioeconomic History     Marital status:    Tobacco Use     Smoking status: Every Day     Current packs/day: 1.00     Average packs/day: 1 pack/day for 30.0 years (30.0 ttl pk-yrs)     Types: Cigarettes     Passive exposure: Past     Smokeless tobacco: Never   Vaping Use     Vaping status: Never Used   Substance and Sexual Activity     Alcohol use: Not Currently     Comment: 1 or 2 a month     Drug use: Yes     Types: Marijuana     Comment: cbd     Sexual activity: Yes     Partners: Male     Birth control/protection: None   Other Topics Concern     Parent/sibling w/ CABG, MI or angioplasty before 65F 55M? No     Social Drivers of Health     Financial Resource Strain: Low Risk  (2/22/2024)    Financial Resource Strain      Within the past 12 months, have you or your family members you live with been unable to get utilities (heat, electricity) when it was really needed?: No   Food Insecurity: High Risk (2/22/2024)    Food Insecurity      Within the past 12 months, did you worry that your food would run out before you got money to buy more?: Yes      Within the past 12  months, did the food you bought just not last and you didn t have money to get more?: Yes   Transportation Needs: Low Risk  (2/22/2024)    Transportation Needs      Within the past 12 months, has lack of transportation kept you from medical appointments, getting your medicines, non-medical meetings or appointments, work, or from getting things that you need?: No   Physical Activity: Insufficiently Active (2/22/2024)    Exercise Vital Sign      Days of Exercise per Week: 3 days      Minutes of Exercise per Session: 20 min   Stress: Stress Concern Present (2/22/2024)    [employer] [place] [employer] - Occupational Stress Questionnaire      Feeling of Stress : Rather much   Social Connections: Unknown (2/22/2024)    Social Connection and Isolation Panel [NHANES]      Frequency of Social Gatherings with Friends and Family: Twice a week   Interpersonal Safety: Low Risk  (2/27/2024)    Interpersonal Safety      Do you feel physically and emotionally safe where you currently live?: Yes      Within the past 12 months, have you been hit, slapped, kicked or otherwise physically hurt by someone?: No      Within the past 12 months, have you been humiliated or emotionally abused in other ways by your partner or ex-partner?: No   Housing Stability: Low Risk  (2/22/2024)    Housing Stability      Do you have housing? : Yes      Are you worried about losing your housing?: No       Family History -   Family History   Problem Relation Age of Onset     Diabetes Mother      Hypertension Mother      Hyperlipidemia Mother      Depression Mother      Substance Abuse Mother      Hypertension Father      Hyperlipidemia Father      Kidney Cancer Father      Other Cancer Father      Substance Abuse Father      Diabetes Maternal Grandmother      Depression Sister      Breast Cancer No family hx of        Review of Systems:   !.  Weight Loss: No   2. Difficulty Breathing: No   3. Difficulty Swallowing: No   4. Pain: No    Physical  Exam  B/P: Data Unavailable, T: Data Unavailable, P: Data Unavailable, R: Data Unavailable  Vitals: There were no vitals taken for this visit.  BMI= There is no height or weight on file to calculate BMI.    General  Appearance - Normal  Head/Face/Scalp:    Skin - Normal    Facial Palpation - Normal    Facial Strength - Normal  Ears:    Pinna - Normal    Canal - Normal   Tympanic membrane - Normal  Nose:    External - Normal    Septum - Normal    Turbinates - Normal    Middle meatus - Normal  Oral Cavity:    Lips - Normal    Floor of Mouth - Normal    Gingiva - Normal    Tongue - Normal    Buccal - Normal    Palate - Normal  Nasopharynx:    Oropharynx:    Tonsils - Normal    Tongue base - Normal    Soft palate - Normal    Posterior pharyngeal wall - Normal  Hypopharynx:  Larynx:    Epiglottis -     Aryepiglottic folds -     Arytenoids -     False vocal cords -     True vocal cords -  Neck Masses - No  Neck lymphatics - no lymphadenopathy  Thyroid - Normal  Salivary glands - Normal    Audiogram - not applicable  Radiology - not applicable   Reports:   View films:  Procedures - postauricular cyst excised, rightr ear. 1% lidocaine injected. 6 mm incision made around the cyst and the tissue removed with tissue scissors. $-O Monocryl used to close in a running fashion  Patient Education:     A/P - Apoorva Roberts is a 48 year old female  Medical Decision Making 1. Excision of right postauricular cyst      Again, thank you for allowing me to participate in the care of your patient.        Sincerely,        Misbah Iverson MD

## 2024-11-20 NOTE — NURSING NOTE
"Initial /76 (BP Location: Right arm, Patient Position: Chair, Cuff Size: Adult Regular)   Pulse 94   Temp 97.8  F (36.6  C) (Tympanic)   Wt 81.6 kg (180 lb)   BMI 32.92 kg/m   Estimated body mass index is 32.92 kg/m  as calculated from the following:    Height as of 9/29/24: 1.575 m (5' 2\").    Weight as of this encounter: 81.6 kg (180 lb). .  Laura Villalta LPN    "

## 2024-11-28 ENCOUNTER — MYC REFILL (OUTPATIENT)
Dept: FAMILY MEDICINE | Facility: CLINIC | Age: 49
End: 2024-11-28
Payer: COMMERCIAL

## 2024-11-28 DIAGNOSIS — R51.9 OCCIPITAL HEADACHE: ICD-10-CM

## 2024-11-28 DIAGNOSIS — I10 BENIGN ESSENTIAL HYPERTENSION: ICD-10-CM

## 2024-12-02 RX ORDER — BUTALBITAL, ACETAMINOPHEN AND CAFFEINE 50; 325; 40 MG/1; MG/1; MG/1
TABLET ORAL
Qty: 20 TABLET | Refills: 3 | OUTPATIENT
Start: 2024-12-02

## 2024-12-02 RX ORDER — HYDROCHLOROTHIAZIDE 25 MG/1
12.5 TABLET ORAL DAILY
Qty: 45 TABLET | Refills: 3 | Status: SHIPPED | OUTPATIENT
Start: 2024-12-02

## 2024-12-21 DIAGNOSIS — M25.511 ACUTE PAIN OF RIGHT SHOULDER: ICD-10-CM

## 2024-12-23 RX ORDER — METHOCARBAMOL 750 MG/1
TABLET, FILM COATED ORAL
Qty: 120 TABLET | Refills: 4 | Status: SHIPPED | OUTPATIENT
Start: 2024-12-23

## 2025-01-17 DIAGNOSIS — G62.89 OTHER POLYNEUROPATHY: ICD-10-CM

## 2025-01-20 RX ORDER — ZOLPIDEM TARTRATE 10 MG/1
TABLET ORAL
Qty: 30 TABLET | Refills: 5 | Status: SHIPPED | OUTPATIENT
Start: 2025-01-20

## 2025-01-27 DIAGNOSIS — G62.89 OTHER POLYNEUROPATHY: ICD-10-CM

## 2025-01-28 ENCOUNTER — PATIENT OUTREACH (OUTPATIENT)
Dept: CARE COORDINATION | Facility: CLINIC | Age: 50
End: 2025-01-28
Payer: COMMERCIAL

## 2025-01-28 RX ORDER — GABAPENTIN 400 MG/1
CAPSULE ORAL
Qty: 270 CAPSULE | Refills: 5 | Status: SHIPPED | OUTPATIENT
Start: 2025-01-28

## 2025-02-03 ENCOUNTER — TELEPHONE (OUTPATIENT)
Dept: OTOLARYNGOLOGY | Facility: CLINIC | Age: 50
End: 2025-02-03
Payer: COMMERCIAL

## 2025-02-03 NOTE — TELEPHONE ENCOUNTER
Calling pt as it looks like she saw someone in [place] already to have the cyst removed. Confirming if needing to cancel appt.    Maura Mclaughlin LPN

## 2025-02-11 ENCOUNTER — PRE VISIT (OUTPATIENT)
Dept: OTOLARYNGOLOGY | Facility: CLINIC | Age: 50
End: 2025-02-11

## 2025-02-11 ENCOUNTER — PATIENT OUTREACH (OUTPATIENT)
Dept: CARE COORDINATION | Facility: CLINIC | Age: 50
End: 2025-02-11

## 2025-02-13 ENCOUNTER — MYC MEDICAL ADVICE (OUTPATIENT)
Dept: FAMILY MEDICINE | Facility: CLINIC | Age: 50
End: 2025-02-13
Payer: COMMERCIAL

## 2025-02-13 DIAGNOSIS — R11.0 NAUSEA: ICD-10-CM

## 2025-02-13 DIAGNOSIS — K21.00 GASTROESOPHAGEAL REFLUX DISEASE WITH ESOPHAGITIS WITHOUT HEMORRHAGE: ICD-10-CM

## 2025-02-13 RX ORDER — ONDANSETRON 4 MG/1
4 TABLET, ORALLY DISINTEGRATING ORAL EVERY 8 HOURS PRN
Qty: 60 TABLET | Refills: 1 | Status: SHIPPED | OUTPATIENT
Start: 2025-02-13

## 2025-02-15 ENCOUNTER — HEALTH MAINTENANCE LETTER (OUTPATIENT)
Age: 50
End: 2025-02-15

## 2025-04-05 ENCOUNTER — HEALTH MAINTENANCE LETTER (OUTPATIENT)
Age: 50
End: 2025-04-05

## 2025-05-14 ENCOUNTER — E-VISIT (OUTPATIENT)
Dept: FAMILY MEDICINE | Facility: CLINIC | Age: 50
End: 2025-05-14
Payer: COMMERCIAL

## 2025-05-14 DIAGNOSIS — F41.0 PANIC ATTACK: ICD-10-CM

## 2025-05-14 DIAGNOSIS — F33.1 MAJOR DEPRESSIVE DISORDER, RECURRENT EPISODE, MODERATE (H): ICD-10-CM

## 2025-05-14 DIAGNOSIS — F41.1 GAD (GENERALIZED ANXIETY DISORDER): Primary | ICD-10-CM

## 2025-05-14 DIAGNOSIS — F43.10 PTSD (POST-TRAUMATIC STRESS DISORDER): ICD-10-CM

## 2025-05-14 ASSESSMENT — ANXIETY QUESTIONNAIRES
1. FEELING NERVOUS, ANXIOUS, OR ON EDGE: NEARLY EVERY DAY
7. FEELING AFRAID AS IF SOMETHING AWFUL MIGHT HAPPEN: NEARLY EVERY DAY
6. BECOMING EASILY ANNOYED OR IRRITABLE: NEARLY EVERY DAY
GAD7 TOTAL SCORE: 21
8. IF YOU CHECKED OFF ANY PROBLEMS, HOW DIFFICULT HAVE THESE MADE IT FOR YOU TO DO YOUR WORK, TAKE CARE OF THINGS AT HOME, OR GET ALONG WITH OTHER PEOPLE?: VERY DIFFICULT
3. WORRYING TOO MUCH ABOUT DIFFERENT THINGS: NEARLY EVERY DAY
5. BEING SO RESTLESS THAT IT IS HARD TO SIT STILL: NEARLY EVERY DAY
4. TROUBLE RELAXING: NEARLY EVERY DAY
GAD7 TOTAL SCORE: 21
IF YOU CHECKED OFF ANY PROBLEMS ON THIS QUESTIONNAIRE, HOW DIFFICULT HAVE THESE PROBLEMS MADE IT FOR YOU TO DO YOUR WORK, TAKE CARE OF THINGS AT HOME, OR GET ALONG WITH OTHER PEOPLE: VERY DIFFICULT
7. FEELING AFRAID AS IF SOMETHING AWFUL MIGHT HAPPEN: NEARLY EVERY DAY
2. NOT BEING ABLE TO STOP OR CONTROL WORRYING: NEARLY EVERY DAY

## 2025-05-15 RX ORDER — LORAZEPAM 1 MG/1
.5-1 TABLET ORAL EVERY 12 HOURS PRN
Qty: 30 TABLET | Refills: 2 | Status: SHIPPED | OUTPATIENT
Start: 2025-05-15

## 2025-05-15 RX ORDER — HYDROXYZINE HYDROCHLORIDE 50 MG/1
25-50 TABLET, FILM COATED ORAL EVERY 6 HOURS PRN
Qty: 90 TABLET | Refills: 5 | Status: SHIPPED | OUTPATIENT
Start: 2025-05-15

## 2025-05-15 RX ORDER — SERTRALINE HYDROCHLORIDE 100 MG/1
100 TABLET, FILM COATED ORAL DAILY
Qty: 90 TABLET | Refills: 1 | Status: SHIPPED | OUTPATIENT
Start: 2025-05-15

## 2025-05-15 NOTE — PATIENT INSTRUCTIONS
Thank you for choosing us for your care. I have placed an order for your treatment:   Orders Placed This Encounter   Medications     sertraline (ZOLOFT) 100 MG tablet     Sig: Take 1 tablet (100 mg) by mouth daily.     Dispense:  90 tablet     Refill:  1     hydrOXYzine HCl (ATARAX) 50 MG tablet     Sig: Take 0.5-1 tablets (25-50 mg) by mouth every 6 hours as needed for anxiety or other (sleep).     Dispense:  90 tablet     Refill:  5     LORazepam (ATIVAN) 1 MG tablet     Sig: Take 0.5-1 tablets (0.5-1 mg) by mouth every 12 hours as needed for anxiety. Max 2 tabs/day     Dispense:  30 tablet     Refill:  2    View your full visit summary for details by clicking on the link below. Your pharmacist will able to address any questions you may have about the medication.      If you're not feeling better within 4-6 weeks please schedule an appointment.  You can schedule an appointment right here in [facility], or call [phone]  If the visit is for the same symptoms as your eVisit, we'll refund the cost of your eVisit if seen within seven days.      Hi Apoorva- Increase your Sertraline to 100 mg. Use the Hydroxyzine as needed for anxiety, sleep, panic attacks. I also sent Lorazepam but I want you to use that sparingly as it is very addictive. Follow up with me in a few weeks on how things are going. Please let us know if you have any questions.     Take care,   ISAAC Dean CNP  [facility]

## 2025-06-30 DIAGNOSIS — G62.89 OTHER POLYNEUROPATHY: ICD-10-CM

## 2025-06-30 RX ORDER — ZOLPIDEM TARTRATE 10 MG/1
TABLET ORAL
Qty: 30 TABLET | Refills: 5 | Status: SHIPPED | OUTPATIENT
Start: 2025-06-30

## 2025-07-03 ENCOUNTER — E-VISIT (OUTPATIENT)
Dept: FAMILY MEDICINE | Facility: CLINIC | Age: 50
End: 2025-07-03

## 2025-07-03 DIAGNOSIS — L72.3 SEBACEOUS CYST: Primary | ICD-10-CM

## 2025-07-03 RX ORDER — CEPHALEXIN 500 MG/1
500 CAPSULE ORAL 3 TIMES DAILY
Qty: 21 CAPSULE | Refills: 0 | Status: SHIPPED | OUTPATIENT
Start: 2025-07-03 | End: 2025-07-10

## 2025-07-08 ENCOUNTER — E-VISIT (OUTPATIENT)
Dept: FAMILY MEDICINE | Facility: CLINIC | Age: 50
End: 2025-07-08

## 2025-07-08 DIAGNOSIS — R69 DIAGNOSIS UNKNOWN: Primary | ICD-10-CM

## 2025-07-08 PROCEDURE — 99207 PR NON-BILLABLE SERV PER CHARTING: CPT | Performed by: NURSE PRACTITIONER

## 2025-07-08 NOTE — PATIENT INSTRUCTIONS
Dear Apoorva,    We are sorry you are not feeling well. Based on the responses you provided, it is recommended that you be seen in-person in clinic so we can better evaluate your symptoms. Please schedule this visit in [place]. You will have a Schedule Now button in [place] to help with scheduling this appointment. Otherwise, you can call [phone] to schedule an appointment.     You will not be charged for this eVisit. Thank you for trusting us with your care.     ISAAC Dodson CNP

## 2025-07-10 ENCOUNTER — HOSPITAL ENCOUNTER (EMERGENCY)
Facility: CLINIC | Age: 50
Discharge: HOME | End: 2025-07-10
Attending: FAMILY MEDICINE

## 2025-07-10 VITALS
BODY MASS INDEX: 33.13 KG/M2 | TEMPERATURE: 98.5 F | DIASTOLIC BLOOD PRESSURE: 92 MMHG | SYSTOLIC BLOOD PRESSURE: 155 MMHG | OXYGEN SATURATION: 99 % | HEART RATE: 85 BPM | RESPIRATION RATE: 20 BRPM | HEIGHT: 62 IN | WEIGHT: 180 LBS

## 2025-07-10 DIAGNOSIS — L72.0 INFECTED EPIDERMOID CYST: ICD-10-CM

## 2025-07-10 DIAGNOSIS — L08.9 INFECTED EPIDERMOID CYST: ICD-10-CM

## 2025-07-10 PROCEDURE — 10060 I&D ABSCESS SIMPLE/SINGLE: CPT | Performed by: FAMILY MEDICINE

## 2025-07-10 PROCEDURE — 99283 EMERGENCY DEPT VISIT LOW MDM: CPT | Mod: 25 | Performed by: FAMILY MEDICINE

## 2025-07-10 ASSESSMENT — COLUMBIA-SUICIDE SEVERITY RATING SCALE - C-SSRS
1. IN THE PAST MONTH, HAVE YOU WISHED YOU WERE DEAD OR WISHED YOU COULD GO TO SLEEP AND NOT WAKE UP?: NO
2. HAVE YOU ACTUALLY HAD ANY THOUGHTS OF KILLING YOURSELF IN THE PAST MONTH?: NO
6. HAVE YOU EVER DONE ANYTHING, STARTED TO DO ANYTHING, OR PREPARED TO DO ANYTHING TO END YOUR LIFE?: NO

## 2025-07-10 ASSESSMENT — ACTIVITIES OF DAILY LIVING (ADL): ADLS_ACUITY_SCORE: 41

## 2025-07-10 NOTE — ED TRIAGE NOTES
Pt here here concern for infection behind her R ear. Has been on keflex for a week.      Triage Assessment (Adult)       Row Name 07/10/25 4851          Triage Assessment    Airway WDL WDL        Respiratory WDL    Respiratory WDL WDL        Skin Circulation/Temperature WDL    Skin Circulation/Temperature WDL WDL        Cardiac WDL    Cardiac WDL WDL        Peripheral/Neurovascular WDL    Peripheral Neurovascular WDL WDL        Cognitive/Neuro/Behavioral WDL    Cognitive/Neuro/Behavioral WDL WDL

## 2025-07-11 NOTE — DISCHARGE INSTRUCTIONS
RETURN TO THE EMERGENCY ROOM FOR THE FOLLOWING:    Severely worsened pain, expanding redness/swelling/tenderness, fever greater than 101, or at anytime for any concern.    FOLLOW UP:    With your primary clinic or general surgeon as needed.    TREATMENT RECOMMENDATIONS:    Ibuprofen 600 mg every six hours for pain (7 days duration).  Tylenol 1000 mg every six hours for pain (7 days duration).  Therefore, you can alternate these every three hours and do it safely.  ONLY take these medications if it is safe to do so given your medical history.    NURSE ADVICE LINE:  [phone] or [phone]

## 2025-07-11 NOTE — ED NOTES
lump behind R. ear. Patient had cyst surgically removed behind R.ear in Nov 24, new cyst developed 2 weeks ago, has been on Kelfex for 1 weeks, reports symptoms getting worse

## 2025-07-11 NOTE — ED PROVIDER NOTES
"  HPI   Patient is a 49-year-old female presenting with a painful swollen lump behind her right ear.  Per my chart review, the patient has had a similar circumstance previously.  She had an epidermal cyst removed but it \"grew back, \"per the patient.  The lump has been present over the past 6 months to a year.  Over the past week it has become larger and is painful.  She has been on antibiotics without improvement.  No drainage.  No fever.  No other systemic symptoms.  No trauma or injury reported.      Allergies:  Allergies   Allergen Reactions    Indomethacin      Other reaction(s): GI Upset    Fluoxetine      Other reaction(s): Thrush    Paroxetine      Other reaction(s): Thrush     Problem List:    Patient Active Problem List    Diagnosis Date Noted    Class 2 severe obesity due to excess calories with serious comorbidity in adult (H) 10/10/2024     Priority: Medium    Hyperlipidemia LDL goal <130 02/27/2024     Priority: Medium    Chronic superficial gastritis without bleeding 02/27/2024     Priority: Medium    Hiatal hernia 02/27/2024     Priority: Medium    Diarrhea due to malabsorption 02/27/2024     Priority: Medium    History of peptic ulcer 02/27/2024     Priority: Medium    Occipital headache 03/30/2023     Priority: Medium    Vitamin B12 deficiency (non anemic) 01/03/2023     Priority: Medium    Restless leg syndrome 01/03/2023     Priority: Medium    Elevated C-reactive protein (CRP) 01/03/2023     Priority: Medium    PTSD (post-traumatic stress disorder) 08/04/2022     Priority: Medium    [place] (generalized anxiety disorder) 08/04/2022     Priority: Medium    Cervical high risk HPV (human papillomavirus) test positive 09/02/2021     Priority: Medium     8/27/21 NIL Pap, + HR HPV (neg 16/18). Plan cotest in 1 year.   8/4/22 LSIL, +HR HPV (not 16/18). Plan: colposcopy due before 11/4/22 2/14/23 Lost to follow-up for pap tracking   2/27/24 NIL pap, +HR HPV (not 16/18). Plan: colp due before 5/27/24  3/5/24 " "left message / mychart sent / mychart read  4/29/24 Reminder mychart  5/28/24 [place] not done. Tracking updated for 6 mo colp/pap due 8/27/24.    8/6/24 Reminder mychart -- read  9/11/24 Lost to follow-up for pap tracking       Marijuana use, continuous 09/17/2020     Priority: Medium    Tobacco use disorder 09/17/2020     Priority: Medium    Migraine without aura and without status migrainosus, not intractable 09/17/2020     Priority: Medium    Chronic daily headache 11/06/2019     Priority: Medium     Improved on daily Tizanidine- has seen Neurology      Peripheral neuropathy 11/06/2019     Priority: Medium     After hernia surgery in 2014- was told the surgeon \"nicked\" a couple nerves on the left      Gastroesophageal reflux disease 12/05/2017     Priority: Medium    Benign essential hypertension 12/05/2017     Priority: Medium    Major depressive disorder, recurrent episode, moderate (H) 12/05/2017     Priority: Medium    Hidradenitis suppurativa 09/11/2017     Priority: Medium      Past Medical History:    Past Medical History:   Diagnosis Date    Cervical high risk HPV (human papillomavirus) test positive 08/27/2021    Class 2 severe obesity due to excess calories with serious comorbidity in adult (H) 10/10/2024    Community acquired pneumonia 12/05/2017    Depressive disorder     Gastroesophageal reflux disease     Hiatal hernia     Hypertension     Other chronic pain     Walking troubles      Past Surgical History:    Past Surgical History:   Procedure Laterality Date    ABDOMEN SURGERY      APPENDECTOMY      CHOLECYSTECTOMY      ESOPHAGOSCOPY, GASTROSCOPY, DUODENOSCOPY (EGD), COMBINED N/A 10/8/2021    Procedure: ESOPHAGOGASTRODUODENOSCOPY, WITH BIOPSY;  Surgeon: Slim Randle DO;  Location: [facility]    ESOPHAGOSCOPY, GASTROSCOPY, DUODENOSCOPY (EGD), COMBINED N/A 5/8/2023    Procedure: ESOPHAGOGASTRODUODENOSCOPY, WITH BIOPSY;  Surgeon: Clemente Strong MD;  Location: [facility]    GYN SURGERY   "    HERNIA REPAIR      HYSTERECTOMY, PAP STILL INDICATED      Cervix still present     Family History:    Family History   Problem Relation Age of Onset    Diabetes Mother     Hypertension Mother     Hyperlipidemia Mother     Depression Mother     Substance Abuse Mother     Hypertension Father     Hyperlipidemia Father     Kidney Cancer Father     Other Cancer Father     Substance Abuse Father     Depression Sister     Diabetes Maternal Grandmother     Breast Cancer No family hx of      Social History:  Marital Status:   [2]  Social History     Tobacco Use    Smoking status: Every Day     Current packs/day: 1.00     Average packs/day: 1 pack/day for 30.0 years (30.0 ttl pk-yrs)     Types: Cigarettes     Passive exposure: Past    Smokeless tobacco: Never   Vaping Use    Vaping status: Never Used   Substance Use Topics    Alcohol use: Not Currently     Comment: 1 or 2 a month    Drug use: Yes     Types: Marijuana     Comment: cbd      Medications:    hydrochlorothiazide (HYDRODIURIL) 25 MG tablet  acetaminophen (TYLENOL) 325 MG tablet  albuterol (PROAIR HFA/PROVENTIL HFA/VENTOLIN HFA) 108 (90 Base) MCG/ACT inhaler  busPIRone HCl (BUSPAR) 30 MG tablet  butalbital-acetaminophen-caffeine (ESGIC) -40 MG tablet  cephALEXin (KEFLEX) 500 MG capsule  cholestyramine (QUESTRAN) 4 GM/DOSE powder  gabapentin (NEURONTIN) 400 MG capsule  hydrOXYzine HCl (ATARAX) 50 MG tablet  IBUPROFEN PO  ipratropium - albuterol 0.5 mg/2.5 mg/3 mL (DUONEB) 0.5-2.5 (3) MG/3ML neb solution  lisinopril (ZESTRIL) 10 MG tablet  Loratadine-Pseudoephedrine (CLARITIN-D 24 HOUR PO)  LORazepam (ATIVAN) 1 MG tablet  methocarbamol (ROBAXIN) 750 MG tablet  ondansetron (ZOFRAN ODT) 4 MG ODT tab  RABEprazole (ACIPHEX) 20 MG EC tablet  rosuvastatin (CRESTOR) 20 MG tablet  sertraline (ZOLOFT) 100 MG tablet  zolpidem (AMBIEN) 10 MG tablet      Review of Systems   All other systems reviewed and are negative.      PE   BP: (!) 155/92  Pulse: 85  Temp:  "98.5  F (36.9  C)  Resp: 20  Height: 157.5 cm (5' 2\")  Weight: 81.6 kg (180 lb)  SpO2: 99 %  Physical Exam  Vitals reviewed.   Constitutional:       Appearance: She is well-developed.   HENT:      Head: Normocephalic and atraumatic.      Right Ear: External ear normal.      Left Ear: External ear normal.      Nose: Nose normal.      Mouth/Throat:      Mouth: Mucous membranes are moist.      Pharynx: Oropharynx is clear.   Eyes:      Extraocular Movements: Extraocular movements intact.      Conjunctiva/sclera: Conjunctivae normal.      Pupils: Pupils are equal, round, and reactive to light.      Comments: Fluctuant mass behind the right ear.  No drainage.  No surrounding redness, tenderness, swelling   Cardiovascular:      Rate and Rhythm: Normal rate and regular rhythm.   Pulmonary:      Effort: Pulmonary effort is normal.   Musculoskeletal:         General: Normal range of motion.      Cervical back: Normal range of motion.   Skin:     General: Skin is warm and dry.   Neurological:      Mental Status: She is oriented to person, place, and time.   Psychiatric:         Behavior: Behavior normal.         ED COURSE and MDM   1946.  Patient with  epidermal cyst that is infected behind the right ear.  Patient agreeable to incision and drainage.  We talked about risks and benefits of doing so.  She may need follow-up and permanent removal at some future date.    2005.  I anesthetized the skin with lidocaine 1%, no epinephrine.  I then used an 11 blade to make a 1 cm incision.  There was purulent and then sebaceous material that came out.  I attempted to remove the sac.  No complications.  Bleeding controlled.  No antibiotics added at this time.    Electronic medical chart reviewed, including medical problems, medications, medical allergies, social history.  Recent hospitalizations and surgical procedures reviewed.  Recent clinic visits and consultations reviewed.  Recent labs and test results reviewed.  Nursing notes " reviewed.    The patient, their parent if applicable, and/or their medical decision maker(s) and I have reviewed all of the available historical information, applicable PMH, physical exam findings, and objective diagnostic data gathered during this ED visit.  We then discussed all work-up options and then together agreed upon the course taken during this visit.  The ultimate disposition and plan was a cooperative decision made between myself and the patient, their parent if applicable, and/or their legal decision maker(s).  The risks and benefits of all decisions made during this visit were discussed to the best of my abilities given the circumstances, and all parties are understanding of the pertinent ramifications of these decisions.      LABS  Labs Ordered and Resulted from Time of ED Arrival to Time of ED Departure - No data to display    IMAGING  Images reviewed by me.  Radiology report also reviewed.  No orders to display       Procedures    Medications - No data to display      IMPRESSION       ICD-10-CM    1. Infected epidermoid cyst  L72.0     L08.9                Medication List      There are no discharge medications for this visit.                             Armando Tena MD  07/10/25 2006

## 2025-07-21 DIAGNOSIS — R51.9 OCCIPITAL HEADACHE: ICD-10-CM

## 2025-07-21 RX ORDER — BUTALBITAL, ACETAMINOPHEN AND CAFFEINE 50; 325; 40 MG/1; MG/1; MG/1
TABLET ORAL
Qty: 20 TABLET | OUTPATIENT
Start: 2025-07-21

## 2025-08-05 ENCOUNTER — MYC REFILL (OUTPATIENT)
Dept: FAMILY MEDICINE | Facility: CLINIC | Age: 50
End: 2025-08-05

## 2025-08-05 ENCOUNTER — E-VISIT (OUTPATIENT)
Dept: FAMILY MEDICINE | Facility: CLINIC | Age: 50
End: 2025-08-05

## 2025-08-05 DIAGNOSIS — G43.009 MIGRAINE WITHOUT AURA AND WITHOUT STATUS MIGRAINOSUS, NOT INTRACTABLE: Primary | ICD-10-CM

## 2025-08-05 DIAGNOSIS — F41.0 PANIC ATTACK: ICD-10-CM

## 2025-08-05 DIAGNOSIS — I10 BENIGN ESSENTIAL HYPERTENSION: ICD-10-CM

## 2025-08-05 DIAGNOSIS — R51.9 OCCIPITAL HEADACHE: ICD-10-CM

## 2025-08-05 PROCEDURE — 99207 PR NON-BILLABLE SERV PER CHARTING: CPT | Performed by: NURSE PRACTITIONER

## 2025-08-05 RX ORDER — LORAZEPAM 1 MG/1
TABLET ORAL
Qty: 30 TABLET | Refills: 0 | Status: SHIPPED | OUTPATIENT
Start: 2025-08-05

## 2025-08-05 RX ORDER — BUTALBITAL, ACETAMINOPHEN AND CAFFEINE 50; 325; 40 MG/1; MG/1; MG/1
TABLET ORAL
Qty: 20 TABLET | Refills: 3 | Status: CANCELLED | OUTPATIENT
Start: 2025-08-05

## 2025-08-06 RX ORDER — LISINOPRIL 10 MG/1
10 TABLET ORAL DAILY
Qty: 90 TABLET | Refills: 3 | OUTPATIENT
Start: 2025-08-06

## 2025-08-11 ENCOUNTER — PATIENT OUTREACH (OUTPATIENT)
Dept: CARE COORDINATION | Facility: CLINIC | Age: 50
End: 2025-08-11

## 2025-08-26 ENCOUNTER — PATIENT OUTREACH (OUTPATIENT)
Dept: CARE COORDINATION | Facility: CLINIC | Age: 50
End: 2025-08-26

## (undated) RX ORDER — ACETAMINOPHEN 325 MG/1
TABLET ORAL
Status: DISPENSED
Start: 2021-10-08

## (undated) RX ORDER — PROPOFOL 10 MG/ML
INJECTION, EMULSION INTRAVENOUS
Status: DISPENSED
Start: 2021-10-08

## (undated) RX ORDER — GLYCOPYRROLATE 0.2 MG/ML
INJECTION, SOLUTION INTRAMUSCULAR; INTRAVENOUS
Status: DISPENSED
Start: 2021-10-08